# Patient Record
Sex: MALE | Race: OTHER | Employment: FULL TIME
[De-identification: names, ages, dates, MRNs, and addresses within clinical notes are randomized per-mention and may not be internally consistent; named-entity substitution may affect disease eponyms.]

---

## 2017-01-02 ENCOUNTER — TELEPHONE (OUTPATIENT)
Dept: FAMILY MEDICINE CLINIC | Facility: CLINIC | Age: 37
End: 2017-01-02

## 2017-01-02 PROBLEM — F40.10 SOCIAL ANXIETY DISORDER: Status: ACTIVE | Noted: 2017-01-02

## 2017-01-02 PROBLEM — I10 ESSENTIAL HYPERTENSION: Status: ACTIVE | Noted: 2017-01-02

## 2017-03-06 ENCOUNTER — TELEPHONE (OUTPATIENT)
Dept: FAMILY MEDICINE CLINIC | Facility: CLINIC | Age: 37
End: 2017-03-06

## 2017-03-06 DIAGNOSIS — F40.10 SOCIAL ANXIETY DISORDER: Primary | ICD-10-CM

## 2017-03-08 RX ORDER — BUSPIRONE HYDROCHLORIDE 10 MG/1
10 TABLET ORAL 3 TIMES DAILY
Qty: 90 TABLET | Refills: 1 | Status: SHIPPED | OUTPATIENT
Start: 2017-03-08 | End: 2017-08-18 | Stop reason: SDUPTHER

## 2017-03-19 DIAGNOSIS — I10 ESSENTIAL HYPERTENSION: ICD-10-CM

## 2017-03-19 RX ORDER — LISINOPRIL 5 MG/1
TABLET ORAL
Qty: 90 TABLET | Refills: 0 | Status: SHIPPED | OUTPATIENT
Start: 2017-03-19 | End: 2017-04-07 | Stop reason: SDUPTHER

## 2017-03-22 ENCOUNTER — HOSPITAL ENCOUNTER (OUTPATIENT)
Age: 37
Discharge: HOME | End: 2017-03-22
Payer: MEDICAID

## 2017-03-22 ENCOUNTER — OFFICE VISIT (OUTPATIENT)
Dept: FAMILY MEDICINE CLINIC | Age: 37
End: 2017-03-22
Payer: MEDICAID

## 2017-03-22 VITALS
SYSTOLIC BLOOD PRESSURE: 125 MMHG | HEIGHT: 74 IN | TEMPERATURE: 98.2 F | DIASTOLIC BLOOD PRESSURE: 74 MMHG | WEIGHT: 264 LBS | OXYGEN SATURATION: 97 % | HEART RATE: 78 BPM | RESPIRATION RATE: 18 BRPM | BODY MASS INDEX: 33.88 KG/M2

## 2017-03-22 DIAGNOSIS — R94.31 ABNORMAL EKG: ICD-10-CM

## 2017-03-22 DIAGNOSIS — I10 ESSENTIAL HYPERTENSION: Primary | ICD-10-CM

## 2017-03-22 DIAGNOSIS — F33.2 SEVERE EPISODE OF RECURRENT MAJOR DEPRESSIVE DISORDER, WITHOUT PSYCHOTIC FEATURES (HCC): ICD-10-CM

## 2017-03-22 DIAGNOSIS — R53.83 OTHER FATIGUE: ICD-10-CM

## 2017-03-22 DIAGNOSIS — J45.40 MODERATE PERSISTENT ASTHMA WITHOUT COMPLICATION: ICD-10-CM

## 2017-03-22 DIAGNOSIS — I11.9 LVH (LEFT VENTRICULAR HYPERTROPHY) DUE TO HYPERTENSIVE DISEASE, WITHOUT HEART FAILURE: ICD-10-CM

## 2017-03-22 DIAGNOSIS — E78.5 HYPERLIPIDEMIA WITH TARGET LDL LESS THAN 100: ICD-10-CM

## 2017-03-22 DIAGNOSIS — M13.0 SERONEGATIVE POLYARTHRITIS: ICD-10-CM

## 2017-03-22 DIAGNOSIS — Z23 NEED FOR TDAP VACCINATION: ICD-10-CM

## 2017-03-22 DIAGNOSIS — R73.9 HYPERGLYCEMIA: ICD-10-CM

## 2017-03-22 DIAGNOSIS — E66.9 OBESITY (BMI 30-39.9): ICD-10-CM

## 2017-03-22 DIAGNOSIS — Z13.31 POSITIVE DEPRESSION SCREENING: ICD-10-CM

## 2017-03-22 DIAGNOSIS — R61 EXCESSIVE SWEATING: ICD-10-CM

## 2017-03-22 DIAGNOSIS — R53.82 CHRONIC FATIGUE: ICD-10-CM

## 2017-03-22 PROBLEM — K75.2 NONSPECIFIC REACTIVE HEPATITIS: Status: ACTIVE | Noted: 2017-03-22

## 2017-03-22 LAB
ABSOLUTE EOS #: 0.3 K/UL (ref 0–0.4)
ABSOLUTE LYMPH #: 2.3 K/UL (ref 1–4.8)
ABSOLUTE MONO #: 0.7 K/UL (ref 0.1–1.3)
ALBUMIN SERPL-MCNC: 4.3 G/DL (ref 3.5–5.2)
ALBUMIN/GLOBULIN RATIO: ABNORMAL (ref 1–2.5)
ALP BLD-CCNC: 81 U/L (ref 40–129)
ALT SERPL-CCNC: 40 U/L (ref 5–41)
ANION GAP SERPL CALCULATED.3IONS-SCNC: 12 MMOL/L (ref 9–17)
AST SERPL-CCNC: 46 U/L
BASOPHILS # BLD: 1 % (ref 0–2)
BASOPHILS ABSOLUTE: 0.1 K/UL (ref 0–0.2)
BILIRUB SERPL-MCNC: 0.48 MG/DL (ref 0.3–1.2)
BUN BLDV-MCNC: 13 MG/DL (ref 6–20)
BUN/CREAT BLD: ABNORMAL (ref 9–20)
C-REACTIVE PROTEIN: 3.5 MG/L (ref 0–5)
CALCIUM SERPL-MCNC: 9.7 MG/DL (ref 8.6–10.4)
CHLORIDE BLD-SCNC: 103 MMOL/L (ref 98–107)
CHOLESTEROL/HDL RATIO: 7.3
CHOLESTEROL: 290 MG/DL
CO2: 26 MMOL/L (ref 20–31)
CREAT SERPL-MCNC: 0.77 MG/DL (ref 0.7–1.2)
DIFFERENTIAL TYPE: ABNORMAL
EOSINOPHILS RELATIVE PERCENT: 4 % (ref 0–4)
GFR AFRICAN AMERICAN: >60 ML/MIN
GFR NON-AFRICAN AMERICAN: >60 ML/MIN
GFR SERPL CREATININE-BSD FRML MDRD: ABNORMAL ML/MIN/{1.73_M2}
GFR SERPL CREATININE-BSD FRML MDRD: ABNORMAL ML/MIN/{1.73_M2}
GLUCOSE BLD-MCNC: 87 MG/DL (ref 70–99)
HCT VFR BLD CALC: 48.1 % (ref 41–53)
HDLC SERPL-MCNC: 40 MG/DL
HEMOGLOBIN: 16.2 G/DL (ref 13.5–17.5)
LDL CHOLESTEROL: 194 MG/DL (ref 0–130)
LYMPHOCYTES # BLD: 31 % (ref 24–44)
MCH RBC QN AUTO: 30.7 PG (ref 26–34)
MCHC RBC AUTO-ENTMCNC: 33.5 G/DL (ref 31–37)
MCV RBC AUTO: 91.5 FL (ref 80–100)
MONOCYTES # BLD: 9 % (ref 1–7)
PDW BLD-RTO: 14.3 % (ref 11.5–14.9)
PLATELET # BLD: 314 K/UL (ref 150–450)
PLATELET ESTIMATE: ABNORMAL
PMV BLD AUTO: 9.2 FL (ref 6–12)
POTASSIUM SERPL-SCNC: 4.7 MMOL/L (ref 3.7–5.3)
RBC # BLD: 5.26 M/UL (ref 4.5–5.9)
RBC # BLD: ABNORMAL 10*6/UL
SEDIMENTATION RATE, ERYTHROCYTE: 1 MM (ref 0–15)
SEG NEUTROPHILS: 55 % (ref 36–66)
SEGMENTED NEUTROPHILS ABSOLUTE COUNT: 4.1 K/UL (ref 1.3–9.1)
SODIUM BLD-SCNC: 141 MMOL/L (ref 135–144)
TOTAL PROTEIN: 7.6 G/DL (ref 6.4–8.3)
TRIGL SERPL-MCNC: 278 MG/DL
TSH SERPL DL<=0.05 MIU/L-ACNC: 3.66 MIU/L (ref 0.3–5)
VLDLC SERPL CALC-MCNC: ABNORMAL MG/DL (ref 1–30)
WBC # BLD: 7.5 K/UL (ref 3.5–11)
WBC # BLD: ABNORMAL 10*3/UL

## 2017-03-22 PROCEDURE — 84443 ASSAY THYROID STIM HORMONE: CPT

## 2017-03-22 PROCEDURE — 85025 COMPLETE CBC W/AUTO DIFF WBC: CPT

## 2017-03-22 PROCEDURE — 36415 COLL VENOUS BLD VENIPUNCTURE: CPT

## 2017-03-22 PROCEDURE — 96160 PT-FOCUSED HLTH RISK ASSMT: CPT | Performed by: FAMILY MEDICINE

## 2017-03-22 PROCEDURE — 80053 COMPREHEN METABOLIC PANEL: CPT

## 2017-03-22 PROCEDURE — 83036 HEMOGLOBIN GLYCOSYLATED A1C: CPT

## 2017-03-22 PROCEDURE — 99215 OFFICE O/P EST HI 40 MIN: CPT | Performed by: FAMILY MEDICINE

## 2017-03-22 PROCEDURE — 85651 RBC SED RATE NONAUTOMATED: CPT

## 2017-03-22 PROCEDURE — 86140 C-REACTIVE PROTEIN: CPT

## 2017-03-22 PROCEDURE — G8431 POS CLIN DEPRES SCRN F/U DOC: HCPCS | Performed by: FAMILY MEDICINE

## 2017-03-22 PROCEDURE — 80061 LIPID PANEL: CPT

## 2017-03-22 RX ORDER — ATORVASTATIN CALCIUM 20 MG/1
20 TABLET, FILM COATED ORAL DAILY
Qty: 30 TABLET | Refills: 6 | Status: SHIPPED | OUTPATIENT
Start: 2017-03-22 | End: 2017-04-07 | Stop reason: SDUPTHER

## 2017-03-22 RX ORDER — FLUTICASONE PROPIONATE 110 UG/1
2 AEROSOL, METERED RESPIRATORY (INHALATION) 2 TIMES DAILY
Qty: 1 INHALER | Refills: 3 | Status: SHIPPED | OUTPATIENT
Start: 2017-03-22 | End: 2017-04-07 | Stop reason: SDUPTHER

## 2017-03-22 RX ORDER — BLOOD PRESSURE TEST KIT
KIT MISCELLANEOUS
Qty: 1 KIT | Refills: 0 | Status: SHIPPED | OUTPATIENT
Start: 2017-03-22

## 2017-03-22 RX ORDER — BUPROPION HYDROCHLORIDE 150 MG/1
150 TABLET ORAL EVERY MORNING
Qty: 30 TABLET | Refills: 3 | Status: SHIPPED | OUTPATIENT
Start: 2017-03-22 | End: 2017-04-07 | Stop reason: SDUPTHER

## 2017-03-22 RX ORDER — AMLODIPINE BESYLATE 2.5 MG/1
2.5 TABLET ORAL DAILY
Qty: 30 TABLET | Refills: 3 | Status: SHIPPED | OUTPATIENT
Start: 2017-03-22 | End: 2017-04-07 | Stop reason: SDUPTHER

## 2017-03-22 ASSESSMENT — PATIENT HEALTH QUESTIONNAIRE - PHQ9
6. FEELING BAD ABOUT YOURSELF - OR THAT YOU ARE A FAILURE OR HAVE LET YOURSELF OR YOUR FAMILY DOWN: 3
2. FEELING DOWN, DEPRESSED OR HOPELESS: 3
SUM OF ALL RESPONSES TO PHQ QUESTIONS 1-9: 23
10. IF YOU CHECKED OFF ANY PROBLEMS, HOW DIFFICULT HAVE THESE PROBLEMS MADE IT FOR YOU TO DO YOUR WORK, TAKE CARE OF THINGS AT HOME, OR GET ALONG WITH OTHER PEOPLE: 1
3. TROUBLE FALLING OR STAYING ASLEEP: 3
5. POOR APPETITE OR OVEREATING: 3
1. LITTLE INTEREST OR PLEASURE IN DOING THINGS: 3
SUM OF ALL RESPONSES TO PHQ9 QUESTIONS 1 & 2: 6
7. TROUBLE CONCENTRATING ON THINGS, SUCH AS READING THE NEWSPAPER OR WATCHING TELEVISION: 2
8. MOVING OR SPEAKING SO SLOWLY THAT OTHER PEOPLE COULD HAVE NOTICED. OR THE OPPOSITE, BEING SO FIGETY OR RESTLESS THAT YOU HAVE BEEN MOVING AROUND A LOT MORE THAN USUAL: 2
9. THOUGHTS THAT YOU WOULD BE BETTER OFF DEAD, OR OF HURTING YOURSELF: 2
4. FEELING TIRED OR HAVING LITTLE ENERGY: 2

## 2017-03-22 ASSESSMENT — ASTHMA QUESTIONNAIRES
QUESTION_2 LAST FOUR WEEKS HOW OFTEN HAVE YOU HAD SHORTNESS OF BREATH: 3
ACT_TOTALSCORE: 16
QUESTION_3 LAST FOUR WEEKS HOW OFTEN DID YOUR ASTHMA SYMPTOMS (WHEEZING, COUGHING, SHORTNESS OF BREATH, CHEST TIGHTNESS OR PAIN) WAKE YOU UP AT NIGHT OR EARLIER THAN USUAL IN THE MORNING: 5
QUESTION_4 LAST FOUR WEEKS HOW OFTEN HAVE YOU USED YOUR RESCUE INHALER OR NEBULIZER MEDICATION (SUCH AS ALBUTEROL): 2
QUESTION_1 LAST FOUR WEEKS HOW MUCH OF THE TIME DID YOUR ASTHMA KEEP YOU FROM GETTING AS MUCH DONE AT WORK, SCHOOL OR AT HOME: 4
QUESTION_5 LAST FOUR WEEKS HOW WOULD YOU RATE YOUR ASTHMA CONTROL: 2

## 2017-03-22 ASSESSMENT — ENCOUNTER SYMPTOMS
WHEEZING: 0
COUGH: 1
ABDOMINAL DISTENTION: 0
VOMITING: 0
CONSTIPATION: 0
DIARRHEA: 0
CHEST TIGHTNESS: 0
NAUSEA: 0
ABDOMINAL PAIN: 0
SHORTNESS OF BREATH: 1

## 2017-03-23 PROBLEM — Z13.31 POSITIVE DEPRESSION SCREENING: Status: ACTIVE | Noted: 2017-03-23

## 2017-03-23 PROBLEM — R94.31 ABNORMAL EKG: Status: ACTIVE | Noted: 2017-03-23

## 2017-03-23 PROBLEM — E78.5 HYPERLIPIDEMIA WITH TARGET LDL LESS THAN 100: Status: ACTIVE | Noted: 2017-03-23

## 2017-03-23 PROBLEM — I11.9 LVH (LEFT VENTRICULAR HYPERTROPHY) DUE TO HYPERTENSIVE DISEASE: Status: ACTIVE | Noted: 2017-03-23

## 2017-03-23 PROBLEM — F33.1 MODERATE EPISODE OF RECURRENT MAJOR DEPRESSIVE DISORDER (HCC): Status: ACTIVE | Noted: 2017-03-23

## 2017-03-23 PROBLEM — J45.40 MODERATE PERSISTENT ASTHMA WITHOUT COMPLICATION: Status: ACTIVE | Noted: 2017-03-23

## 2017-03-23 PROBLEM — R73.9 HYPERGLYCEMIA: Status: ACTIVE | Noted: 2017-03-23

## 2017-03-23 PROBLEM — R61 EXCESSIVE SWEATING: Status: ACTIVE | Noted: 2017-03-23

## 2017-03-23 PROBLEM — E66.9 OBESITY (BMI 30-39.9): Status: ACTIVE | Noted: 2017-03-23

## 2017-03-23 LAB
ESTIMATED AVERAGE GLUCOSE: 111 MG/DL
HBA1C MFR BLD: 5.5 % (ref 4–6)

## 2017-03-24 DIAGNOSIS — B35.3 TINEA PEDIS OF RIGHT FOOT: Primary | ICD-10-CM

## 2017-03-24 DIAGNOSIS — B35.1 ONYCHOMYCOSIS: ICD-10-CM

## 2017-03-24 DIAGNOSIS — K75.2 NONSPECIFIC REACTIVE HEPATITIS: ICD-10-CM

## 2017-03-24 DIAGNOSIS — Z51.81 MEDICATION MONITORING ENCOUNTER: ICD-10-CM

## 2017-03-24 RX ORDER — TERBINAFINE HYDROCHLORIDE 250 MG/1
250 TABLET ORAL DAILY
Qty: 30 TABLET | Refills: 0 | Status: SHIPPED | OUTPATIENT
Start: 2017-03-24 | End: 2017-04-07 | Stop reason: SDUPTHER

## 2017-04-07 DIAGNOSIS — B35.3 TINEA PEDIS OF RIGHT FOOT: ICD-10-CM

## 2017-04-07 DIAGNOSIS — I10 ESSENTIAL HYPERTENSION: ICD-10-CM

## 2017-04-07 DIAGNOSIS — B35.1 ONYCHOMYCOSIS: ICD-10-CM

## 2017-04-07 DIAGNOSIS — F33.2 SEVERE EPISODE OF RECURRENT MAJOR DEPRESSIVE DISORDER, WITHOUT PSYCHOTIC FEATURES (HCC): ICD-10-CM

## 2017-04-07 DIAGNOSIS — J45.40 MODERATE PERSISTENT ASTHMA WITHOUT COMPLICATION: ICD-10-CM

## 2017-04-07 DIAGNOSIS — E78.5 HYPERLIPIDEMIA WITH TARGET LDL LESS THAN 100: ICD-10-CM

## 2017-04-07 RX ORDER — BUPROPION HYDROCHLORIDE 150 MG/1
150 TABLET ORAL EVERY MORNING
Qty: 30 TABLET | Refills: 3 | Status: SHIPPED | OUTPATIENT
Start: 2017-04-07 | End: 2017-08-18 | Stop reason: SDUPTHER

## 2017-04-07 RX ORDER — LISINOPRIL 5 MG/1
5 TABLET ORAL DAILY
Qty: 30 TABLET | Refills: 3 | Status: SHIPPED | OUTPATIENT
Start: 2017-04-07 | End: 2017-08-28 | Stop reason: SDUPTHER

## 2017-04-07 RX ORDER — ATORVASTATIN CALCIUM 20 MG/1
20 TABLET, FILM COATED ORAL DAILY
Qty: 30 TABLET | Refills: 6 | Status: SHIPPED | OUTPATIENT
Start: 2017-04-07 | End: 2017-11-10 | Stop reason: SDUPTHER

## 2017-04-07 RX ORDER — TERBINAFINE HYDROCHLORIDE 250 MG/1
250 TABLET ORAL DAILY
Qty: 30 TABLET | Refills: 0 | Status: SHIPPED | OUTPATIENT
Start: 2017-04-07 | End: 2017-05-07

## 2017-04-07 RX ORDER — AMLODIPINE BESYLATE 2.5 MG/1
2.5 TABLET ORAL DAILY
Qty: 30 TABLET | Refills: 3 | Status: SHIPPED | OUTPATIENT
Start: 2017-04-07 | End: 2017-08-28 | Stop reason: SDUPTHER

## 2017-04-07 RX ORDER — FLUTICASONE PROPIONATE 110 UG/1
2 AEROSOL, METERED RESPIRATORY (INHALATION) 2 TIMES DAILY
Qty: 1 INHALER | Refills: 3 | Status: SHIPPED | OUTPATIENT
Start: 2017-04-07 | End: 2017-08-28 | Stop reason: SDUPTHER

## 2017-04-13 ENCOUNTER — HOSPITAL ENCOUNTER (OUTPATIENT)
Dept: PULMONOLOGY | Age: 37
Discharge: HOME | End: 2017-04-13
Payer: COMMERCIAL

## 2017-04-13 DIAGNOSIS — M13.0 SERONEGATIVE POLYARTHRITIS: ICD-10-CM

## 2017-04-13 PROCEDURE — 94729 DIFFUSING CAPACITY: CPT

## 2017-04-13 PROCEDURE — 6360000002 HC RX W HCPCS: Performed by: FAMILY MEDICINE

## 2017-04-13 PROCEDURE — 94727 GAS DIL/WSHOT DETER LNG VOL: CPT

## 2017-04-13 PROCEDURE — 94726 PLETHYSMOGRAPHY LUNG VOLUMES: CPT

## 2017-04-13 PROCEDURE — 94728 AIRWY RESIST BY OSCILLOMETRY: CPT

## 2017-04-13 PROCEDURE — 94060 EVALUATION OF WHEEZING: CPT

## 2017-04-13 RX ORDER — ALBUTEROL SULFATE 2.5 MG/3ML
2.5 SOLUTION RESPIRATORY (INHALATION) ONCE
Status: COMPLETED | OUTPATIENT
Start: 2017-04-13 | End: 2017-04-13

## 2017-04-13 RX ORDER — SULFASALAZINE 500 MG/1
1000 TABLET ORAL 2 TIMES DAILY
Qty: 120 TABLET | Refills: 3 | OUTPATIENT
Start: 2017-04-13

## 2017-04-13 RX ADMIN — ALBUTEROL SULFATE 2.5 MG: 2.5 SOLUTION RESPIRATORY (INHALATION) at 10:14

## 2017-05-22 ENCOUNTER — TELEPHONE (OUTPATIENT)
Dept: FAMILY MEDICINE CLINIC | Age: 37
End: 2017-05-22

## 2017-05-22 DIAGNOSIS — M13.0 SERONEGATIVE POLYARTHRITIS: Primary | ICD-10-CM

## 2017-05-23 ENCOUNTER — HOSPITAL ENCOUNTER (OUTPATIENT)
Dept: SLEEP CENTER | Age: 37
Discharge: HOME | End: 2017-05-23
Payer: COMMERCIAL

## 2017-05-23 VITALS
RESPIRATION RATE: 18 BRPM | HEART RATE: 78 BPM | DIASTOLIC BLOOD PRESSURE: 74 MMHG | BODY MASS INDEX: 33.88 KG/M2 | WEIGHT: 264 LBS | SYSTOLIC BLOOD PRESSURE: 125 MMHG | HEIGHT: 74 IN

## 2017-05-23 DIAGNOSIS — G47.33 OSA (OBSTRUCTIVE SLEEP APNEA): Primary | ICD-10-CM

## 2017-05-23 PROCEDURE — 95810 POLYSOM 6/> YRS 4/> PARAM: CPT

## 2017-05-24 ASSESSMENT — SLEEP AND FATIGUE QUESTIONNAIRES
HOW LIKELY ARE YOU TO NOD OFF OR FALL ASLEEP IN A CAR, WHILE STOPPED FOR A FEW MINUTES IN TRAFFIC: 0
HOW LIKELY ARE YOU TO NOD OFF OR FALL ASLEEP WHILE WATCHING TV: 0
ESS TOTAL SCORE: 2
NECK CIRCUMFERENCE (INCHES): 17.32
HOW LIKELY ARE YOU TO NOD OFF OR FALL ASLEEP WHILE SITTING AND READING: 0
HOW LIKELY ARE YOU TO NOD OFF OR FALL ASLEEP WHILE SITTING QUIETLY AFTER LUNCH WITHOUT ALCOHOL: 0
HOW LIKELY ARE YOU TO NOD OFF OR FALL ASLEEP WHILE LYING DOWN TO REST IN THE AFTERNOON WHEN CIRCUMSTANCES PERMIT: 0
HOW LIKELY ARE YOU TO NOD OFF OR FALL ASLEEP WHILE SITTING AND TALKING TO SOMEONE: 0
HOW LIKELY ARE YOU TO NOD OFF OR FALL ASLEEP WHEN YOU ARE A PASSENGER IN A CAR FOR AN HOUR WITHOUT A BREAK: 2
HOW LIKELY ARE YOU TO NOD OFF OR FALL ASLEEP WHILE SITTING INACTIVE IN A PUBLIC PLACE: 0

## 2017-05-26 ENCOUNTER — OFFICE VISIT (OUTPATIENT)
Dept: FAMILY MEDICINE CLINIC | Age: 37
End: 2017-05-26
Payer: COMMERCIAL

## 2017-05-26 ENCOUNTER — HOSPITAL ENCOUNTER (OUTPATIENT)
Age: 37
Discharge: HOME | End: 2017-05-26
Payer: COMMERCIAL

## 2017-05-26 VITALS
HEART RATE: 69 BPM | SYSTOLIC BLOOD PRESSURE: 126 MMHG | DIASTOLIC BLOOD PRESSURE: 70 MMHG | RESPIRATION RATE: 18 BRPM | BODY MASS INDEX: 33.88 KG/M2 | OXYGEN SATURATION: 100 % | TEMPERATURE: 98.5 F | HEIGHT: 74 IN | WEIGHT: 264 LBS

## 2017-05-26 DIAGNOSIS — E66.9 OBESITY (BMI 30-39.9): ICD-10-CM

## 2017-05-26 DIAGNOSIS — E78.5 HYPERLIPIDEMIA WITH TARGET LDL LESS THAN 100: ICD-10-CM

## 2017-05-26 DIAGNOSIS — K75.2 NONSPECIFIC REACTIVE HEPATITIS: ICD-10-CM

## 2017-05-26 DIAGNOSIS — Z23 NEED FOR TDAP VACCINATION: ICD-10-CM

## 2017-05-26 DIAGNOSIS — I10 ESSENTIAL HYPERTENSION: ICD-10-CM

## 2017-05-26 DIAGNOSIS — R10.13 DYSPEPSIA: ICD-10-CM

## 2017-05-26 DIAGNOSIS — J45.40 MODERATE PERSISTENT ASTHMA WITHOUT COMPLICATION: Primary | ICD-10-CM

## 2017-05-26 DIAGNOSIS — F33.1 MODERATE EPISODE OF RECURRENT MAJOR DEPRESSIVE DISORDER (HCC): ICD-10-CM

## 2017-05-26 DIAGNOSIS — F40.10 SOCIAL ANXIETY DISORDER: ICD-10-CM

## 2017-05-26 DIAGNOSIS — E78.1 HYPERTRIGLYCERIDEMIA: ICD-10-CM

## 2017-05-26 LAB
ALBUMIN SERPL-MCNC: 4.5 G/DL (ref 3.5–5.2)
ALBUMIN/GLOBULIN RATIO: ABNORMAL (ref 1–2.5)
ALP BLD-CCNC: 116 U/L (ref 40–129)
ALT SERPL-CCNC: 27 U/L (ref 5–41)
ANION GAP SERPL CALCULATED.3IONS-SCNC: 12 MMOL/L (ref 9–17)
AST SERPL-CCNC: 19 U/L
BILIRUB SERPL-MCNC: 0.27 MG/DL (ref 0.3–1.2)
BUN BLDV-MCNC: 12 MG/DL (ref 6–20)
BUN/CREAT BLD: ABNORMAL (ref 9–20)
CALCIUM SERPL-MCNC: 9.7 MG/DL (ref 8.6–10.4)
CHLORIDE BLD-SCNC: 100 MMOL/L (ref 98–107)
CO2: 27 MMOL/L (ref 20–31)
CREAT SERPL-MCNC: 0.8 MG/DL (ref 0.7–1.2)
GFR AFRICAN AMERICAN: >60 ML/MIN
GFR NON-AFRICAN AMERICAN: >60 ML/MIN
GFR SERPL CREATININE-BSD FRML MDRD: ABNORMAL ML/MIN/{1.73_M2}
GFR SERPL CREATININE-BSD FRML MDRD: ABNORMAL ML/MIN/{1.73_M2}
GLUCOSE BLD-MCNC: 97 MG/DL (ref 70–99)
HAV IGM SER IA-ACNC: NONREACTIVE
HEPATITIS B CORE IGM ANTIBODY: NONREACTIVE
HEPATITIS B SURFACE ANTIGEN: NONREACTIVE
HEPATITIS C ANTIBODY: NONREACTIVE
POTASSIUM SERPL-SCNC: 4.8 MMOL/L (ref 3.7–5.3)
SODIUM BLD-SCNC: 139 MMOL/L (ref 135–144)
TOTAL PROTEIN: 8 G/DL (ref 6.4–8.3)

## 2017-05-26 PROCEDURE — 36415 COLL VENOUS BLD VENIPUNCTURE: CPT

## 2017-05-26 PROCEDURE — 99214 OFFICE O/P EST MOD 30 MIN: CPT | Performed by: FAMILY MEDICINE

## 2017-05-26 PROCEDURE — 80074 ACUTE HEPATITIS PANEL: CPT

## 2017-05-26 PROCEDURE — 80053 COMPREHEN METABOLIC PANEL: CPT

## 2017-05-26 PROCEDURE — 96127 BRIEF EMOTIONAL/BEHAV ASSMT: CPT | Performed by: FAMILY MEDICINE

## 2017-05-26 RX ORDER — OMEPRAZOLE 40 MG/1
40 CAPSULE, DELAYED RELEASE ORAL DAILY
Qty: 30 CAPSULE | Refills: 1 | Status: SHIPPED | OUTPATIENT
Start: 2017-05-26 | End: 2017-06-30 | Stop reason: SDUPTHER

## 2017-05-26 ASSESSMENT — ASTHMA QUESTIONNAIRES
ACT_TOTALSCORE: 25
QUESTION_5 LAST FOUR WEEKS HOW WOULD YOU RATE YOUR ASTHMA CONTROL: 5
QUESTION_3 LAST FOUR WEEKS HOW OFTEN DID YOUR ASTHMA SYMPTOMS (WHEEZING, COUGHING, SHORTNESS OF BREATH, CHEST TIGHTNESS OR PAIN) WAKE YOU UP AT NIGHT OR EARLIER THAN USUAL IN THE MORNING: 5
QUESTION_1 LAST FOUR WEEKS HOW MUCH OF THE TIME DID YOUR ASTHMA KEEP YOU FROM GETTING AS MUCH DONE AT WORK, SCHOOL OR AT HOME: 5
QUESTION_2 LAST FOUR WEEKS HOW OFTEN HAVE YOU HAD SHORTNESS OF BREATH: 5
QUESTION_4 LAST FOUR WEEKS HOW OFTEN HAVE YOU USED YOUR RESCUE INHALER OR NEBULIZER MEDICATION (SUCH AS ALBUTEROL): 5

## 2017-05-26 ASSESSMENT — ENCOUNTER SYMPTOMS
CHEST TIGHTNESS: 0
COUGH: 0
CONSTIPATION: 0
WHEEZING: 0
ABDOMINAL PAIN: 1
SHORTNESS OF BREATH: 0
NAUSEA: 0
APNEA: 1
ABDOMINAL DISTENTION: 1
VOMITING: 0
DIARRHEA: 0

## 2017-05-26 ASSESSMENT — PATIENT HEALTH QUESTIONNAIRE - PHQ9
SUM OF ALL RESPONSES TO PHQ QUESTIONS 1-9: 9
10. IF YOU CHECKED OFF ANY PROBLEMS, HOW DIFFICULT HAVE THESE PROBLEMS MADE IT FOR YOU TO DO YOUR WORK, TAKE CARE OF THINGS AT HOME, OR GET ALONG WITH OTHER PEOPLE: 1
SUM OF ALL RESPONSES TO PHQ9 QUESTIONS 1 & 2: 0
3. TROUBLE FALLING OR STAYING ASLEEP: 3
8. MOVING OR SPEAKING SO SLOWLY THAT OTHER PEOPLE COULD HAVE NOTICED. OR THE OPPOSITE, BEING SO FIGETY OR RESTLESS THAT YOU HAVE BEEN MOVING AROUND A LOT MORE THAN USUAL: 0
1. LITTLE INTEREST OR PLEASURE IN DOING THINGS: 0
9. THOUGHTS THAT YOU WOULD BE BETTER OFF DEAD, OR OF HURTING YOURSELF: 0
2. FEELING DOWN, DEPRESSED OR HOPELESS: 0
7. TROUBLE CONCENTRATING ON THINGS, SUCH AS READING THE NEWSPAPER OR WATCHING TELEVISION: 0
5. POOR APPETITE OR OVEREATING: 2
4. FEELING TIRED OR HAVING LITTLE ENERGY: 3
6. FEELING BAD ABOUT YOURSELF - OR THAT YOU ARE A FAILURE OR HAVE LET YOURSELF OR YOUR FAMILY DOWN: 1

## 2017-05-28 PROBLEM — E78.1 HYPERTRIGLYCERIDEMIA: Status: ACTIVE | Noted: 2017-05-28

## 2017-05-28 PROBLEM — R10.13 DYSPEPSIA: Status: ACTIVE | Noted: 2017-05-28

## 2017-06-10 ENCOUNTER — HOSPITAL ENCOUNTER (OUTPATIENT)
Dept: SLEEP CENTER | Age: 37
Discharge: HOME | End: 2017-06-10
Payer: COMMERCIAL

## 2017-06-10 DIAGNOSIS — G47.33 OSA (OBSTRUCTIVE SLEEP APNEA): Primary | ICD-10-CM

## 2017-06-10 PROCEDURE — 95811 POLYSOM 6/>YRS CPAP 4/> PARM: CPT

## 2017-06-11 VITALS
HEIGHT: 74 IN | SYSTOLIC BLOOD PRESSURE: 125 MMHG | WEIGHT: 264 LBS | HEART RATE: 65 BPM | BODY MASS INDEX: 33.88 KG/M2 | DIASTOLIC BLOOD PRESSURE: 74 MMHG | RESPIRATION RATE: 24 BRPM

## 2017-06-13 DIAGNOSIS — B35.3 TINEA PEDIS OF RIGHT FOOT: ICD-10-CM

## 2017-06-13 DIAGNOSIS — B35.1 ONYCHOMYCOSIS: ICD-10-CM

## 2017-06-13 RX ORDER — TERBINAFINE HYDROCHLORIDE 250 MG/1
250 TABLET ORAL DAILY
Qty: 30 TABLET | Refills: 0 | Status: SHIPPED | OUTPATIENT
Start: 2017-06-13 | End: 2017-07-13

## 2017-06-19 DIAGNOSIS — R06.00 DYSPNEA, UNSPECIFIED TYPE: ICD-10-CM

## 2017-06-19 RX ORDER — ALBUTEROL SULFATE 90 UG/1
2 AEROSOL, METERED RESPIRATORY (INHALATION) EVERY 6 HOURS PRN
Qty: 8 G | Refills: 5 | Status: SHIPPED | OUTPATIENT
Start: 2017-06-19 | End: 2019-02-22

## 2017-06-30 DIAGNOSIS — R10.13 DYSPEPSIA: ICD-10-CM

## 2017-06-30 RX ORDER — OMEPRAZOLE 40 MG/1
CAPSULE, DELAYED RELEASE ORAL
Qty: 30 CAPSULE | Refills: 1 | Status: SHIPPED | OUTPATIENT
Start: 2017-06-30 | End: 2017-08-03 | Stop reason: SDUPTHER

## 2017-08-03 DIAGNOSIS — R10.13 DYSPEPSIA: ICD-10-CM

## 2017-08-03 RX ORDER — OMEPRAZOLE 40 MG/1
CAPSULE, DELAYED RELEASE ORAL
Qty: 30 CAPSULE | Refills: 2 | Status: SHIPPED | OUTPATIENT
Start: 2017-08-03 | End: 2017-10-09 | Stop reason: SDUPTHER

## 2017-08-18 DIAGNOSIS — F33.2 SEVERE EPISODE OF RECURRENT MAJOR DEPRESSIVE DISORDER, WITHOUT PSYCHOTIC FEATURES (HCC): ICD-10-CM

## 2017-08-18 DIAGNOSIS — F40.10 SOCIAL ANXIETY DISORDER: ICD-10-CM

## 2017-08-18 RX ORDER — BUSPIRONE HYDROCHLORIDE 10 MG/1
TABLET ORAL
Qty: 90 TABLET | Refills: 0 | Status: SHIPPED | OUTPATIENT
Start: 2017-08-18 | End: 2017-09-29 | Stop reason: SDUPTHER

## 2017-08-18 RX ORDER — BUPROPION HYDROCHLORIDE 150 MG/1
TABLET ORAL
Qty: 30 TABLET | Refills: 3 | Status: ON HOLD | OUTPATIENT
Start: 2017-08-18 | End: 2019-03-29 | Stop reason: ALTCHOICE

## 2017-08-28 DIAGNOSIS — I10 ESSENTIAL HYPERTENSION: ICD-10-CM

## 2017-08-28 DIAGNOSIS — J45.40 MODERATE PERSISTENT ASTHMA WITHOUT COMPLICATION: ICD-10-CM

## 2017-08-28 RX ORDER — AMLODIPINE BESYLATE 2.5 MG/1
TABLET ORAL
Qty: 30 TABLET | Refills: 3 | Status: SHIPPED | OUTPATIENT
Start: 2017-08-28 | End: 2017-11-10 | Stop reason: SDUPTHER

## 2017-08-28 RX ORDER — DEXAMETHASONE 4 MG/1
TABLET ORAL
Qty: 12 INHALER | Refills: 3 | Status: SHIPPED | OUTPATIENT
Start: 2017-08-28 | End: 2018-11-19 | Stop reason: SDUPTHER

## 2017-08-28 RX ORDER — LISINOPRIL 5 MG/1
TABLET ORAL
Qty: 30 TABLET | Refills: 3 | Status: SHIPPED | OUTPATIENT
Start: 2017-08-28 | End: 2017-11-10 | Stop reason: SDUPTHER

## 2017-09-06 ENCOUNTER — TELEPHONE (OUTPATIENT)
Dept: FAMILY MEDICINE CLINIC | Age: 37
End: 2017-09-06

## 2017-09-29 ENCOUNTER — OFFICE VISIT (OUTPATIENT)
Dept: FAMILY MEDICINE CLINIC | Age: 37
End: 2017-09-29
Payer: COMMERCIAL

## 2017-09-29 VITALS
HEART RATE: 73 BPM | BODY MASS INDEX: 33.57 KG/M2 | WEIGHT: 261.6 LBS | TEMPERATURE: 97.8 F | DIASTOLIC BLOOD PRESSURE: 73 MMHG | OXYGEN SATURATION: 96 % | SYSTOLIC BLOOD PRESSURE: 125 MMHG | HEIGHT: 74 IN

## 2017-09-29 DIAGNOSIS — E78.5 HYPERLIPIDEMIA WITH TARGET LDL LESS THAN 100: ICD-10-CM

## 2017-09-29 DIAGNOSIS — R00.2 HEART PALPITATIONS: ICD-10-CM

## 2017-09-29 DIAGNOSIS — N52.9 ERECTILE DYSFUNCTION, UNSPECIFIED ERECTILE DYSFUNCTION TYPE: ICD-10-CM

## 2017-09-29 DIAGNOSIS — F40.10 SOCIAL ANXIETY DISORDER: ICD-10-CM

## 2017-09-29 DIAGNOSIS — I51.7 LVH (LEFT VENTRICULAR HYPERTROPHY): ICD-10-CM

## 2017-09-29 DIAGNOSIS — R53.82 CHRONIC FATIGUE: ICD-10-CM

## 2017-09-29 DIAGNOSIS — I10 ESSENTIAL HYPERTENSION: Primary | ICD-10-CM

## 2017-09-29 PROCEDURE — 99214 OFFICE O/P EST MOD 30 MIN: CPT | Performed by: FAMILY MEDICINE

## 2017-09-29 RX ORDER — BUSPIRONE HYDROCHLORIDE 10 MG/1
10 TABLET ORAL 3 TIMES DAILY PRN
Qty: 90 TABLET | Refills: 3 | Status: SHIPPED | OUTPATIENT
Start: 2017-09-29 | End: 2017-11-10 | Stop reason: SDUPTHER

## 2017-09-29 ASSESSMENT — ENCOUNTER SYMPTOMS
SHORTNESS OF BREATH: 0
WHEEZING: 0
CHEST TIGHTNESS: 0
ABDOMINAL PAIN: 0
NAUSEA: 0
ABDOMINAL DISTENTION: 0
CONSTIPATION: 0
DIARRHEA: 0
COUGH: 0
VOMITING: 0

## 2017-10-06 ENCOUNTER — HOSPITAL ENCOUNTER (OUTPATIENT)
Age: 37
Discharge: HOME | End: 2017-10-06
Payer: COMMERCIAL

## 2017-10-06 DIAGNOSIS — I10 ESSENTIAL HYPERTENSION: ICD-10-CM

## 2017-10-06 DIAGNOSIS — R53.82 CHRONIC FATIGUE: ICD-10-CM

## 2017-10-06 DIAGNOSIS — E78.5 HYPERLIPIDEMIA WITH TARGET LDL LESS THAN 100: ICD-10-CM

## 2017-10-06 DIAGNOSIS — E66.9 OBESITY (BMI 30-39.9): ICD-10-CM

## 2017-10-06 DIAGNOSIS — N52.9 ERECTILE DYSFUNCTION, UNSPECIFIED ERECTILE DYSFUNCTION TYPE: ICD-10-CM

## 2017-10-06 DIAGNOSIS — E78.1 HYPERTRIGLYCERIDEMIA: ICD-10-CM

## 2017-10-06 LAB
ANION GAP SERPL CALCULATED.3IONS-SCNC: 12 MMOL/L (ref 9–17)
BUN BLDV-MCNC: 12 MG/DL (ref 6–20)
BUN/CREAT BLD: NORMAL (ref 9–20)
CALCIUM SERPL-MCNC: 9.6 MG/DL (ref 8.6–10.4)
CHLORIDE BLD-SCNC: 106 MMOL/L (ref 98–107)
CHOLESTEROL/HDL RATIO: 4.2
CHOLESTEROL: 191 MG/DL
CO2: 25 MMOL/L (ref 20–31)
CREAT SERPL-MCNC: 0.84 MG/DL (ref 0.7–1.2)
GFR AFRICAN AMERICAN: >60 ML/MIN
GFR NON-AFRICAN AMERICAN: >60 ML/MIN
GFR SERPL CREATININE-BSD FRML MDRD: NORMAL ML/MIN/{1.73_M2}
GFR SERPL CREATININE-BSD FRML MDRD: NORMAL ML/MIN/{1.73_M2}
GLUCOSE BLD-MCNC: 92 MG/DL (ref 70–99)
HDLC SERPL-MCNC: 45 MG/DL
LDL CHOLESTEROL: 123 MG/DL (ref 0–130)
POTASSIUM SERPL-SCNC: 4.7 MMOL/L (ref 3.7–5.3)
SODIUM BLD-SCNC: 143 MMOL/L (ref 135–144)
TESTOSTERONE TOTAL: 266 NG/DL (ref 220–1000)
TRIGL SERPL-MCNC: 117 MG/DL
VLDLC SERPL CALC-MCNC: NORMAL MG/DL (ref 1–30)

## 2017-10-06 PROCEDURE — 80061 LIPID PANEL: CPT

## 2017-10-06 PROCEDURE — 36415 COLL VENOUS BLD VENIPUNCTURE: CPT

## 2017-10-06 PROCEDURE — 80048 BASIC METABOLIC PNL TOTAL CA: CPT

## 2017-10-06 PROCEDURE — 84403 ASSAY OF TOTAL TESTOSTERONE: CPT

## 2017-10-07 DIAGNOSIS — R10.13 DYSPEPSIA: ICD-10-CM

## 2017-10-09 RX ORDER — OMEPRAZOLE 40 MG/1
CAPSULE, DELAYED RELEASE ORAL
Qty: 30 CAPSULE | Refills: 3 | Status: SHIPPED | OUTPATIENT
Start: 2017-10-09 | End: 2017-11-10 | Stop reason: SDUPTHER

## 2017-10-13 ENCOUNTER — HOSPITAL ENCOUNTER (OUTPATIENT)
Dept: NON INVASIVE DIAGNOSTICS | Age: 37
Discharge: HOME | End: 2017-10-13
Payer: COMMERCIAL

## 2017-10-13 DIAGNOSIS — I51.7 LVH (LEFT VENTRICULAR HYPERTROPHY): ICD-10-CM

## 2017-10-13 DIAGNOSIS — R00.2 HEART PALPITATIONS: ICD-10-CM

## 2017-10-13 DIAGNOSIS — I10 ESSENTIAL HYPERTENSION: ICD-10-CM

## 2017-10-13 LAB
LV EF: 55 %
LVEF MODALITY: NORMAL

## 2017-10-13 PROCEDURE — 93225 XTRNL ECG REC<48 HRS REC: CPT

## 2017-10-13 PROCEDURE — 93226 XTRNL ECG REC<48 HR SCAN A/R: CPT

## 2017-10-13 PROCEDURE — 93306 TTE W/DOPPLER COMPLETE: CPT

## 2017-10-13 NOTE — PROGRESS NOTES
PLEASE NOTIFY PATIENT.echo 2d is within normal limits except mild left ventricular hypertrophy. left ventricular hypertrophy, means thickening of the muscles of the heart due to  \"heart working harder against the resistance\" which is usually due to high blood pressure. Good control of blood pressure is what you need at this time. Continue BP meds!   BP Readings from Last 3 Encounters:  09/29/17 : 125/73  06/11/17 : 125/74  05/26/17 : 126/70

## 2017-10-17 LAB
ACQUISITION DURATION: NORMAL S
AVERAGE HEART RATE: 81 BPM
HOOKUP DATE: NORMAL
HOOKUP TIME: NORMAL
MAX HEART RATE TIME/DATE: NORMAL
MAX HEART RATE: 152 BPM
MIN HEART RATE TIME/DATE: NORMAL
MIN HEART RATE: 53 BPM
NUMBER OF QRS COMPLEXES: NORMAL
NUMBER OF SUPRAVENTRICULAR BEATS IN RUNS: 0
NUMBER OF SUPRAVENTRICULAR COUPLETS: 1
NUMBER OF SUPRAVENTRICULAR ECTOPICS: 15
NUMBER OF SUPRAVENTRICULAR ISOLATED BEATS: 13
NUMBER OF SUPRAVENTRICULAR RUNS: 0
NUMBER OF VENTRICULAR BEATS IN RUNS: 0
NUMBER OF VENTRICULAR BIGEMINAL CYCLES: 0
NUMBER OF VENTRICULAR COUPLETS: 1
NUMBER OF VENTRICULAR ECTOPICS: 7
NUMBER OF VENTRICULAR ISOLATED BEATS: 5
NUMBER OF VENTRICULAR RUNS: 0

## 2017-10-31 DIAGNOSIS — M13.0 SERONEGATIVE POLYARTHRITIS: Primary | ICD-10-CM

## 2017-10-31 RX ORDER — SULFASALAZINE 500 MG/1
500 TABLET ORAL 2 TIMES DAILY
Qty: 60 TABLET | Refills: 3 | Status: SHIPPED | OUTPATIENT
Start: 2017-10-31 | End: 2018-06-12 | Stop reason: SDUPTHER

## 2017-10-31 NOTE — TELEPHONE ENCOUNTER
Per old records, he was taking sulfasalazine 500 MG, 2 tabs twice a day. HE IS NOT TAKING ANY NOW, I WOULD SUGGEST TO START with 1 TAB BID. Please let the patient know to  prescription from pharmacy. Requested Prescriptions     Signed Prescriptions Disp Refills    sulfaSALAzine (AZULFIDINE) 500 MG tablet 60 tablet 3     Sig: Take 1 tablet by mouth 2 times daily     Authorizing Provider: Jimbo Ruth         Thank you!         FYI    Future Appointments  Date Time Provider Joe Healy   12/29/2017 3:00 PM [facility]       Controlled Substances Monitoring:

## 2017-11-10 DIAGNOSIS — R10.13 DYSPEPSIA: ICD-10-CM

## 2017-11-10 DIAGNOSIS — E78.5 HYPERLIPIDEMIA WITH TARGET LDL LESS THAN 100: ICD-10-CM

## 2017-11-10 DIAGNOSIS — I10 ESSENTIAL HYPERTENSION: ICD-10-CM

## 2017-11-10 DIAGNOSIS — F40.10 SOCIAL ANXIETY DISORDER: ICD-10-CM

## 2017-11-10 RX ORDER — ATORVASTATIN CALCIUM 20 MG/1
20 TABLET, FILM COATED ORAL DAILY
Qty: 90 TABLET | Refills: 0 | Status: SHIPPED | OUTPATIENT
Start: 2017-11-10 | End: 2018-01-13 | Stop reason: SDUPTHER

## 2017-11-10 RX ORDER — OMEPRAZOLE 40 MG/1
40 CAPSULE, DELAYED RELEASE ORAL DAILY
Qty: 90 CAPSULE | Refills: 1 | Status: SHIPPED | OUTPATIENT
Start: 2017-11-10 | End: 2018-03-11 | Stop reason: SDUPTHER

## 2017-11-10 RX ORDER — AMLODIPINE BESYLATE 2.5 MG/1
2.5 TABLET ORAL DAILY
Qty: 90 TABLET | Refills: 0 | Status: SHIPPED | OUTPATIENT
Start: 2017-11-10 | End: 2018-01-13 | Stop reason: SDUPTHER

## 2017-11-10 RX ORDER — LISINOPRIL 5 MG/1
5 TABLET ORAL DAILY
Qty: 90 TABLET | Refills: 0 | Status: SHIPPED | OUTPATIENT
Start: 2017-11-10 | End: 2018-01-13 | Stop reason: SDUPTHER

## 2017-11-10 RX ORDER — BUSPIRONE HYDROCHLORIDE 10 MG/1
10 TABLET ORAL 3 TIMES DAILY PRN
Qty: 180 TABLET | Refills: 0 | Status: SHIPPED | OUTPATIENT
Start: 2017-11-10 | End: 2018-01-02 | Stop reason: SDUPTHER

## 2018-01-02 DIAGNOSIS — F40.10 SOCIAL ANXIETY DISORDER: ICD-10-CM

## 2018-01-02 RX ORDER — BUSPIRONE HYDROCHLORIDE 10 MG/1
TABLET ORAL
Qty: 270 TABLET | Refills: 1 | Status: SHIPPED | OUTPATIENT
Start: 2018-01-02 | End: 2018-11-06 | Stop reason: SDUPTHER

## 2018-01-13 DIAGNOSIS — I10 ESSENTIAL HYPERTENSION: ICD-10-CM

## 2018-01-13 DIAGNOSIS — E78.5 HYPERLIPIDEMIA WITH TARGET LDL LESS THAN 100: ICD-10-CM

## 2018-01-15 RX ORDER — ATORVASTATIN CALCIUM 20 MG/1
TABLET, FILM COATED ORAL
Qty: 90 TABLET | Refills: 3 | Status: SHIPPED | OUTPATIENT
Start: 2018-01-15 | End: 2018-11-06 | Stop reason: SDUPTHER

## 2018-01-15 RX ORDER — AMLODIPINE BESYLATE 2.5 MG/1
TABLET ORAL
Qty: 90 TABLET | Refills: 3 | Status: SHIPPED | OUTPATIENT
Start: 2018-01-15 | End: 2018-11-06 | Stop reason: SDUPTHER

## 2018-01-15 RX ORDER — LISINOPRIL 5 MG/1
TABLET ORAL
Qty: 90 TABLET | Refills: 3 | Status: SHIPPED | OUTPATIENT
Start: 2018-01-15 | End: 2018-11-06 | Stop reason: SDUPTHER

## 2018-01-15 NOTE — TELEPHONE ENCOUNTER
Requested Prescriptions     Pending Prescriptions Disp Refills    lisinopril (PRINIVIL;ZESTRIL) 5 MG tablet [Pharmacy Med Name: LISINOPRIL TABS 5MG] 90 tablet 0     Sig: TAKE 1 TABLET DAILY    atorvastatin (LIPITOR) 20 MG tablet [Pharmacy Med Name: ATORVASTATIN TABS 20MG] 90 tablet 0     Sig: TAKE 1 TABLET DAILY FOR CHOLESTEROL    amLODIPine (NORVASC) 2.5 MG tablet [Pharmacy Med Name: AMLODIPINE BESYLATE TABS 2.5MG] 90 tablet 0     Sig: TAKE 1 TABLET DAILY

## 2018-03-11 DIAGNOSIS — R10.13 DYSPEPSIA: ICD-10-CM

## 2018-03-12 RX ORDER — OMEPRAZOLE 40 MG/1
CAPSULE, DELAYED RELEASE ORAL
Qty: 90 CAPSULE | Refills: 1 | Status: SHIPPED | OUTPATIENT
Start: 2018-03-12 | End: 2018-11-19 | Stop reason: SDUPTHER

## 2018-11-06 ENCOUNTER — APPOINTMENT (OUTPATIENT)
Dept: GENERAL RADIOLOGY | Age: 38
End: 2018-11-06
Payer: COMMERCIAL

## 2018-11-06 ENCOUNTER — HOSPITAL ENCOUNTER (EMERGENCY)
Age: 38
Discharge: HOME | End: 2018-11-06
Attending: EMERGENCY MEDICINE
Payer: COMMERCIAL

## 2018-11-06 VITALS
BODY MASS INDEX: 32.08 KG/M2 | DIASTOLIC BLOOD PRESSURE: 91 MMHG | SYSTOLIC BLOOD PRESSURE: 136 MMHG | HEIGHT: 74 IN | WEIGHT: 250 LBS | TEMPERATURE: 98.2 F | RESPIRATION RATE: 30 BRPM | HEART RATE: 101 BPM | OXYGEN SATURATION: 96 %

## 2018-11-06 DIAGNOSIS — I10 ESSENTIAL HYPERTENSION: ICD-10-CM

## 2018-11-06 DIAGNOSIS — K21.9 GASTROESOPHAGEAL REFLUX DISEASE, ESOPHAGITIS PRESENCE NOT SPECIFIED: ICD-10-CM

## 2018-11-06 DIAGNOSIS — R07.9 CHEST PAIN, UNSPECIFIED TYPE: Primary | ICD-10-CM

## 2018-11-06 DIAGNOSIS — E78.5 HYPERLIPIDEMIA WITH TARGET LDL LESS THAN 100: ICD-10-CM

## 2018-11-06 DIAGNOSIS — F40.10 SOCIAL ANXIETY DISORDER: ICD-10-CM

## 2018-11-06 DIAGNOSIS — F33.2 SEVERE EPISODE OF RECURRENT MAJOR DEPRESSIVE DISORDER, WITHOUT PSYCHOTIC FEATURES (HCC): ICD-10-CM

## 2018-11-06 LAB
ABSOLUTE EOS #: 0.53 K/UL (ref 0–0.44)
ABSOLUTE IMMATURE GRANULOCYTE: <0.03 K/UL (ref 0–0.3)
ABSOLUTE LYMPH #: 2.96 K/UL (ref 1.1–3.7)
ABSOLUTE MONO #: 0.77 K/UL (ref 0.1–1.2)
ANION GAP SERPL CALCULATED.3IONS-SCNC: 12 MMOL/L (ref 9–17)
BASOPHILS # BLD: 1 % (ref 0–2)
BASOPHILS ABSOLUTE: 0.08 K/UL (ref 0–0.2)
BUN BLDV-MCNC: 9 MG/DL (ref 6–20)
BUN/CREAT BLD: NORMAL (ref 9–20)
CALCIUM SERPL-MCNC: 9.5 MG/DL (ref 8.6–10.4)
CHLORIDE BLD-SCNC: 106 MMOL/L (ref 98–107)
CO2: 23 MMOL/L (ref 20–31)
CREAT SERPL-MCNC: 0.84 MG/DL (ref 0.7–1.2)
DIFFERENTIAL TYPE: ABNORMAL
EKG ATRIAL RATE: 99 BPM
EKG P AXIS: 37 DEGREES
EKG P-R INTERVAL: 146 MS
EKG Q-T INTERVAL: 338 MS
EKG QRS DURATION: 90 MS
EKG QTC CALCULATION (BAZETT): 433 MS
EKG R AXIS: 23 DEGREES
EKG T AXIS: 54 DEGREES
EKG VENTRICULAR RATE: 99 BPM
EOSINOPHILS RELATIVE PERCENT: 6 % (ref 1–4)
GFR AFRICAN AMERICAN: >60 ML/MIN
GFR NON-AFRICAN AMERICAN: >60 ML/MIN
GFR SERPL CREATININE-BSD FRML MDRD: NORMAL ML/MIN/{1.73_M2}
GFR SERPL CREATININE-BSD FRML MDRD: NORMAL ML/MIN/{1.73_M2}
GLUCOSE BLD-MCNC: 91 MG/DL (ref 70–99)
HCT VFR BLD CALC: 51.6 % (ref 40.7–50.3)
HEMOGLOBIN: 16.9 G/DL (ref 13–17)
IMMATURE GRANULOCYTES: 0 %
LYMPHOCYTES # BLD: 31 % (ref 24–43)
MCH RBC QN AUTO: 30 PG (ref 25.2–33.5)
MCHC RBC AUTO-ENTMCNC: 32.8 G/DL (ref 28.4–34.8)
MCV RBC AUTO: 91.5 FL (ref 82.6–102.9)
MONOCYTES # BLD: 8 % (ref 3–12)
NRBC AUTOMATED: 0 PER 100 WBC
PDW BLD-RTO: 13.1 % (ref 11.8–14.4)
PLATELET # BLD: 332 K/UL (ref 138–453)
PLATELET ESTIMATE: ABNORMAL
PMV BLD AUTO: 10.7 FL (ref 8.1–13.5)
POC TROPONIN I: 0 NG/ML (ref 0–0.1)
POC TROPONIN I: 0 NG/ML (ref 0–0.1)
POC TROPONIN INTERP: NORMAL
POC TROPONIN INTERP: NORMAL
POTASSIUM SERPL-SCNC: 3.9 MMOL/L (ref 3.7–5.3)
RBC # BLD: 5.64 M/UL (ref 4.21–5.77)
RBC # BLD: ABNORMAL 10*6/UL
SEG NEUTROPHILS: 54 % (ref 36–65)
SEGMENTED NEUTROPHILS ABSOLUTE COUNT: 5.34 K/UL (ref 1.5–8.1)
SODIUM BLD-SCNC: 141 MMOL/L (ref 135–144)
WBC # BLD: 9.7 K/UL (ref 3.5–11.3)
WBC # BLD: ABNORMAL 10*3/UL

## 2018-11-06 PROCEDURE — 2500000003 HC RX 250 WO HCPCS: Performed by: FAMILY MEDICINE

## 2018-11-06 PROCEDURE — 93005 ELECTROCARDIOGRAM TRACING: CPT

## 2018-11-06 PROCEDURE — 71046 X-RAY EXAM CHEST 2 VIEWS: CPT

## 2018-11-06 PROCEDURE — S0028 INJECTION, FAMOTIDINE, 20 MG: HCPCS | Performed by: FAMILY MEDICINE

## 2018-11-06 PROCEDURE — 84484 ASSAY OF TROPONIN QUANT: CPT

## 2018-11-06 PROCEDURE — 99285 EMERGENCY DEPT VISIT HI MDM: CPT

## 2018-11-06 PROCEDURE — 6370000000 HC RX 637 (ALT 250 FOR IP): Performed by: FAMILY MEDICINE

## 2018-11-06 PROCEDURE — 80048 BASIC METABOLIC PNL TOTAL CA: CPT

## 2018-11-06 PROCEDURE — 96374 THER/PROPH/DIAG INJ IV PUSH: CPT

## 2018-11-06 PROCEDURE — 85025 COMPLETE CBC W/AUTO DIFF WBC: CPT

## 2018-11-06 RX ORDER — MAGNESIUM HYDROXIDE/ALUMINUM HYDROXICE/SIMETHICONE 120; 1200; 1200 MG/30ML; MG/30ML; MG/30ML
30 SUSPENSION ORAL ONCE
Status: COMPLETED | OUTPATIENT
Start: 2018-11-06 | End: 2018-11-06

## 2018-11-06 RX ORDER — ATORVASTATIN CALCIUM 20 MG/1
20 TABLET, FILM COATED ORAL DAILY
Qty: 90 TABLET | Refills: 0 | Status: SHIPPED | OUTPATIENT
Start: 2018-11-06 | End: 2019-02-11 | Stop reason: SDUPTHER

## 2018-11-06 RX ORDER — LISINOPRIL 5 MG/1
5 TABLET ORAL DAILY
Qty: 90 TABLET | Refills: 0 | Status: SHIPPED | OUTPATIENT
Start: 2018-11-06 | End: 2019-02-11 | Stop reason: SDUPTHER

## 2018-11-06 RX ORDER — BUSPIRONE HYDROCHLORIDE 10 MG/1
10 TABLET ORAL 3 TIMES DAILY
Qty: 90 TABLET | Refills: 3 | Status: SHIPPED | OUTPATIENT
Start: 2018-11-06 | End: 2019-05-24 | Stop reason: SDUPTHER

## 2018-11-06 RX ORDER — AMLODIPINE BESYLATE 2.5 MG/1
2.5 TABLET ORAL DAILY
Qty: 90 TABLET | Refills: 0 | Status: SHIPPED | OUTPATIENT
Start: 2018-11-06 | End: 2019-02-11 | Stop reason: SDUPTHER

## 2018-11-06 RX ADMIN — FAMOTIDINE 20 MG: 10 INJECTION, SOLUTION INTRAVENOUS at 16:35

## 2018-11-06 RX ADMIN — ALUMINUM HYDROXIDE, MAGNESIUM HYDROXIDE, AND SIMETHICONE 30 ML: 200; 200; 20 SUSPENSION ORAL at 16:35

## 2018-11-06 ASSESSMENT — ENCOUNTER SYMPTOMS
DIARRHEA: 0
ABDOMINAL PAIN: 0
COLOR CHANGE: 0
TROUBLE SWALLOWING: 0
CONSTIPATION: 0
COUGH: 0
SHORTNESS OF BREATH: 1
VOMITING: 0
NAUSEA: 0

## 2018-11-06 ASSESSMENT — HEART SCORE
ECG: 0
ECG: 0

## 2018-11-06 ASSESSMENT — PAIN DESCRIPTION - LOCATION: LOCATION: CHEST

## 2018-11-06 ASSESSMENT — PAIN DESCRIPTION - PAIN TYPE: TYPE: ACUTE PAIN

## 2018-11-06 ASSESSMENT — PAIN SCALES - GENERAL: PAINLEVEL_OUTOF10: 6

## 2018-11-06 ASSESSMENT — PAIN DESCRIPTION - FREQUENCY: FREQUENCY: CONTINUOUS

## 2018-11-06 ASSESSMENT — PAIN DESCRIPTION - ORIENTATION: ORIENTATION: RIGHT;LEFT

## 2018-11-06 NOTE — ED PROVIDER NOTES
(cough) 6/19/17   Amirah Brown MD   Blood Pressure KIT Dx: HTN. Needs automatic blood pressure machine to monitor his blood pressure. 3/22/17   Amirah Brown MD   Respiratory Therapy Supplies (NEBULIZER) MADHAV 1 Units by Does not apply route 2 times daily Dx: Asthma exacerbation J45.901 1/31/17   Skyler Beltran MD   albuterol (PROVENTIL) (2.5 MG/3ML) 0.083% nebulizer solution Take 3 mLs by nebulization every 6 hours as needed for Wheezing or Shortness of Breath. 3/23/12 9/29/17  Maryam Lovell MD       REVIEW OF SYSTEMS    (2-9 systems for level 4, 10 or more for level5)      Review of Systems   Constitutional: Negative for chills and fever. HENT: Negative for trouble swallowing. Eyes: Negative for visual disturbance. Respiratory: Positive for shortness of breath. Negative for cough. Cardiovascular: Positive for chest pain. Negative for palpitations and leg swelling. Gastrointestinal: Negative for abdominal pain, constipation, diarrhea, nausea and vomiting. Genitourinary: Negative for difficulty urinating. Musculoskeletal: Negative for arthralgias and myalgias. Skin: Negative for color change, pallor, rash and wound. Neurological: Positive for light-headedness. Negative for headaches. PHYSICAL EXAM   (up to 7 for level 4, 8 or more for level 5)      INITIAL VITALS:   BP (!) 138/91   Pulse 99   Temp 98.2 °F (36.8 °C) (Oral)   Resp 20   Ht 6' 2\" (1.88 m)   Wt 250 lb (113.4 kg)   SpO2 98%   BMI 32.10 kg/m²     Physical Exam   Constitutional: He is oriented to person, place, and time. He appears well-developed and well-nourished. No distress. HENT:   Head: Normocephalic and atraumatic. Right Ear: External ear normal.   Left Ear: External ear normal.   Eyes: Pupils are equal, round, and reactive to light. Conjunctivae are normal. Right eye exhibits no discharge. Left eye exhibits no discharge. No scleral icterus. Neck: Normal range of motion. Neck supple.  No JVD present. No tracheal deviation present. No thyromegaly present. Cardiovascular: Normal rate, regular rhythm, normal heart sounds and intact distal pulses. Exam reveals no gallop and no friction rub. No murmur heard. Pulmonary/Chest: Effort normal and breath sounds normal. No stridor. He has no wheezes. He has no rales. He exhibits no tenderness. Abdominal: Soft. Bowel sounds are normal. He exhibits no distension and no mass. There is no tenderness. There is no rebound and no guarding. Musculoskeletal: Normal range of motion. He exhibits no edema, tenderness or deformity. Lymphadenopathy:     He has no cervical adenopathy. Neurological: He is alert and oriented to person, place, and time. He exhibits normal muscle tone. Coordination normal.   Skin: Skin is warm and dry. No rash noted. He is not diaphoretic. No erythema. No pallor.        DIFFERENTIAL  DIAGNOSIS     PLAN (LABS / IMAGING / EKG):  Orders Placed This Encounter   Procedures    XR CHEST STANDARD (2 VW)    CBC Auto Differential    Basic Metabolic Panel w/ Reflex to MG    POCT troponin    POCT troponin    POCT troponin    EKG 12 Lead       MEDICATIONS ORDERED:  Orders Placed This Encounter   Medications    aluminum & magnesium hydroxide-simethicone (MAALOX) 200-200-20 MG/5ML suspension 30 mL    famotidine (PEPCID) injection 20 mg       DDX: GERD, stable angina, UA, NSTEMI, STEMI, PNA, pneumothorax    DIAGNOSTIC RESULTS / EMERGENCY DEPARTMENT COURSE / MDM     LABS:  Results for orders placed or performed during the hospital encounter of 11/06/18   CBC Auto Differential   Result Value Ref Range    WBC 9.7 3.5 - 11.3 k/uL    RBC 5.64 4.21 - 5.77 m/uL    Hemoglobin 16.9 13.0 - 17.0 g/dL    Hematocrit 51.6 (H) 40.7 - 50.3 %    MCV 91.5 82.6 - 102.9 fL    MCH 30.0 25.2 - 33.5 pg    MCHC 32.8 28.4 - 34.8 g/dL    RDW 13.1 11.8 - 14.4 %    Platelets 646 174 - 109 k/uL    MPV 10.7 8.1 - 13.5 fL    NRBC Automated 0.0 0.0 per 100 WBC

## 2018-11-06 NOTE — TELEPHONE ENCOUNTER
Please Approve or Refuse.   Send to Pharmacy per Pt's Request: patient is working out of town, not able to make appt at this moment       Next Visit Date:  Visit date not found   Last Visit Date: Visit date not found    Hemoglobin A1C (%)   Date Value   03/22/2017 5.5   07/23/2014 5.3   05/01/2012 5.2             ( goal A1C is < 7)   BP Readings from Last 3 Encounters:   09/29/17 125/73   06/11/17 125/74   05/26/17 126/70          (goal 120/80)  BUN   Date Value Ref Range Status   10/06/2017 12 6 - 20 mg/dL Final     CREATININE   Date Value Ref Range Status   10/06/2017 0.84 0.70 - 1.20 mg/dL Final     Potassium   Date Value Ref Range Status   10/06/2017 4.7 3.7 - 5.3 mmol/L Final

## 2018-11-07 ENCOUNTER — TELEPHONE (OUTPATIENT)
Dept: FAMILY MEDICINE CLINIC | Age: 38
End: 2018-11-07

## 2018-11-19 ENCOUNTER — OFFICE VISIT (OUTPATIENT)
Dept: FAMILY MEDICINE CLINIC | Age: 38
End: 2018-11-19
Payer: COMMERCIAL

## 2018-11-19 VITALS
HEIGHT: 74 IN | WEIGHT: 258.2 LBS | DIASTOLIC BLOOD PRESSURE: 70 MMHG | HEART RATE: 90 BPM | OXYGEN SATURATION: 96 % | BODY MASS INDEX: 33.14 KG/M2 | SYSTOLIC BLOOD PRESSURE: 134 MMHG

## 2018-11-19 DIAGNOSIS — K21.9 GASTROESOPHAGEAL REFLUX DISEASE, ESOPHAGITIS PRESENCE NOT SPECIFIED: ICD-10-CM

## 2018-11-19 DIAGNOSIS — R00.2 HEART PALPITATIONS: ICD-10-CM

## 2018-11-19 DIAGNOSIS — R68.89 DECREASED EXERCISE TOLERANCE: ICD-10-CM

## 2018-11-19 DIAGNOSIS — R53.83 FATIGUE, UNSPECIFIED TYPE: ICD-10-CM

## 2018-11-19 DIAGNOSIS — Z82.49 FAMILY HISTORY OF PREMATURE CAD: ICD-10-CM

## 2018-11-19 DIAGNOSIS — R07.9 CHEST PAIN, UNSPECIFIED TYPE: Primary | ICD-10-CM

## 2018-11-19 DIAGNOSIS — J44.9 CHRONIC OBSTRUCTIVE PULMONARY DISEASE, UNSPECIFIED COPD TYPE (HCC): ICD-10-CM

## 2018-11-19 DIAGNOSIS — I10 ESSENTIAL HYPERTENSION: ICD-10-CM

## 2018-11-19 DIAGNOSIS — J45.30 MILD PERSISTENT ASTHMA WITHOUT COMPLICATION: ICD-10-CM

## 2018-11-19 DIAGNOSIS — Z11.4 ENCOUNTER FOR SCREENING FOR HIV: ICD-10-CM

## 2018-11-19 PROCEDURE — 99215 OFFICE O/P EST HI 40 MIN: CPT | Performed by: NURSE PRACTITIONER

## 2018-11-19 RX ORDER — OMEPRAZOLE 40 MG/1
CAPSULE, DELAYED RELEASE ORAL
Qty: 90 CAPSULE | Refills: 1 | Status: SHIPPED | OUTPATIENT
Start: 2018-11-19 | End: 2019-05-24 | Stop reason: SDUPTHER

## 2018-11-19 RX ORDER — FLUTICASONE PROPIONATE 110 UG/1
AEROSOL, METERED RESPIRATORY (INHALATION)
Qty: 12 INHALER | Refills: 3 | Status: SHIPPED | OUTPATIENT
Start: 2018-11-19 | End: 2018-12-22

## 2018-11-19 ASSESSMENT — PATIENT HEALTH QUESTIONNAIRE - PHQ9
SUM OF ALL RESPONSES TO PHQ QUESTIONS 1-9: 0
SUM OF ALL RESPONSES TO PHQ QUESTIONS 1-9: 0
SUM OF ALL RESPONSES TO PHQ9 QUESTIONS 1 & 2: 0
2. FEELING DOWN, DEPRESSED OR HOPELESS: 0
1. LITTLE INTEREST OR PLEASURE IN DOING THINGS: 0

## 2018-11-19 ASSESSMENT — ENCOUNTER SYMPTOMS
BLOOD IN STOOL: 0
ABDOMINAL PAIN: 0
SHORTNESS OF BREATH: 1
WHEEZING: 1
COUGH: 0
EYE DISCHARGE: 0

## 2018-11-19 NOTE — PROGRESS NOTES
[address] [facility] [address]  [address] [facility] [address]  [address] [address]  Dept: [phone]  Dept Fax: [phone]    Raina Woodard is a 43 y.o. male who presents today for his medical conditions/complaintsas noted below. Raina Woodard is c/o of ED Follow-up; Hypertension; and Palpitations (pt is noticing that this happens more offen)        HPI:     Patient presents with:  ED Follow-up  Hypertension 134/70 today   Palpitations: pt is noticing that this happens more offen        In ER: 43 Y. Gilma Sample with PMHx of HTN, HLD and NOEMÍ presented with substernal CP since yesterday. Pt was stable, initial EKG shows NSR with no any ST-T changes. Obtained  CXR two sets of trops - neg,  Was  Medicated  with maalox and pepcid as a trial given his symptoms of GERD. Did not keep taking these meds   omeprazole   Had pressure/fullness  in chest head hands. Went back on meds. Had c'weight on chest \" yesterday  -  did not take bp at that time   Duration: 2-3 hr.   Aggrav: I dont know   Allev: nothing   Works outside, weather not a factor.     + decr exerc tolerance     Has not made appt with cardiologist since er visit. Asthma, out of flovent x 1 y   Sob x 3 mos.  Using nebulizer more freq   Using rescue inhaler In a while., about sept ,  was using qd x 3 mos                       Past Medical History:   Diagnosis Date    Asthma     COPD (chronic obstructive pulmonary disease) ([address] 11/19/2018    Depression     Erectile dysfunction 9/29/2017    Essential hypertension 1/2/2017    GERD (gastroesophageal reflux disease) 11/19/2018    Hyperlipidemia with target LDL less than 100 3/23/2017    Left lower lobe pneumonia ([address] 11/9/2012    Nonspecific reactive hepatitis 3/22/2017    Obesity (BMI 30-39.9) 3/23/2017    Osteoarthritis     Seronegative polyarthritis 10/10/2016    followed w/ rheumatology in [address] [place] [address] anxiety disorder      Past Surgical History:   Procedure Laterality

## 2018-12-21 ENCOUNTER — OFFICE VISIT (OUTPATIENT)
Dept: FAMILY MEDICINE CLINIC | Age: 38
End: 2018-12-21
Payer: COMMERCIAL

## 2018-12-21 ENCOUNTER — HOSPITAL ENCOUNTER (OUTPATIENT)
Dept: GENERAL RADIOLOGY | Age: 38
Discharge: HOME | End: 2018-12-23
Payer: COMMERCIAL

## 2018-12-21 ENCOUNTER — HOSPITAL ENCOUNTER (OUTPATIENT)
Age: 38
Discharge: HOME | End: 2018-12-23
Payer: COMMERCIAL

## 2018-12-21 VITALS
HEART RATE: 75 BPM | SYSTOLIC BLOOD PRESSURE: 124 MMHG | HEIGHT: 74 IN | BODY MASS INDEX: 33.11 KG/M2 | WEIGHT: 258 LBS | DIASTOLIC BLOOD PRESSURE: 68 MMHG | OXYGEN SATURATION: 96 %

## 2018-12-21 DIAGNOSIS — R06.00 DYSPNEA, UNSPECIFIED TYPE: Primary | ICD-10-CM

## 2018-12-21 DIAGNOSIS — J45.40 MODERATE PERSISTENT ASTHMA WITHOUT COMPLICATION: ICD-10-CM

## 2018-12-21 DIAGNOSIS — J20.9 ACUTE BRONCHITIS DUE TO INFECTION: ICD-10-CM

## 2018-12-21 DIAGNOSIS — J01.01 ACUTE RECURRENT MAXILLARY SINUSITIS: ICD-10-CM

## 2018-12-21 DIAGNOSIS — R06.00 DYSPNEA, UNSPECIFIED TYPE: ICD-10-CM

## 2018-12-21 DIAGNOSIS — E78.5 HYPERLIPIDEMIA WITH TARGET LDL LESS THAN 100: ICD-10-CM

## 2018-12-21 DIAGNOSIS — R93.89 ABNORMAL CHEST X-RAY: ICD-10-CM

## 2018-12-21 DIAGNOSIS — I10 ESSENTIAL HYPERTENSION: ICD-10-CM

## 2018-12-21 PROCEDURE — 71046 X-RAY EXAM CHEST 2 VIEWS: CPT

## 2018-12-21 PROCEDURE — 99214 OFFICE O/P EST MOD 30 MIN: CPT | Performed by: FAMILY MEDICINE

## 2018-12-21 RX ORDER — ALBUTEROL SULFATE 90 UG/1
2 AEROSOL, METERED RESPIRATORY (INHALATION) EVERY 6 HOURS PRN
Qty: 18 G | Refills: 3 | Status: SHIPPED | OUTPATIENT
Start: 2018-12-21 | End: 2020-01-02 | Stop reason: SDUPTHER

## 2018-12-21 RX ORDER — ALBUTEROL SULFATE 90 UG/1
2 AEROSOL, METERED RESPIRATORY (INHALATION) EVERY 6 HOURS PRN
Qty: 8 G | Refills: 5 | Status: CANCELLED | OUTPATIENT
Start: 2018-12-21

## 2018-12-21 RX ORDER — CEFUROXIME AXETIL 500 MG/1
500 TABLET ORAL EVERY 12 HOURS
Qty: 20 TABLET | Refills: 0 | Status: SHIPPED | OUTPATIENT
Start: 2018-12-21 | End: 2018-12-31

## 2018-12-21 ASSESSMENT — ENCOUNTER SYMPTOMS
TROUBLE SWALLOWING: 0
VOMITING: 0
DIARRHEA: 0
ABDOMINAL DISTENTION: 0
WHEEZING: 0
SHORTNESS OF BREATH: 1
CHEST TIGHTNESS: 1
SORE THROAT: 0
NAUSEA: 0
RHINORRHEA: 1
ABDOMINAL PAIN: 0
SINUS PAIN: 0
CONSTIPATION: 0
SINUS PRESSURE: 1

## 2018-12-21 ASSESSMENT — ASTHMA QUESTIONNAIRES
QUESTION_5 LAST FOUR WEEKS HOW WOULD YOU RATE YOUR ASTHMA CONTROL: 3
ACT_TOTALSCORE: 18
QUESTION_3 LAST FOUR WEEKS HOW OFTEN DID YOUR ASTHMA SYMPTOMS (WHEEZING, COUGHING, SHORTNESS OF BREATH, CHEST TIGHTNESS OR PAIN) WAKE YOU UP AT NIGHT OR EARLIER THAN USUAL IN THE MORNING: 4
QUESTION_2 LAST FOUR WEEKS HOW OFTEN HAVE YOU HAD SHORTNESS OF BREATH: 4
QUESTION_4 LAST FOUR WEEKS HOW OFTEN HAVE YOU USED YOUR RESCUE INHALER OR NEBULIZER MEDICATION (SUCH AS ALBUTEROL): 4
QUESTION_1 LAST FOUR WEEKS HOW MUCH OF THE TIME DID YOUR ASTHMA KEEP YOU FROM GETTING AS MUCH DONE AT WORK, SCHOOL OR AT HOME: 3

## 2018-12-21 NOTE — PROGRESS NOTES
a concern about  obstructive lung disease then a methacholine challenge should be ordered. \"       ACT 18, worsening  asthma    ASTHMA CONTROL TEST 12/21/2018 5/26/2017 3/22/2017   In the past 4 weeks, how much of the time did your asthma keep you from getting as much done at work, school or at home? 3 5 4   During the past 4 weeks, how often have you had shortness of breath? 4 5 3   During the past 4 weeks, how often did your asthma symptoms (wheezing, coughing, shortness of breath, chest tightness or pain) wake you up at night or earlier than usual in the morning? 4 5 5   During the past 4 weeks, how often have you used your rescue inhaler or nebulizer medication (such as albuterol)? 4 5 2   How would you rate your asthma control during the past 4 weeks? 3 5 2   Asthma Control Test Total Score 18 25 16       Pa complains of Sinus congestion, with greenish rinorrhea, for about 2 months, not getting better. Reports sinus congestion, postnasal drip, greenish rhinorrhea, sinus pressure over the both maxillary sinuses, for 2 months, not getting better.         Hypertension: Patient here for follow-up of elevated blood pressure. he   is not exercising , but staying active,and is adherent to low salt diet. Blood pressure is well controlled at home. Cardiac symptoms dyspnea and fatigue. Patient denies chest pain, chest pressure/discomfort, claudication, exertional chest pressure/discomfort, irregular heart beat, lower extremity edema, near-syncope, orthopnea, palpitations, paroxysmal nocturnal dyspnea, syncope and tachypnea. Cardiovascular risk factors: dyslipidemia, hypertension and obesity (BMI >= 30 kg/m2). Use of agents associated with hypertension: none. History of target organ damage: left ventricular hypertrophy. EKG done on 11/7/18 was normal  Echo 2-D on 10/13/17 showed mild LVH, EF >55%      BP controlled.  Douglaswatson Santos reports compliance with BP medications, and tolerates them well, denies side Respiratory: Positive for cough, chest tightness and shortness of breath. Negative for wheezing. Cardiovascular: Negative for chest pain, palpitations and leg swelling. Gastrointestinal: Negative for abdominal distention, abdominal pain, constipation, diarrhea, nausea and vomiting. Endocrine: Negative for cold intolerance, heat intolerance, polydipsia, polyphagia and polyuria. Musculoskeletal: Negative for arthralgias and back pain. Allergic/Immunologic: Positive for environmental allergies. Psychiatric/Behavioral: Negative for dysphoric mood and sleep disturbance. The patient is not nervous/anxious.            -vital signs stable and within normal limits except Obesity per BMI. /68   Pulse 75   Ht 6' 2\" (1.88 m)   Wt 258 lb (117 kg)   SpO2 96%   BMI 33.13 kg/m²            Physical Exam   Constitutional: He is oriented to person, place, and time. He appears well-developed and well-nourished. No distress. HENT:   Head: Normocephalic and atraumatic. Right Ear: External ear normal.   Left Ear: External ear normal.   Nose: Mucosal edema and rhinorrhea present. Right sinus exhibits maxillary sinus tenderness. Right sinus exhibits no frontal sinus tenderness. Left sinus exhibits maxillary sinus tenderness. Left sinus exhibits no frontal sinus tenderness. Mouth/Throat: Posterior oropharyngeal erythema present. No oropharyngeal exudate. Eyes: Conjunctivae and EOM are normal. Right eye exhibits no discharge. Left eye exhibits no discharge. No scleral icterus. Neck: Normal range of motion. Neck supple. No thyromegaly present. Cardiovascular: Normal rate, regular rhythm, normal heart sounds and intact distal pulses. No murmur heard. Pulmonary/Chest: Effort normal and breath sounds normal. No respiratory distress. He has no wheezes. He has no rales. He exhibits no tenderness. Abdominal: Soft. Bowel sounds are normal. He exhibits no distension. There is no tenderness.    Obese abdomen. Musculoskeletal: Normal range of motion. He exhibits no edema or tenderness. Neurological: He is alert and oriented to person, place, and time. No cranial nerve deficit. He exhibits normal muscle tone. Coordination normal.   Skin: Skin is warm and dry. Capillary refill takes less than 2 seconds. No rash noted. He is not diaphoretic. Psychiatric: He has a normal mood and affect. His behavior is normal. Judgment and thought content normal.   Nursing note and vitals reviewed. Discussed testing with the patient and all questions fully answered. Mild polycythemia, he denies dehydration.   hyperlipidemia   Otherwise labs within normal limits    EKG normal   Admission on 11/06/2018, Discharged on 11/06/2018   Component Date Value Ref Range Status    Ventricular Rate 11/06/2018 99  BPM Final    Atrial Rate 11/06/2018 99  BPM Final    P-R Interval 11/06/2018 146  ms Final    QRS Duration 11/06/2018 90  ms Final    Q-T Interval 11/06/2018 338  ms Final    QTc Calculation (Bazett) 11/06/2018 433  ms Final    P Axis 11/06/2018 37  degrees Final    R Axis 11/06/2018 23  degrees Final    T Axis 11/06/2018 54  degrees Final    WBC 11/06/2018 9.7  3.5 - 11.3 k/uL Final    RBC 11/06/2018 5.64  4.21 - 5.77 m/uL Final    Hemoglobin 11/06/2018 16.9  13.0 - 17.0 g/dL Final    Hematocrit 11/06/2018 51.6* 40.7 - 50.3 % Final    MCV 11/06/2018 91.5  82.6 - 102.9 fL Final    MCH 11/06/2018 30.0  25.2 - 33.5 pg Final    MCHC 11/06/2018 32.8  28.4 - 34.8 g/dL Final    RDW 11/06/2018 13.1  11.8 - 14.4 % Final    Platelets 47/92/3052 332  138 - 453 k/uL Final    MPV 11/06/2018 10.7  8.1 - 13.5 fL Final    NRBC Automated 11/06/2018 0.0  0.0 per 100 WBC Final    Differential Type 11/06/2018 NOT REPORTED   Final    Seg Neutrophils 11/06/2018 54  36 - 65 % Final    Lymphocytes 11/06/2018 31  24 - 43 % Final    Monocytes 11/06/2018 8  3 - 12 % Final    Eosinophils % 11/06/2018 6* 1 - 4 % Final   

## 2018-12-21 NOTE — PROGRESS NOTES
Visit Information    Have you changed or started any medications since your last visit including any over-the-counter medicines, vitamins, or herbal medicines? no   Are you having any side effects from any of your medications? -  no  Have you stopped taking any of your medications? Is so, why? -  no    Have you seen any other physician or provider since your last visit? No  Have you had any other diagnostic tests since your last visit? No  Have you been seen in the emergency room and/or had an admission to a hospital since we last saw you? No  Have you had your routine dental cleaning in the past 6 months? no    Have you activated your [employer] account? If not, what are your barriers?  Yes     Patient Care Team:  Debbie Kelsey MD as PCP - General (Family Medicine)  Lisa Pino MD as Surgeon (Cardiology)    Medical History Review  Past Medical, Family, and Social History reviewed and does contribute to the patient presenting condition    Health Maintenance   Topic Date Due    DTaP/Tdap/Td vaccine (1 - Tdap) 05/19/2019 (Originally 10/23/1998)    Flu vaccine (1) 05/19/2019 (Originally 9/1/2018)    Pneumococcal med risk (1 of 1 - PPSV23) 05/19/2019 (Originally 10/23/1998)    HIV screen  05/19/2019 (Originally 10/23/1994)    Potassium monitoring  11/06/2019    Creatinine monitoring  11/06/2019

## 2018-12-22 PROBLEM — J01.01 ACUTE RECURRENT MAXILLARY SINUSITIS: Status: ACTIVE | Noted: 2018-12-22

## 2018-12-22 PROBLEM — R93.89 ABNORMAL CHEST X-RAY: Status: ACTIVE | Noted: 2018-12-22

## 2018-12-22 PROBLEM — R06.00 DYSPNEA: Status: ACTIVE | Noted: 2018-12-22

## 2018-12-22 PROBLEM — J20.9 ACUTE BRONCHITIS DUE TO INFECTION: Status: ACTIVE | Noted: 2018-12-22

## 2018-12-22 ASSESSMENT — ENCOUNTER SYMPTOMS
COUGH: 1
BACK PAIN: 0

## 2019-02-11 DIAGNOSIS — E78.5 HYPERLIPIDEMIA WITH TARGET LDL LESS THAN 100: ICD-10-CM

## 2019-02-11 DIAGNOSIS — I10 ESSENTIAL HYPERTENSION: ICD-10-CM

## 2019-02-11 RX ORDER — AMLODIPINE BESYLATE 2.5 MG/1
TABLET ORAL
Qty: 90 TABLET | Refills: 0 | Status: SHIPPED | OUTPATIENT
Start: 2019-02-11 | End: 2019-05-24 | Stop reason: SDUPTHER

## 2019-02-11 RX ORDER — LISINOPRIL 5 MG/1
TABLET ORAL
Qty: 90 TABLET | Refills: 0 | Status: SHIPPED | OUTPATIENT
Start: 2019-02-11 | End: 2019-05-24 | Stop reason: SDUPTHER

## 2019-02-11 RX ORDER — ATORVASTATIN CALCIUM 20 MG/1
TABLET, FILM COATED ORAL
Qty: 90 TABLET | Refills: 0 | Status: SHIPPED | OUTPATIENT
Start: 2019-02-11 | End: 2019-05-24 | Stop reason: SDUPTHER

## 2019-02-12 ENCOUNTER — OFFICE VISIT (OUTPATIENT)
Dept: FAMILY MEDICINE CLINIC | Age: 39
End: 2019-02-12
Payer: COMMERCIAL

## 2019-02-12 VITALS
HEIGHT: 74 IN | BODY MASS INDEX: 33.88 KG/M2 | DIASTOLIC BLOOD PRESSURE: 82 MMHG | SYSTOLIC BLOOD PRESSURE: 124 MMHG | WEIGHT: 264 LBS | TEMPERATURE: 99 F | HEART RATE: 88 BPM

## 2019-02-12 DIAGNOSIS — I10 ESSENTIAL HYPERTENSION: ICD-10-CM

## 2019-02-12 DIAGNOSIS — R22.31 LUMP OF AXILLA, RIGHT: Primary | ICD-10-CM

## 2019-02-12 PROCEDURE — 99213 OFFICE O/P EST LOW 20 MIN: CPT | Performed by: FAMILY MEDICINE

## 2019-02-12 RX ORDER — DOXYCYCLINE 100 MG/1
100 CAPSULE ORAL 2 TIMES DAILY
COMMUNITY
Start: 2019-02-09 | End: 2019-02-16

## 2019-02-12 ASSESSMENT — ENCOUNTER SYMPTOMS
SORE THROAT: 1
SHORTNESS OF BREATH: 0
ABDOMINAL PAIN: 0
NAUSEA: 0
COUGH: 0

## 2019-02-13 ENCOUNTER — TELEPHONE (OUTPATIENT)
Dept: FAMILY MEDICINE CLINIC | Age: 39
End: 2019-02-13

## 2019-02-22 ENCOUNTER — OFFICE VISIT (OUTPATIENT)
Dept: FAMILY MEDICINE CLINIC | Age: 39
End: 2019-02-22
Payer: COMMERCIAL

## 2019-02-22 VITALS
HEART RATE: 78 BPM | DIASTOLIC BLOOD PRESSURE: 72 MMHG | WEIGHT: 262.2 LBS | BODY MASS INDEX: 33.65 KG/M2 | HEIGHT: 74 IN | OXYGEN SATURATION: 98 % | TEMPERATURE: 98.2 F | SYSTOLIC BLOOD PRESSURE: 119 MMHG

## 2019-02-22 DIAGNOSIS — I10 ESSENTIAL HYPERTENSION: ICD-10-CM

## 2019-02-22 DIAGNOSIS — J45.40 MODERATE PERSISTENT ASTHMA WITHOUT COMPLICATION: ICD-10-CM

## 2019-02-22 DIAGNOSIS — R22.31 AXILLARY LUMP, RIGHT: Primary | ICD-10-CM

## 2019-02-22 DIAGNOSIS — E78.5 HYPERLIPIDEMIA WITH TARGET LDL LESS THAN 100: ICD-10-CM

## 2019-02-22 PROCEDURE — 99214 OFFICE O/P EST MOD 30 MIN: CPT | Performed by: FAMILY MEDICINE

## 2019-02-22 ASSESSMENT — ENCOUNTER SYMPTOMS
SHORTNESS OF BREATH: 0
CONSTIPATION: 0
CHEST TIGHTNESS: 0
DIARRHEA: 0
NAUSEA: 0
ABDOMINAL PAIN: 0
COUGH: 0
VOMITING: 0
ABDOMINAL DISTENTION: 0
WHEEZING: 0

## 2019-03-03 PROBLEM — R10.13 DYSPEPSIA: Status: RESOLVED | Noted: 2017-05-28 | Resolved: 2019-03-03

## 2019-03-03 PROBLEM — Z13.31 POSITIVE DEPRESSION SCREENING: Status: RESOLVED | Noted: 2017-03-23 | Resolved: 2019-03-03

## 2019-03-03 PROBLEM — J44.9 COPD (CHRONIC OBSTRUCTIVE PULMONARY DISEASE) (HCC): Status: RESOLVED | Noted: 2018-11-19 | Resolved: 2019-03-03

## 2019-03-03 PROBLEM — R22.31 AXILLARY LUMP, RIGHT: Status: ACTIVE | Noted: 2019-03-03

## 2019-03-03 PROBLEM — I51.7 LVH (LEFT VENTRICULAR HYPERTROPHY): Status: RESOLVED | Noted: 2017-09-29 | Resolved: 2019-03-03

## 2019-03-03 PROBLEM — R06.00 DYSPNEA: Status: RESOLVED | Noted: 2018-12-22 | Resolved: 2019-03-03

## 2019-03-03 PROBLEM — R93.89 ABNORMAL CHEST X-RAY: Status: RESOLVED | Noted: 2018-12-22 | Resolved: 2019-03-03

## 2019-03-03 PROBLEM — J20.9 ACUTE BRONCHITIS DUE TO INFECTION: Status: RESOLVED | Noted: 2018-12-22 | Resolved: 2019-03-03

## 2019-03-03 PROBLEM — R94.31 ABNORMAL EKG: Status: RESOLVED | Noted: 2017-03-23 | Resolved: 2019-03-03

## 2019-03-03 PROBLEM — J01.01 ACUTE RECURRENT MAXILLARY SINUSITIS: Status: RESOLVED | Noted: 2018-12-22 | Resolved: 2019-03-03

## 2019-03-12 ENCOUNTER — HOSPITAL ENCOUNTER (OUTPATIENT)
Age: 39
Discharge: HOME | End: 2019-03-12
Payer: COMMERCIAL

## 2019-03-12 DIAGNOSIS — I10 ESSENTIAL HYPERTENSION: ICD-10-CM

## 2019-03-12 DIAGNOSIS — E78.5 HYPERLIPIDEMIA WITH TARGET LDL LESS THAN 100: ICD-10-CM

## 2019-03-12 DIAGNOSIS — Z11.4 ENCOUNTER FOR SCREENING FOR HIV: ICD-10-CM

## 2019-03-12 LAB
ALBUMIN SERPL-MCNC: 4.3 G/DL (ref 3.5–5.2)
ALBUMIN/GLOBULIN RATIO: ABNORMAL (ref 1–2.5)
ALP BLD-CCNC: 95 U/L (ref 40–129)
ALT SERPL-CCNC: 30 U/L (ref 5–41)
ANION GAP SERPL CALCULATED.3IONS-SCNC: 13 MMOL/L (ref 9–17)
AST SERPL-CCNC: 29 U/L
BILIRUB SERPL-MCNC: 0.6 MG/DL (ref 0.3–1.2)
BUN BLDV-MCNC: 22 MG/DL (ref 6–20)
BUN/CREAT BLD: ABNORMAL (ref 9–20)
CALCIUM SERPL-MCNC: 9.5 MG/DL (ref 8.6–10.4)
CHLORIDE BLD-SCNC: 104 MMOL/L (ref 98–107)
CHOLESTEROL/HDL RATIO: 4.8
CHOLESTEROL: 211 MG/DL
CO2: 23 MMOL/L (ref 20–31)
CREAT SERPL-MCNC: 0.83 MG/DL (ref 0.7–1.2)
GFR AFRICAN AMERICAN: >60 ML/MIN
GFR NON-AFRICAN AMERICAN: >60 ML/MIN
GFR SERPL CREATININE-BSD FRML MDRD: ABNORMAL ML/MIN/{1.73_M2}
GFR SERPL CREATININE-BSD FRML MDRD: ABNORMAL ML/MIN/{1.73_M2}
GLUCOSE BLD-MCNC: 95 MG/DL (ref 70–99)
HCT VFR BLD CALC: 50.3 % (ref 41–53)
HDLC SERPL-MCNC: 44 MG/DL
HEMOGLOBIN: 16.6 G/DL (ref 13.5–17.5)
HIV AG/AB: NONREACTIVE
LDL CHOLESTEROL: 125 MG/DL (ref 0–130)
MCH RBC QN AUTO: 30.1 PG (ref 26–34)
MCHC RBC AUTO-ENTMCNC: 32.9 G/DL (ref 31–37)
MCV RBC AUTO: 91.3 FL (ref 80–100)
NRBC AUTOMATED: NORMAL PER 100 WBC
PDW BLD-RTO: 14.1 % (ref 11.5–14.9)
PLATELET # BLD: 284 K/UL (ref 150–450)
PMV BLD AUTO: 9.3 FL (ref 6–12)
POTASSIUM SERPL-SCNC: 4.8 MMOL/L (ref 3.7–5.3)
RBC # BLD: 5.51 M/UL (ref 4.5–5.9)
SODIUM BLD-SCNC: 140 MMOL/L (ref 135–144)
TOTAL PROTEIN: 7.8 G/DL (ref 6.4–8.3)
TRIGL SERPL-MCNC: 211 MG/DL
TSH SERPL DL<=0.05 MIU/L-ACNC: 3.87 MIU/L (ref 0.3–5)
VLDLC SERPL CALC-MCNC: ABNORMAL MG/DL (ref 1–30)
WBC # BLD: 7.3 K/UL (ref 3.5–11)

## 2019-03-12 PROCEDURE — 36415 COLL VENOUS BLD VENIPUNCTURE: CPT

## 2019-03-12 PROCEDURE — 84443 ASSAY THYROID STIM HORMONE: CPT

## 2019-03-12 PROCEDURE — 80061 LIPID PANEL: CPT

## 2019-03-12 PROCEDURE — 85027 COMPLETE CBC AUTOMATED: CPT

## 2019-03-12 PROCEDURE — 87389 HIV-1 AG W/HIV-1&-2 AB AG IA: CPT

## 2019-03-12 PROCEDURE — 80053 COMPREHEN METABOLIC PANEL: CPT

## 2019-03-29 ENCOUNTER — ANESTHESIA EVENT (OUTPATIENT)
Dept: OPERATING ROOM | Age: 39
End: 2019-03-29
Payer: COMMERCIAL

## 2019-03-29 ENCOUNTER — ANESTHESIA (OUTPATIENT)
Dept: OPERATING ROOM | Age: 39
End: 2019-03-29
Payer: COMMERCIAL

## 2019-03-29 ENCOUNTER — HOSPITAL ENCOUNTER (OUTPATIENT)
Age: 39
Setting detail: OUTPATIENT SURGERY
Discharge: HOME | End: 2019-03-29
Attending: SURGERY | Admitting: SURGERY
Payer: COMMERCIAL

## 2019-03-29 VITALS
SYSTOLIC BLOOD PRESSURE: 117 MMHG | DIASTOLIC BLOOD PRESSURE: 67 MMHG | TEMPERATURE: 97.8 F | HEIGHT: 74 IN | BODY MASS INDEX: 33.37 KG/M2 | WEIGHT: 260 LBS | HEART RATE: 58 BPM | OXYGEN SATURATION: 95 % | RESPIRATION RATE: 16 BRPM

## 2019-03-29 VITALS — OXYGEN SATURATION: 91 % | SYSTOLIC BLOOD PRESSURE: 95 MMHG | DIASTOLIC BLOOD PRESSURE: 55 MMHG

## 2019-03-29 DIAGNOSIS — R22.31 AXILLARY LUMP, RIGHT: Primary | ICD-10-CM

## 2019-03-29 PROCEDURE — 6360000002 HC RX W HCPCS: Performed by: SURGERY

## 2019-03-29 PROCEDURE — 3600000012 HC SURGERY LEVEL 2 ADDTL 15MIN: Performed by: SURGERY

## 2019-03-29 PROCEDURE — 2709999900 HC NON-CHARGEABLE SUPPLY: Performed by: SURGERY

## 2019-03-29 PROCEDURE — 7100000000 HC PACU RECOVERY - FIRST 15 MIN: Performed by: SURGERY

## 2019-03-29 PROCEDURE — 2500000003 HC RX 250 WO HCPCS: Performed by: NURSE ANESTHETIST, CERTIFIED REGISTERED

## 2019-03-29 PROCEDURE — 3600000002 HC SURGERY LEVEL 2 BASE: Performed by: SURGERY

## 2019-03-29 PROCEDURE — 3700000001 HC ADD 15 MINUTES (ANESTHESIA): Performed by: SURGERY

## 2019-03-29 PROCEDURE — 7100000001 HC PACU RECOVERY - ADDTL 15 MIN: Performed by: SURGERY

## 2019-03-29 PROCEDURE — 2580000003 HC RX 258: Performed by: ANESTHESIOLOGY

## 2019-03-29 PROCEDURE — 7100000011 HC PHASE II RECOVERY - ADDTL 15 MIN: Performed by: SURGERY

## 2019-03-29 PROCEDURE — 2500000003 HC RX 250 WO HCPCS: Performed by: SURGERY

## 2019-03-29 PROCEDURE — 7100000010 HC PHASE II RECOVERY - FIRST 15 MIN: Performed by: SURGERY

## 2019-03-29 PROCEDURE — 7100000031 HC ASPR PHASE II RECOVERY - ADDTL 15 MIN: Performed by: SURGERY

## 2019-03-29 PROCEDURE — 88305 TISSUE EXAM BY PATHOLOGIST: CPT

## 2019-03-29 PROCEDURE — 6360000002 HC RX W HCPCS: Performed by: NURSE ANESTHETIST, CERTIFIED REGISTERED

## 2019-03-29 PROCEDURE — 7100000030 HC ASPR PHASE II RECOVERY - FIRST 15 MIN: Performed by: SURGERY

## 2019-03-29 PROCEDURE — 3700000000 HC ANESTHESIA ATTENDED CARE: Performed by: SURGERY

## 2019-03-29 RX ORDER — LABETALOL HYDROCHLORIDE 5 MG/ML
5 INJECTION, SOLUTION INTRAVENOUS EVERY 10 MIN PRN
Status: DISCONTINUED | OUTPATIENT
Start: 2019-03-29 | End: 2019-03-29 | Stop reason: HOSPADM

## 2019-03-29 RX ORDER — BUPIVACAINE HYDROCHLORIDE 5 MG/ML
INJECTION, SOLUTION EPIDURAL; INTRACAUDAL PRN
Status: DISCONTINUED | OUTPATIENT
Start: 2019-03-29 | End: 2019-03-29 | Stop reason: ALTCHOICE

## 2019-03-29 RX ORDER — IBUPROFEN 600 MG/1
600 TABLET ORAL EVERY 6 HOURS PRN
Qty: 30 TABLET | Refills: 0 | Status: SHIPPED | OUTPATIENT
Start: 2019-03-29 | End: 2019-08-15 | Stop reason: ALTCHOICE

## 2019-03-29 RX ORDER — PROPOFOL 10 MG/ML
INJECTION, EMULSION INTRAVENOUS PRN
Status: DISCONTINUED | OUTPATIENT
Start: 2019-03-29 | End: 2019-03-29 | Stop reason: SDUPTHER

## 2019-03-29 RX ORDER — LIDOCAINE HYDROCHLORIDE 10 MG/ML
INJECTION, SOLUTION EPIDURAL; INFILTRATION; INTRACAUDAL; PERINEURAL PRN
Status: DISCONTINUED | OUTPATIENT
Start: 2019-03-29 | End: 2019-03-29 | Stop reason: SDUPTHER

## 2019-03-29 RX ORDER — FENTANYL CITRATE 50 UG/ML
INJECTION, SOLUTION INTRAMUSCULAR; INTRAVENOUS PRN
Status: DISCONTINUED | OUTPATIENT
Start: 2019-03-29 | End: 2019-03-29 | Stop reason: SDUPTHER

## 2019-03-29 RX ORDER — DIPHENHYDRAMINE HYDROCHLORIDE 50 MG/ML
12.5 INJECTION INTRAMUSCULAR; INTRAVENOUS
Status: DISCONTINUED | OUTPATIENT
Start: 2019-03-29 | End: 2019-03-29 | Stop reason: HOSPADM

## 2019-03-29 RX ORDER — HYDROCODONE BITARTRATE AND ACETAMINOPHEN 5; 325 MG/1; MG/1
1 TABLET ORAL EVERY 4 HOURS PRN
Qty: 18 TABLET | Refills: 0 | Status: SHIPPED | OUTPATIENT
Start: 2019-03-29 | End: 2019-03-29

## 2019-03-29 RX ORDER — PROMETHAZINE HYDROCHLORIDE 25 MG/ML
6.25 INJECTION, SOLUTION INTRAMUSCULAR; INTRAVENOUS
Status: DISCONTINUED | OUTPATIENT
Start: 2019-03-29 | End: 2019-03-29 | Stop reason: CLARIF

## 2019-03-29 RX ORDER — OXYCODONE HYDROCHLORIDE AND ACETAMINOPHEN 5; 325 MG/1; MG/1
1 TABLET ORAL PRN
Status: DISCONTINUED | OUTPATIENT
Start: 2019-03-29 | End: 2019-03-29 | Stop reason: HOSPADM

## 2019-03-29 RX ORDER — OXYCODONE HYDROCHLORIDE AND ACETAMINOPHEN 5; 325 MG/1; MG/1
2 TABLET ORAL PRN
Status: DISCONTINUED | OUTPATIENT
Start: 2019-03-29 | End: 2019-03-29 | Stop reason: HOSPADM

## 2019-03-29 RX ORDER — PROPOFOL 10 MG/ML
INJECTION, EMULSION INTRAVENOUS CONTINUOUS PRN
Status: DISCONTINUED | OUTPATIENT
Start: 2019-03-29 | End: 2019-03-29 | Stop reason: SDUPTHER

## 2019-03-29 RX ORDER — MEPERIDINE HYDROCHLORIDE 50 MG/ML
12.5 INJECTION INTRAMUSCULAR; INTRAVENOUS; SUBCUTANEOUS EVERY 5 MIN PRN
Status: DISCONTINUED | OUTPATIENT
Start: 2019-03-29 | End: 2019-03-29 | Stop reason: HOSPADM

## 2019-03-29 RX ORDER — SODIUM CHLORIDE, SODIUM LACTATE, POTASSIUM CHLORIDE, CALCIUM CHLORIDE 600; 310; 30; 20 MG/100ML; MG/100ML; MG/100ML; MG/100ML
INJECTION, SOLUTION INTRAVENOUS CONTINUOUS
Status: DISCONTINUED | OUTPATIENT
Start: 2019-03-29 | End: 2019-03-29 | Stop reason: HOSPADM

## 2019-03-29 RX ADMIN — FENTANYL CITRATE 25 MCG: 50 INJECTION, SOLUTION INTRAMUSCULAR; INTRAVENOUS at 09:53

## 2019-03-29 RX ADMIN — FENTANYL CITRATE 25 MCG: 50 INJECTION, SOLUTION INTRAMUSCULAR; INTRAVENOUS at 10:07

## 2019-03-29 RX ADMIN — FENTANYL CITRATE 50 MCG: 50 INJECTION, SOLUTION INTRAMUSCULAR; INTRAVENOUS at 09:47

## 2019-03-29 RX ADMIN — PROPOFOL 40 MG: 10 INJECTION, EMULSION INTRAVENOUS at 09:50

## 2019-03-29 RX ADMIN — PROPOFOL 40 MG: 10 INJECTION, EMULSION INTRAVENOUS at 09:48

## 2019-03-29 RX ADMIN — Medication 2 G: at 09:48

## 2019-03-29 RX ADMIN — PROPOFOL 75 MCG/KG/MIN: 10 INJECTION, EMULSION INTRAVENOUS at 09:48

## 2019-03-29 RX ADMIN — SODIUM CHLORIDE, POTASSIUM CHLORIDE, SODIUM LACTATE AND CALCIUM CHLORIDE: 600; 310; 30; 20 INJECTION, SOLUTION INTRAVENOUS at 08:52

## 2019-03-29 RX ADMIN — LIDOCAINE HYDROCHLORIDE 20 MG: 10 INJECTION, SOLUTION EPIDURAL; INFILTRATION; INTRACAUDAL; PERINEURAL at 09:48

## 2019-03-29 ASSESSMENT — PULMONARY FUNCTION TESTS
PIF_VALUE: 1
PIF_VALUE: 0
PIF_VALUE: 1
PIF_VALUE: 0
PIF_VALUE: 1

## 2019-03-29 ASSESSMENT — PAIN SCALES - GENERAL: PAINLEVEL_OUTOF10: 2

## 2019-03-29 ASSESSMENT — PAIN DESCRIPTION - DESCRIPTORS: DESCRIPTORS: ACHING

## 2019-03-29 ASSESSMENT — PAIN DESCRIPTION - PAIN TYPE: TYPE: SURGICAL PAIN

## 2019-03-29 ASSESSMENT — PAIN - FUNCTIONAL ASSESSMENT: PAIN_FUNCTIONAL_ASSESSMENT: 0-10

## 2019-03-29 ASSESSMENT — PAIN DESCRIPTION - ORIENTATION: ORIENTATION: RIGHT

## 2019-03-29 NOTE — H&P
HISTORY and [facility] [address] [address]       NAME:  Jennifer Sinclair  MRN: [id number]   YOB: 1980   Date: 3/29/2019   Age: 43 y.o. Gender: male       COMPLAINT AND PRESENT HISTORY:               Jennifer Sinclair is 43 y.o. , male, here for right axillary lesion biopsy excision- sebaceous cyst. Pt reports he has had the cyst since December 2018. He reports it swelled, increased in size and became very tender to touch. Denies any drainage or open wound. He states he went to PCP and was put on antibiotics, 7 day course of doxycycline, which decreased the size of the cyst. It continues to bother him. He reports at the time it was infected, his right arm had increased redness. Denies any other somatic complaints.      PAST MEDICAL HISTORY     Past Medical History:   Diagnosis Date    Asthma     COPD (chronic obstructive pulmonary disease) ([facility] 11/19/2018    Depression     Erectile dysfunction 9/29/2017    Essential hypertension 1/2/2017    GERD (gastroesophageal reflux disease) 11/19/2018    Hyperlipidemia with target LDL less than 100 3/23/2017    Left lower lobe pneumonia ([facility] 11/9/2012    Mitral valve prolapse     Nonspecific reactive hepatitis 3/22/2017    Obesity (BMI 30-39.9) 3/23/2017    Osteoarthritis     Seronegative polyarthritis 10/10/2016    followed w/ rheumatology in [address] [place] [address] anxiety disorder      SURGICAL HISTORY       Past Surgical History:   Procedure Laterality Date    COLONOSCOPY      ENDOSCOPY, COLON, DIAGNOSTIC      HAND SURGERY Left     KNEE ARTHROSCOPY      right knee    NOSE SURGERY      TONSILLECTOMY      WISDOM TOOTH EXTRACTION       SOCIAL HISTORY       Social History     Socioeconomic History    Marital status:      Spouse name: None    Number of children: None    Years of education: None    Highest education level: None   Occupational History    None   Social Needs    Financial resource strain: None    Food insecurity: mometasone-formoterol (DULERA) 100-5 MCG/ACT inhaler Inhale 2 puffs into the lungs 2 times daily Stop Flovent 13 g 3    Blood Pressure KIT Dx: HTN. Needs automatic blood pressure machine to monitor his blood pressure. 1 kit 0    Respiratory Therapy Supplies (NEBULIZER) MADHAV 1 Units by Does not apply route 2 times daily Dx: Asthma exacerbation J45.901 1 Device 0       Negative except for what is mentioned in the HPI. GENERAL PHYSICAL EXAM     Vitals: /66   Pulse 69   Temp 97.7 °F (36.5 °C) (Oral)   Resp 16   Ht 6' 2\" (1.88 m)   Wt 260 lb (117.9 kg)   SpO2 95%   BMI 33.38 kg/m²  Body mass index is 33.38 kg/m². GENERAL APPEARANCE:   Fermin Conklin is 43 y.o., male, mildly obese, nourished, conscious, alert. Does not appear to be distress or pain at this time. SKIN:  Warm, dry, no cyanosis or jaundice. HEAD:  Normocephalic, atraumatic, no swelling or tenderness. EYES:  Pupils equal, reactive to light. EARS:  No discharge, no marked hearing loss. NOSE:  No rhinorrhea, epistaxis or septal deformity. THROAT:  Not congested. No ulceration bleeding or discharge. NECK:  No stiffness, trachea central.                  CHEST:  Symmetrical and equal on expansion. HEART:  RRR S1 > S2. No audible murmurs or gallops. LUNGS:  Equal on expansion, normal breath sounds anterior lateral.           ABDOMEN:  Obese. Soft on palpation. No localized tenderness. LYMPHATICS:  Not examined. LOCOMOTOR, BACK AND SPINE: no reported tenderness. EXTREMITIES:  Right axilla with palpable pea sized mobile nodule, firm, tender to touch. No visible surrounding erythema. No pedal edema. Moves all extremities with no difficulty. NEUROLOGIC:  The patient is conscious, alert, oriented, No apparent focal sensory or motor deficits.

## 2019-04-01 LAB — SURGICAL PATHOLOGY REPORT: NORMAL

## 2019-05-24 ENCOUNTER — OFFICE VISIT (OUTPATIENT)
Dept: FAMILY MEDICINE CLINIC | Age: 39
End: 2019-05-24
Payer: COMMERCIAL

## 2019-05-24 VITALS
HEIGHT: 74 IN | BODY MASS INDEX: 34.68 KG/M2 | WEIGHT: 270.2 LBS | HEART RATE: 97 BPM | DIASTOLIC BLOOD PRESSURE: 77 MMHG | OXYGEN SATURATION: 96 % | SYSTOLIC BLOOD PRESSURE: 124 MMHG

## 2019-05-24 DIAGNOSIS — F40.10 SOCIAL ANXIETY DISORDER: ICD-10-CM

## 2019-05-24 DIAGNOSIS — R07.89 ATYPICAL CHEST PAIN: ICD-10-CM

## 2019-05-24 DIAGNOSIS — Z00.00 ANNUAL PHYSICAL EXAM: Primary | ICD-10-CM

## 2019-05-24 DIAGNOSIS — Z28.21 REFUSED PNEUMOCOCCAL VACCINATION: ICD-10-CM

## 2019-05-24 DIAGNOSIS — K21.9 GASTROESOPHAGEAL REFLUX DISEASE, ESOPHAGITIS PRESENCE NOT SPECIFIED: ICD-10-CM

## 2019-05-24 DIAGNOSIS — I10 ESSENTIAL HYPERTENSION: ICD-10-CM

## 2019-05-24 DIAGNOSIS — E78.5 HYPERLIPIDEMIA WITH TARGET LDL LESS THAN 100: ICD-10-CM

## 2019-05-24 DIAGNOSIS — Z82.49 FAMILY HISTORY OF PREMATURE CAD: ICD-10-CM

## 2019-05-24 DIAGNOSIS — Z28.21 REFUSES TETANUS, DIPHTHERIA, AND ACELLULAR PERTUSSIS (TDAP) VACCINATION: ICD-10-CM

## 2019-05-24 PROBLEM — R22.31 AXILLARY LUMP, RIGHT: Status: RESOLVED | Noted: 2019-03-03 | Resolved: 2019-05-24

## 2019-05-24 PROBLEM — R61 EXCESSIVE SWEATING: Status: RESOLVED | Noted: 2017-03-23 | Resolved: 2019-05-24

## 2019-05-24 PROBLEM — R00.2 HEART PALPITATIONS: Status: RESOLVED | Noted: 2017-09-29 | Resolved: 2019-05-24

## 2019-05-24 PROCEDURE — 99395 PREV VISIT EST AGE 18-39: CPT | Performed by: FAMILY MEDICINE

## 2019-05-24 RX ORDER — BUSPIRONE HYDROCHLORIDE 10 MG/1
10 TABLET ORAL 3 TIMES DAILY
Qty: 90 TABLET | Refills: 3 | Status: SHIPPED | OUTPATIENT
Start: 2019-05-24 | End: 2019-10-20 | Stop reason: SDUPTHER

## 2019-05-24 RX ORDER — ATORVASTATIN CALCIUM 20 MG/1
TABLET, FILM COATED ORAL
Qty: 90 TABLET | Refills: 0 | Status: SHIPPED | OUTPATIENT
Start: 2019-05-24 | End: 2019-08-11 | Stop reason: SDUPTHER

## 2019-05-24 RX ORDER — AMLODIPINE BESYLATE 2.5 MG/1
TABLET ORAL
Qty: 90 TABLET | Refills: 0 | Status: SHIPPED | OUTPATIENT
Start: 2019-05-24 | End: 2019-08-11 | Stop reason: SDUPTHER

## 2019-05-24 RX ORDER — OMEPRAZOLE 40 MG/1
CAPSULE, DELAYED RELEASE ORAL
Qty: 90 CAPSULE | Refills: 1 | Status: SHIPPED | OUTPATIENT
Start: 2019-05-24 | End: 2019-08-15 | Stop reason: SDUPTHER

## 2019-05-24 RX ORDER — LISINOPRIL 5 MG/1
TABLET ORAL
Qty: 90 TABLET | Refills: 0 | Status: SHIPPED | OUTPATIENT
Start: 2019-05-24 | End: 2019-06-05 | Stop reason: SDUPTHER

## 2019-05-24 ASSESSMENT — ENCOUNTER SYMPTOMS
CHEST TIGHTNESS: 0
VOMITING: 0
RHINORRHEA: 0
WHEEZING: 0
NAUSEA: 0
TROUBLE SWALLOWING: 0
DIARRHEA: 0
BACK PAIN: 0
COUGH: 0
SHORTNESS OF BREATH: 0
ABDOMINAL DISTENTION: 0
ABDOMINAL PAIN: 0
CONSTIPATION: 0

## 2019-05-24 ASSESSMENT — PATIENT HEALTH QUESTIONNAIRE - PHQ9
SUM OF ALL RESPONSES TO PHQ QUESTIONS 1-9: 0
SUM OF ALL RESPONSES TO PHQ QUESTIONS 1-9: 0
1. LITTLE INTEREST OR PLEASURE IN DOING THINGS: 0
SUM OF ALL RESPONSES TO PHQ9 QUESTIONS 1 & 2: 0
2. FEELING DOWN, DEPRESSED OR HOPELESS: 0

## 2019-05-24 ASSESSMENT — ASTHMA QUESTIONNAIRES
QUESTION_4 LAST FOUR WEEKS HOW OFTEN HAVE YOU USED YOUR RESCUE INHALER OR NEBULIZER MEDICATION (SUCH AS ALBUTEROL): 5
QUESTION_2 LAST FOUR WEEKS HOW OFTEN HAVE YOU HAD SHORTNESS OF BREATH: 5
QUESTION_1 LAST FOUR WEEKS HOW MUCH OF THE TIME DID YOUR ASTHMA KEEP YOU FROM GETTING AS MUCH DONE AT WORK, SCHOOL OR AT HOME: 5
QUESTION_3 LAST FOUR WEEKS HOW OFTEN DID YOUR ASTHMA SYMPTOMS (WHEEZING, COUGHING, SHORTNESS OF BREATH, CHEST TIGHTNESS OR PAIN) WAKE YOU UP AT NIGHT OR EARLIER THAN USUAL IN THE MORNING: 5
ACT_TOTALSCORE: 25
QUESTION_5 LAST FOUR WEEKS HOW WOULD YOU RATE YOUR ASTHMA CONTROL: 5

## 2019-05-24 NOTE — PATIENT INSTRUCTIONS
Patient Education        High Cholesterol: Care Instructions  Your Care Instructions    Cholesterol is a type of fat in your blood. It is needed for many body functions, such as making new cells. Cholesterol is made by your body. It also comes from food you eat. High cholesterol means that you have too much of the fat in your blood. This raises your risk of a heart attack and stroke. LDL and HDL are part of your total cholesterol. LDL is the \"bad\" cholesterol. High LDL can raise your risk for heart disease, heart attack, and stroke. HDL is the \"good\" cholesterol. It helps clear bad cholesterol from the body. High HDL is linked with a lower risk of heart disease, heart attack, and stroke. Your cholesterol levels help your doctor find out your risk for having a heart attack or stroke. You and your doctor can talk about whether you need to lower your risk and what treatment is best for you. A heart-healthy lifestyle along with medicines can help lower your cholesterol and your risk. The way you choose to lower your risk will depend on how high your risk is for heart attack and stroke. It will also depend on how you feel about taking medicines. Follow-up care is a key part of your treatment and safety. Be sure to make and go to all appointments, and call your doctor if you are having problems. It's also a good idea to know your test results and keep a list of the medicines you take. How can you care for yourself at home? · Eat a variety of foods every day. Good choices include fruits, vegetables, whole grains (like oatmeal), dried beans and peas, nuts and seeds, soy products (like tofu), and fat-free or low-fat dairy products. · Replace butter, margarine, and hydrogenated or partially hydrogenated oils with olive and canola oils. (Canola oil margarine without trans fat is fine.)  · Replace red meat with fish, poultry, and soy protein (like tofu).   · Limit processed and packaged foods like chips, crackers, and cookies. · Bake, broil, or steam foods. Don't leon them. · Be physically active. Get at least 30 minutes of exercise on most days of the week. Walking is a good choice. You also may want to do other activities, such as running, swimming, cycling, or playing tennis or team sports. · Stay at a healthy weight or lose weight by making the changes in eating and physical activity listed above. Losing just a small amount of weight, even 5 to 10 pounds, can reduce your risk for having a heart attack or stroke. · Do not smoke. When should you call for help? Watch closely for changes in your health, and be sure to contact your doctor if:    · You need help making lifestyle changes.     · You have questions about your medicine. Where can you learn more? Go to https://chpepiceweb.[email or web address] org and sign in to your [employer] account. Enter D885 in the [employer] box to learn more about \"High Cholesterol: Care Instructions. \"     If you do not have an account, please click on the \"Sign Up Now\" link. Current as of: July 22, 2018  Content Version: 12.0  © 4150-5282 [employer]. Care instructions adapted under license by [facility] ([facility]). If you have questions about a medical condition or this instruction, always ask your healthcare professional. [address] any warranty or liability for your use of this information. Patient Education        Well Visit, Ages 25 to 48: Care Instructions  Your Care Instructions    Physical exams can help you stay healthy. Your doctor has checked your overall health and may have suggested ways to take good care of yourself. He or she also may have recommended tests. At home, you can help prevent illness with healthy eating, regular exercise, and other steps. Follow-up care is a key part of your treatment and safety. Be sure to make and go to all appointments, and call your doctor if you are having problems.  It's also a good idea to know your test results and keep a list of the medicines you take. How can you care for yourself at home? · Reach and stay at a healthy weight. This will lower your risk for many problems, such as obesity, diabetes, heart disease, and high blood pressure. · Get at least 30 minutes of physical activity on most days of the week. Walking is a good choice. You also may want to do other activities, such as running, swimming, cycling, or playing tennis or team sports. Discuss any changes in your exercise program with your doctor. · Do not smoke or allow others to smoke around you. If you need help quitting, talk to your doctor about stop-smoking programs and medicines. These can increase your chances of quitting for good. · Talk to your doctor about whether you have any risk factors for sexually transmitted infections (STIs). Having one sex partner (who does not have STIs and does not have sex with anyone else) is a good way to avoid these infections. · Use birth control if you do not want to have children at this time. Talk with your doctor about the choices available and what might be best for you. · Protect your skin from too much sun. When you're outdoors from 10 a.m. to 4 p.m., stay in the shade or cover up with clothing and a hat with a wide brim. Wear sunglasses that block UV rays. Even when it's cloudy, put broad-spectrum sunscreen (SPF 30 or higher) on any exposed skin. · See a dentist one or two times a year for checkups and to have your teeth cleaned. · Wear a seat belt in the car. Follow your doctor's advice about when to have certain tests. These tests can spot problems early. For everyone  · Cholesterol. Have the fat (cholesterol) in your blood tested after age 21. Your doctor will tell you how often to have this done based on your age, family history, or other things that can increase your risk for heart disease. · Blood pressure. Have your blood pressure checked during a routine doctor visit.  Your got prostate cancer when he was younger than 72. When should you call for help? Watch closely for changes in your health, and be sure to contact your doctor if you have any problems or symptoms that concern you. Where can you learn more? Go to https://chdavid.[employer]. org and sign in to your [facility] account. Enter P072 in the [employer] box to learn more about \"Well Visit, Ages 25 to 48: Care Instructions. \"     If you do not have an account, please click on the \"Sign Up Now\" link. Current as of: December 13, 2018  Content Version: 12.0  © 9141-2631 [employer]. Care instructions adapted under license by [facility] ([facility]). If you have questions about a medical condition or this instruction, always ask your healthcare professional. [address] any warranty or liability for your use of this information.

## 2019-05-24 NOTE — PROGRESS NOTES
Chief Complaint   Patient presents with    Annual Exam    Other     testing not done     Hypertension    Hyperlipidemia    Asthma        Here for annual exam. Also, Annual Exam; Other (testing not done ); Hypertension; Hyperlipidemia; and Asthma      Current concerns:still getting chest pains but less than before    Reports chest pains, stabbing pains, located on the left chest, precordial. He says the pains are not reproducible with direct pressure, and they come and go, for a few days at a time  Last time  He had severe chest pain was 3 mo ago, was located on the left side, went to the jaw and left shoulder, lasted 10 minutes , and he thinks it was due to stress . He is left-handed. He denies shoulder pains, but sometimes he gets pain in his biceps area  Was in ED on 11/6/18 due to the chest pain   EKG on 11/6/18, report reviewed through [employer],  was normal.  He never completed the stress test      Sexually active: yes  Wears seatbelts: yes    Regular exercise: yes   Has physical work      Ever been transfused or tattooed?: yes, one tattoo    Last eye exam: not up to date     Abnormal visual screening. I advised Jim Yadav to make appointment with ophthalmologist. The patient verbalizes understanding and agrees with the plan. Visual Acuity Screening    Right eye Left eye Both eyes   Without correction: 20/10 20/10 20/10   With correction:          Hearing:normal       Last dental exam and preventative dental cleaning:not  up to date   I advised Jim Yadav to make appointment with dentist. The patient verbalizes understanding and agrees with the plan. Nutritional assessment: Obesity per BMI.   unintentional weight gain of 8 pounds in 3 months. He admits of eating a lot of Fast foods and fried foods. He doesn't drink pop. He drinks occasionally alcohol but not every week     Body mass index is 34.69 kg/m².   Wt Readings from Last 3 Encounters:   05/24/19 270 lb 3.2 oz (122.6 kg)   03/29/19 260 lb (117.9 kg)   02/22/19 262 lb 3.2 oz (118.9 kg)       Healthful diet and Physical activity counseling to prevent CVD- low carb, low fat diet, increase fruits and vegetables, and exercise 4-5 times a day 30-40 minutes a day discussed      Tobacco use screening: He doesn't smoke      Alcohol use: Occasionally, rarely      There is no immunization history on file for this patient. 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 --Absolutely declines pneumonia shot at this time      Colonoscopy recommended at 47  The patient has NO family history of colon cancer    The patient has NO family history of cancer. BP controlled. Jelena Wing reports compliance with BP medications, and tolerates them well, denies side effects. BP Readings from Last 3 Encounters:   05/24/19 124/77   03/29/19 117/67   03/29/19 (!) 95/55     Pulse controlled. Pulse Readings from Last 3 Encounters:   05/24/19 97   03/29/19 58   02/22/19 78           Normal A1c     Lab Results   Component Value Date    LABA1C 5.5 03/22/2017       Hyperlipidemia  worsening  He reports compliance with Lipitor.   He is agreeable to keep low carb, low-fat diet and to stop eating fast food and instead eat more baked food    Lab Results   Component Value Date    CHOL 211 (H) 03/12/2019    CHOL 191 10/06/2017    CHOL 290 (H) 03/22/2017     Lab Results   Component Value Date    TRIG 211 (H) 03/12/2019    TRIG 117 10/06/2017    TRIG 278 (H) 03/22/2017     Lab Results   Component Value Date    HDL 44 03/12/2019    HDL 45 10/06/2017    HDL 40 (L) 03/22/2017     Lab Results   Component Value Date    LDLCHOLESTEROL 125 03/12/2019    LDLCHOLESTEROL 123 10/06/2017    LDLCHOLESTEROL 194 (H) 03/22/2017     Lab Results   Component Value Date  Obesity (BMI 30-39.9) 03/23/2017     Priority: High    Hyperlipidemia with target LDL less than 100 03/23/2017     Priority: High    Moderate episode of recurrent major depressive disorder ([address] 03/23/2017     Priority: High    Asthma, Moderate persistent asthma without complication 61/35/3006     Priority: High    Essential hypertension 01/02/2017     Priority: High    Social anxiety disorder 01/02/2017     Priority: High    Seronegative polyarthritis 10/10/2016     Priority: High    Fatigue 10/10/2016     Priority: High    Atypical chest pain      Priority: High    Hyperglycemia 03/23/2017     Priority: Medium    LVH (left ventricular hypertrophy) due to hypertensive disease 03/23/2017     Priority: Medium    Nonspecific reactive hepatitis 03/22/2017     Priority: Medium    Refused pneumococcal vaccination 05/24/2019    Refuses tetanus, diphtheria, and acellular pertussis (Tdap) vaccination 05/24/2019    Erectile dysfunction 09/29/2017         Past Medical History:   Diagnosis Date    Asthma     [facility] 11/19/2018    Depression     Erectile dysfunction 9/29/2017    Essential hypertension 1/2/2017    GERD (gastroesophageal reflux disease) 11/19/2018    Heart palpitations 9/29/2017    Hyperlipidemia with target LDL less than 100 3/23/2017    Left lower lobe pneumonia ([address] 11/9/2012    Mitral valve prolapse     Nonspecific reactive hepatitis 3/22/2017    Obesity (BMI 30-39.9) 3/23/2017    Osteoarthritis     Seronegative polyarthritis 10/10/2016    followed w/ rheumatology in [address] [place] [address] anxiety disorder      Past Surgical History:   Procedure Laterality Date    COLONOSCOPY      CYST REMOVAL Right     Armpit     ENDOSCOPY, COLON, DIAGNOSTIC      HAND SURGERY Left     KNEE ARTHROSCOPY      right knee    NOSE SURGERY      TONSILLECTOMY      WISDOM TOOTH EXTRACTION       Family History   Problem Relation Age of Onset    Diabetes social,and family history as well as his current medications and allergies were reviewed as documented in today's encounter. Review of Systems   Constitutional: Positive for unexpected weight change. Negative for activity change, appetite change, chills, diaphoresis, fatigue and fever. HENT: Negative for congestion, dental problem, hearing loss, postnasal drip, rhinorrhea and trouble swallowing. Eyes: Positive for visual disturbance. Respiratory: Negative for cough, chest tightness, shortness of breath and wheezing. Cardiovascular: Negative for chest pain, palpitations and leg swelling. Gastrointestinal: Negative for abdominal distention, abdominal pain, constipation, diarrhea, nausea and vomiting. Endocrine: Positive for polyphagia. Negative for cold intolerance, heat intolerance, polydipsia and polyuria. Genitourinary: Negative for difficulty urinating, frequency and urgency. Musculoskeletal: Negative for arthralgias, back pain and myalgias. Skin: Negative for rash. Allergic/Immunologic: Negative for environmental allergies. Neurological: Negative for weakness and numbness. Hematological: Does not bruise/bleed easily. Psychiatric/Behavioral: Negative for dysphoric mood and sleep disturbance. The patient is nervous/anxious.          -vital signs stable and within normal limits except Obesity per BMI. /77   Pulse 97   Ht 6' 2\" (1.88 m)   Wt 270 lb 3.2 oz (122.6 kg)   SpO2 96%   BMI 34.69 kg/m²      Physical Exam   Constitutional: He is oriented to person, place, and time. He appears well-developed and well-nourished. No distress. HENT:   Head: Normocephalic and atraumatic. Right Ear: Hearing, tympanic membrane, external ear and ear canal normal.   Left Ear: Hearing, tympanic membrane, external ear and ear canal normal.   Nose: Nose normal. Right sinus exhibits no maxillary sinus tenderness and no frontal sinus tenderness.  Left sinus exhibits no maxillary sinus tenderness and no frontal sinus tenderness. Mouth/Throat: Oropharynx is clear and moist. No posterior oropharyngeal erythema. Hearing screening by finger rub within normal limits bilateral.     Eyes: Conjunctivae and EOM are normal. Right eye exhibits no discharge. Left eye exhibits no discharge. No scleral icterus. Neck: Normal range of motion. Neck supple. No thyromegaly present. Cardiovascular: Normal rate, regular rhythm, normal heart sounds and intact distal pulses. No murmur heard. Pulmonary/Chest: Effort normal and breath sounds normal. No respiratory distress. He has no wheezes. He has no rales. He exhibits no tenderness. Abdominal: Soft. Bowel sounds are normal. He exhibits no distension. There is no tenderness. Obese abdomen. Musculoskeletal: Normal range of motion. He exhibits no edema or tenderness. Right shoulder: He exhibits no tenderness. Left shoulder: He exhibits no tenderness. No shoulder joint tenderness  No reproducible  chest pain with direct pressure. Neurological: He is alert and oriented to person, place, and time. No cranial nerve deficit. He exhibits normal muscle tone. Coordination normal.   Skin: Skin is warm and dry. Capillary refill takes less than 2 seconds. No rash noted. He is not diaphoretic. Psychiatric: He has a normal mood and affect. His behavior is normal. Judgment and thought content normal.   Nursing note and vitals reviewed. I personally reviewed testing with patient.   hyperlipidemia worsening  Says he will stop eating fried foods    Otherwise labs within normal limits      Lab Results   Component Value Date    WBC 7.3 03/12/2019    HGB 16.6 03/12/2019    HCT 50.3 03/12/2019    MCV 91.3 03/12/2019     03/12/2019       Lab Results   Component Value Date     03/12/2019    K 4.8 03/12/2019     03/12/2019    CO2 23 03/12/2019    BUN 22 03/12/2019    CREATININE 0.83 03/12/2019    GLUCOSE 95 03/12/2019    GLUCOSE dosage   - atorvastatin (LIPITOR) 20 MG tablet; take 1 tablet by mouth once daily  Dispense: 90 tablet; Refill: 0  - (Gxt) Stress Test Exercise W Out Myoview; Future    7. Family history of premature CAD  - (Gxt) Stress Test Exercise W Out Myoview; Future    8. Refused pneumococcal vaccination  9. Refuses tetanus, diphtheria, and acellular pertussis (Tdap) vaccination  I have recommended that this patient have a immunization for heat and pneumonia vaccine, but he declines at this time. I have discussed the risks and benefits of this procedure with him. The patient verbalizes understanding. Orders Placed This Encounter   Procedures    (Gxt) Stress Test Exercise W Out Myoview     Standing Status:   Future     Standing Expiration Date:   5/24/2020     Order Specific Question:   Reason for Exam?     Answer: Atypical chest pain     Order Specific Question:   Reason for Exam?     Answer:   Family history     Order Specific Question:   Reason for Exam?     Answer:   Hypertension     Order Specific Question:   Reason for Exam?     Answer:   Hyperlipidemia       Medications Discontinued During This Encounter   Medication Reason    omeprazole (PRILOSEC) 40 MG delayed release capsule REORDER    lisinopril (PRINIVIL;ZESTRIL) 5 MG tablet REORDER    busPIRone (BUSPAR) 10 MG tablet REORDER    atorvastatin (LIPITOR) 20 MG tablet REORDER    amLODIPine (NORVASC) 2.5 MG tablet REORDER         Pa received counseling on the following healthy behaviors: nutrition, exercise, medication adherence and weight loss . Reviewed prior labs and health maintenance  Continue current medications, diet and exercise. Discussed use, benefit, and side effects of prescribed medications. Barriers to medication compliance addressed. Patient given educational materials - see patient instructions  Was a self-tracking handout given in paper form or via [employer]?  Yes    Requested Prescriptions     Signed Prescriptions Disp Refills    omeprazole (PRILOSEC) 40 MG delayed release capsule 90 capsule 1     Sig: TAKE 1 CAPSULE DAILY    lisinopril (PRINIVIL;ZESTRIL) 5 MG tablet 90 tablet 0     Sig: take 1 tablet by mouth once daily    busPIRone (BUSPAR) 10 MG tablet 90 tablet 3     Sig: Take 1 tablet by mouth 3 times daily    atorvastatin (LIPITOR) 20 MG tablet 90 tablet 0     Sig: take 1 tablet by mouth once daily    amLODIPine (NORVASC) 2.5 MG tablet 90 tablet 0     Sig: take 1 tablet by mouth once daily       All patient questions answered. Patient voiced understanding. Quality Measures    Body mass index is 34.69 kg/m². Elevated. Weight control planned discussed conventional weight loss and Healthy diet and regular exercise. BP: 124/77 Blood pressure is normal. Treatment plan consists of Weight Reduction, DASH Eating Plan, Dietary Sodium Restriction, Increased Physical Activity, Patient In-home Blood Pressure Monitoring and No treatment change needed. Lab Results   Component Value Date    LDLCHOLESTEROL 125 03/12/2019    (goal LDL reduction with dx if diabetes is 50% LDL reduction)        Negative depression screening. PHQ Scores 5/24/2019 11/19/2018 5/26/2017 3/22/2017   PHQ2 Score 0 0 0 6   PHQ9 Score 0 0 9 23     Interpretation of Total Score Depression Severity: 1-4 = Minimal depression, 5-9 = Mild depression, 10-14 = Moderate depression, 15-19 = Moderately severe depression, 20-27 = Severe depression      Thepatient's past medical, surgical, social, and family history as well as his   current medications and allergies were reviewed as documented in today's encounter. Medications, labs, diagnostic studies,consultations and follow-up as documented in this encounter. Return in about 6 months (around 11/24/2019) for HTN, ASTHMA, **Please give asthma form, LABS F/U. Patient was seen with total face to face time of  30 minutes. More than 50% of this visit was counseling and education. No future appointments.     This note was completed by using the assistance of a speech-recognition program. However, inadvertent computerized transcription errors may be present. Although every effort wasmade to ensure accuracy, no guarantees can be provided that every mistake has been identified and corrected by editing.   Electronically signed by Ivy Huitron MD on 5/24/2019 at 5:30 PM

## 2019-05-24 NOTE — PROGRESS NOTES
Visit Information    Have you changed or started any medications since your last visit including any over-the-counter medicines, vitamins, or herbal medicines? no   Are you having any side effects from any of your medications? -  no  Have you stopped taking any of your medications? Is so, why? -  no    Have you seen any other physician or provider since your last visit? Yes - Records Obtained  Have you had any other diagnostic tests since your last visit? No  Have you been seen in the emergency room and/or had an admission to a hospital since we last saw you? No  Have you had your routine dental cleaning in the past 6 months? no    Have you activated your [employer] account? If not, what are your barriers?  Yes     Patient Care Team:  Raquel Mulligan MD as PCP - General (Family Medicine)  Carleen Jones MD as Surgeon (Cardiology)    Medical History Review  Past Medical, Family, and Social History reviewed and does contribute to the patient presenting condition    Health Maintenance   Topic Date Due    Pneumococcal 0-64 years Vaccine (1 of 1 - PPSV23) 05/15/2020 (Originally 10/23/1986)    DTaP/Tdap/Td vaccine (1 - Tdap) 05/24/2020 (Originally 10/23/1998)    Flu vaccine (Season Ended) 09/01/2019    Potassium monitoring  03/12/2020    Creatinine monitoring  03/12/2020    HIV screen  Completed

## 2019-06-05 DIAGNOSIS — I10 ESSENTIAL HYPERTENSION: ICD-10-CM

## 2019-06-05 RX ORDER — LISINOPRIL 5 MG/1
TABLET ORAL
Qty: 90 TABLET | Refills: 3 | Status: SHIPPED | OUTPATIENT
Start: 2019-06-05 | End: 2019-08-15 | Stop reason: ALTCHOICE

## 2019-06-05 NOTE — TELEPHONE ENCOUNTER
Please Approve or Refuse.   Send to Pharmacy per Pt's Request:      Next Visit Date:  Visit date not found   Last Visit Date: 5/24/2019    Hemoglobin A1C (%)   Date Value   03/22/2017 5.5   07/23/2014 5.3   05/01/2012 5.2             ( goal A1C is < 7)   BP Readings from Last 3 Encounters:   05/24/19 124/77   03/29/19 117/67   03/29/19 (!) 95/55          (goal 120/80)  BUN   Date Value Ref Range Status   03/12/2019 22 (H) 6 - 20 mg/dL Final     CREATININE   Date Value Ref Range Status   03/12/2019 0.83 0.70 - 1.20 mg/dL Final     Potassium   Date Value Ref Range Status   03/12/2019 4.8 3.7 - 5.3 mmol/L Final     Comment:     SPECIMEN MODERATELY HEMOLYZED, RESULTS MAY BE ADVERSELY AFFECTED

## 2019-08-11 DIAGNOSIS — E78.5 HYPERLIPIDEMIA WITH TARGET LDL LESS THAN 100: ICD-10-CM

## 2019-08-11 DIAGNOSIS — I10 ESSENTIAL HYPERTENSION: ICD-10-CM

## 2019-08-12 RX ORDER — ATORVASTATIN CALCIUM 20 MG/1
TABLET, FILM COATED ORAL
Qty: 90 TABLET | Refills: 3 | Status: SHIPPED | OUTPATIENT
Start: 2019-08-12 | End: 2020-01-02 | Stop reason: SDUPTHER

## 2019-08-12 RX ORDER — AMLODIPINE BESYLATE 2.5 MG/1
TABLET ORAL
Qty: 90 TABLET | Refills: 3 | Status: SHIPPED | OUTPATIENT
Start: 2019-08-12 | End: 2019-08-15 | Stop reason: SDUPTHER

## 2019-08-15 ENCOUNTER — OFFICE VISIT (OUTPATIENT)
Dept: FAMILY MEDICINE CLINIC | Age: 39
End: 2019-08-15
Payer: COMMERCIAL

## 2019-08-15 VITALS
SYSTOLIC BLOOD PRESSURE: 116 MMHG | HEIGHT: 74 IN | BODY MASS INDEX: 32.83 KG/M2 | OXYGEN SATURATION: 96 % | HEART RATE: 85 BPM | DIASTOLIC BLOOD PRESSURE: 78 MMHG | WEIGHT: 255.8 LBS

## 2019-08-15 DIAGNOSIS — K12.2 UVULITIS: ICD-10-CM

## 2019-08-15 DIAGNOSIS — I10 ESSENTIAL HYPERTENSION: ICD-10-CM

## 2019-08-15 DIAGNOSIS — Z83.49 FAMILY HISTORY OF THYROID NODULE: ICD-10-CM

## 2019-08-15 DIAGNOSIS — K21.9 GASTROESOPHAGEAL REFLUX DISEASE, ESOPHAGITIS PRESENCE NOT SPECIFIED: ICD-10-CM

## 2019-08-15 DIAGNOSIS — R13.14 PHARYNGOESOPHAGEAL DYSPHAGIA: Primary | ICD-10-CM

## 2019-08-15 PROBLEM — F33.42 RECURRENT MAJOR DEPRESSIVE DISORDER, IN FULL REMISSION (HCC): Status: ACTIVE | Noted: 2017-03-23

## 2019-08-15 PROCEDURE — 1036F TOBACCO NON-USER: CPT | Performed by: FAMILY MEDICINE

## 2019-08-15 PROCEDURE — G8417 CALC BMI ABV UP PARAM F/U: HCPCS | Performed by: FAMILY MEDICINE

## 2019-08-15 PROCEDURE — 99214 OFFICE O/P EST MOD 30 MIN: CPT | Performed by: FAMILY MEDICINE

## 2019-08-15 PROCEDURE — G8427 DOCREV CUR MEDS BY ELIG CLIN: HCPCS | Performed by: FAMILY MEDICINE

## 2019-08-15 RX ORDER — OMEPRAZOLE 40 MG/1
CAPSULE, DELAYED RELEASE ORAL
Qty: 90 CAPSULE | Refills: 1 | Status: SHIPPED | OUTPATIENT
Start: 2019-08-15 | End: 2020-01-02 | Stop reason: SDUPTHER

## 2019-08-15 RX ORDER — AMLODIPINE BESYLATE 10 MG/1
10 TABLET ORAL EVERY MORNING
Qty: 90 TABLET | Refills: 3 | Status: SHIPPED | OUTPATIENT
Start: 2019-08-15 | End: 2020-01-02 | Stop reason: SDUPTHER

## 2019-08-15 RX ORDER — LISINOPRIL 5 MG/1
TABLET ORAL
Qty: 90 TABLET | Refills: 3 | Status: CANCELLED | OUTPATIENT
Start: 2019-08-15

## 2019-08-15 RX ORDER — METHYLPREDNISOLONE 4 MG/1
TABLET ORAL
Qty: 1 KIT | Refills: 0 | Status: ON HOLD | OUTPATIENT
Start: 2019-08-15 | End: 2019-10-09 | Stop reason: ALTCHOICE

## 2019-08-15 ASSESSMENT — ENCOUNTER SYMPTOMS
NAUSEA: 0
COUGH: 0
VOMITING: 0
RHINORRHEA: 0
TROUBLE SWALLOWING: 1
CHEST TIGHTNESS: 0
ABDOMINAL DISTENTION: 0
WHEEZING: 0
CONSTIPATION: 0
SHORTNESS OF BREATH: 0
ABDOMINAL PAIN: 0
DIARRHEA: 0
VOICE CHANGE: 1

## 2019-08-15 NOTE — PROGRESS NOTES
mg and lisinopril 5 mg daily. Use of agents associated with hypertension: none. History of target organ damage: none. BP Readings from Last 3 Encounters:   08/15/19 116/78   05/24/19 124/77   03/29/19 117/67      Pulse controlled. Pulse Readings from Last 3 Encounters:   08/15/19 85   05/24/19 97   03/29/19 58     Patient has lost intentionally 15 pounds since the last visit with me  Wt Readings from Last 3 Encounters:   08/15/19 255 lb 12.8 oz (116 kg)   05/24/19 270 lb 3.2 oz (122.6 kg)   03/29/19 260 lb (117.9 kg)               Allergies   Allergen Reactions    Other Anaphylaxis     Any type of seafood    Shrimp (Diagnostic) Anaphylaxis    Ace Inhibitors      UVULITIS ON 8/15/19         Current Outpatient Medications on File Prior to Visit   Medication Sig Dispense Refill    amLODIPine (NORVASC) 2.5 MG tablet take 1 tablet by mouth once daily 90 tablet 3    atorvastatin (LIPITOR) 20 MG tablet take 1 tablet by mouth once daily 90 tablet 3    lisinopril (PRINIVIL;ZESTRIL) 5 MG tablet take 1 tablet by mouth once daily 90 tablet 3    omeprazole (PRILOSEC) 40 MG delayed release capsule TAKE 1 CAPSULE DAILY 90 capsule 1    busPIRone (BUSPAR) 10 MG tablet Take 1 tablet by mouth 3 times daily 90 tablet 3    ibuprofen (ADVIL;MOTRIN) 600 MG tablet Take 1 tablet by mouth every 6 hours as needed for Pain 30 tablet 0    gentamicin irrigation 15 mLs by Nasal route 2 times daily 15ml each nostril bid.  BUDESONIDE NA by Nasal route 1 tube ea nostril bid      albuterol sulfate HFA (VENTOLIN HFA) 108 (90 Base) MCG/ACT inhaler Inhale 2 puffs into the lungs every 6 hours as needed for Wheezing or Shortness of Breath 18 g 3    mometasone-formoterol (DULERA) 100-5 MCG/ACT inhaler Inhale 2 puffs into the lungs 2 times daily Stop Flovent 13 g 3    Blood Pressure KIT Dx: HTN. Needs automatic blood pressure machine to monitor his blood pressure.  1 kit 0    Respiratory Therapy Supplies (NEBULIZER) MADHAV 1 Units by Does not apply route 2 times daily Dx: Asthma exacerbation J45.901 1 Device 0     No current facility-administered medications on file prior to visit. Social History     Tobacco Use    Smoking status: Never Smoker    Smokeless tobacco: Never Used   Substance Use Topics    Alcohol use: Yes     Comment: occasional  few x year    Drug use: No       Counseling given: Yes                    The patient's past medical, surgical, social, and family history as well as his current medications and allergies were reviewed as documented in today's encounter. Rest of complaints with corresponding details per ROS. Review of Systems   Constitutional: Negative for activity change, appetite change, chills, diaphoresis, fatigue, fever and unexpected weight change. HENT: Positive for trouble swallowing and voice change. Negative for congestion, postnasal drip and rhinorrhea. Respiratory: Negative for cough, chest tightness, shortness of breath and wheezing. Cardiovascular: Negative for chest pain, palpitations and leg swelling. Gastrointestinal: Negative for abdominal distention, abdominal pain, constipation, diarrhea, nausea and vomiting. Heartburn better   Allergic/Immunologic: Positive for environmental allergies and food allergies. Neurological: Negative for dizziness and light-headedness.         -vital signs stable and within normal limits except Morbid obesity per BMI. /78   Pulse 85   Ht 6' 2\" (1.88 m)   Wt 255 lb 12.8 oz (116 kg)   SpO2 96%   BMI 32.84 kg/m²        Physical Exam   Constitutional: He is oriented to person, place, and time. He appears well-developed and well-nourished. No distress. HENT:   Head: Normocephalic and atraumatic. Right Ear: External ear normal.   Left Ear: External ear normal.   Nose: Nose normal. No mucosal edema or rhinorrhea. Right sinus exhibits no maxillary sinus tenderness and no frontal sinus tenderness.  Left sinus exhibits no mouth, with food. Keep low carb, low salt diet while taking it     Dispense:  1 kit     Refill:  0       Medications Discontinued During This Encounter   Medication Reason    lisinopril (PRINIVIL;ZESTRIL) 5 MG tablet Alternate therapy    ibuprofen (ADVIL;MOTRIN) 600 MG tablet Therapy completed    gentamicin irrigation Therapy completed    omeprazole (PRILOSEC) 40 MG delayed release capsule REORDER    amLODIPine (NORVASC) 2.5 MG tablet REORDER       Pa received counseling on the following healthy behaviors: nutrition, exercise, medication adherence and weight loss    Reviewed prior labs and health maintenance. Continue current medications, diet and exercise. Discussed use, benefit, and side effects of prescribed medications. Barriers to medication compliance addressed. Patient given educational materials - see patient instructions. All patient questions answered. Patient voiced understanding. Thepatient's past medical, surgical, social, and family history as well as his   current medications and allergies were reviewed as documented in today's encounter. Medications, labs, diagnostic studies,consultations and follow-up as documented in this encounter. Return in about 3 months (around 11/15/2019) for LABS F/U, HTN, F/U imaging, ASTHMA, **Please give asthma form. Patient was seen with total face to face time of  25 minutes. More than 50% of this visit was counseling and education. No future appointments. This note was completed by using the assistance of a speech-recognition program. However, inadvertent computerized transcription errors may be present. Although every effort was made to ensure accuracy, no guarantees can be provided that every mistake has been identified and corrected by editing.     Electronically signed by Tushar Nina MD on 8/15/2019 at 7:48 PM

## 2019-08-21 ENCOUNTER — HOSPITAL ENCOUNTER (OUTPATIENT)
Dept: ULTRASOUND IMAGING | Age: 39
Discharge: HOME | End: 2019-08-23
Payer: COMMERCIAL

## 2019-08-21 DIAGNOSIS — Z83.49 FAMILY HISTORY OF THYROID NODULE: ICD-10-CM

## 2019-08-21 DIAGNOSIS — R13.14 PHARYNGOESOPHAGEAL DYSPHAGIA: ICD-10-CM

## 2019-08-21 PROCEDURE — 76536 US EXAM OF HEAD AND NECK: CPT

## 2019-09-05 ENCOUNTER — TELEPHONE (OUTPATIENT)
Dept: GASTROENTEROLOGY | Age: 39
End: 2019-09-05

## 2019-09-23 ENCOUNTER — OFFICE VISIT (OUTPATIENT)
Dept: GASTROENTEROLOGY | Age: 39
End: 2019-09-23
Payer: COMMERCIAL

## 2019-09-23 VITALS
BODY MASS INDEX: 33.29 KG/M2 | WEIGHT: 259.3 LBS | HEART RATE: 83 BPM | SYSTOLIC BLOOD PRESSURE: 119 MMHG | DIASTOLIC BLOOD PRESSURE: 75 MMHG

## 2019-09-23 DIAGNOSIS — K21.9 GASTROESOPHAGEAL REFLUX DISEASE, ESOPHAGITIS PRESENCE NOT SPECIFIED: Primary | ICD-10-CM

## 2019-09-23 DIAGNOSIS — R13.14 PHARYNGOESOPHAGEAL DYSPHAGIA: ICD-10-CM

## 2019-09-23 DIAGNOSIS — K12.2 UVULITIS: ICD-10-CM

## 2019-09-23 PROCEDURE — G8427 DOCREV CUR MEDS BY ELIG CLIN: HCPCS | Performed by: INTERNAL MEDICINE

## 2019-09-23 PROCEDURE — 99244 OFF/OP CNSLTJ NEW/EST MOD 40: CPT | Performed by: INTERNAL MEDICINE

## 2019-09-23 PROCEDURE — G8417 CALC BMI ABV UP PARAM F/U: HCPCS | Performed by: INTERNAL MEDICINE

## 2019-09-23 ASSESSMENT — ENCOUNTER SYMPTOMS
VOMITING: 0
ABDOMINAL PAIN: 0
CHOKING: 0
VOICE CHANGE: 0
RESPIRATORY NEGATIVE: 1
BACK PAIN: 0
TROUBLE SWALLOWING: 1
COUGH: 0
DIARRHEA: 0
ANAL BLEEDING: 0
RECTAL PAIN: 0
EYES NEGATIVE: 1
CONSTIPATION: 0
BLOOD IN STOOL: 0
ABDOMINAL DISTENTION: 0
GASTROINTESTINAL NEGATIVE: 1
WHEEZING: 0
NAUSEA: 0
SORE THROAT: 0
SINUS PRESSURE: 1

## 2019-09-23 NOTE — PROGRESS NOTES
Subjective:      Patient ID: Linda Schulz is a 43 y.o. male. Dr. Anil Zamorano MD has requested that I see Linda Schulz for a consult for   1. Gastroesophageal reflux disease, esophagitis presence not specified    2. Pharyngoesophageal dysphagia    3. Nonspecific reactive hepatitis    4. Uvulitis     . Past Medical, Family, and Social History reviewed and does contribute to the patient presenting condition. patient\"s PMH/PSH,SH,PSYCH hx, MEDs, ALLERGIES, and ROS was all reviewed and updated ion the appropriate sections      HPI  Patient seen along with his wife. Patient states that he is having difficulty swallowing. He feels food getting stuck in the cervical esophagus. No symptom of complete obstruction of esophagus. He also states that he has issues with the liquids. His symptoms not suggestive of oropharyngeal dysphagia. He does have chronic GERD symptoms as well. Patient has been taking PPI therapy with relief of heartburns. He states that his swallowing issues are intermittent. Some days he swallows GERD and suddenly has issues with swallowing. Patient has the symptom the last 6 months and recently getting worse. No odynophagia. No epigastric pain denies abdominal pain, bloating, nausea vomiting. Bowel movements satisfactory. No weight loss. Has good appetite. Symptoms suggestive of extra esophageal manifestation of GERD. Review of Systems   Constitutional: Negative. Negative for appetite change, fatigue and unexpected weight change. HENT: Positive for sinus pressure and trouble swallowing. Negative for dental problem, postnasal drip, sore throat and voice change. Eyes: Negative. Negative for visual disturbance. Respiratory: Negative. Negative for cough, choking and wheezing. Cardiovascular: Positive for palpitations. Negative for chest pain and leg swelling. Gastrointestinal: Negative.   Negative for abdominal distention, abdominal pain, anal disease, esophagitis presence not specified     2. Pharyngoesophageal dysphagia  EGD   3. Uvulitis             Plan: At present patient looks stable. Given patient's symptoms, concerns he may need EGD to evaluate cervical esophageal pathology. Advised to follow antireflux measures. On further discussion it appears that this patient has significant stress. I am wondering whether this issues causing some of his symptoms intermittently. Discussed with the patient regarding this as well.

## 2019-10-02 ENCOUNTER — TELEPHONE (OUTPATIENT)
Dept: GASTROENTEROLOGY | Age: 39
End: 2019-10-02

## 2019-10-09 ENCOUNTER — ANESTHESIA EVENT (OUTPATIENT)
Dept: ENDOSCOPY | Age: 39
End: 2019-10-09
Payer: COMMERCIAL

## 2019-10-09 ENCOUNTER — ANESTHESIA (OUTPATIENT)
Dept: ENDOSCOPY | Age: 39
End: 2019-10-09
Payer: COMMERCIAL

## 2019-10-09 ENCOUNTER — HOSPITAL ENCOUNTER (OUTPATIENT)
Age: 39
Setting detail: OUTPATIENT SURGERY
Discharge: HOME | End: 2019-10-09
Attending: INTERNAL MEDICINE | Admitting: INTERNAL MEDICINE
Payer: COMMERCIAL

## 2019-10-09 VITALS
BODY MASS INDEX: 32.08 KG/M2 | WEIGHT: 250 LBS | HEART RATE: 87 BPM | OXYGEN SATURATION: 98 % | HEIGHT: 74 IN | RESPIRATION RATE: 16 BRPM | TEMPERATURE: 98.8 F | DIASTOLIC BLOOD PRESSURE: 55 MMHG | SYSTOLIC BLOOD PRESSURE: 104 MMHG

## 2019-10-09 VITALS — OXYGEN SATURATION: 92 % | SYSTOLIC BLOOD PRESSURE: 133 MMHG | DIASTOLIC BLOOD PRESSURE: 78 MMHG

## 2019-10-09 PROCEDURE — 2580000003 HC RX 258: Performed by: ANESTHESIOLOGY

## 2019-10-09 PROCEDURE — 43239 EGD BIOPSY SINGLE/MULTIPLE: CPT | Performed by: INTERNAL MEDICINE

## 2019-10-09 PROCEDURE — 43251 EGD REMOVE LESION SNARE: CPT | Performed by: INTERNAL MEDICINE

## 2019-10-09 PROCEDURE — 2709999900 HC NON-CHARGEABLE SUPPLY: Performed by: INTERNAL MEDICINE

## 2019-10-09 PROCEDURE — 3700000000 HC ANESTHESIA ATTENDED CARE: Performed by: INTERNAL MEDICINE

## 2019-10-09 PROCEDURE — 3700000001 HC ADD 15 MINUTES (ANESTHESIA): Performed by: INTERNAL MEDICINE

## 2019-10-09 PROCEDURE — 7100000001 HC PACU RECOVERY - ADDTL 15 MIN: Performed by: INTERNAL MEDICINE

## 2019-10-09 PROCEDURE — 6360000002 HC RX W HCPCS: Performed by: NURSE ANESTHETIST, CERTIFIED REGISTERED

## 2019-10-09 PROCEDURE — 88305 TISSUE EXAM BY PATHOLOGIST: CPT

## 2019-10-09 PROCEDURE — 3609013500 HC EGD REMOVAL TUMOR POLYP/OTHER LESION SNARE TECH: Performed by: INTERNAL MEDICINE

## 2019-10-09 PROCEDURE — 7100000000 HC PACU RECOVERY - FIRST 15 MIN: Performed by: INTERNAL MEDICINE

## 2019-10-09 RX ORDER — PROPOFOL 10 MG/ML
INJECTION, EMULSION INTRAVENOUS PRN
Status: DISCONTINUED | OUTPATIENT
Start: 2019-10-09 | End: 2019-10-09 | Stop reason: SDUPTHER

## 2019-10-09 RX ORDER — SODIUM CHLORIDE, SODIUM LACTATE, POTASSIUM CHLORIDE, CALCIUM CHLORIDE 600; 310; 30; 20 MG/100ML; MG/100ML; MG/100ML; MG/100ML
INJECTION, SOLUTION INTRAVENOUS CONTINUOUS
Status: DISCONTINUED | OUTPATIENT
Start: 2019-10-09 | End: 2019-10-09 | Stop reason: HOSPADM

## 2019-10-09 RX ADMIN — PROPOFOL 100 MG: 10 INJECTION, EMULSION INTRAVENOUS at 10:03

## 2019-10-09 RX ADMIN — PROPOFOL 100 MG: 10 INJECTION, EMULSION INTRAVENOUS at 10:06

## 2019-10-09 RX ADMIN — SODIUM CHLORIDE, POTASSIUM CHLORIDE, SODIUM LACTATE AND CALCIUM CHLORIDE: 600; 310; 30; 20 INJECTION, SOLUTION INTRAVENOUS at 08:36

## 2019-10-09 RX ADMIN — PROPOFOL 100 MG: 10 INJECTION, EMULSION INTRAVENOUS at 09:59

## 2019-10-09 RX ADMIN — PROPOFOL 30 MG: 10 INJECTION, EMULSION INTRAVENOUS at 09:54

## 2019-10-09 ASSESSMENT — PULMONARY FUNCTION TESTS
PIF_VALUE: 0
PIF_VALUE: 1
PIF_VALUE: 0
PIF_VALUE: 1
PIF_VALUE: 0
PIF_VALUE: 1
PIF_VALUE: 0
PIF_VALUE: 0
PIF_VALUE: 1
PIF_VALUE: 0
PIF_VALUE: 0
PIF_VALUE: 1
PIF_VALUE: 0

## 2019-10-09 ASSESSMENT — PAIN - FUNCTIONAL ASSESSMENT: PAIN_FUNCTIONAL_ASSESSMENT: 0-10

## 2019-10-09 ASSESSMENT — PAIN SCALES - GENERAL: PAINLEVEL_OUTOF10: 0

## 2019-10-10 LAB — SURGICAL PATHOLOGY REPORT: NORMAL

## 2019-10-16 PROBLEM — G47.33 OSA (OBSTRUCTIVE SLEEP APNEA): Status: ACTIVE | Noted: 2019-10-16

## 2019-10-20 DIAGNOSIS — F40.10 SOCIAL ANXIETY DISORDER: ICD-10-CM

## 2019-10-21 RX ORDER — BUSPIRONE HYDROCHLORIDE 10 MG/1
TABLET ORAL
Qty: 90 TABLET | Refills: 3 | Status: SHIPPED | OUTPATIENT
Start: 2019-10-21 | End: 2020-01-02 | Stop reason: SDUPTHER

## 2020-01-02 ENCOUNTER — TELEPHONE (OUTPATIENT)
Dept: FAMILY MEDICINE CLINIC | Age: 40
End: 2020-01-02

## 2020-01-02 ENCOUNTER — OFFICE VISIT (OUTPATIENT)
Dept: FAMILY MEDICINE CLINIC | Age: 40
End: 2020-01-02
Payer: COMMERCIAL

## 2020-01-02 VITALS
SYSTOLIC BLOOD PRESSURE: 139 MMHG | BODY MASS INDEX: 32.98 KG/M2 | HEART RATE: 108 BPM | HEIGHT: 74 IN | DIASTOLIC BLOOD PRESSURE: 84 MMHG | OXYGEN SATURATION: 94 % | WEIGHT: 257 LBS

## 2020-01-02 PROBLEM — R13.14 PHARYNGOESOPHAGEAL DYSPHAGIA: Status: RESOLVED | Noted: 2019-08-15 | Resolved: 2020-01-02

## 2020-01-02 PROBLEM — K20.0 EOSINOPHILIC ESOPHAGITIS: Status: ACTIVE | Noted: 2020-01-02

## 2020-01-02 PROBLEM — K12.2 UVULITIS: Status: RESOLVED | Noted: 2019-08-15 | Resolved: 2020-01-02

## 2020-01-02 LAB
INFLUENZA A ANTIBODY: NEGATIVE
INFLUENZA B ANTIBODY: NEGATIVE

## 2020-01-02 PROCEDURE — 99214 OFFICE O/P EST MOD 30 MIN: CPT | Performed by: FAMILY MEDICINE

## 2020-01-02 PROCEDURE — G8417 CALC BMI ABV UP PARAM F/U: HCPCS | Performed by: FAMILY MEDICINE

## 2020-01-02 PROCEDURE — 94640 AIRWAY INHALATION TREATMENT: CPT | Performed by: FAMILY MEDICINE

## 2020-01-02 PROCEDURE — G8484 FLU IMMUNIZE NO ADMIN: HCPCS | Performed by: FAMILY MEDICINE

## 2020-01-02 PROCEDURE — 1036F TOBACCO NON-USER: CPT | Performed by: FAMILY MEDICINE

## 2020-01-02 PROCEDURE — 87804 INFLUENZA ASSAY W/OPTIC: CPT | Performed by: FAMILY MEDICINE

## 2020-01-02 PROCEDURE — G8427 DOCREV CUR MEDS BY ELIG CLIN: HCPCS | Performed by: FAMILY MEDICINE

## 2020-01-02 RX ORDER — OMEPRAZOLE 40 MG/1
CAPSULE, DELAYED RELEASE ORAL
Qty: 90 CAPSULE | Refills: 3 | Status: SHIPPED | OUTPATIENT
Start: 2020-01-02 | End: 2020-10-27 | Stop reason: SDUPTHER

## 2020-01-02 RX ORDER — AMLODIPINE BESYLATE 2.5 MG/1
TABLET ORAL
COMMUNITY
Start: 2019-11-18 | End: 2020-01-02 | Stop reason: DRUGHIGH

## 2020-01-02 RX ORDER — BLOOD PRESSURE TEST KIT
KIT MISCELLANEOUS
Qty: 1 KIT | Refills: 0 | Status: CANCELLED | OUTPATIENT
Start: 2020-01-02

## 2020-01-02 RX ORDER — AMLODIPINE BESYLATE 2.5 MG/1
TABLET ORAL
Qty: 30 TABLET | Status: CANCELLED | OUTPATIENT
Start: 2020-01-02

## 2020-01-02 RX ORDER — AMLODIPINE BESYLATE 10 MG/1
10 TABLET ORAL EVERY EVENING
Qty: 90 TABLET | Refills: 3 | Status: SHIPPED | OUTPATIENT
Start: 2020-01-02 | End: 2020-05-08 | Stop reason: SDUPTHER

## 2020-01-02 RX ORDER — PREDNISONE 10 MG/1
50 TABLET ORAL DAILY
Qty: 35 TABLET | Refills: 0 | Status: SHIPPED | OUTPATIENT
Start: 2020-01-02 | End: 2020-01-09

## 2020-01-02 RX ORDER — ALBUTEROL SULFATE 2.5 MG/3ML
2.5 SOLUTION RESPIRATORY (INHALATION) EVERY 6 HOURS PRN
Qty: 120 VIAL | Refills: 0 | Status: SHIPPED | OUTPATIENT
Start: 2020-01-02 | End: 2022-08-09 | Stop reason: SDUPTHER

## 2020-01-02 RX ORDER — ALBUTEROL SULFATE 2.5 MG/3ML
2.5 SOLUTION RESPIRATORY (INHALATION) ONCE
Status: COMPLETED | OUTPATIENT
Start: 2020-01-02 | End: 2020-01-02

## 2020-01-02 RX ORDER — SOFT LENS DISINFECTANT
1 SOLUTION, NON-ORAL MISCELLANEOUS 2 TIMES DAILY
Qty: 1 DEVICE | Refills: 0 | Status: CANCELLED | OUTPATIENT
Start: 2020-01-02

## 2020-01-02 RX ORDER — ATORVASTATIN CALCIUM 20 MG/1
20 TABLET, FILM COATED ORAL EVERY EVENING
Qty: 90 TABLET | Refills: 3 | Status: SHIPPED | OUTPATIENT
Start: 2020-01-02 | End: 2020-05-08 | Stop reason: SDUPTHER

## 2020-01-02 RX ORDER — BUSPIRONE HYDROCHLORIDE 10 MG/1
10 TABLET ORAL 3 TIMES DAILY PRN
Qty: 90 TABLET | Refills: 3 | Status: SHIPPED | OUTPATIENT
Start: 2020-01-02 | End: 2020-05-08 | Stop reason: SDUPTHER

## 2020-01-02 RX ORDER — ALBUTEROL SULFATE 90 UG/1
2 AEROSOL, METERED RESPIRATORY (INHALATION) EVERY 6 HOURS PRN
Qty: 18 G | Refills: 3 | Status: SHIPPED | OUTPATIENT
Start: 2020-01-02 | End: 2020-05-08 | Stop reason: SDUPTHER

## 2020-01-02 RX ORDER — METHYLPREDNISOLONE SODIUM SUCCINATE 125 MG/2ML
125 INJECTION, POWDER, LYOPHILIZED, FOR SOLUTION INTRAMUSCULAR; INTRAVENOUS ONCE
Status: COMPLETED | OUTPATIENT
Start: 2020-01-02 | End: 2020-01-02

## 2020-01-02 RX ORDER — AZITHROMYCIN 250 MG/1
TABLET, FILM COATED ORAL
Qty: 6 TABLET | Refills: 0 | Status: SHIPPED | OUTPATIENT
Start: 2020-01-02 | End: 2020-01-07

## 2020-01-02 RX ADMIN — METHYLPREDNISOLONE SODIUM SUCCINATE 125 MG: 125 INJECTION, POWDER, LYOPHILIZED, FOR SOLUTION INTRAMUSCULAR; INTRAVENOUS at 15:48

## 2020-01-02 RX ADMIN — ALBUTEROL SULFATE 2.5 MG: 2.5 SOLUTION RESPIRATORY (INHALATION) at 15:46

## 2020-01-02 ASSESSMENT — PATIENT HEALTH QUESTIONNAIRE - PHQ9
1. LITTLE INTEREST OR PLEASURE IN DOING THINGS: 0
SUM OF ALL RESPONSES TO PHQ9 QUESTIONS 1 & 2: 0
SUM OF ALL RESPONSES TO PHQ QUESTIONS 1-9: 0
2. FEELING DOWN, DEPRESSED OR HOPELESS: 0
SUM OF ALL RESPONSES TO PHQ QUESTIONS 1-9: 0

## 2020-01-02 ASSESSMENT — ENCOUNTER SYMPTOMS
VOMITING: 0
SORE THROAT: 1
SINUS PAIN: 1
WHEEZING: 1
COUGH: 1
NAUSEA: 0
TROUBLE SWALLOWING: 0
ABDOMINAL DISTENTION: 0
SINUS PRESSURE: 1
CHEST TIGHTNESS: 0
SHORTNESS OF BREATH: 1
DIARRHEA: 0
CONSTIPATION: 0
ABDOMINAL PAIN: 0

## 2020-01-02 NOTE — PROGRESS NOTES
Chief Complaint   Patient presents with    Cough     fever sore throat ear pain back of head hurts phlegm green chest pain    Other     pt refuses vaccines    Nausea    Hypertension    Hyperlipidemia         Patient presents today for an acute visit secondary to Cough (fever sore throat ear pain back of head hurts phlegm green chest pain); Other (pt refuses vaccines); Nausea; Hypertension; and Hyperlipidemia   . HPI      Pa Cordova complains of cough, congestion and shortness of breath. Onset of symptoms was a few days ago, more than 3 days ago. Symptoms have been gradually worsening since that time. The cough is paroxysmal and productive of clear  and green sputum and is aggravated by cold air, infection and reclining position. Associated symptoms include: postnasal drip, shortness of breath, sputum production and wheezing, congestion, sore throat, post nasal drip, headache, bilateral sinus pain, fever and chills. He does report feeling more tired, fatigued, fever and chills  Patient denies ear pain or trouble swallowing  He  reports neck pain and upper back pain from so much coughing. Patient says he has been taking decongestants from over-the-counter, but does not help. Patient does have asthma. Patient reports he never received Dullera. He is using only albuterol as needed. He does have nebulizer machine at home but did not start using it  He denies chest pain    Patient does have a history of environmental allergens. Patient has not traveled recently. Patient does not have a history of smoking. Patient has not had a previous chest x-ray. Patient has not had a PPD done. Reports sick contact with his daughter    Negative influenza screen  Results for POC orders placed in visit on 01/02/20   POCT Influenza A/B   Result Value Ref Range    Influenza A Ab negative     Influenza B Ab negative        Hypertension: Patient here for follow-up of elevated blood pressure.   he   is not Range    Influenza A Ab negative     Influenza B Ab negative        3. Essential hypertension  Inadequately controlled  Advised to stop decongestants due to borderline high BP and mild tachycardia   - amLODIPine (NORVASC) 10 MG tablet; Take 1 tablet by mouth every evening Stop Lisinopril. Dose increased  8/15/2019  Dispense: 90 tablet; Refill: 3  - CBC; Future  - Comprehensive Metabolic Panel; Future  - TSH without Reflex; Future  Discussed low salt diet and BP and pulse monitoring daily, BP log given  Continue current treatment. 4. Flu-like symptoms  worsening   - POCT Influenza A/B-negative     5. Hyperlipidemia with target LDL less than 100  Inadequately controlled    - atorvastatin (LIPITOR) 20 MG tablet; Take 1 tablet by mouth every evening  Dispense: 90 tablet; Refill: 3  - Lipid Panel; [facility] labs    6. Gastroesophageal reflux disease, esophagitis presence not specified  Improved with medication  - omeprazole (PRILOSEC) 40 MG delayed release capsule; TAKE 1 CAPSULE DAILY  Dispense: 90 capsule; Refill: 3    7. Social anxiety disorder  Improved with medications  - busPIRone (BUSPAR) 10 MG tablet; Take 1 tablet by mouth 3 times daily as needed (anxiety)  Dispense: 90 tablet; Refill: 3    8.  Eosinophilic esophagitis  New per pathology report    Patient never followed up with GI to discuss his report  - Willem Lopze MD, Allergy & Immunology, [place]  Continue Prilosec 40 mg daily  He needs full evaluation for food allergies, will refer him to allergy specialist      Data Unavailable    Orders Placed This Encounter   Procedures    CBC     Standing Status:   Future     Standing Expiration Date:   1/2/2021    Comprehensive Metabolic Panel     Standing Status:   Future     Standing Expiration Date:   1/2/2021    Lipid Panel     Standing Status:   Future     Standing Expiration Date:   1/2/2021     Order Specific Question:   Is Patient Fasting?/# of Hours     Answer:   8-10 Hours, water ok to drink  TSH without Reflex     Standing Status:   Future     Standing Expiration Date:   2021   Ronel Duckworth MD, Allergy & Immunology, [place]     Referral Priority:   Routine     Referral Type:   Eval and Treat     Referral Reason:   Specialty Services Required     Referred to Provider:   Taurus Laboy MD     Requested Specialty:   Allergy & Immunology     Number of Visits Requested:   1    POCT Influenza A/B    OR PRESSURIZED/NONPRESSURIZED INHALATION TREATMENT       Orders Placed This Encounter   Medications    omeprazole (PRILOSEC) 40 MG delayed release capsule     Sig: TAKE 1 CAPSULE DAILY     Dispense:  90 capsule     Refill:  3    albuterol sulfate HFA (VENTOLIN HFA) 108 (90 Base) MCG/ACT inhaler     Sig: Inhale 2 puffs into the lungs every 6 hours as needed for Wheezing or Shortness of Breath     Dispense:  18 g     Refill:  3    amLODIPine (NORVASC) 10 MG tablet     Sig: Take 1 tablet by mouth every evening Stop Lisinopril.  Dose increased  8/15/2019     Dispense:  90 tablet     Refill:  3    atorvastatin (LIPITOR) 20 MG tablet     Sig: Take 1 tablet by mouth every evening     Dispense:  90 tablet     Refill:  3    busPIRone (BUSPAR) 10 MG tablet     Sig: Take 1 tablet by mouth 3 times daily as needed (anxiety)     Dispense:  90 tablet     Refill:  3    mometasone-formoterol (DULERA) 100-5 MCG/ACT inhaler     Sig: Inhale 2 puffs into the lungs 2 times daily Stop Flovent     Dispense:  13 g     Refill:  3    budesonide (RINOCORT AQUA) 32 MCG/ACT nasal spray     Si sprays by Each Nostril route daily 1 tube ea nostril bid     Dispense:  1 Bottle     Refill:  3    albuterol (PROVENTIL) (2.5 MG/3ML) 0.083% nebulizer solution     Sig: Take 3 mLs by nebulization every 6 hours as needed for Wheezing or Shortness of Breath     Dispense:  120 vial     Refill:  0    azithromycin (ZITHROMAX) 250 MG tablet     Si mg orally on day one followed by 250 mg daily on days two through five Dispense:  6 tablet     Refill:  0    predniSONE (DELTASONE) 10 MG tablet     Sig: Take 5 tablets by mouth daily for 7 days Start tomorrow     Dispense:  35 tablet     Refill:  0    methylPREDNISolone sodium (SOLU-MEDROL) injection 125 mg    albuterol (PROVENTIL) nebulizer solution 2.5 mg       Medications Discontinued During This Encounter   Medication Reason    amLODIPine (NORVASC) 2.5 MG tablet DOSE ADJUSTMENT    omeprazole (PRILOSEC) 40 MG delayed release capsule REORDER    albuterol sulfate HFA (VENTOLIN HFA) 108 (90 Base) MCG/ACT inhaler REORDER    amLODIPine (NORVASC) 10 MG tablet REORDER    atorvastatin (LIPITOR) 20 MG tablet REORDER    busPIRone (BUSPAR) 10 MG tablet REORDER    mometasone-formoterol (DULERA) 100-5 MCG/ACT inhaler REORDER    BUDESONIDE NA REORDER       Pa received counseling on the following healthy behaviors: nutrition, exercise, medication adherence and weight loss    Reviewed prior labs and health maintenance  Continue current medications, diet and exercise. Discussed use, benefit, and side effects of prescribed medications. Barriers to medication compliance addressed. Patient given educational materials - see patient instructions  Was a self-tracking handout given in paper form or via Clutch[employer]? Yes    Requested Prescriptions     Signed Prescriptions Disp Refills    omeprazole (PRILOSEC) 40 MG delayed release capsule 90 capsule 3     Sig: TAKE 1 CAPSULE DAILY    albuterol sulfate HFA (VENTOLIN HFA) 108 (90 Base) MCG/ACT inhaler 18 g 3     Sig: Inhale 2 puffs into the lungs every 6 hours as needed for Wheezing or Shortness of Breath    amLODIPine (NORVASC) 10 MG tablet 90 tablet 3     Sig: Take 1 tablet by mouth every evening Stop Lisinopril.  Dose increased  8/15/2019    atorvastatin (LIPITOR) 20 MG tablet 90 tablet 3     Sig: Take 1 tablet by mouth every evening    busPIRone (BUSPAR) 10 MG tablet 90 tablet 3     Sig: Take 1 tablet by mouth 3 times daily as needed education. Future Appointments   Date Time Provider Joe Monet   4/2/2020  3:30 PM MD flower Oliver     This note was completed by using the assistance of a speech-recognition program. However, inadvertent computerized transcription errors may be present. Although every effort was made to ensure accuracy, no guarantees can be provided that every mistake has been identified and corrected by editing.     Electronically signed by Zara Swenson MD on 1/2/2020 at 6:25 PM

## 2020-01-02 NOTE — PATIENT INSTRUCTIONS
Patient Education        Bronchitis: Care Instructions  Your Care Instructions    Bronchitis is inflammation of the bronchial tubes, which carry air to the lungs. The tubes swell and produce mucus, or phlegm. The mucus and inflamed bronchial tubes make you cough. You may have trouble breathing. Most cases of bronchitis are caused by viruses like those that cause colds. Antibiotics usually do not help and they may be harmful. Bronchitis usually develops rapidly and lasts about 2 to 3 weeks in otherwise healthy people. Follow-up care is a key part of your treatment and safety. Be sure to make and go to all appointments, and call your doctor if you are having problems. It's also a good idea to know your test results and keep a list of the medicines you take. How can you care for yourself at home? · Take all medicines exactly as prescribed. Call your doctor if you think you are having a problem with your medicine. · Get some extra rest.  · Take an over-the-counter pain medicine, such as acetaminophen (Tylenol), ibuprofen (Advil, Motrin), or naproxen (Aleve) to reduce fever and relieve body aches. Read and follow all instructions on the label. · Do not take two or more pain medicines at the same time unless the doctor told you to. Many pain medicines have acetaminophen, which is Tylenol. Too much acetaminophen (Tylenol) can be harmful. · Take an over-the-counter cough medicine that contains dextromethorphan to help quiet a dry, hacking cough so that you can sleep. Avoid cough medicines that have more than one active ingredient. Read and follow all instructions on the label. · Breathe moist air from a humidifier, hot shower, or sink filled with hot water. The heat and moisture will thin mucus so you can cough it out. · Do not smoke. Smoking can make bronchitis worse. If you need help quitting, talk to your doctor about stop-smoking programs and medicines.  These can increase your chances of quitting for good.  When should you call for help? Call 911 anytime you think you may need emergency care. For example, call if:    · You have severe trouble breathing.    Call your doctor now or seek immediate medical care if:    · You have new or worse trouble breathing.     · You cough up dark brown or bloody mucus (sputum).     · You have a new or higher fever.     · You have a new rash.    Watch closely for changes in your health, and be sure to contact your doctor if:    · You cough more deeply or more often, especially if you notice more mucus or a change in the color of your mucus.     · You are not getting better as expected. Where can you learn more? Go to https://GummiipeVirobayeb.Goshi. org and sign in to your [employer] account. Enter H333 in the [employer] box to learn more about \"Bronchitis: Care Instructions. \"     If you do not have an account, please click on the \"Sign Up Now\" link. Current as of: September 5, 2018  Content Version: 12.1  © 1938-3349 Healthwise, Incorporated. Care instructions adapted under license by [facility] ([facility]). If you have questions about a medical condition or this instruction, always ask your healthcare professional. [address] any warranty or liability for your use of this information. Patient Education        Asthma: Your Action Plan  Sample Action Plan    Controller medicine action plan  Fill in the blank spaces and boxes that apply for all sections. · Name of your controller medicine:  ? ____________________________________________  · How much of this medicine do you take? ? ____________________________________________  · How often do you take this medicine? ? ____________________________________________  · Other instructions?   ? ____________________________________________  Quick-relief medicine action plan  · Name of your quick-relief medicine:  ? ____________________________________________  · How much of this medicine do you zone peak flow (if you use a peak flow meter)  · Less than _______ (less than 50% of your personal best)  Red zone actions (Check the boxes and fill in the blank spaces that apply.)  [ ] Take _____ puff(s) of quick-relief medicine called ____________________________. Repeat ______ times. [ ] Begin or increase treatment with corticosteroid pills. Take ________ mg now. [ ] Call your doctor at this number: _________________. If you can't contact your doctor, go to the emergency department. Call 911 or ___________________. [ ] Other numbers you might call are: ___________________________________. When should you call for help? Call 911 anytime you think you may need emergency care. For example, call if:  · You have severe trouble breathing. Call your doctor now or seek immediate medical care if:  · You are in the red zone of your asthma action plan. · You've used your quick-relief medicine but are still having trouble breathing. · You cough up blood. · You have new or worse trouble breathing. · You cough up dark brown or bloody mucus (sputum). Watch closely for changes in your health, and be sure to contact your doctor if:  · You need to use quick-relief medicine more than 2 days each week (unless it's just for exercise). · Your coughing and wheezing get worse. Follow-up care is a key part of your treatment and safety. Be sure to make and go to all appointments, and call your doctor if you are having problems. It's also a good idea to know your test results and keep a list of the medicines you take. Where can you learn more? Go to https://[email or web address] org and sign in to your [employer] account. Enter 81 [id number] in the [employer] box to learn more about \"Asthma: Your Action Plan. \"     If you do not have an account, please click on the \"Sign Up Now\" link. Current as of: September 5, 2018  Content Version: 12.1  © 3331-7208 Healthwise, Incorporated.  Care instructions adapted under license by [facility] ([facility]). If you have questions about a medical condition or this instruction, always ask your healthcare professional. [place] [address] any warranty or liability for your use of this information.

## 2020-01-02 NOTE — RESULT ENCOUNTER NOTE
Addressed during office visit today, negative influenza bilaterally , within normal limits   Continue current treatment discussed during visit

## 2020-01-02 NOTE — TELEPHONE ENCOUNTER
Please advise patient I have made a referral for him to allergy specialist, the pathology report of his EGD showed eosinophilic esophagitis which could be seen in food allergies. I do know he has some food allergies anyway  I did make the referral, please give him the contact information for Dr. Villanueva    He never followed up with a GI specialist, and he never returned the phone calls to our office either, per the notes under the surgical pathology report. I also reprinted his stress test, needs to do it in 1 month, when asthma is better.     I also asked him to let us know if he ever received [facility] which is the blue inhaler I prescribed to him today, he might need prior authorization for it

## 2020-01-02 NOTE — PROGRESS NOTES
Visit Information    Have you changed or started any medications since your last visit including any over-the-counter medicines, vitamins, or herbal medicines? no   Have you stopped taking any of your medications? Is so, why? -  no  Are you having any side effects from any of your medications? - no    Have you seen any other physician or provider since your last visit?  no   Have you had any other diagnostic tests since your last visit?  no   Have you been seen in the emergency room and/or had an admission in a hospital since we last saw you?  no   Have you had your routine dental cleaning in the past 6 months?  no     Do you have an active MyChart account? If no, what is the barrier?   Yes    Patient Care Team:  Gaviota Fowler MD as PCP - General (Family Medicine)  Gaviota Fowler MD as PCP - [facility]  Moni Lewis MD as Surgeon (Cardiology)  Xin Sharma MD as Consulting Physician (Gastroenterology)    Medical History Review  Past Medical, Family, and Social History reviewed and does contribute to the patient presenting condition    Health Maintenance   Topic Date Due    Flu vaccine (1) 09/01/2019    Pneumococcal 0-64 years Vaccine (1 of 1 - PPSV23) 05/15/2020 (Originally 10/23/1985)    DTaP/Tdap/Td vaccine (1 - Tdap) 05/24/2020 (Originally 10/23/1990)    Lipid screen  03/12/2020    Potassium monitoring  03/12/2020    Creatinine monitoring  03/12/2020    HIV screen  Completed

## 2020-04-29 ENCOUNTER — TELEPHONE (OUTPATIENT)
Dept: FAMILY MEDICINE CLINIC | Age: 40
End: 2020-04-29

## 2020-05-08 ENCOUNTER — HOSPITAL ENCOUNTER (OUTPATIENT)
Age: 40
Setting detail: SPECIMEN
Discharge: HOME | End: 2020-05-08
Payer: COMMERCIAL

## 2020-05-08 ENCOUNTER — TELEMEDICINE (OUTPATIENT)
Dept: FAMILY MEDICINE CLINIC | Age: 40
End: 2020-05-08
Payer: COMMERCIAL

## 2020-05-08 VITALS
SYSTOLIC BLOOD PRESSURE: 132 MMHG | WEIGHT: 260 LBS | DIASTOLIC BLOOD PRESSURE: 68 MMHG | BODY MASS INDEX: 33.37 KG/M2 | HEIGHT: 74 IN

## 2020-05-08 PROBLEM — J30.9 ALLERGIC RHINITIS DUE TO ALLERGEN: Status: ACTIVE | Noted: 2020-05-08

## 2020-05-08 PROBLEM — F33.42 RECURRENT MAJOR DEPRESSIVE DISORDER, IN FULL REMISSION (HCC): Status: RESOLVED | Noted: 2017-03-23 | Resolved: 2020-05-08

## 2020-05-08 PROBLEM — F33.0 MILD EPISODE OF RECURRENT MAJOR DEPRESSIVE DISORDER (HCC): Status: ACTIVE | Noted: 2020-05-08

## 2020-05-08 LAB
ALBUMIN SERPL-MCNC: 4.2 G/DL (ref 3.5–5.2)
ALBUMIN/GLOBULIN RATIO: 1.4 (ref 1–2.5)
ALP BLD-CCNC: 94 U/L (ref 40–129)
ALT SERPL-CCNC: 59 U/L (ref 5–41)
ANION GAP SERPL CALCULATED.3IONS-SCNC: 15 MMOL/L (ref 9–17)
AST SERPL-CCNC: 48 U/L
BILIRUB SERPL-MCNC: 0.36 MG/DL (ref 0.3–1.2)
BUN BLDV-MCNC: 13 MG/DL (ref 6–20)
BUN/CREAT BLD: ABNORMAL (ref 9–20)
CALCIUM SERPL-MCNC: 9.2 MG/DL (ref 8.6–10.4)
CHLORIDE BLD-SCNC: 105 MMOL/L (ref 98–107)
CHOLESTEROL/HDL RATIO: 4.2
CHOLESTEROL: 211 MG/DL
CO2: 20 MMOL/L (ref 20–31)
CREAT SERPL-MCNC: 0.71 MG/DL (ref 0.7–1.2)
GFR AFRICAN AMERICAN: >60 ML/MIN
GFR NON-AFRICAN AMERICAN: >60 ML/MIN
GFR SERPL CREATININE-BSD FRML MDRD: ABNORMAL ML/MIN/{1.73_M2}
GFR SERPL CREATININE-BSD FRML MDRD: ABNORMAL ML/MIN/{1.73_M2}
GLUCOSE BLD-MCNC: 64 MG/DL (ref 70–99)
HCT VFR BLD CALC: 49.3 % (ref 40.7–50.3)
HDLC SERPL-MCNC: 50 MG/DL
HEMOGLOBIN: 16.2 G/DL (ref 13–17)
LDL CHOLESTEROL: 133 MG/DL (ref 0–130)
MCH RBC QN AUTO: 30.6 PG (ref 25.2–33.5)
MCHC RBC AUTO-ENTMCNC: 32.9 G/DL (ref 28.4–34.8)
MCV RBC AUTO: 93.2 FL (ref 82.6–102.9)
NRBC AUTOMATED: 0 PER 100 WBC
PDW BLD-RTO: 13.2 % (ref 11.8–14.4)
PLATELET # BLD: 356 K/UL (ref 138–453)
PMV BLD AUTO: 11.3 FL (ref 8.1–13.5)
POTASSIUM SERPL-SCNC: 4.1 MMOL/L (ref 3.7–5.3)
RBC # BLD: 5.29 M/UL (ref 4.21–5.77)
SODIUM BLD-SCNC: 140 MMOL/L (ref 135–144)
TOTAL PROTEIN: 7.2 G/DL (ref 6.4–8.3)
TRIGL SERPL-MCNC: 138 MG/DL
TSH SERPL DL<=0.05 MIU/L-ACNC: 3.28 MIU/L (ref 0.3–5)
VLDLC SERPL CALC-MCNC: ABNORMAL MG/DL (ref 1–30)
WBC # BLD: 7.3 K/UL (ref 3.5–11.3)

## 2020-05-08 PROCEDURE — 99214 OFFICE O/P EST MOD 30 MIN: CPT | Performed by: FAMILY MEDICINE

## 2020-05-08 PROCEDURE — G8427 DOCREV CUR MEDS BY ELIG CLIN: HCPCS | Performed by: FAMILY MEDICINE

## 2020-05-08 RX ORDER — ALBUTEROL SULFATE 90 UG/1
2 AEROSOL, METERED RESPIRATORY (INHALATION) EVERY 6 HOURS PRN
Qty: 18 G | Refills: 3 | Status: ON HOLD | OUTPATIENT
Start: 2020-05-08 | End: 2021-10-08 | Stop reason: HOSPADM

## 2020-05-08 RX ORDER — AMLODIPINE BESYLATE 10 MG/1
10 TABLET ORAL EVERY EVENING
Qty: 90 TABLET | Refills: 3 | Status: SHIPPED | OUTPATIENT
Start: 2020-05-08 | End: 2020-10-12

## 2020-05-08 RX ORDER — BUSPIRONE HYDROCHLORIDE 10 MG/1
10 TABLET ORAL 3 TIMES DAILY PRN
Qty: 90 TABLET | Refills: 3 | Status: SHIPPED | OUTPATIENT
Start: 2020-05-08 | End: 2021-01-08 | Stop reason: SDUPTHER

## 2020-05-08 RX ORDER — ATORVASTATIN CALCIUM 20 MG/1
20 TABLET, FILM COATED ORAL EVERY EVENING
Qty: 90 TABLET | Refills: 3 | Status: SHIPPED | OUTPATIENT
Start: 2020-05-08 | End: 2020-05-12 | Stop reason: ALTCHOICE

## 2020-05-08 RX ORDER — BUPROPION HYDROCHLORIDE 150 MG/1
150 TABLET ORAL EVERY MORNING
Qty: 30 TABLET | Refills: 3 | Status: SHIPPED | OUTPATIENT
Start: 2020-05-08 | End: 2020-09-25 | Stop reason: SDUPTHER

## 2020-05-08 RX ORDER — FLUTICASONE PROPIONATE 50 MCG
2 SPRAY, SUSPENSION (ML) NASAL DAILY
Qty: 1 BOTTLE | Refills: 3 | Status: SHIPPED | OUTPATIENT
Start: 2020-05-08 | End: 2020-10-27 | Stop reason: SDUPTHER

## 2020-05-08 RX ORDER — LORATADINE 10 MG/1
10 TABLET ORAL DAILY
Qty: 90 TABLET | Refills: 3 | Status: SHIPPED | OUTPATIENT
Start: 2020-05-08 | End: 2020-09-21 | Stop reason: SDUPTHER

## 2020-05-08 ASSESSMENT — ENCOUNTER SYMPTOMS
SINUS PAIN: 0
RHINORRHEA: 1
NAUSEA: 0
SINUS PRESSURE: 0
SHORTNESS OF BREATH: 0
COUGH: 0
CHEST TIGHTNESS: 0
DIARRHEA: 0
CONSTIPATION: 0
WHEEZING: 0
ABDOMINAL DISTENTION: 0
VOMITING: 0
ABDOMINAL PAIN: 0

## 2020-05-08 ASSESSMENT — PATIENT HEALTH QUESTIONNAIRE - PHQ9
SUM OF ALL RESPONSES TO PHQ QUESTIONS 1-9: 2
1. LITTLE INTEREST OR PLEASURE IN DOING THINGS: 0
2. FEELING DOWN, DEPRESSED OR HOPELESS: 2
SUM OF ALL RESPONSES TO PHQ QUESTIONS 1-9: 2
SUM OF ALL RESPONSES TO PHQ9 QUESTIONS 1 & 2: 2

## 2020-05-08 NOTE — PROGRESS NOTES
Asthma exacerbation J45.901 Yes Sukumar Kim MD       Social History     Tobacco Use    Smoking status: Never Smoker    Smokeless tobacco: Never Used   Substance Use Topics    Alcohol use: Yes     Comment: occasional  few x year    Drug use: No          PHYSICAL EXAMINATION:    Vital Signs: (As obtained by patient/caregiver or practitioner observation)  -vital signs stable and within normal limits except obesity per BMI      /68   Ht 6' 2\" (1.88 m)   Wt 260 lb (117.9 kg)   BMI 33.38 kg/m²     Intensity of pain is: 0 out of 10      Constitutional: [x] Appears well-developed and well-nourished [x] No apparent distress      [x] Abnormal-obese    Mental status  [x] Alert and awake  [x] Oriented to person/place/time [x]Able to follow commands      Eyes:  EOM    [x]  Normal  [] Abnormal-  Sclera  [x]  Normal  [] Abnormal -         Discharge [x]  None visible  [] Abnormal -    HENT:   [x] Normocephalic, atraumatic.   [] Abnormal   [x] Mouth/Throat: Mucous membranes are moist.     External Ears [x] Normal  [] Abnormal-     Neck: [x] No visualized mass     Pulmonary/Chest: [x] Respiratory effort normal.  [x] No visualized signs of difficulty breathing or respiratory distress        [] Abnormal        Musculoskeletal:   [x] Normal gait with no signs of ataxia         [x] Normal range of motion of neck        [] Abnormal-       Neurological:        [x] No Facial Asymmetry (Cranial nerve 7 motor function) (limited exam to video visit)          [x] No gaze palsy        [] Abnormal-            Skin:        [x] No significant exanthematous lesions or discoloration noted on facial skin         [] Abnormal-            Psychiatric:      [x] No Hallucinations       []Mood is normal      [x]Behavior is normal      [x]Judgment is normal      [x]Thought content is normal       [x] Abnormal-mildly anxious    Other pertinent observable physical exam findings-none    Due to this being a TeleHealth encounter, evaluation Metabolic Panel; Future  - TSH without Reflex; Future  Discussed low salt diet and BP and pulse monitoring daily    2. Obesity (BMI 30-39. 9)  Worsening  We will restart Wellbutrin  - Cathy De La Garza CNP, Weight Management, [place]    Patient was asked about his current diet and exercise habits, and personalized advice was provided regarding recommended lifestyle changes. Patient's comorbid health conditions associated with elevated BMI were discussed, including COPD/asthma, dyslipidemia, GERD, hypertension, mood disorder and obstructive sleep apnea, as well as the likely benefits of weight loss. Based upon patient's motivation to change his behavior, the following plan was agreed upon to work toward a weight loss goal of 1-2 pounds per week: low carbohydrate diet, wear a pedometer and get at least 10,000 steps per day and medication prescribed: Wellbutrin. Referred to weight management   Educational materials for  weight loss were provided. Patient will follow-up in 3 month(s) with PCP and earlier with nutritionist.  Provider spent 10 minutes counseling patient. 3. Hyperlipidemia with target LDL less than 100  Likely improving  - atorvastatin (LIPITOR) 20 MG tablet; Take 1 tablet by mouth every evening  Dispense: 90 tablet; Refill: 3  - Lipid Panel; Future  Continue current treatment and attempt to lose weight  4. Asthma, Moderate persistent asthma without complication  Stable  Continue current treatment  - mometasone-formoterol (DULERA) 100-5 MCG/ACT inhaler; Inhale 2 puffs into the lungs 2 times daily Stop Flovent  Dispense: 13 g; Refill: 3  - albuterol sulfate HFA (VENTOLIN HFA) 108 (90 Base) MCG/ACT inhaler; Inhale 2 puffs into the lungs every 6 hours as needed for Wheezing or Shortness of Breath  Dispense: 18 g; Refill: 3    5.  Seasonal allergic rhinitis due to other allergic trigger  Worsening  - fluticasone (FLONASE) 50 MCG/ACT nasal spray; 2 sprays by Nasal route daily  Dispense: 1 Bottle; Refill: 3  - loratadine (CLARITIN) 10 MG tablet; Take 1 tablet by mouth daily  Dispense: 90 tablet; Refill: 3    6. Mild episode of recurrent major depressive disorder (HCC)  Worsening  - star  buPROPion (WELLBUTRIN XL) 150 MG extended release tablet; Take 1 tablet by mouth every morning  Dispense: 30 tablet; Refill: 3  -Continue busPIRone (BUSPAR) 10 MG tablet; Take 1 tablet by mouth 3 times daily as needed (anxiety)  Dispense: 90 tablet; Refill: 3    7. Social anxiety disorder  Improved with medication  - buPROPion (WELLBUTRIN XL) 150 MG extended release tablet; Take 1 tablet by mouth every morning  Dispense: 30 tablet; Refill: 3  - busPIRone (BUSPAR) 10 MG tablet; Take 1 tablet by mouth 3 times daily as needed (anxiety)  Dispense: 90 tablet; Refill: 3      Pa received counseling on the following healthy behaviors: nutrition, exercise, medication adherence and weight loss  Reviewed prior labs and health maintenance. Continue current medications, diet and exercise. Discussed use, benefit, and side effects of prescribed medications. Barriers to medication compliance addressed. Patient given educational materials - see patient instructions. All patient questions answered. Patient voiced understanding. Return in about 3 months (around 8/8/2020) for ASTHMA, HTN,HLP, LABS F/U, WT management, depression-DO PHQ-9 in [facility]. Data Unavailable      No future appointments. Total time spent during this visit 25 minutes including face-to-face, counseling, charting review, and non-face-to-face time. Roverto García is a 35 y.o. male being evaluated by a Virtual Visit (video visit) encounter to address concerns as mentioned above. Due to this being a TeleHealth encounter (During ICWQE-02 public health emergency), evaluation of the following organ systems was limited: Vitals/Constitutional/EENT/Resp/CV/GI//MS/Neuro/Skin/Heme-Lymph-Imm.     Pursuant to the emergency declaration under the [employer] and

## 2020-05-08 NOTE — PATIENT INSTRUCTIONS
wheezing get worse. Follow-up care is a key part of your treatment and safety. Be sure to make and go to all appointments, and call your doctor if you are having problems. It's also a good idea to know your test results and keep a list of the medicines you take. Where can you learn more? Go to [email or web address] and sign in to your [employer] account. Enter [phone] in the charity: water box to learn more about \"Asthma: Your Action Plan. \"     If you do not have an account, please click on the \"Sign Up Now\" link. Current as of: June 9, 2019Content Version: 12.4  © 3815-0224 Healthwise, Incorporated. Patient Education        Learning About Low-Carbohydrate Diets for Weight Loss  What is a low-carbohydrate diet? Low-carb diets avoid foods that are high in carbohydrate. These high-carb foods include pasta, bread, rice, cereal, fruits, and starchy vegetables. Instead, these diets usually have you eat foods that are high in fat and protein. Many people lose weight quickly on a low-carb diet. But the early weight loss is water. People on this diet often gain the weight back after they start eating carbs again. Not all diet plans are safe or work well. A lot of the evidence shows that low-carb diets aren't healthy. That's because these diets often don't include healthy foods like fruits and vegetables. Losing weight safely means balancing protein, fat, and carbs with every meal and snack. And low-carb diets don't always provide the vitamins, minerals, and fiber you need. If you have a serious medical condition, talk to your doctor before you try any diet. These conditions include kidney disease, heart disease, type 2 diabetes, high cholesterol, and high blood pressure. If you are pregnant, it may not be safe for your baby if you are on a low-carb diet. How can you lose weight safely? You might have heard that a diet plan helped another person lose weight.  But that doesn't mean that it will

## 2020-05-12 RX ORDER — PRAVASTATIN SODIUM 20 MG
20 TABLET ORAL EVERY EVENING
Qty: 90 TABLET | Refills: 3 | Status: SHIPPED | OUTPATIENT
Start: 2020-05-12 | End: 2021-06-07

## 2020-08-12 ENCOUNTER — OFFICE VISIT (OUTPATIENT)
Dept: PRIMARY CARE CLINIC | Age: 40
End: 2020-08-12
Payer: COMMERCIAL

## 2020-08-12 ENCOUNTER — HOSPITAL ENCOUNTER (OUTPATIENT)
Age: 40
Setting detail: SPECIMEN
Discharge: HOME | End: 2020-08-12
Payer: COMMERCIAL

## 2020-08-12 VITALS
DIASTOLIC BLOOD PRESSURE: 83 MMHG | BODY MASS INDEX: 34.01 KG/M2 | OXYGEN SATURATION: 98 % | SYSTOLIC BLOOD PRESSURE: 129 MMHG | RESPIRATION RATE: 18 BRPM | HEIGHT: 74 IN | WEIGHT: 265 LBS | TEMPERATURE: 99.3 F | HEART RATE: 78 BPM

## 2020-08-12 PROCEDURE — G8427 DOCREV CUR MEDS BY ELIG CLIN: HCPCS | Performed by: NURSE PRACTITIONER

## 2020-08-12 PROCEDURE — 1036F TOBACCO NON-USER: CPT | Performed by: NURSE PRACTITIONER

## 2020-08-12 PROCEDURE — G8417 CALC BMI ABV UP PARAM F/U: HCPCS | Performed by: NURSE PRACTITIONER

## 2020-08-12 PROCEDURE — 99214 OFFICE O/P EST MOD 30 MIN: CPT | Performed by: NURSE PRACTITIONER

## 2020-08-12 ASSESSMENT — ENCOUNTER SYMPTOMS
ABDOMINAL PAIN: 0
VOMITING: 0
COUGH: 1
TROUBLE SWALLOWING: 0
CHEST TIGHTNESS: 0
DIARRHEA: 0
EYE REDNESS: 1
NAUSEA: 0
ABDOMINAL DISTENTION: 0
EYE PAIN: 0
SORE THROAT: 0
RHINORRHEA: 0
SHORTNESS OF BREATH: 0
EYE DISCHARGE: 0

## 2020-08-12 ASSESSMENT — PATIENT HEALTH QUESTIONNAIRE - PHQ9
2. FEELING DOWN, DEPRESSED OR HOPELESS: 0
SUM OF ALL RESPONSES TO PHQ QUESTIONS 1-9: 0
1. LITTLE INTEREST OR PLEASURE IN DOING THINGS: 0
SUM OF ALL RESPONSES TO PHQ QUESTIONS 1-9: 0
SUM OF ALL RESPONSES TO PHQ9 QUESTIONS 1 & 2: 0

## 2020-08-12 NOTE — PATIENT INSTRUCTIONS
Learning About Coronavirus [phone])  Coronavirus [phone]): Overview  What is coronavirus (COVID-19)? The coronavirus disease (COVID-19) is caused by a virus. It is an illness that was first found in [place], [place], in December 2019. It has since spread worldwide. The virus can cause fever, cough, and trouble breathing. In severe cases, it can cause pneumonia and make it hard to breathe without help. It can cause death. Coronaviruses are a large group of viruses. They cause the common cold. They also cause more serious illnesses like [place] East respiratory syndrome (MERS) and severe acute respiratory syndrome (SARS). COVID-19 is caused by a novel coronavirus. That means it's a new type that has not been seen in people before. This virus spreads person-to-person through droplets from coughing and sneezing. It can also spread when you are close to someone who is infected. And it can spread when you touch something that has the virus on it, such as a doorknob or a tabletop. What can you do to protect yourself from coronavirus (COVID-19)? The best way to protect yourself from getting sick is to:  · Avoid areas where there is an outbreak. · Avoid contact with people who may be infected. · Wash your hands often with soap or alcohol-based hand sanitizers. · Avoid crowds and try to stay at least 6 feet away from other people. · Wash your hands often, especially after you cough or sneeze. Use soap and water, and scrub for at least 20 seconds. If soap and water aren't available, use an alcohol-based hand . · Avoid touching your mouth, nose, and eyes. What can you do to avoid spreading the virus to others? To help avoid spreading the virus to others:  · Cover your mouth with a tissue when you cough or sneeze. Then throw the tissue in the trash. · Use a disinfectant to clean things that you touch often. · Wear a cloth face cover if you have to go to public areas.   · Stay home if you are sick or have been exposed to the virus. Don't go to school, work, or public areas. And don't use public transportation. · If you are sick:  ? Leave your home only if you need to get medical care. But call the doctor's office first so they know you're coming. And wear a face cover. ? Wear the face cover whenever you're around other people. It can help stop the spread of the virus when you cough or sneeze. ? Clean and disinfect your home every day. Use household  and disinfectant wipes or sprays. Take special care to clean things that you grab with your hands. These include doorknobs, remote controls, phones, and handles on your refrigerator and microwave. And don't forget countertops, tabletops, bathrooms, and computer keyboards. When to call for help  [id number] anytime you think you may need emergency care. For example, call if:  · You have severe trouble breathing. (You can't talk at all.)  · You have constant chest pain or pressure. · You are severely dizzy or lightheaded. · You are confused or can't think clearly. · Your face and lips have a blue color. · You pass out (lose consciousness) or are very hard to wake up. Call your doctor now if you develop symptoms such as:  · Shortness of breath. · Fever. · Cough. If you need to get care, call ahead to the doctor's office for instructions before you go. Make sure you wear a face cover to prevent exposing other people to the virus. Where can you get the latest information? The following health organizations are tracking and studying this virus. Their websites contain the most up-to-date information. Talon Dai also learn what to do if you think you may have been exposed to the virus. · U.S. Centers for Disease Control and Prevention (CDC): The CDC provides updated news about the disease and travel advice. The website also tells you how to prevent the spread of infection.  www.cdc.gov  · World Health Organization [facility]): WHO offers information about the virus with fever. Don't use cold water or ice. · Use petroleum jelly on sore skin. This can help if the skin around your nose and lips becomes sore from rubbing a lot with tissues. Tips for isolation  · Wear a cloth face cover when you are around other people. It can help stop the spread of the virus when you cough or sneeze. · Limit contact with people in your home. If possible, stay in a separate bedroom and use a separate bathroom. · Avoid contact with pets and other animals. · Cover your mouth and nose with a tissue when you cough or sneeze. Then throw it in the trash right away. · Wash your hands often, especially after you cough or sneeze. Use soap and water, and scrub for at least 20 seconds. If soap and water aren't available, use an alcohol-based hand . · Don't share personal household items. These include bedding, towels, cups and glasses, and eating utensils. · Clean and disinfect your home every day. Use household  and disinfectant wipes or sprays. Take special care to clean things that you grab with your hands. These include doorknobs, remote controls, phones, and handles on your refrigerator and microwave. And don't forget countertops, tabletops, bathrooms, and computer keyboards. When should you call for help? [id number] anytime you think you may need emergency care. For example, call if you have life-threatening symptoms, such as:  · You have severe trouble breathing. (You can't talk at all.)  · You have constant chest pain or pressure. · You are severely dizzy or lightheaded. · You are confused or can't think clearly. · Your face and lips have a blue color. · You pass out (lose consciousness) or are very hard to wake up. Call your doctor now or seek immediate medical care if:  · You have moderate trouble breathing. (You can't speak a full sentence.)  · You are coughing up blood (more than about 1 teaspoon). · You have signs of low blood pressure.  These include feeling lightheaded; being too weak to stand; and having cold, pale, clammy skin. Watch closely for changes in your health, and be sure to contact your doctor if:  · Your symptoms get worse. · You are not getting better as expected. Call before you go to the doctor's office. Follow their instructions. And wear a cloth face cover. Current as of: April 24, 2020               Content Version: 12.4  © 2006-2020 Healthwise, Incorporated. Care instructions adapted under license by your healthcare professional. If you have questions about a medical condition or this instruction, always ask your healthcare professional. [address] any warranty or liability for your use of this information.

## 2020-08-12 NOTE — PROGRESS NOTES
[facility]  [facility]  [address] [place] [address]  [address] [address]  Dept: [phone]  Dept Fax: [phone]    Tri Hancock is a 35 y.o. male who presents today for his medical conditions/complaints of   Chief Complaint   Patient presents with    Fever    Other     loss of smell/taste          HPI:     /83 (Site: Left Upper Arm, Position: Sitting)   Pulse 78   Temp 99.3 °F (37.4 °C) (Temporal)   Resp 18   Ht 6' 2\" (1.88 m)   Wt 265 lb (120.2 kg)   SpO2 98%   BMI 34.02 kg/m²       HPI  Pt presented to the urgent care today with c/o fever and chills (temp at home 101.0). This is a new problem. The current episode started 6 days ago. The problem has been unchanged since onset. Associated symptoms include: loss of taste and smell, headache, cough- dry, fatigue. Pertinent negatives include: No body aches, SOB,chest pain, nausea, vomiting, diarrhea. Pt has tried Tylenol with some improvement. History of asthma- controlled with inhalers. Employed at [employer] . Traveled to [place]- returned 2 days ago. No known exposure to COVID. 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      Past Medical History:   Diagnosis Date    Asthma     COPD (chronic obstructive pulmonary disease) ([facility] [address] 11/19/2018    Depression     Erectile dysfunction 9/29/2017    Essential hypertension 1/2/2017    GERD (gastroesophageal reflux disease) 11/19/2018    Heart palpitations 9/29/2017    Hyperlipidemia with target LDL less than 100 3/23/2017    Left lower lobe pneumonia ([facility] [address] 11/9/2012    Mitral valve prolapse     Nonspecific reactive hepatitis 3/22/2017    Obesity (BMI 30-39.9) 3/23/2017    NOEMÍ (obstructive sleep apnea) 10/16/2019    Osteoarthritis     Seronegative polyarthritis 10/10/2016    followed w/ rheumatology in [address] [place] [address] anxiety disorder     Uvulitis 8/15/2019        Past Surgical History:   Procedure Laterality Date    COLONOSCOPY      CYST REMOVAL Right     Armpit     ENDOSCOPY, COLON, DIAGNOSTIC      HAND SURGERY Left     KNEE ARTHROSCOPY      right knee    NOSE SURGERY      TONSILLECTOMY      UPPER GASTROINTESTINAL ENDOSCOPY N/A 10/9/2019    EGD POLYP SNARE, HOT. ESOPHAGEAL DILATATION WITH MALONIES 50F performed by Saida Reeder MD at [facility]         Family History   Problem Relation Age of Onset    Diabetes Father     High Cholesterol Father     Other Mother         autoimmune hepatitis     High Blood Pressure Brother     Heart Disease Brother     Heart Attack Brother 40        cabg age 40    Leah Tomas Rheum Arthritis Other 7    Colon Cancer Neg Hx     Cancer Neg Hx        Social History     Tobacco Use    Smoking status: Never Smoker    Smokeless tobacco: Never Used   Substance Use Topics    Alcohol use: Yes     Comment: occasional  few x year        Prior to Visit Medications    Medication Sig Taking? Authorizing Provider   pravastatin (PRAVACHOL) 20 MG tablet Take 1 tablet by mouth every evening Stop atorvastatin Yes Loren Matthew MD   buPROPion (WELLBUTRIN XL) 150 MG extended release tablet Take 1 tablet by mouth every morning Yes Loren Matthew MD   mometasone-formoterol (DULERA) 100-5 MCG/ACT inhaler Inhale 2 puffs into the lungs 2 times daily Stop Flovent Yes Loren Matthew MD   fluticasone (FLONASE) 50 MCG/ACT nasal spray 2 sprays by Nasal route daily Yes Loren Matthew MD   loratadine (CLARITIN) 10 MG tablet Take 1 tablet by mouth daily Yes Loren Matthew MD   albuterol sulfate HFA (VENTOLIN HFA) 108 (90 Base) MCG/ACT inhaler Inhale 2 puffs into the lungs every 6 hours as needed for Wheezing or Shortness of Breath Yes Loren Matthew MD   amLODIPine (NORVASC) 10 MG tablet Take 1 tablet by mouth every evening Stop Lisinopril.  Dose increased  8/15/2019 Yes Loren Matthew MD   busPIRone (BUSPAR) 10 MG tablet Take 1 tablet by mouth 3 times daily as needed (anxiety) Yes Vivian Brown MD   omeprazole (PRILOSEC) 40 MG delayed release capsule TAKE 1 CAPSULE DAILY Yes Vivian Brown MD   albuterol (PROVENTIL) (2.5 MG/3ML) 0.083% nebulizer solution Take 3 mLs by nebulization every 6 hours as needed for Wheezing or Shortness of Breath Yes Vivain Brown MD   Blood Pressure KIT Dx: HTN. Needs automatic blood pressure machine to monitor his blood pressure. Yes Vivian Brown MD   Respiratory Therapy Supplies (NEBULIZER) MADHAV 1 Units by Does not apply route 2 times daily Dx: Asthma exacerbation J45.901 Yes Azar Kilpatrick MD       Allergies   Allergen Reactions    Other Anaphylaxis     Any type of seafood    Shellfish Allergy Anaphylaxis    Shrimp (Diagnostic) Anaphylaxis    Ace Inhibitors      UVULITIS ON 8/15/19         Subjective:      Review of Systems   Constitutional: Positive for chills, fatigue and fever. HENT: Negative for congestion, ear pain, rhinorrhea, sore throat and trouble swallowing. Loss of taste and smell. Eyes: Positive for redness. Negative for pain, discharge and visual disturbance. Respiratory: Positive for cough. Negative for chest tightness and shortness of breath. Cardiovascular: Negative for chest pain, palpitations and leg swelling. Gastrointestinal: Negative for abdominal distention, abdominal pain, diarrhea, nausea and vomiting. Genitourinary: Negative for decreased urine volume and difficulty urinating. Musculoskeletal: Negative for arthralgias, gait problem, myalgias and neck pain. Skin: Negative for pallor and rash. Neurological: Positive for headaches. Negative for weakness and light-headedness. Psychiatric/Behavioral: Negative for sleep disturbance. Objective:     Physical Exam  Vitals signs and nursing note reviewed. Constitutional:       General: He is not in acute distress. Appearance: Normal appearance. HENT:      Head: Normocephalic and atraumatic. Right Ear: Tympanic membrane and ear canal normal.      Left Ear: Tympanic membrane and ear canal normal.      Nose: Nose normal. No rhinorrhea. Mouth/Throat:      Lips: Pink. Mouth: Mucous membranes are moist.      Pharynx: Oropharynx is clear. Uvula midline. No oropharyngeal exudate or posterior oropharyngeal erythema. Eyes:      General: Lids are normal.      Extraocular Movements: Extraocular movements intact. Conjunctiva/sclera:      Right eye: Right conjunctiva is injected. No exudate. Left eye: Left conjunctiva is injected. No exudate. Pupils: Pupils are equal, round, and reactive to light. Neck:      Musculoskeletal: Normal range of motion and neck supple. Cardiovascular:      Rate and Rhythm: Normal rate and regular rhythm. Pulses: Normal pulses. Pulmonary:      Effort: Pulmonary effort is normal. No tachypnea. Breath sounds: Normal breath sounds. No wheezing, rhonchi or rales. Abdominal:      General: Bowel sounds are normal. There is no distension. Palpations: Abdomen is soft. Tenderness: There is no abdominal tenderness. Musculoskeletal: Normal range of motion. Right lower leg: No edema. Left lower leg: No edema. Lymphadenopathy:      Cervical: No cervical adenopathy. Skin:     General: Skin is warm and dry. Capillary Refill: Capillary refill takes less than 2 seconds. Findings: No rash. Neurological:      Mental Status: He is alert and oriented to person, place, and time. Coordination: Coordination normal.      Gait: Gait normal.   Psychiatric:         Mood and Affect: Mood normal.         Thought Content: Thought content normal.           MEDICAL DECISION MAKING Assessment/Plan:     Christina Rouse was seen today for fever and other. Diagnoses and all orders for this visit:    Suspected COVID-19 virus infection  -     COVID-19 Ambulatory; Future    Fever and chills  -     COVID-19 Ambulatory;  Future    Loss of taste  - The patient indicates understanding of these issues and agrees with the plan. Educational materials provided on AVS.  Follow up if symptoms do not improve/worsen. Call with any questions or concerns. Discussed symptoms that will warrant urgent ED evaluation/treatment. Preventing the Spread of Coronavirus Disease 2019 in Homes and Residential Communities: For the most recent information go to: [email or web address]    Patient given educational materials - see patientinstructions. Discussed use, benefit, and side effects of prescribed medications. All patient questions answered. Pt verbalized understanding. Instructed to continue current medications, diet and exercise. Patient agreedwith treatment plan. Follow up as directed.      Electronically signed by LAMAR Laurent CNP on 8/12/2020 at 10:34 AM

## 2020-08-16 LAB — SARS-COV-2, NAA: NOT DETECTED

## 2020-09-01 NOTE — PROGRESS NOTES
P [facility]  [facility]  [address] [facility]  [address] [address]  Dept: [phone]     2020   Chief Complaint   Patient presents with    Arm Pain     left forearm x 2yrs gripping hurts pain radiates up to his shoulder      HPI  Daniel Manzano (:  1980) is a 35 y.o. male is an established patient [facility]. Patient has a history of left elbow tendinitis and biceps tendinitis. Patient was seen and evaluated for this condition in the past.  He works as a . Patient admits to repetitive work using the left arm. Patient never had any evaluation done such as an x-ray or an EMG done. Patient reports some severe pain and numbness coming from the shoulder to the elbow to the wrist area. Patient used some elbow splints and took some anti-inflammatories. However, pain continues. In fact, he thinks it has gotten worse. No data recorded   Counseling given: Not Answered    Patient Active Problem List   Diagnosis    Seronegative polyarthritis    Fatigue    Essential hypertension    Social anxiety disorder    Nonspecific reactive hepatitis    Hyperglycemia    Obesity (BMI 30-39. 9)    LVH (left ventricular hypertrophy) due to hypertensive disease    Hyperlipidemia with target LDL less than 100    Asthma, Moderate persistent asthma without complication    Hypertriglyceridemia    Erectile dysfunction    Family history of premature CAD    GERD (gastroesophageal reflux disease)    Refused pneumococcal vaccination    Refuses tetanus, diphtheria, and acellular pertussis (Tdap) vaccination    Family history of thyroid nodule    Gastric polyp    NOEMÍ (obstructive sleep apnea)    Eosinophilic esophagitis    Mild episode of recurrent major depressive disorder (HCC)    Allergic rhinitis due to allergen      Past Medical History:   Diagnosis Date    Asthma     COPD (chronic obstructive pulmonary disease) ([facility] [address] 2018    Depression MCG/ACT nasal spray 2 sprays by Nasal route daily 1 Bottle 3    loratadine (CLARITIN) 10 MG tablet Take 1 tablet by mouth daily 90 tablet 3    albuterol sulfate HFA (VENTOLIN HFA) 108 (90 Base) MCG/ACT inhaler Inhale 2 puffs into the lungs every 6 hours as needed for Wheezing or Shortness of Breath 18 g 3    amLODIPine (NORVASC) 10 MG tablet Take 1 tablet by mouth every evening Stop Lisinopril. Dose increased  8/15/2019 90 tablet 3    busPIRone (BUSPAR) 10 MG tablet Take 1 tablet by mouth 3 times daily as needed (anxiety) 90 tablet 3    omeprazole (PRILOSEC) 40 MG delayed release capsule TAKE 1 CAPSULE DAILY 90 capsule 3    albuterol (PROVENTIL) (2.5 MG/3ML) 0.083% nebulizer solution Take 3 mLs by nebulization every 6 hours as needed for Wheezing or Shortness of Breath 120 vial 0    Blood Pressure KIT Dx: HTN. Needs automatic blood pressure machine to monitor his blood pressure. 1 kit 0    Respiratory Therapy Supplies (NEBULIZER) MADHAV 1 Units by Does not apply route 2 times daily Dx: Asthma exacerbation J45.901 1 Device 0     No current facility-administered medications for this visit. Review of Systems   Constitutional: Negative for chills and fever. HENT: Negative. Respiratory: Negative. Negative for cough and shortness of breath. Cardiovascular: Negative. Negative for chest pain and palpitations. Gastrointestinal: Negative for abdominal pain. Genitourinary: Negative for dysuria. Musculoskeletal: Positive for arthralgias and joint swelling. Negative for myalgias. Left arm, elbow pain   Skin: Negative for rash. Neurological: Positive for numbness. Negative for light-headedness and headaches. Psychiatric/Behavioral: Negative for sleep disturbance. The patient is nervous/anxious. Prior to Visit Medications    Medication Sig Taking? Authorizing Provider   methylPREDNISolone (MEDROL DOSEPACK) 4 MG tablet Take by mouth.  Yes Cheli Booker, APRN - CNP   naproxen (NAPROSYN) 500 MG tablet Take 1 tablet by mouth 2 times daily (with meals) Yes Cheli Booker APRN - CNP   pravastatin (PRAVACHOL) 20 MG tablet Take 1 tablet by mouth every evening Stop atorvastatin Yes Ross Lieberman MD   buPROPion (WELLBUTRIN XL) 150 MG extended release tablet Take 1 tablet by mouth every morning Yes Ross Lieberman MD   mometasone-formoterol (DULERA) 100-5 MCG/ACT inhaler Inhale 2 puffs into the lungs 2 times daily Stop Flovent Yes Ross Lieberman MD   fluticasone (FLONASE) 50 MCG/ACT nasal spray 2 sprays by Nasal route daily Yes Ross Lieberman MD   loratadine (CLARITIN) 10 MG tablet Take 1 tablet by mouth daily Yes Ross Lieberman MD   albuterol sulfate HFA (VENTOLIN HFA) 108 (90 Base) MCG/ACT inhaler Inhale 2 puffs into the lungs every 6 hours as needed for Wheezing or Shortness of Breath Yes Vandana Novak MD   amLODIPine (NORVASC) 10 MG tablet Take 1 tablet by mouth every evening Stop Lisinopril. Dose increased  8/15/2019 Yes Ross Lieberman MD   busPIRone (BUSPAR) 10 MG tablet Take 1 tablet by mouth 3 times daily as needed (anxiety) Yes Ross Lieberman MD   omeprazole (PRILOSEC) 40 MG delayed release capsule TAKE 1 CAPSULE DAILY Yes Ross Lieberman MD   albuterol (PROVENTIL) (2.5 MG/3ML) 0.083% nebulizer solution Take 3 mLs by nebulization every 6 hours as needed for Wheezing or Shortness of Breath Yes Ross Lieberman MD   Blood Pressure KIT Dx: HTN. Needs automatic blood pressure machine to monitor his blood pressure.  Yes Ross Lieberman MD   Respiratory Therapy Supplies (NEBULIZER) MADHAV 1 Units by Does not apply route 2 times daily Dx: Asthma exacerbation J45.901 Yes Carmen Rivas MD      Social History     Tobacco Use    Smoking status: Never Smoker    Smokeless tobacco: Never Used   Substance Use Topics    Alcohol use: Yes     Comment: occasional  few x year      Vitals:    09/02/20 1339   BP: 110/80   Pulse: 83   SpO2: 98%   Weight: 260 lb (117.9 kg)   Height: 6' 2\" (1.88 m)     Estimated body mass index is 33.38 kg/m² as calculated from the following:    Height as of this encounter: 6' 2\" (1.88 m). Weight as of this encounter: 260 lb (117.9 kg). Physical Exam  Vitals signs and nursing note reviewed. Constitutional:       Appearance: He is well-developed. HENT:      Head: Normocephalic. Right Ear: Tympanic membrane and external ear normal.      Left Ear: Tympanic membrane and external ear normal.      Nose: Nose normal.      Mouth/Throat:      Mouth: Mucous membranes are moist.   Eyes:      Pupils: Pupils are equal, round, and reactive to light. Neck:      Musculoskeletal: Normal range of motion and neck supple. Thyroid: No thyromegaly. Vascular: No JVD. Cardiovascular:      Rate and Rhythm: Normal rate and regular rhythm. Pulses: Normal pulses. Heart sounds: Normal heart sounds. No murmur. Pulmonary:      Effort: Pulmonary effort is normal. No respiratory distress. Breath sounds: Normal breath sounds. Abdominal:      General: Bowel sounds are normal. There is no distension. Palpations: Abdomen is soft. Tenderness: There is no abdominal tenderness. Musculoskeletal: Normal range of motion. Left elbow: He exhibits swelling. He exhibits normal range of motion. Tenderness found. Left wrist: He exhibits tenderness. He exhibits normal range of motion and no deformity. Right forearm: He exhibits tenderness. He exhibits no edema. Lymphadenopathy:      Cervical: No cervical adenopathy. Skin:     General: Skin is warm and dry. Capillary Refill: Capillary refill takes less than 2 seconds. Neurological:      Mental Status: He is alert and oriented to person, place, and time. Psychiatric:         Mood and Affect: Mood is anxious. Behavior: Behavior normal.       Most recent labs reviewed with patient, and all questions answered.   Lab Results   Component Value Date WBC 7.3 05/08/2020    HGB 16.2 05/08/2020    HCT 49.3 05/08/2020    MCV 93.2 05/08/2020     05/08/2020     Lab Results   Component Value Date     05/08/2020    K 4.1 05/08/2020     05/08/2020    CO2 20 05/08/2020    BUN 13 05/08/2020    CREATININE 0.71 05/08/2020    GLUCOSE 64 05/08/2020    GLUCOSE 96 04/30/2012    CALCIUM 9.2 05/08/2020      Lab Results   Component Value Date    ALT 59 (H) 05/08/2020    AST 48 (H) 05/08/2020    ALKPHOS 94 05/08/2020    BILITOT 0.36 05/08/2020     Lab Results   Component Value Date    TSH 3.28 05/08/2020     Lab Results   Component Value Date    CHOL 211 (H) 05/08/2020    CHOL 211 (H) 03/12/2019    CHOL 191 10/06/2017     Lab Results   Component Value Date    TRIG 138 05/08/2020    TRIG 211 (H) 03/12/2019    TRIG 117 10/06/2017     Lab Results   Component Value Date    HDL 50 05/08/2020    HDL 44 03/12/2019    HDL 45 10/06/2017     Lab Results   Component Value Date    LDLCHOLESTEROL 133 (H) 05/08/2020    LDLCHOLESTEROL 125 03/12/2019    LDLCHOLESTEROL 123 10/06/2017     Lab Results   Component Value Date    CHOLHDLRATIO 4.2 05/08/2020    CHOLHDLRATIO 4.8 03/12/2019    CHOLHDLRATIO 4.2 10/06/2017     Lab Results   Component Value Date    LABA1C 5.5 03/22/2017      Lab Results   Component Value Date    SJRREJWW61 570 07/23/2014     No results found for: FOLATE  Lab Results   Component Value Date    VITD25 43.5 08/26/2015     ASSESSMENT/PLAN:  1. Left elbow tendinitis  Failure to Improve  Evaluate  Start medrol pack  Then naproxen  Get PT done  - EMG; Future  - [facility]  - methylPREDNISolone (MEDROL DOSEPACK) 4 MG tablet; Take by mouth. Dispense: 1 kit; Refill: 0  - naproxen (NAPROSYN) 500 MG tablet; Take 1 tablet by mouth 2 times daily (with meals)  Dispense: 180 tablet; Refill: 1    2.  Biceps tendinitis of right upper extremity  Failure to Improve  Evaluate  Start medrol pack  Then naproxen  Get PT done  - EMG; Future  - [facility] - [facility]  - methylPREDNISolone (MEDROL DOSEPACK) 4 MG tablet; Take by mouth. Dispense: 1 kit; Refill: 0  - naproxen (NAPROSYN) 500 MG tablet; Take 1 tablet by mouth 2 times daily (with meals)  Dispense: 180 tablet; Refill: 1     Controlled Substance Monitoring:  Acute and Chronic Pain Monitoring:   RX Monitoring 11/4/2016   Attestation The Prescription Monitoring Report for this patient was reviewed today. Periodic Controlled Substance Monitoring No signs of potential drug abuse or diversion identified. Orders Placed This Encounter   Procedures    [facility]     Referral Priority:   Routine     Referral Type:   Eval and Treat     Referral Reason:   Specialty Services Required     Requested Specialty:   Physical Therapy     Number of Visits Requested:   1    EMG     Standing Status:   Future     Standing Expiration Date:   11/1/2020     Order Specific Question:   Which body part? Answer:   left upper arm     Orders Placed This Encounter   Medications    methylPREDNISolone (MEDROL DOSEPACK) 4 MG tablet     Sig: Take by mouth. Dispense:  1 kit     Refill:  0    naproxen (NAPROSYN) 500 MG tablet     Sig: Take 1 tablet by mouth 2 times daily (with meals)     Dispense:  180 tablet     Refill:  1      There are no discontinued medications. Health Maintenance Due   Topic Date Due    Pneumococcal 0-64 years Vaccine (1 of 1 - PPSV23) 10/23/1985    DTaP/Tdap/Td vaccine (1 - Tdap) 10/23/1998    Flu vaccine (1) 09/01/2020      Return in about 3 months (around 12/2/2020) for Chronic conditions, Appt w/ Dr. Rere Babb. [facility] received counseling on the following healthy behaviors: nutrition, exercise and medication adherence  Reviewed prior labs and health maintenance  Continue current medications, diet and exercise. Discussed use, benefit, and side effects of prescribed medications. Barriers to medication compliance addressed.    Patient given educational materials - see patient instructions  Was a self-tracking handout given in paper form or via [employer]? Yes    Requested Prescriptions     Signed Prescriptions Disp Refills    methylPREDNISolone (MEDROL DOSEPACK) 4 MG tablet 1 kit 0     Sig: Take by mouth.  naproxen (NAPROSYN) 500 MG tablet 180 tablet 1     Sig: Take 1 tablet by mouth 2 times daily (with meals)       All patient questions answered. Patient voiced understanding. Quality Measures    Body mass index is 33.38 kg/m². Elevated. Weight control planned discussed Healthy diet and regular exercise. BP: 110/80 Blood pressure is normal. Treatment plan consists of No treatment change needed. Lab Results   Component Value Date    VTVCKPZNEWMAVP 191 (H) 05/08/2020    (goal LDL reduction with dx if diabetes is 50% LDL reduction)      PHQ Scores 8/12/2020 5/8/2020 1/2/2020 5/24/2019 11/19/2018 5/26/2017 3/22/2017   PHQ2 Score 0 2 0 0 0 0 6   PHQ9 Score 0 2 0 0 0 9 23     Interpretation of Total Score Depression Severity: 1-4 = Minimal depression, 5-9 = Mild depression, 10-14 = Moderate depression, 15-19 = Moderately severe depression, 20-27 = Severe depression   This note was completed by using the assistance of a speech-recognition program. However, inadvertent computerized transcription errors may be present. Although every effort was made to ensure accuracy, no guarantees can be provided that every mistake has been identified and corrected by editing   An electronic signature was used to authenticate this note.   --LAMAR Gallagher - CNP on 9/2/2020 at 8:36 PM

## 2020-09-02 ENCOUNTER — OFFICE VISIT (OUTPATIENT)
Dept: FAMILY MEDICINE CLINIC | Age: 40
End: 2020-09-02
Payer: COMMERCIAL

## 2020-09-02 VITALS
SYSTOLIC BLOOD PRESSURE: 110 MMHG | OXYGEN SATURATION: 98 % | BODY MASS INDEX: 33.37 KG/M2 | WEIGHT: 260 LBS | HEIGHT: 74 IN | HEART RATE: 83 BPM | DIASTOLIC BLOOD PRESSURE: 80 MMHG

## 2020-09-02 PROCEDURE — G8427 DOCREV CUR MEDS BY ELIG CLIN: HCPCS | Performed by: FAMILY MEDICINE

## 2020-09-02 PROCEDURE — G8417 CALC BMI ABV UP PARAM F/U: HCPCS | Performed by: FAMILY MEDICINE

## 2020-09-02 PROCEDURE — 1036F TOBACCO NON-USER: CPT | Performed by: FAMILY MEDICINE

## 2020-09-02 PROCEDURE — 99213 OFFICE O/P EST LOW 20 MIN: CPT | Performed by: FAMILY MEDICINE

## 2020-09-02 RX ORDER — NAPROXEN 500 MG/1
500 TABLET ORAL 2 TIMES DAILY WITH MEALS
Qty: 180 TABLET | Refills: 1 | Status: SHIPPED | OUTPATIENT
Start: 2020-09-02 | End: 2021-06-23 | Stop reason: ALTCHOICE

## 2020-09-02 RX ORDER — METHYLPREDNISOLONE 4 MG/1
TABLET ORAL
Qty: 1 KIT | Refills: 0 | Status: SHIPPED | OUTPATIENT
Start: 2020-09-02 | End: 2020-09-21 | Stop reason: ALTCHOICE

## 2020-09-02 ASSESSMENT — ENCOUNTER SYMPTOMS
ABDOMINAL PAIN: 0
COUGH: 0
RESPIRATORY NEGATIVE: 1
SHORTNESS OF BREATH: 0

## 2020-09-02 NOTE — PATIENT INSTRUCTIONS
Patient Education        Biceps Tendinitis: Exercises  Introduction  Here are some examples of exercises for you to try. The exercises may be suggested for a condition or for rehabilitation. Start each exercise slowly. Ease off the exercises if you start to have pain. You will be told when to start these exercises and which ones will work best for you. How to do the exercises  Biceps stretch   1. Stand and hold your affected arm out to the side, with your hand at about hip level. 2. Gently bend your wrist back so that your fingers point down toward the floor. 3. You may also do this next to a wall and rest your fingers on the wall. 4. For more of a stretch, bend your head to the opposite side of your affected arm. 5. Hold for 15 to 30 seconds. 6. Repeat 2 to 4 times. Resisted supination   For this and the following exercises, you will need elastic exercise material, such as surgical tubing or Thera-Band. 1. Sit leaning forward with your legs slightly spread. Then place your forearm on your thigh with your hand and affected wrist in front of your knee. 2. Grasp one end of an exercise band with your palm down, and step on the other end. 3. Keeping your wrist straight, roll your palm outward and away from your thigh for a count of 2, then slowly move your wrist back to the starting position to a count of 5.  4. Repeat 8 to 12 times. Resisted elbow flexion   1. Using your affected arm, hold one end of an elastic band in your hand. 2. Place the other end of the band under your foot on the same side of your body as your affected arm. 3. Slowly bend your elbow and bring your hand toward your shoulder. Your palm and the underside of your wrist should be facing up as you pull the band toward your shoulder. Count to 2 as you pull up. 4. Relax and slowly return to your starting position. Count to 5 as you return to the start. 5. Repeat 8 to 12 times. Resisted elbow flexion at shoulder level   1.  Tie the ends of an exercise band together to form a loop. Attach one end of the loop to a secure object or shut a door on it to hold it in place. (Or you can have someone hold one end of the loop to provide resistance.) The band should be at shoulder level. 2. Stand facing where you have tied or fastened the band. 3. Start with your affected arm held out straight, holding the band in your hand. 4. Slowly bend your elbow to 90 degrees, bringing your hand toward your forehead. Count to 2 as you pull the band toward your head. 5. Relax and slowly return to your starting position. Count to 5 as you return to the start. 6. Repeat 8 to 12 times. Follow-up care is a key part of your treatment and safety. Be sure to make and go to all appointments, and call your doctor if you are having problems. It's also a good idea to know your test results and keep a list of the medicines you take. Where can you learn more? Go to https://[employer]. org and sign in to your [employer] account. Enter M120 in the Cloud Security box to learn more about \"Biceps Tendinitis: Exercises. \"     If you do not have an account, please click on the \"Sign Up Now\" link. Current as of: March 2, 2020               Content Version: 12.5  © 8243-4420 Healthwise, Incorporated. Care instructions adapted under license by [facility] ([facility]). If you have questions about a medical condition or this instruction, always ask your healthcare professional. [place] [address] any warranty or liability for your use of this information. Patient Education        Tennis Elbow: Exercises  Introduction  Here are some examples of exercises for you to try. The exercises may be suggested for a condition or for rehabilitation. Start each exercise slowly. Ease off the exercises if you start to have pain. You will be told when to start these exercises and which ones will work best for you.   How to do the exercises  Wrist flexor stretch 1. Extend your arm in front of you with your palm up. 2. Bend your wrist, pointing your hand toward the floor. 3. With your other hand, gently bend your wrist farther until you feel a mild to moderate stretch in your forearm. 4. Hold for at least 15 to 30 seconds. Repeat 2 to 4 times. Wrist extensor stretch   1. Repeat steps 1 to 4 of the stretch above but begin with your extended hand palm down. Ball or sock squeeze   1. Hold a tennis ball (or a rolled-up sock) in your hand. 2. Make a fist around the ball (or sock) and squeeze. 3. Hold for about 6 seconds, and then relax for up to 10 seconds. 4. Repeat 8 to 12 times. 5. Switch the ball (or sock) to your other hand and do 8 to 12 times. Wrist deviation   1. Sit so that your arm is supported but your hand hangs off the edge of a flat surface, such as a table. 2. Hold your hand out like you are shaking hands with someone. 3. Move your hand up and down. 4. Repeat this motion 8 to 12 times. 5. Switch arms. 6. Try to do this exercise twice with each hand. Wrist curls   1. Place your forearm on a table with your hand hanging over the edge of the table, palm up. 2. Place a 1- to 2-pound weight in your hand. This may be a dumbbell, a can of food, or a filled water bottle. 3. Slowly raise and lower the weight while keeping your forearm on the table and your palm facing up. 4. Repeat this motion 8 to 12 times. 5. Switch arms, and do steps 1 through 4.  6. Repeat with your hand facing down toward the floor. Switch arms. Biceps curls   1. Sit leaning forward with your legs slightly spread and your left hand on your left thigh. 2. Place your right elbow on your right thigh, and hold the weight with your forearm horizontal.  3. Slowly curl the weight up and toward your chest.  4. Repeat this motion 8 to 12 times. 5. Switch arms, and do steps 1 through 4. Follow-up care is a key part of your treatment and safety.  Be sure to make and go to all appointments, and call your doctor if you are having problems. It's also a good idea to know your test results and keep a list of the medicines you take. Where can you learn more? Go to https://[employer]. org and sign in to your [employer] account. Enter O104 in the [employer] box to learn more about \"Tennis Elbow: Exercises. \"     If you do not have an account, please click on the \"Sign Up Now\" link. Current as of: March 2, 2020               Content Version: 12.5  © 0454-4195 Healthwise, Incorporated. Care instructions adapted under license by [facility] ([facility]). If you have questions about a medical condition or this instruction, always ask your healthcare professional. [address] any warranty or liability for your use of this information.

## 2020-09-10 ENCOUNTER — HOSPITAL ENCOUNTER (OUTPATIENT)
Dept: PHYSICAL THERAPY | Age: 40
Setting detail: THERAPIES SERIES
Discharge: HOME | End: 2020-09-10
Payer: COMMERCIAL

## 2020-09-10 PROCEDURE — 97110 THERAPEUTIC EXERCISES: CPT

## 2020-09-10 PROCEDURE — 97162 PT EVAL MOD COMPLEX 30 MIN: CPT

## 2020-09-10 ASSESSMENT — PAIN DESCRIPTION - DESCRIPTORS: DESCRIPTORS: SHARP;SHOOTING;STABBING;BURNING

## 2020-09-10 ASSESSMENT — PAIN DESCRIPTION - LOCATION: LOCATION: ELBOW;ARM;SHOULDER

## 2020-09-10 ASSESSMENT — PAIN DESCRIPTION - FREQUENCY: FREQUENCY: CONTINUOUS

## 2020-09-10 ASSESSMENT — PAIN DESCRIPTION - PROGRESSION: CLINICAL_PROGRESSION: GRADUALLY WORSENING

## 2020-09-10 ASSESSMENT — PAIN SCALES - GENERAL: PAINLEVEL_OUTOF10: 7

## 2020-09-10 ASSESSMENT — PAIN DESCRIPTION - ORIENTATION: ORIENTATION: RIGHT;ANTERIOR;OUTER

## 2020-09-10 ASSESSMENT — PAIN DESCRIPTION - PAIN TYPE: TYPE: CHRONIC PAIN

## 2020-09-10 NOTE — PROGRESS NOTES
power lines- climbing poles, hand digging, pulling up wire    Objective  Palpation: No tenderness with palpation  Observation: Forward head posture with protracted shoulders, (R) shoulder lower than (L) shoulder, slight head tilt to the (R) in neutral sitting, unequal scapular retraction. Range of Motion  AROM RUE (degrees)  RUE AROM : WNL  PROM LUE (degrees)  LUE PROM: Exceptions  L Shoulder Flex  0-170: WNL  L Shoulder ABduction 0-140: WNL with pain at end range  L Shoulder Int Rotation  0-70: WNL  L Shoulder Ext Rotation  0-90: 50  L Shoulder Horiz ABduction 0-40: WNL  L Shoulder Horiz ADduction 0-120: WNL  L Elbow Flex/Ext 0-145: WNL with pain at end range of elbow flexion  L Forearm Pron  0-90: WNL  L Wrist Flex 0-80: WNL  L Wrist Ext 0-70: WNL  L Wrist Radial Deviation 0-20: WNL  L Wrist Ulnar Deviation 0-45: WNL  AROM LUE (degrees)  LUE AROM : Exceptions  L Shoulder Flexion 0-180: WNL  L Shoulder ABduction 0-180: 155  L Shoulder Int Rotation  0-70: WNL  L Shoulder Ext Rotation  0-90: 50  L Shoulder Horiz ABduction 0-40: WNL  L Shoulder Horiz ADduction 0-120: WNL  L Elbow Flexion 0-145:  WNL with pain at end range  L Elbow Extension 145-0: WNL  L Forearm Pron 0-90: WNL  L Forearm Supination  0-90: 30  L Wrist Flexion 0-80: 75  L Wrist Extension 0-70: 55  L Wrist Radial Deviation 0-20: WNL  L Wrist Ulnar Deviation 0-45: WNL    Strength  Strength RUE  Strength RUE: WFL  Strength LUE  Strength LUE: Exception  L Shoulder Flexion: 5/5  L Shoulder ABduction: 3-/5  L Shoulder Internal Rotation: 5/5  L Shoulder External Rotation: 2+/5(pain with resistance)  L Elbow Flexion: 4+/5(pain with resistance)  L Elbow Extension: 5/5(pain in lateral forearm with resistance)  L Forearm Pron: 5/5  L Forearm Sup: 5/5(Pain in the lateral forearm with resistance)  L Wrist Flexion: 4+/5(Pain in lateral forearm and bicep belly with resistance)  L Wrist Extension: 5/5    Additional Measures  Flexibility: Tightness noted in (L) wrist extensors  Special Tests: Cozen's Test (-),  Mill's Test (-), Golfer's Elbow Test (-)    Sensation  Overall Sensation Status: Impaired(C6-T1)  Light Touch: Partial deficits in the LUE    Hand Assessment  Hand Dominance: Left  Left Hand Strength -  (lbs)  Handle Setting 3: 70 lbs with pain  Right Hand Strength -  (lbs)  Handle Setting 3: 60 lbs    Assessment  Conditions Requiring Skilled Therapeutic Intervention  Body structures, Functions, Activity limitations: Decreased functional mobility ; Decreased ADL status; Decreased ROM; Decreased strength; Increased pain  Assessment: Patient had moderate irritation of the (L) elbow/ biceps muscle with increased use of the (L) upper extremity. Mild irritability of the (L) elbow when performing  strength. Treatment Diagnosis: (L) elbow pain with mobility deficits  Prognosis: Good  Decision Making: Medium Complexity  History: Work demands limit patient from resting (L) elbow, Asthma, COPD, Hyperlipidemia, HTN, Depression, Social Anxiety Disorder  Exam: NPRS-7/10, Decreased ROM within the (L) upper extremity, Decreased strength within the (L) upper extremity  Clinical Presentation: Clinically Evolving  Treatment Initiated : Cold pack to the (L) elbow and biceps muscle x 15 min (seated position), Educated patient on resting the (L) arm as much as possible and icing the (L) elbow/ biceps at home, Home Exercise Program- Wrist extensor stretch 30 sec x 5 sets (1-2 times daily)    OutComes Score  QuickDASH Total Score: 23 (09/10/20 0945)       QuickDASH Disability/Symptom Score : 27.27 % (09/10/20 0945)    Goals  Short term goals  Time Frame for Short term goals: 9 visits  Short term goal 1: Patient will report pain as 5-6/10 with ADLs/work related tasks.   Short term goal 2: Patient will improve ROM within all involved planes in order to assist with work related tasks  Long term goals  Time Frame for Long term goals : 18 visits  Long term goal 1: Patient will report pain as certify a need for   Medically necessary rehabilitation services.   [] Physician Signature    Date:     Electronically signed by: Shantel MILLER    [facility]) @ [facility]  [address] [place] [address] [address] [place], [address] [address] [facility]  Phone [phone]  Fax [phone]    Therapy Time   Individual Concurrent Group Co-treatment   Time In 0945         Time Out 1100         Minutes 75           Treatment Charges: Minutes Units   []  Ultrasound     []  Electrical-Stim     []  Iontophoresis     []  Traction     []  Massage       [x]  Eval 50 1   []  Gait     [x]  Ther Exercise 10  1    []  Manual Therapy       []  Ther Activities       []  Aquatics     []  Vasopneumatic Device     []  Neuro Re-Ed       [x]  Other- cold pack  15  0    Total Treatment Time: 75 2

## 2020-09-16 ENCOUNTER — HOSPITAL ENCOUNTER (OUTPATIENT)
Dept: PHYSICAL THERAPY | Age: 40
Setting detail: THERAPIES SERIES
Discharge: HOME | End: 2020-09-16
Payer: COMMERCIAL

## 2020-09-16 PROCEDURE — 97033 APP MDLTY 1+IONTPHRSIS EA 15: CPT

## 2020-09-16 ASSESSMENT — PAIN SCALES - GENERAL: PAINLEVEL_OUTOF10: 3

## 2020-09-16 ASSESSMENT — PAIN DESCRIPTION - DESCRIPTORS: DESCRIPTORS: SHARP;SHOOTING;STABBING;BURNING

## 2020-09-16 ASSESSMENT — PAIN DESCRIPTION - ORIENTATION: ORIENTATION: LEFT;OUTER

## 2020-09-16 ASSESSMENT — PAIN DESCRIPTION - PAIN TYPE: TYPE: CHRONIC PAIN

## 2020-09-16 ASSESSMENT — PAIN DESCRIPTION - PROGRESSION: CLINICAL_PROGRESSION: GRADUALLY IMPROVING

## 2020-09-16 ASSESSMENT — PAIN DESCRIPTION - FREQUENCY: FREQUENCY: CONTINUOUS

## 2020-09-16 ASSESSMENT — PAIN DESCRIPTION - LOCATION: LOCATION: ELBOW

## 2020-09-16 NOTE — PROGRESS NOTES
Physical Therapy  Daily Treatment Note  Date: 2020  Patient Name: Roxana Forte  MRN: [id number]     :   1980    General  Chart Reviewed: Yes  Response To Previous Treatment: Not applicable  Referring Practitioner: JUDI Vaughn  PT Visit Information  Onset Date: 20  PT Insurance Information: [address] [facility]  Total # of Visits Approved: 18  Total # of Visits to Date: 2  No Show: 0  Canceled Appointment: 0  General Comment  Comments: Patient presents to therapy with (L) elbow and biceps tendonitis. 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 Patient saw PCP on 2020 due to progressively getting worse. PCP prescribed Naproxen and referred patient to physical therapy. Patient to also get a nerve conduction test in the future. Subjective  Pain Screening  Patient Currently in Pain: Yes  Pain Assessment  Pain Assessment: 0-10  Pain Level: 3  Patient's Stated Pain Goal: 2  Pain Type: Chronic pain  Pain Location: Elbow  Pain Orientation: Left; Outer  Pain Radiating Towards: the (L) bicep muscle in the (L) shoulder  Pain Descriptors: Travon Rumpf; Shooting; Stabbing;Burning  Pain Frequency: Continuous  Clinical Progression: Gradually improving  Non-Pharmaceutical Pain Intervention(s): Repositioned;Rest;Cold applied  Response to Pain Intervention: Patient Satisfied  Multiple Pain Sites: No     Treatment Activities  Modalities  Iontophoresis: 80 mA x 30 minutes (L) distal elbow region (seated position)    Activity Tolerance  Patient tolerated treatment well     Assessment  Conditions Requiring Skilled Therapeutic Intervention  Body structures, Functions, Activity limitations: Decreased functional mobility ; Decreased ADL status; Decreased ROM; Decreased strength; Increased pain  Assessment: Patient reported less pain within the elbow region within the last 24 hours. Still highly irritable with touch/provided ionto for pain control.   Treatment Diagnosis: (L) elbow pain with mobility deficits  Prognosis: Good     Plan  Times per week: 2  Plan weeks: 18 visits  Current Treatment Recommendations: Strengthening, Home Exercise Program, ROM, Modalities    Therapy Time   Individual Concurrent Group Co-treatment   Time In 0930         Time Out 4510         Minutes 45           Treatment Charges: Minutes Units   []  Ultrasound     []  Electrical-Stim     [x]  Iontophoresis 30 1   []  Traction     []  Massage       []  Eval     []  Gait     []  Ther Exercise       []  Manual Therapy       []  Ther Activities       []  Aquatics     []  Vasopneumatic Device     []  Neuro Re-Ed       []  Other       Total Treatment Time: 27 1      Kulwinder Askew, PT DPT

## 2020-09-21 ENCOUNTER — TELEMEDICINE (OUTPATIENT)
Dept: FAMILY MEDICINE CLINIC | Age: 40
End: 2020-09-21
Payer: COMMERCIAL

## 2020-09-21 VITALS — BODY MASS INDEX: 33.37 KG/M2 | WEIGHT: 260 LBS | HEIGHT: 74 IN | TEMPERATURE: 99.5 F

## 2020-09-21 PROCEDURE — 99213 OFFICE O/P EST LOW 20 MIN: CPT | Performed by: FAMILY MEDICINE

## 2020-09-21 PROCEDURE — G8427 DOCREV CUR MEDS BY ELIG CLIN: HCPCS | Performed by: FAMILY MEDICINE

## 2020-09-21 RX ORDER — AMOXICILLIN AND CLAVULANATE POTASSIUM 875; 125 MG/1; MG/1
1 TABLET, FILM COATED ORAL 2 TIMES DAILY
Qty: 14 TABLET | Refills: 0 | Status: SHIPPED | OUTPATIENT
Start: 2020-09-21 | End: 2020-09-28

## 2020-09-21 RX ORDER — LORATADINE 10 MG/1
10 TABLET ORAL DAILY
Qty: 90 TABLET | Refills: 3 | Status: SHIPPED | OUTPATIENT
Start: 2020-09-21 | End: 2021-01-08 | Stop reason: SDUPTHER

## 2020-09-21 ASSESSMENT — ENCOUNTER SYMPTOMS
CONSTIPATION: 0
DIARRHEA: 0
NAUSEA: 0
WHEEZING: 1
TROUBLE SWALLOWING: 1
COUGH: 0
VOMITING: 0
ABDOMINAL PAIN: 0
SINUS PRESSURE: 0
EYE PAIN: 0
CHEST TIGHTNESS: 0
SORE THROAT: 1

## 2020-09-21 NOTE — PROGRESS NOTES
[address] [address]  Phone: [phone], Fax: [phone]    TELEHEALTH EVALUATION -- Audio/Visual (During - public health emergency)    Patient ID verified by me prior to start of this visit    Chen Woodard (:  1980) has requested an audio/video evaluation for the following concern(s):  Chief Complaint   Patient presents with    Pharyngitis     ~ 5 days, ear pain       HPI:  Chen Woodard is an established patient of Dr. Wlimer Lou. Patient has a history of asthma and seasonal allergies. Patient woke up with ear pain and sore throat 5 days ago. Is also had some low grade fever, 99.5 today. Patient denies any patient contact with similar symptoms or any patient with a confirmed COVID-19 virus infection. Patient does not have any lymphadenopathy with him assessing himself. But he feels generally ill. He denies any body aches, headaches, anosmia, ageusia, fatigue, or severe shortness of breath. Patient reports some dysphagia and sore throat. He reports symptoms are getting worse. He started to have some mild shortness of breath. He does have a known asthma and he is on albuterol and Dulera. Patient is adherence with this therapy usually helps with his asthma symptoms. Patient is also on Flonase and Claritin for his seasonal allergic rhinitis. This therapy is also working for him effectively. Height: 6' 2.02\" (1.88 m), Weight: 260 lb (117.9 kg)    Review of Systems   Constitutional: Positive for fever. Negative for chills and fatigue. HENT: Positive for ear pain, sore throat and trouble swallowing. Negative for congestion, postnasal drip and sinus pressure. Eyes: Negative for pain. Respiratory: Positive for wheezing (occasional). Negative for cough and chest tightness. Cardiovascular: Negative for chest pain and palpitations.    Gastrointestinal: Negative for abdominal pain, constipation, diarrhea, nausea and NOEMÍ (obstructive sleep apnea) 10/16/2019    Osteoarthritis     Seronegative polyarthritis 10/10/2016    followed w/ rheumatology in [address] [place] [address] anxiety disorder     Uvulitis 8/15/2019     Past Surgical History:   Procedure Laterality Date    COLONOSCOPY      CYST REMOVAL Right     Armpit     ENDOSCOPY, COLON, DIAGNOSTIC      HAND SURGERY Left     KNEE ARTHROSCOPY      right knee    NOSE SURGERY      TONSILLECTOMY      UPPER GASTROINTESTINAL ENDOSCOPY N/A 10/9/2019    EGD POLYP SNARE, HOT.  ESOPHAGEAL DILATATION WITH MALONIES 49F performed by Gina Garner MD at [facility]       Family History   Problem Relation Age of Onset    Diabetes Father     High Cholesterol Father     Other Mother         autoimmune hepatitis     High Blood Pressure Brother     Heart Disease Brother     Heart Attack Brother 40        cabg age 40    Alex Gilding Rheum Arthritis Other 7    Colon Cancer Neg Hx     Cancer Neg Hx      Current Outpatient Medications   Medication Sig Dispense Refill    amoxicillin-clavulanate (AUGMENTIN) 875-125 MG per tablet Take 1 tablet by mouth 2 times daily for 7 days 14 tablet 0    loratadine (CLARITIN) 10 MG tablet Take 1 tablet by mouth daily 90 tablet 3    naproxen (NAPROSYN) 500 MG tablet Take 1 tablet by mouth 2 times daily (with meals) 180 tablet 1    pravastatin (PRAVACHOL) 20 MG tablet Take 1 tablet by mouth every evening Stop atorvastatin 90 tablet 3    buPROPion (WELLBUTRIN XL) 150 MG extended release tablet Take 1 tablet by mouth every morning 30 tablet 3    mometasone-formoterol (DULERA) 100-5 MCG/ACT inhaler Inhale 2 puffs into the lungs 2 times daily Stop Flovent 13 g 3    fluticasone (FLONASE) 50 MCG/ACT nasal spray 2 sprays by Nasal route daily 1 Bottle 3    albuterol sulfate HFA (VENTOLIN HFA) 108 (90 Base) MCG/ACT inhaler Inhale 2 puffs into the lungs every 6 hours as needed for Wheezing or Shortness of Breath 18 g 3    amLODIPine HFA) 108 (90 Base) MCG/ACT inhaler Inhale 2 puffs into the lungs every 6 hours as needed for Wheezing or Shortness of Breath Yes Henrry Jeong MD   amLODIPine (NORVASC) 10 MG tablet Take 1 tablet by mouth every evening Stop Lisinopril. Dose increased  8/15/2019 Yes Henrry Jeong MD   busPIRone (BUSPAR) 10 MG tablet Take 1 tablet by mouth 3 times daily as needed (anxiety) Yes Henrry Jeong MD   omeprazole (PRILOSEC) 40 MG delayed release capsule TAKE 1 CAPSULE DAILY Yes Henrry Jeong MD   albuterol (PROVENTIL) (2.5 MG/3ML) 0.083% nebulizer solution Take 3 mLs by nebulization every 6 hours as needed for Wheezing or Shortness of Breath Yes Henrry Jeong MD   Blood Pressure KIT Dx: HTN. Needs automatic blood pressure machine to monitor his blood pressure. Yes Henrry Jeong MD   Respiratory Therapy Supplies (NEBULIZER) MADHAV 1 Units by Does not apply route 2 times daily Dx: Asthma exacerbation J45.901 Yes Laura Douglas MD       Social History     Tobacco Use    Smoking status: Never Smoker    Smokeless tobacco: Never Used   Substance Use Topics    Alcohol use: Yes     Comment: occasional  few x year    Drug use: No        PHYSICAL EXAMINATION:  Vital Signs: (As obtained by patient/caregiver or practitioner observation)    Constitutional: [x] Appears well-developed and well-nourished [x] No apparent distress      [] Abnormal-   Mental status  [x] Alert and awake  [x] Oriented to person/place/time [x]Able to follow commands      Eyes:  EOM    [x]  Normal  [] Abnormal-  Sclera  [x]  Normal  [] Abnormal -         Discharge [x]  None visible  [] Abnormal -    HENT:   [x] Normocephalic, atraumatic.   [] Abnormal   [x] Mouth/Throat: Mucous membranes are moist.     External Ears [x] Normal  [] Abnormal-     Neck: [x] No visualized mass     Pulmonary/Chest: [x] Respiratory effort normal.  [x] No visualized signs of difficulty breathing or respiratory distress        [] Abnormal Musculoskeletal:   [x] Normal gait with no signs of ataxia         [x] Normal range of motion of neck        [] Abnormal-     Neurological:        [x] No Facial Asymmetry (Cranial nerve 7 motor function) (limited exam to video visit)          [x] No gaze palsy        [] Abnormal-     Skin:        [x] No significant exanthematous lesions or discoloration noted on facial skin         [] Abnormal-     Psychiatric:       [x] Normal Affect [x] No Hallucinations        [x] Abnormal- Anxious, with pressured speech    Other pertinent observable physical exam findings- no lymph adenopathy.   Lab Results   Component Value Date    WBC 7.3 05/08/2020    HGB 16.2 05/08/2020    HCT 49.3 05/08/2020    MCV 93.2 05/08/2020     05/08/2020     Lab Results   Component Value Date     05/08/2020    K 4.1 05/08/2020     05/08/2020    CO2 20 05/08/2020    BUN 13 05/08/2020    CREATININE 0.71 05/08/2020    GLUCOSE 64 05/08/2020    GLUCOSE 96 04/30/2012    CALCIUM 9.2 05/08/2020      Lab Results   Component Value Date    ALT 59 (H) 05/08/2020    AST 48 (H) 05/08/2020    ALKPHOS 94 05/08/2020    BILITOT 0.36 05/08/2020     Lab Results   Component Value Date    TSH 3.28 05/08/2020     Lab Results   Component Value Date    CHOL 211 (H) 05/08/2020    CHOL 211 (H) 03/12/2019    CHOL 191 10/06/2017     Lab Results   Component Value Date    TRIG 138 05/08/2020    TRIG 211 (H) 03/12/2019    TRIG 117 10/06/2017     Lab Results   Component Value Date    HDL 50 05/08/2020    HDL 44 03/12/2019    HDL 45 10/06/2017     Lab Results   Component Value Date    LDLCHOLESTEROL 133 (H) 05/08/2020    LDLCHOLESTEROL 125 03/12/2019    LDLCHOLESTEROL 123 10/06/2017     Lab Results   Component Value Date    VLDL NOT REPORTED 05/08/2020    VLDL NOT REPORTED (H) 03/12/2019    VLDL NOT REPORTED 10/06/2017     Lab Results   Component Value Date    CHOLHDLRATIO 4.2 05/08/2020    CHOLHDLRATIO 4.8 03/12/2019    CHOLHDLRATIO 4.2 10/06/2017     Lab Results   Component Value Date    LABA1C 5.5 03/22/2017     Lab Results   Component Value Date    CUUCFYRI54 570 07/23/2014     No results found for: FOLATE  Lab Results   Component Value Date    IRON 60 07/23/2014    TIBC 338 07/23/2014    FERRITIN 113 07/23/2014     Lab Results   Component Value Date    VITD25 43.5 08/26/2015      Due to this being a TeleHealth encounter, evaluation of the following organ systems is limited: Vitals/Constitutional/EENT/Resp/CV/GI//MS/Neuro/Skin/Heme-Lymph-Imm. ASSESSMENT/PLAN:  1. Strep throat  Worsening  DISCUSSED AND ADVISED TO:  Rest.  Advised to increase fluid intake. Eat more fruits and vegetables. Take medications as discussed. Report for worsening symptoms. - amoxicillin-clavulanate (AUGMENTIN) 875-125 MG per tablet; Take 1 tablet by mouth 2 times daily for 7 days  Dispense: 14 tablet; Refill: 0    2. Asthma, Moderate persistent asthma without complication  Stable  Continue using inhalers. To avoid bronchitis  DISCUSSED and ADVISED TO:  Use prescribed inhalers or medications regularly. Avoid known irritants and exposure to known triggers. Advised to report the need to use your albuterol inhaler more than usual  Stay away from smoking or second hand smoke. Go to the nearest ER for increasing SOB. 3. Seasonal allergic rhinitis due to other allergic trigger  Stable  Continue with the same therapy Claritin and Flonase  ADVISED TO:  Avoid known allergens/irritants. Stop smoking or avoid second hand smoke. Stay hydrated. Report for worsening symptoms    - loratadine (CLARITIN) 10 MG tablet; Take 1 tablet by mouth daily  Dispense: 90 tablet; Refill: 3    Controlled Substance Monitoring:  Acute and Chronic Pain Monitoring:   RX Monitoring 11/4/2016   Attestation The Prescription Monitoring Report for this patient was reviewed today. Periodic Controlled Substance Monitoring No signs of potential drug abuse or diversion identified.      No orders of the defined types were placed in this encounter. Orders Placed This Encounter   Medications    amoxicillin-clavulanate (AUGMENTIN) 875-125 MG per tablet     Sig: Take 1 tablet by mouth 2 times daily for 7 days     Dispense:  14 tablet     Refill:  0    loratadine (CLARITIN) 10 MG tablet     Sig: Take 1 tablet by mouth daily     Dispense:  90 tablet     Refill:  3      Medications Discontinued During This Encounter   Medication Reason    methylPREDNISolone (MEDROL DOSEPACK) 4 MG tablet Therapy completed    loratadine (CLARITIN) 10 MG tablet REORDER      Pa received counseling on the following healthy behaviors: nutrition, exercise and medication adherence  Reviewed prior labs and health maintenance. Continue current medications, diet and exercise. Discussed use, benefit, and side effects of prescribed medications. Barriers to medication compliance addressed. Patient given educational materials - see patient instructions. All patient questions answered. Patient voiced understanding. No follow-ups on file. Roxana Forte is a 35 y.o. male patient  being evaluated by a Virtual Visit (video visit) encounter to address concerns as mentioned above. Due to this being a TeleHealth encounter (During [place] [address] public health emergency), evaluation of the following organ systems was limited:Vitals/Constitutional/EENT/Resp/CV/GI//MS/Neuro/Skin/Heme-Lymph-Imm. Services were provided through a video synchronous discussion virtually to substitute for in-person clinic visit. This is a telehealth visit that was performed with the originating site at Patient Location: home and provider Location of [place], [place]. Verbal consent to participate in video visit was obtained.  Patient ID verified by me prior to start of this visit  I discussed with the patient the nature of our telehealth visits via interactive/real-time audio/video that:  - I would evaluate the patient and recommend diagnostics and treatments based on my

## 2020-09-21 NOTE — PATIENT INSTRUCTIONS
Patient Education        Strep Throat: Care Instructions  Your Care Instructions     Strep throat is a bacterial infection that causes sudden, severe sore throat and fever. Strep throat, which is caused by bacteria called streptococcus, is treated with antibiotics. Sometimes a strep test is necessary to tell if the sore throat is caused by strep bacteria. Treatment can help ease symptoms and may prevent future problems. Follow-up care is a key part of your treatment and safety. Be sure to make and go to all appointments, and call your doctor if you are having problems. It's also a good idea to know your test results and keep a list of the medicines you take. How can you care for yourself at home? · Take your antibiotics as directed. Do not stop taking them just because you feel better. You need to take the full course of antibiotics. · Strep throat can spread to others until 24 hours after you begin taking antibiotics. During this time, you should avoid contact with other people at work or home, especially infants and children. Do not sneeze or cough on others, and wash your hands often. Keep your drinking glass and eating utensils separate from those of others, and wash these items well in hot, soapy water. · Gargle with warm salt water at least once each hour to help reduce swelling and make your throat feel better. Use 1 teaspoon of salt mixed in 8 fluid ounces of warm water. · Take an over-the-counter pain medication, such as acetaminophen (Tylenol), ibuprofen (Advil, Motrin), or naproxen (Aleve). Read and follow all instructions on the label. · Try an over-the-counter anesthetic throat spray or throat lozenges, which may help relieve throat pain. · Drink plenty of fluids. Fluids may help soothe an irritated throat. Hot fluids, such as tea or soup, may help your throat feel better. · Eat soft solids and drink plenty of clear liquids.  Flavored ice pops, ice cream, scrambled eggs, sherbet, and gelatin dessert (such as Jell-O) may also soothe the throat. · Get lots of rest.  · Do not smoke, and avoid secondhand smoke. If you need help quitting, talk to your doctor about stop-smoking programs and medicines. These can increase your chances of quitting for good. · Use a vaporizer or humidifier to add moisture to the air in your bedroom. Follow the directions for cleaning the machine. When should you call for help? Call your doctor now or seek immediate medical care if:  · You have a new or higher fever. · You have a fever with a stiff neck or severe headache. · You have new or worse trouble swallowing. · Your sore throat gets much worse on one side. · Your pain becomes much worse on one side of your throat. Watch closely for changes in your health, and be sure to contact your doctor if:  · You are not getting better after 2 days (48 hours). · You do not get better as expected. Where can you learn more? Go to https://[employer]. org and sign in to your "[employer] account. Enter K625 in the SmartMove box to learn more about \"Strep Throat: Care Instructions. \"     If you do not have an account, please click on the \"Sign Up Now\" link. Current as of: July 29, 2019               Content Version: 12.5  © 0318-7657 Healthwise, Incorporated. Care instructions adapted under license by [facility] ([facility]). If you have questions about a medical condition or this instruction, always ask your healthcare professional. [place] [address] any warranty or liability for your use of this information.

## 2020-09-25 RX ORDER — BUPROPION HYDROCHLORIDE 150 MG/1
150 TABLET ORAL EVERY MORNING
Qty: 30 TABLET | Refills: 3 | Status: SHIPPED | OUTPATIENT
Start: 2020-09-25 | End: 2020-10-27 | Stop reason: SDUPTHER

## 2020-09-25 NOTE — TELEPHONE ENCOUNTER
Please Approve or Refuse.   Send to Pharmacy per Pt's Request:    Next Visit Date:  1/8/2021   Last Visit Date: 9/21/2020    Hemoglobin A1C (%)   Date Value   03/22/2017 5.5   07/23/2014 5.3   05/01/2012 5.2             ( goal A1C is < 7)   BP Readings from Last 3 Encounters:   09/02/20 110/80   08/12/20 129/83   05/08/20 132/68          (goal 120/80)  BUN   Date Value Ref Range Status   05/08/2020 13 6 - 20 mg/dL Final     CREATININE   Date Value Ref Range Status   05/08/2020 0.71 0.70 - 1.20 mg/dL Final     Potassium   Date Value Ref Range Status   05/08/2020 4.1 3.7 - 5.3 mmol/L Final

## 2020-10-12 ENCOUNTER — PROCEDURE VISIT (OUTPATIENT)
Dept: PHYSICAL MEDICINE AND REHAB | Age: 40
End: 2020-10-12
Payer: COMMERCIAL

## 2020-10-12 PROCEDURE — 95886 MUSC TEST DONE W/N TEST COMP: CPT | Performed by: PSYCHIATRY & NEUROLOGY

## 2020-10-12 PROCEDURE — 95909 NRV CNDJ TST 5-6 STUDIES: CPT | Performed by: PSYCHIATRY & NEUROLOGY

## 2020-10-12 RX ORDER — AMLODIPINE BESYLATE 10 MG/1
TABLET ORAL
Qty: 90 TABLET | Refills: 3 | Status: SHIPPED | OUTPATIENT
Start: 2020-10-12 | End: 2021-01-08 | Stop reason: SDUPTHER

## 2020-10-20 NOTE — PROGRESS NOTES
[x] [facility] ([facility]) @ [facility]  [address] [place] [address] [address]  Phone [phone]  Fax [phone]    Physical Therapy Discharge Note    Date: 10/20/2020      Patient: Serene Richards  : 1980  MRN: [id number]    Physician: LAMAR Yanez-CNP    Diagnosis: Left elbow tendonitis (M77.8), Biceps tendonitis of the left upper extremity  Onset Date: 2020    Rehab Diagnosis: (L) elbow pain with mobility deficits   Total visits attended: 2  Cancels/No shows:1/0  Date of initial visit: 09/10/2020                [] Patient recovered from conditions. Treatment goals were met. [] Patient received maximum benefit. No further therapy indicated at this time. [] Patient demonstrated improvement from condition with  ** Of  ** Short term goals met. []Patient demonstrated improvement from condition with **   Of **  Long term goals met. [] Patient to continue exercise/home instructions independently. [x] Therapy interrupted due to the patient not wishing to schedule future appointments. [] Patient has 2 or more no shows/cancels, is discontinued per our policy. [] Patient has completed prescribed number of treatment sessions. [] Other:      Pain level at evaluation was   7    /10 and at discharge was  unknown     /10    It Is My Understanding That The:  [] Patient returned to work. [] Patient demonstrated improved level of function. [] Patient returned to previous functional level. [] Patient's current functional status is unknown due to no-shows  [] Other:     Recommendations/Comments:  Pt was contacted at the number provided regarding future appointments. A message was left to contact us. However, he did not return our call.      Treatment Included:     [] Therapeutic Exercise   81649  [x] Iontophoresis: 4 mg/mL Dexamethasone Sodium Phosphate  mA min  56060   [] Therapeutic Activity  68602 [] Vasopneumatic cold with compression  [id number]    [] Gait Training   [id number] [] Ultrasound   [id number]   [] Neuromuscular Re-education  [id number] [] Electrical Stimulation Unattended  65390   [] Manual Therapy  93026 [] Electrical Stimulation Attended  [id number]   [] Instruction in HEP  [] Lumbar/Cervical Traction  [id number]   [] Aquatic Therapy   [id number] [] Cold/hot pack    [] Massage   62370      [] Dry Needling, 1 or 2 muscles  63074   [] Biofeedback, first 15 minutes   83487  [] Biofeedback, additional 15 minutes   02896 [] Dry Needling, 3 or more muscles  37174                If you have any questions or concerns regarding this patient's care, please contact us.    Thank you for your referral.      Electronically signed by: STEPHANIE AlyT

## 2020-10-26 ASSESSMENT — ENCOUNTER SYMPTOMS
SHORTNESS OF BREATH: 0
COUGH: 0
RESPIRATORY NEGATIVE: 1
ABDOMINAL PAIN: 0

## 2020-10-27 ENCOUNTER — OFFICE VISIT (OUTPATIENT)
Dept: FAMILY MEDICINE CLINIC | Age: 40
End: 2020-10-27
Payer: COMMERCIAL

## 2020-10-27 VITALS
DIASTOLIC BLOOD PRESSURE: 86 MMHG | HEIGHT: 74 IN | SYSTOLIC BLOOD PRESSURE: 132 MMHG | OXYGEN SATURATION: 97 % | TEMPERATURE: 99.1 F | HEART RATE: 106 BPM | WEIGHT: 258 LBS | BODY MASS INDEX: 33.11 KG/M2

## 2020-10-27 PROBLEM — M75.22 BICEPS TENDINITIS OF LEFT UPPER EXTREMITY: Status: ACTIVE | Noted: 2020-10-27

## 2020-10-27 PROBLEM — Z28.21 INFLUENZA VACCINATION DECLINED: Status: ACTIVE | Noted: 2020-10-27

## 2020-10-27 PROBLEM — M77.8 TENDINITIS OF ELBOW: Status: ACTIVE | Noted: 2020-10-27

## 2020-10-27 PROCEDURE — G8484 FLU IMMUNIZE NO ADMIN: HCPCS | Performed by: FAMILY MEDICINE

## 2020-10-27 PROCEDURE — 99214 OFFICE O/P EST MOD 30 MIN: CPT | Performed by: FAMILY MEDICINE

## 2020-10-27 PROCEDURE — G8427 DOCREV CUR MEDS BY ELIG CLIN: HCPCS | Performed by: FAMILY MEDICINE

## 2020-10-27 PROCEDURE — G8417 CALC BMI ABV UP PARAM F/U: HCPCS | Performed by: FAMILY MEDICINE

## 2020-10-27 PROCEDURE — 1036F TOBACCO NON-USER: CPT | Performed by: FAMILY MEDICINE

## 2020-10-27 RX ORDER — FLUTICASONE PROPIONATE 50 MCG
2 SPRAY, SUSPENSION (ML) NASAL DAILY
Qty: 1 BOTTLE | Refills: 3 | Status: SHIPPED | OUTPATIENT
Start: 2020-10-27 | End: 2022-07-08

## 2020-10-27 RX ORDER — OMEPRAZOLE 40 MG/1
CAPSULE, DELAYED RELEASE ORAL
Qty: 90 CAPSULE | Refills: 3 | Status: SHIPPED | OUTPATIENT
Start: 2020-10-27 | End: 2021-01-08 | Stop reason: SDUPTHER

## 2020-10-27 RX ORDER — GABAPENTIN 100 MG/1
100 CAPSULE ORAL NIGHTLY
Qty: 90 CAPSULE | Refills: 1 | Status: SHIPPED | OUTPATIENT
Start: 2020-10-27 | End: 2021-01-08

## 2020-10-27 RX ORDER — PREDNISONE 10 MG/1
TABLET ORAL
Qty: 30 TABLET | Refills: 0 | Status: SHIPPED | OUTPATIENT
Start: 2020-10-27 | End: 2021-01-08 | Stop reason: ALTCHOICE

## 2020-10-27 RX ORDER — BUPROPION HYDROCHLORIDE 150 MG/1
150 TABLET ORAL EVERY MORNING
Qty: 30 TABLET | Refills: 3 | Status: SHIPPED | OUTPATIENT
Start: 2020-10-27 | End: 2021-01-08 | Stop reason: SDUPTHER

## 2020-10-27 NOTE — PROGRESS NOTES
Visit Information    Have you changed or started any medications since your last visit including any over-the-counter medicines, vitamins, or herbal medicines? no   Are you having any side effects from any of your medications? -  no  Have you stopped taking any of your medications? Is so, why? -  no    Have you seen any other physician or provider since your last visit? No  Have you had any other diagnostic tests since your last visit? Yes - Records Obtained  Have you been seen in the emergency room and/or had an admission to a hospital since we last saw you? No  Have you had your routine dental cleaning in the past 6 months? no    Have you activated your [employer] account? If not, what are your barriers?  Yes     Patient Care Team:  Charlotte Hanley MD as PCP - General (Family Medicine)  Charlotte Hanley MD as PCP - [address] [place] [address]  [facility] Provider  Ena Childress MD as Surgeon (Cardiology)  Leah Burnett MD as Consulting Physician (Gastroenterology)    Medical History Review  Past Medical, Family, and Social History reviewed and does contribute to the patient presenting condition    Health Maintenance   Topic Date Due    Pneumococcal 0-64 years Vaccine (1 of 1 - PPSV23) 10/23/1985    DTaP/Tdap/Td vaccine (1 - Tdap) 10/23/1998    Flu vaccine (1) 09/01/2020    Lipid screen  05/08/2021    Potassium monitoring  05/08/2021    Creatinine monitoring  05/08/2021    HIV screen  Completed    Hepatitis A vaccine  Aged Out    Hepatitis B vaccine  Aged Out    Hib vaccine  Aged Out    Meningococcal (ACWY) vaccine  Aged Out

## 2020-10-27 NOTE — PATIENT INSTRUCTIONS
Patient Education        Biceps Tendinitis: Exercises  Introduction  Here are some examples of exercises for you to try. The exercises may be suggested for a condition or for rehabilitation. Start each exercise slowly. Ease off the exercises if you start to have pain. You will be told when to start these exercises and which ones will work best for you. How to do the exercises  Biceps stretch   1. Stand and hold your affected arm out to the side, with your hand at about hip level. 2. Gently bend your wrist back so that your fingers point down toward the floor. 3. You may also do this next to a wall and rest your fingers on the wall. 4. For more of a stretch, bend your head to the opposite side of your affected arm. 5. Hold for 15 to 30 seconds. 6. Repeat 2 to 4 times. Resisted supination   For this and the following exercises, you will need elastic exercise material, such as surgical tubing or Thera-Band. 1. Sit leaning forward with your legs slightly spread. Then place your forearm on your thigh with your hand and affected wrist in front of your knee. 2. Grasp one end of an exercise band with your palm down, and step on the other end. 3. Keeping your wrist straight, roll your palm outward and away from your thigh for a count of 2, then slowly move your wrist back to the starting position to a count of 5.  4. Repeat 8 to 12 times. Resisted elbow flexion   1. Using your affected arm, hold one end of an elastic band in your hand. 2. Place the other end of the band under your foot on the same side of your body as your affected arm. 3. Slowly bend your elbow and bring your hand toward your shoulder. Your palm and the underside of your wrist should be facing up as you pull the band toward your shoulder. Count to 2 as you pull up. 4. Relax and slowly return to your starting position. Count to 5 as you return to the start. 5. Repeat 8 to 12 times. Resisted elbow flexion at shoulder level   1.  Tie the ends of an exercise band together to form a loop. Attach one end of the loop to a secure object or shut a door on it to hold it in place. (Or you can have someone hold one end of the loop to provide resistance.) The band should be at shoulder level. 2. Stand facing where you have tied or fastened the band. 3. Start with your affected arm held out straight, holding the band in your hand. 4. Slowly bend your elbow to 90 degrees, bringing your hand toward your forehead. Count to 2 as you pull the band toward your head. 5. Relax and slowly return to your starting position. Count to 5 as you return to the start. 6. Repeat 8 to 12 times. Follow-up care is a key part of your treatment and safety. Be sure to make and go to all appointments, and call your doctor if you are having problems. It's also a good idea to know your test results and keep a list of the medicines you take. Where can you learn more? Go to https://[employer]. org and sign in to your [employer] account. Enter U963 in the [employer] box to learn more about \"Biceps Tendinitis: Exercises. \"     If you do not have an account, please click on the \"Sign Up Now\" link. Current as of: March 2, 2020               Content Version: 12.6  © 6189-0037 [employer]. Care instructions adapted under license by [facility] ([facility]). If you have questions about a medical condition or this instruction, always ask your healthcare professional. [place] [address] any warranty or liability for your use of this information.

## 2020-10-27 NOTE — PROGRESS NOTES
[facility] PHYSICIANS  [facility] PHYSICIANS [address] [address]  Dept: [phone]     10/27/2020   Chief Complaint   Patient presents with    Arm Pain     left      HPI  Cassie Pain (:  1980) is a 38 y.o. male is an established patient Shivani Pineda. Patient has a history of left elbow tendinitis, biceps tendinitis, social anxiety disorder GERD eosinophilic esophagitis, seasonal allergic rhinitis,    Patient was seen and evaluated for this condition in the past.  He works as a . Patient admits to repetitive work using the left arm. Patient reports some severe pain and numbness coming from the shoulder to the elbow to the wrist area. Patient used some elbow splints and Naproxen. EMG came in without any report of neuropathy or radiculopathy. Also had some courses of physical therapy. Patient states that it did not help at all. Patient continues to do his type of work and continues to have the same issues. He is about ready to go to [place] to do another job there for a few weeks and he was requesting to get a steroid injection. Since we are not able to do this in the office I will send oral steroid for him to take and start him on gabapentin he can take at night. Since we have done most of the conservative treatments that we can for his chronic tendinitis of the elbow and the biceps. He also declined to go back to physical therapy. We are going to refer him to orthopedic specialist once he gets back from [place]. Patient also has a known history of major depressive disorder and anxiety disorder patient is currently on Wellbutrin, BuSpar, with fair success. He denies suicidality or homicidality. He does admit to some mild anxiety relating to the pandemic and being away from the family while he gets sent to another state to work on jobs    Patient also has a known history of GERD and eosinophilic esophagitis.   Patient is currently on omeprazole, patient does well with this medication. He is aware of some of the food triggers which he tries to avoid. He denies any nausea, vomiting, diarrhea. He does have some indigestion and heartburn without the medication. Amelie Khan is due for Influenza vaccine. Denies side effects from prior flu shots. Denies allergy to eggs. Denies history of Guillain-Barré syndrome. No data recorded   Counseling given: Yes    Patient Active Problem List   Diagnosis    Seronegative polyarthritis    Fatigue    Essential hypertension    Social anxiety disorder    Nonspecific reactive hepatitis    Hyperglycemia    Obesity (BMI 30-39. 9)    LVH (left ventricular hypertrophy) due to hypertensive disease    Hyperlipidemia with target LDL less than 100    Asthma, Moderate persistent asthma without complication    Hypertriglyceridemia    Erectile dysfunction    Family history of premature CAD    GERD (gastroesophageal reflux disease)    Refused pneumococcal vaccination    Refuses tetanus, diphtheria, and acellular pertussis (Tdap) vaccination    Family history of thyroid nodule    Gastric polyp    NOEMÍ (obstructive sleep apnea)    Eosinophilic esophagitis    Mild episode of recurrent major depressive disorder (HCC)    Allergic rhinitis due to allergen    Influenza vaccination declined    Tendinitis of elbow    Biceps tendinitis of left upper extremity      Past Medical History:   Diagnosis Date    Asthma     COPD (chronic obstructive pulmonary disease) ([facility] [address] 11/19/2018    Depression     Erectile dysfunction 9/29/2017    Essential hypertension 1/2/2017    GERD (gastroesophageal reflux disease) 11/19/2018    Heart palpitations 9/29/2017    Hyperlipidemia with target LDL less than 100 3/23/2017    Left lower lobe pneumonia 11/9/2012    Mitral valve prolapse     Nonspecific reactive hepatitis 3/22/2017    Obesity (BMI 30-39.9) 3/23/2017    NOEMÍ (obstructive sleep apnea) 10/16/2019  Osteoarthritis     Seronegative polyarthritis 10/10/2016    followed w/ rheumatology in [address] [place] [address] anxiety disorder     Uvulitis 8/15/2019      Past Surgical History:   Procedure Laterality Date    COLONOSCOPY      CYST REMOVAL Right     Armpit     ENDOSCOPY, COLON, DIAGNOSTIC      HAND SURGERY Left     KNEE ARTHROSCOPY      right knee    NOSE SURGERY      TONSILLECTOMY      UPPER GASTROINTESTINAL ENDOSCOPY N/A 10/9/2019    EGD POLYP SNARE, HOT. ESOPHAGEAL DILATATION WITH MALONIES 49F performed by Rahul Shoemaker MD at [facility]       Family History   Problem Relation Age of Onset    Diabetes Father     High Cholesterol Father     Other Mother         autoimmune hepatitis     High Blood Pressure Brother     Heart Disease Brother     Heart Attack Brother 40        cabg age 40    Merna Stabs Rheum Arthritis Other 7    Colon Cancer Neg Hx     Cancer Neg Hx      Current Outpatient Medications   Medication Sig Dispense Refill    buPROPion (WELLBUTRIN XL) 150 MG extended release tablet Take 1 tablet by mouth every morning 30 tablet 3    fluticasone (FLONASE) 50 MCG/ACT nasal spray 2 sprays by Nasal route daily 1 Bottle 3    omeprazole (PRILOSEC) 40 MG delayed release capsule TAKE 1 CAPSULE DAILY 90 capsule 3    predniSONE (DELTASONE) 10 MG tablet Take 4 tabs X 3 days, then 3 tabs X 3 days, then 2 tabs X 3 days, then 1 tab X 3 days 30 tablet 0    gabapentin (NEURONTIN) 100 MG capsule Take 1 capsule by mouth nightly for 180 days.  Intended supply: 90 days 90 capsule 1    amLODIPine (NORVASC) 10 MG tablet take 1 tablet by mouth every morning **STOP LISINOPRIL** 90 tablet 3    loratadine (CLARITIN) 10 MG tablet Take 1 tablet by mouth daily 90 tablet 3    naproxen (NAPROSYN) 500 MG tablet Take 1 tablet by mouth 2 times daily (with meals) 180 tablet 1    pravastatin (PRAVACHOL) 20 MG tablet Take 1 tablet by mouth every evening Stop atorvastatin 90 tablet 3    MG tablet Take 4 tabs X 3 days, then 3 tabs X 3 days, then 2 tabs X 3 days, then 1 tab X 3 days Yes LAMAR Mcadams CNP   gabapentin (NEURONTIN) 100 MG capsule Take 1 capsule by mouth nightly for 180 days. Intended supply: 90 days Yes LAMAR Mcadams CNP   amLODIPine (NORVASC) 10 MG tablet take 1 tablet by mouth every morning **STOP LISINOPRIL** Yes Leonie Rosas MD   loratadine (CLARITIN) 10 MG tablet Take 1 tablet by mouth daily Yes LAMAR Mcadams CNP   naproxen (NAPROSYN) 500 MG tablet Take 1 tablet by mouth 2 times daily (with meals) Yes LAMAR Mcadams CNP   pravastatin (PRAVACHOL) 20 MG tablet Take 1 tablet by mouth every evening Stop atorvastatin Yes Leonie Rosas MD   mometasone-formoterol (DULERA) 100-5 MCG/ACT inhaler Inhale 2 puffs into the lungs 2 times daily Stop Flovent Yes Leonie Rosas MD   albuterol sulfate HFA (VENTOLIN HFA) 108 (90 Base) MCG/ACT inhaler Inhale 2 puffs into the lungs every 6 hours as needed for Wheezing or Shortness of Breath Yes Leonie Rosas MD   busPIRone (BUSPAR) 10 MG tablet Take 1 tablet by mouth 3 times daily as needed (anxiety) Yes Vandana Novak MD   albuterol (PROVENTIL) (2.5 MG/3ML) 0.083% nebulizer solution Take 3 mLs by nebulization every 6 hours as needed for Wheezing or Shortness of Breath Yes Leonie Rosas MD   Blood Pressure KIT Dx: HTN. Needs automatic blood pressure machine to monitor his blood pressure.  Yes Leonie Rosas MD   Respiratory Therapy Supplies (NEBULIZER) MADHAV 1 Units by Does not apply route 2 times daily Dx: Asthma exacerbation J45.901 Yes Vicky Aguayo MD      Social History     Tobacco Use    Smoking status: Never Smoker    Smokeless tobacco: Never Used   Substance Use Topics    Alcohol use: Yes     Comment: occasional  few x year      Vitals:    10/27/20 1355   BP: 132/86   Pulse: 106   Temp: 99.1 °F (37.3 °C)   TempSrc: Temporal   SpO2: 97%   Weight: 258 lb (117 kg)   Height: 6' 2\" (1.88 m)     Estimated body mass index is 33.13 kg/m² as calculated from the following:    Height as of this encounter: 6' 2\" (1.88 m). Weight as of this encounter: 258 lb (117 kg). Physical Exam  Vitals signs and nursing note reviewed. Constitutional:       Appearance: He is well-developed. He is obese. HENT:      Head: Normocephalic. Right Ear: Tympanic membrane and external ear normal.      Left Ear: Tympanic membrane and external ear normal.      Nose: Nose normal.      Mouth/Throat:      Mouth: Mucous membranes are moist.   Eyes:      Pupils: Pupils are equal, round, and reactive to light. Neck:      Musculoskeletal: Normal range of motion and neck supple. Thyroid: No thyromegaly. Vascular: No JVD. Cardiovascular:      Rate and Rhythm: Normal rate and regular rhythm. Pulses: Normal pulses. Heart sounds: Normal heart sounds. No murmur. Pulmonary:      Effort: Pulmonary effort is normal. No respiratory distress. Breath sounds: Normal breath sounds. Abdominal:      General: Bowel sounds are normal. There is no distension. Palpations: Abdomen is soft. Tenderness: There is no abdominal tenderness. Musculoskeletal: Normal range of motion. Left elbow: He exhibits swelling. He exhibits normal range of motion. Tenderness found. Left wrist: He exhibits tenderness. He exhibits normal range of motion and no deformity. Right forearm: He exhibits tenderness. He exhibits no edema. Comments: Tenderness to biceps, triceps, lateral epicondyle. Lymphadenopathy:      Cervical: No cervical adenopathy. Skin:     General: Skin is warm and dry. Capillary Refill: Capillary refill takes less than 2 seconds. Neurological:      Mental Status: He is alert and oriented to person, place, and time. Psychiatric:         Mood and Affect: Mood is anxious.          Behavior: Behavior normal.       Most recent labs reviewed with patient, and all questions answered. Lab Results   Component Value Date    WBC 7.3 05/08/2020    HGB 16.2 05/08/2020    HCT 49.3 05/08/2020    MCV 93.2 05/08/2020     05/08/2020     Lab Results   Component Value Date     05/08/2020    K 4.1 05/08/2020     05/08/2020    CO2 20 05/08/2020    BUN 13 05/08/2020    CREATININE 0.71 05/08/2020    GLUCOSE 64 05/08/2020    GLUCOSE 96 04/30/2012    CALCIUM 9.2 05/08/2020      Lab Results   Component Value Date    ALT 59 (H) 05/08/2020    AST 48 (H) 05/08/2020    ALKPHOS 94 05/08/2020    BILITOT 0.36 05/08/2020     Lab Results   Component Value Date    TSH 3.28 05/08/2020     Lab Results   Component Value Date    CHOL 211 (H) 05/08/2020    CHOL 211 (H) 03/12/2019    CHOL 191 10/06/2017     Lab Results   Component Value Date    TRIG 138 05/08/2020    TRIG 211 (H) 03/12/2019    TRIG 117 10/06/2017     Lab Results   Component Value Date    HDL 50 05/08/2020    HDL 44 03/12/2019    HDL 45 10/06/2017     Lab Results   Component Value Date    LDLCHOLESTEROL 133 (H) 05/08/2020    LDLCHOLESTEROL 125 03/12/2019    LDLCHOLESTEROL 123 10/06/2017     Lab Results   Component Value Date    CHOLHDLRATIO 4.2 05/08/2020    CHOLHDLRATIO 4.8 03/12/2019    CHOLHDLRATIO 4.2 10/06/2017     Lab Results   Component Value Date    LABA1C 5.5 03/22/2017      Lab Results   Component Value Date    [id number] 570 07/23/2014     No results found for: FOLATE  Lab Results   Component Value Date    VITD25 43.5 08/26/2015     ASSESSMENT/PLAN:  1. Biceps tendinitis of left upper extremity  Worsening  Start the prednisone and gabapentin. Once prednisone is done start taking the naproxen  Use the splint provided from the previous visit  Call the office once back from [place] to be sent to the orthopedic specialist  Advised to report for worsening symptoms    - predniSONE (DELTASONE) 10 MG tablet;  Take 4 tabs X 3 days, then 3 tabs X 3 days, then 2 tabs X 3 days, then 1 tab X 3 days  Dispense: 30 tablet; Refill: 0  - gabapentin (NEURONTIN) 100 MG capsule; Take 1 capsule by mouth nightly for 180 days. Intended supply: 90 days  Dispense: 90 capsule; Refill: 1    2. Tendinitis of elbow  Worsening  Start the prednisone and gabapentin. Once prednisone is done start taking the naproxen  Use the splint provided from the previous visit  Call the office once back from [place] to be sent to the orthopedic specialist  Advised to report for worsening symptoms    - predniSONE (DELTASONE) 10 MG tablet; Take 4 tabs X 3 days, then 3 tabs X 3 days, then 2 tabs X 3 days, then 1 tab X 3 days  Dispense: 30 tablet; Refill: 0  - gabapentin (NEURONTIN) 100 MG capsule; Take 1 capsule by mouth nightly for 180 days. Intended supply: 90 days  Dispense: 90 capsule; Refill: 1    3. Mild episode of recurrent major depressive disorder (HCC)  Stable  Continue current therapy. DISCUSSED and ADVISED TO:  Not stopping medication suddenly. See the specialist as discussed. Report for feelings of SI, HI, and hallucinations. Go to the ER for increasing urge to hurt yourself. - buPROPion (WELLBUTRIN XL) 150 MG extended release tablet; Take 1 tablet by mouth every morning  Dispense: 30 tablet; Refill: 3    4. Social anxiety disorder  Stable  Continue current therapy. Discussed how to recognize anxiety. Advised to relieve tension with exercise or a massage. Advised to get enough rest.  Advised to avoid alcohol, caffeine, nicotine, and illegal drugs. Which can increase anxiety level and cause sleep problems. - buPROPion (WELLBUTRIN XL) 150 MG extended release tablet; Take 1 tablet by mouth every morning  Dispense: 30 tablet; Refill: 3    5. Gastroesophageal reflux disease, unspecified whether esophagitis present  Stable  Continue therapy  DISCUSSED AND ADVISED TO:  Avoid food triggers.   Stop eating large meals close to bedtime  Don't eat meals too close to bedtime  Avoid ASA, NSAID's, caffeine, peppermints, alcohol and tobacco.  Report for worsening symptoms. - omeprazole (PRILOSEC) 40 MG delayed release capsule; TAKE 1 CAPSULE DAILY  Dispense: 90 capsule; Refill: 3    6. Eosinophilic esophagitis  Stable  Continue therapy  DISCUSSED AND ADVISED TO:  Avoid food triggers. Stop eating large meals close to bedtime  Don't eat meals too close to bedtime  Avoid ASA, NSAID's, caffeine, peppermints, alcohol and tobacco.  Report for worsening symptoms. - omeprazole (PRILOSEC) 40 MG delayed release capsule; TAKE 1 CAPSULE DAILY  Dispense: 90 capsule; Refill: 3    7. Influenza vaccination declined  Declined, despite lengthy discussion of the importance of getting vaccinated. Controlled Substance Monitoring:  Acute and Chronic Pain Monitoring:   RX Monitoring 2016   Attestation The Prescription Monitoring Report for this patient was reviewed today. Periodic Controlled Substance Monitoring No signs of potential drug abuse or diversion identified. No orders of the defined types were placed in this encounter. Orders Placed This Encounter   Medications    buPROPion (WELLBUTRIN XL) 150 MG extended release tablet     Sig: Take 1 tablet by mouth every morning     Dispense:  30 tablet     Refill:  3    fluticasone (FLONASE) 50 MCG/ACT nasal spray     Si sprays by Nasal route daily     Dispense:  1 Bottle     Refill:  3    omeprazole (PRILOSEC) 40 MG delayed release capsule     Sig: TAKE 1 CAPSULE DAILY     Dispense:  90 capsule     Refill:  3    predniSONE (DELTASONE) 10 MG tablet     Sig: Take 4 tabs X 3 days, then 3 tabs X 3 days, then 2 tabs X 3 days, then 1 tab X 3 days     Dispense:  30 tablet     Refill:  0    gabapentin (NEURONTIN) 100 MG capsule     Sig: Take 1 capsule by mouth nightly for 180 days.  Intended supply: 90 days     Dispense:  90 capsule     Refill:  1      Medications Discontinued During This Encounter   Medication Reason    buPROPion (WELLBUTRIN XL) 150 MG extended release tablet REORDER    fluticasone (FLONASE) 50 MCG/ACT nasal spray REORDER    omeprazole (PRILOSEC) 40 MG delayed release capsule REORDER      Health Maintenance Due   Topic Date Due    Pneumococcal 0-64 years Vaccine (1 of 1 - PPSV23) 10/23/1985    DTaP/Tdap/Td vaccine (1 - Tdap) 10/23/1998      Return in about 2 months (around 2020) for Keep Prev Appt, Appt w/ Dr. Garnell Bence. Sin Chun received counseling on the following healthy behaviors: nutrition, exercise and medication adherence  Reviewed prior labs and health maintenance  Continue current medications, diet and exercise. Discussed use, benefit, and side effects of prescribed medications. Barriers to medication compliance addressed. Patient given educational materials - see patient instructions  Was a self-tracking handout given in paper form or via [employer]? Yes    Requested Prescriptions     Signed Prescriptions Disp Refills    buPROPion (WELLBUTRIN XL) 150 MG extended release tablet 30 tablet 3     Sig: Take 1 tablet by mouth every morning    fluticasone (FLONASE) 50 MCG/ACT nasal spray 1 Bottle 3     Si sprays by Nasal route daily    omeprazole (PRILOSEC) 40 MG delayed release capsule 90 capsule 3     Sig: TAKE 1 CAPSULE DAILY    predniSONE (DELTASONE) 10 MG tablet 30 tablet 0     Sig: Take 4 tabs X 3 days, then 3 tabs X 3 days, then 2 tabs X 3 days, then 1 tab X 3 days    gabapentin (NEURONTIN) 100 MG capsule 90 capsule 1     Sig: Take 1 capsule by mouth nightly for 180 days. Intended supply: 90 days       All patient questions answered. Patient voiced understanding. Quality Measures    Body mass index is 33.13 kg/m². Elevated. Weight control planned discussed Healthy diet and regular exercise. BP: 132/86 Blood pressure is normal. Treatment plan consists of No treatment change needed.     Lab Results   Component Value Date    [id number] 859 (H) 2020    (goal LDL reduction with dx if diabetes is 50% LDL reduction)      PHQ Scores 2020 5/8/2020 1/2/2020 5/24/2019 11/19/2018 5/26/2017 3/22/2017   PHQ2 Score 0 2 0 0 0 0 6   PHQ9 Score 0 2 0 0 0 9 23     Interpretation of Total Score Depression Severity: 1-4 = Minimal depression, 5-9 = Mild depression, 10-14 = Moderate depression, 15-19 = Moderately severe depression, 20-27 = Severe depression   This note was completed by using the assistance of a speech-recognition program. However, inadvertent computerized transcription errors may be present. Although every effort was made to ensure accuracy, no guarantees can be provided that every mistake has been identified and corrected by editing   An electronic signature was used to authenticate this note.   --LAMAR Gee - CNP on 10/27/2020 at 2:37 PM

## 2020-12-01 ENCOUNTER — OFFICE VISIT (OUTPATIENT)
Dept: PRIMARY CARE CLINIC | Age: 40
End: 2020-12-01
Payer: COMMERCIAL

## 2020-12-01 VITALS
RESPIRATION RATE: 16 BRPM | HEART RATE: 84 BPM | DIASTOLIC BLOOD PRESSURE: 82 MMHG | SYSTOLIC BLOOD PRESSURE: 124 MMHG | WEIGHT: 246 LBS | OXYGEN SATURATION: 97 % | HEIGHT: 74 IN | TEMPERATURE: 98.8 F | BODY MASS INDEX: 31.57 KG/M2

## 2020-12-01 PROCEDURE — G8427 DOCREV CUR MEDS BY ELIG CLIN: HCPCS | Performed by: NURSE PRACTITIONER

## 2020-12-01 PROCEDURE — G8484 FLU IMMUNIZE NO ADMIN: HCPCS | Performed by: NURSE PRACTITIONER

## 2020-12-01 PROCEDURE — 99214 OFFICE O/P EST MOD 30 MIN: CPT | Performed by: NURSE PRACTITIONER

## 2020-12-01 PROCEDURE — G8417 CALC BMI ABV UP PARAM F/U: HCPCS | Performed by: NURSE PRACTITIONER

## 2020-12-01 PROCEDURE — 1036F TOBACCO NON-USER: CPT | Performed by: NURSE PRACTITIONER

## 2020-12-01 ASSESSMENT — ENCOUNTER SYMPTOMS
EYE DISCHARGE: 0
VOMITING: 0
ALLERGIC/IMMUNOLOGIC NEGATIVE: 1
EYE ITCHING: 0
SORE THROAT: 0
CHEST TIGHTNESS: 0
COUGH: 0
SHORTNESS OF BREATH: 1
NAUSEA: 1
DIARRHEA: 0
ABDOMINAL PAIN: 0
EYES NEGATIVE: 1

## 2020-12-01 NOTE — PATIENT INSTRUCTIONS

## 2020-12-01 NOTE — PROGRESS NOTES
[facility]  [facility]  [address] [place]  [address] [address]  Dept: [phone]  Dept Fax: [phone]    Zuleyma Cha is a 38 y.o. male who presents today forhis medical conditions/complaints as noted below. Zuleyma Cha is c/o of   Chief Complaint   Patient presents with    Nausea    Shortness of Breath    Other     loss of smell     HPI:     Nausea & Vomiting   This is a new problem. The current episode started in the past 7 days (x's 2 days ). The problem occurs constantly. Associated symptoms include nausea. Pertinent negatives include no abdominal pain, chest pain, chills, congestion, coughing, fatigue, fever, headaches, neck pain, rash, sore throat, vomiting or weakness. Associated symptoms comments: SOB, loss of smell . Symptoms developed Sunday. Taking Tylenol and Ibuprofen PRN. Past Medical History:   Diagnosis Date    Asthma     COPD (chronic obstructive pulmonary disease) ([facility] [address] 11/19/2018    Depression     Erectile dysfunction 9/29/2017    Essential hypertension 1/2/2017    GERD (gastroesophageal reflux disease) 11/19/2018    Heart palpitations 9/29/2017    Hyperlipidemia with target LDL less than 100 3/23/2017    Left lower lobe pneumonia 11/9/2012    Mitral valve prolapse     Nonspecific reactive hepatitis 3/22/2017    Obesity (BMI 30-39.9) 3/23/2017    NOEMÍ (obstructive sleep apnea) 10/16/2019    Osteoarthritis     Seronegative polyarthritis 10/10/2016    followed w/ rheumatology in [address] [place] [address] anxiety disorder     Uvulitis 8/15/2019      Past Surgical History:   Procedure Laterality Date    COLONOSCOPY      CYST REMOVAL Right     Armpit     ENDOSCOPY, COLON, DIAGNOSTIC      HAND SURGERY Left     KNEE ARTHROSCOPY      right knee    NOSE SURGERY      TONSILLECTOMY      UPPER GASTROINTESTINAL ENDOSCOPY N/A 10/9/2019    EGD POLYP SNARE, HOT.  ESOPHAGEAL DILATATION WITH MALONIES 49F performed by Donal Peng Ronn Nava MD at [facility]         Family History   Problem Relation Age of Onset    Diabetes Father     High Cholesterol Father     Other Mother         autoimmune hepatitis     High Blood Pressure Brother     Heart Disease Brother     Heart Attack Brother 40        cabg age 40    Adriana Curl Rheum Arthritis Other 7    Colon Cancer Neg Hx     Cancer Neg Hx        Social History     Tobacco Use    Smoking status: Never Smoker    Smokeless tobacco: Never Used   Substance Use Topics    Alcohol use: Yes     Comment: occasional  few x year      Current Outpatient Medications   Medication Sig Dispense Refill    buPROPion (WELLBUTRIN XL) 150 MG extended release tablet Take 1 tablet by mouth every morning 30 tablet 3    fluticasone (FLONASE) 50 MCG/ACT nasal spray 2 sprays by Nasal route daily 1 Bottle 3    omeprazole (PRILOSEC) 40 MG delayed release capsule TAKE 1 CAPSULE DAILY 90 capsule 3    amLODIPine (NORVASC) 10 MG tablet take 1 tablet by mouth every morning **STOP LISINOPRIL** 90 tablet 3    loratadine (CLARITIN) 10 MG tablet Take 1 tablet by mouth daily 90 tablet 3    pravastatin (PRAVACHOL) 20 MG tablet Take 1 tablet by mouth every evening Stop atorvastatin 90 tablet 3    mometasone-formoterol (DULERA) 100-5 MCG/ACT inhaler Inhale 2 puffs into the lungs 2 times daily Stop Flovent 13 g 3    albuterol sulfate HFA (VENTOLIN HFA) 108 (90 Base) MCG/ACT inhaler Inhale 2 puffs into the lungs every 6 hours as needed for Wheezing or Shortness of Breath 18 g 3    busPIRone (BUSPAR) 10 MG tablet Take 1 tablet by mouth 3 times daily as needed (anxiety) 90 tablet 3    albuterol (PROVENTIL) (2.5 MG/3ML) 0.083% nebulizer solution Take 3 mLs by nebulization every 6 hours as needed for Wheezing or Shortness of Breath 120 vial 0    Blood Pressure KIT Dx: HTN. Needs automatic blood pressure machine to monitor his blood pressure.  1 kit 0    Respiratory Therapy Supplies (NEBULIZER) MADHAV 1 Units by Does not apply route 2 times daily Dx: Asthma exacerbation J45.901 1 Device 0    predniSONE (DELTASONE) 10 MG tablet Take 4 tabs X 3 days, then 3 tabs X 3 days, then 2 tabs X 3 days, then 1 tab X 3 days (Patient not taking: Reported on 12/1/2020) 30 tablet 0    gabapentin (NEURONTIN) 100 MG capsule Take 1 capsule by mouth nightly for 180 days. Intended supply: 90 days (Patient not taking: Reported on 12/1/2020) 90 capsule 1    naproxen (NAPROSYN) 500 MG tablet Take 1 tablet by mouth 2 times daily (with meals) (Patient not taking: Reported on 12/1/2020) 180 tablet 1     No current facility-administered medications for this visit. Allergies   Allergen Reactions    Other Anaphylaxis     Any type of seafood    Shellfish Allergy Anaphylaxis    Shrimp (Diagnostic) Anaphylaxis    Ace Inhibitors      UVULITIS ON 8/15/19           Subjective:      Review of Systems   Constitutional: Negative for appetite change, chills, fatigue and fever. HENT: Negative for congestion, postnasal drip and sore throat. Loss of smell    Eyes: Negative. Negative for discharge and itching. Respiratory: Positive for shortness of breath. Negative for cough and chest tightness. Cardiovascular: Negative for chest pain, palpitations and leg swelling. Gastrointestinal: Positive for nausea. Negative for abdominal pain, diarrhea and vomiting. Endocrine: Negative. Genitourinary: Negative for dysuria, frequency and urgency. Musculoskeletal: Negative for neck pain and neck stiffness. Skin: Negative for rash. Allergic/Immunologic: Negative. Neurological: Negative for dizziness, weakness, light-headedness and headaches. Hematological: Negative for adenopathy. Psychiatric/Behavioral: Negative for confusion. Objective:      Physical Exam  Vitals signs reviewed. Constitutional:       Appearance: He is well-developed. HENT:      Head: Normocephalic.       Right Ear: External ear normal.      Left Ear: External ear normal.      Nose: Nose normal.   Eyes:      Conjunctiva/sclera: Conjunctivae normal.      Pupils: Pupils are equal, round, and reactive to light. Neck:      Musculoskeletal: Normal range of motion and neck supple. Cardiovascular:      Rate and Rhythm: Normal rate and regular rhythm. Heart sounds: Normal heart sounds, S1 normal and S2 normal. No murmur. No friction rub. No gallop. Pulmonary:      Effort: Pulmonary effort is normal. No respiratory distress. Breath sounds: Normal breath sounds. No stridor, decreased air movement or transmitted upper airway sounds. No decreased breath sounds, wheezing, rhonchi or rales. Abdominal:      General: Bowel sounds are normal.      Palpations: Abdomen is soft. Musculoskeletal: Normal range of motion. Lymphadenopathy:      Cervical: No cervical adenopathy. Skin:     General: Skin is warm and dry. Findings: No rash. Neurological:      Mental Status: He is alert and oriented to person, place, and time. Cranial Nerves: No cranial nerve deficit. Coordination: Coordination normal.      Deep Tendon Reflexes: Reflexes are normal and symmetric. Psychiatric:         Thought Content: Thought content normal.       /82 (Site: Right Upper Arm, Position: Sitting)   Pulse 84   Temp 98.8 °F (37.1 °C) (Temporal)   Resp 16   Ht 6' 2\" (1.88 m)   Wt 246 lb (111.6 kg)   SpO2 97%   BMI 31.58 kg/m²     Assessment:       Diagnosis Orders   1. Suspected COVID-19 virus infection  COVID-19 Ambulatory   2. Nausea  COVID-19 Ambulatory   3. SOB (shortness of breath)  COVID-19 Ambulatory   4.  Loss of smell  COVID-19 Ambulatory           Plan:     1.) Covid swab obtained and sent to lab- will call with results   2.) Quarantine while waiting for Covid results   3.) Symptom management encouraged   4.) Follow-up with PCP PRN     Advance Care Planning  People with COVID-19 may have no symptoms, mild symptoms, such as fever, cough, and shortness of breath or they may have more severe illness, developing severe and fatal pneumonia. As a result, Advance Care Planning with attention to naming a health care decision maker (someone you trust to make healthcare decisions for you if you could not speak for yourself) and sharing other health care preferences is important BEFORE a possible health crisis. Please contact your Primary Care Provider to discuss Advance Care Planning. Preventing the Spread of Coronavirus Disease 2019 in Homes and Residential Communities  For the most recent information go to [email or web address]    Prevention steps for People with confirmed or suspected COVID-19 (including persons under investigation) who do not need to be hospitalized  and   People with confirmed COVID-19 who were hospitalized and determined to be medically stable to go home    Your healthcare provider and public health staff will evaluate whether you can be cared for at home. If it is determined that you do not need to be hospitalized and can be isolated at home, you will be monitored by staff from your local or state health department. You should follow the prevention steps below until a healthcare provider or local or state health department says you can return to your normal activities. Stay home except to get medical care  People who are mildly ill with COVID-19 are able to isolate at home during their illness. You should restrict activities outside your home, except for getting medical care. Do not go to work, school, or public areas. Avoid using public transportation, ride-sharing, or taxis. Separate yourself from other people and animals in your home  People: As much as possible, you should stay in a specific room and away from other people in your home. Also, you should use a separate bathroom, if available. Animals:  You should restrict contact with pets and other animals while you are sick with at least 60% alcohol, covering all surfaces of your hands and rubbing them together until they feel dry. Soap and water are the best option if hands are visibly dirty. Avoid touching your eyes, nose, and mouth with unwashed hands. Avoid sharing personal household items  You should not share dishes, drinking glasses, cups, eating utensils, towels, or bedding with other people or pets in your home. After using these items, they should be washed thoroughly with soap and water. Clean all high-touch surfaces everyday  High touch surfaces include counters, tabletops, doorknobs, bathroom fixtures, toilets, phones, keyboards, tablets, and bedside tables. Also, clean any surfaces that may have blood, stool, or body fluids on them. Use a household cleaning spray or wipe, according to the label instructions. Labels contain instructions for safe and effective use of the cleaning product including precautions you should take when applying the product, such as wearing gloves and making sure you have good ventilation during use of the product. Monitor your symptoms  Seek prompt medical attention if your illness is worsening (e.g., difficulty breathing). Before seeking care, call your healthcare provider and tell them that you have, or are being evaluated for, COVID-19. Put on a facemask before you enter the facility. These steps will help the healthcare providers office to keep other people in the office or waiting room from getting infected or exposed. Ask your healthcare provider to call the local or state health department. Persons who are placed under active monitoring or facilitated self-monitoring should follow instructions provided by their local health department or occupational health professionals, as appropriate. When working with your local health department check their available hours.   If you have a medical emergency and need to call 911, notify the dispatch personnel that you have, or are being evaluated for COVID-19. If possible, put on a facemask before emergency medical services arrive. Discontinuing home isolation  Patients with confirmed COVID-19 should remain under home isolation precautions until the risk of secondary transmission to others is thought to be low. The decision to discontinue home isolation precautions should be made on a case-by-case basis, in consultation with healthcare providers and state and local health departments. Problem List     None           Patient given educationalmaterials - see patient instructions. Discussed use, benefit, and side effectsof prescribed medications. All patient questions answered. Pt verbalized understanding. Instructed to continue current medications, diet and exercise. Patient agreedwith treatment plan. Follow up as directed.      Electronically signed by LAMAR Alas CNP on 12/1/2020 at 11:36 AM

## 2020-12-02 ENCOUNTER — HOSPITAL ENCOUNTER (OUTPATIENT)
Age: 40
Setting detail: SPECIMEN
Discharge: HOME | End: 2020-12-02
Payer: COMMERCIAL

## 2020-12-06 LAB — SARS-COV-2, NAA: NOT DETECTED

## 2021-01-08 ENCOUNTER — OFFICE VISIT (OUTPATIENT)
Dept: FAMILY MEDICINE CLINIC | Age: 41
End: 2021-01-08
Payer: COMMERCIAL

## 2021-01-08 VITALS
HEIGHT: 74 IN | DIASTOLIC BLOOD PRESSURE: 84 MMHG | WEIGHT: 265.8 LBS | HEART RATE: 82 BPM | OXYGEN SATURATION: 96 % | SYSTOLIC BLOOD PRESSURE: 136 MMHG | BODY MASS INDEX: 34.11 KG/M2 | TEMPERATURE: 98.3 F

## 2021-01-08 DIAGNOSIS — K21.00 GASTROESOPHAGEAL REFLUX DISEASE WITH ESOPHAGITIS WITHOUT HEMORRHAGE: ICD-10-CM

## 2021-01-08 DIAGNOSIS — F33.42 RECURRENT MAJOR DEPRESSIVE DISORDER, IN FULL REMISSION (HCC): ICD-10-CM

## 2021-01-08 DIAGNOSIS — E78.5 HYPERLIPIDEMIA WITH TARGET LDL LESS THAN 100: ICD-10-CM

## 2021-01-08 DIAGNOSIS — J30.89 SEASONAL ALLERGIC RHINITIS DUE TO OTHER ALLERGIC TRIGGER: ICD-10-CM

## 2021-01-08 DIAGNOSIS — M77.8 LEFT ELBOW TENDINITIS: Primary | ICD-10-CM

## 2021-01-08 DIAGNOSIS — F40.10 SOCIAL ANXIETY DISORDER: ICD-10-CM

## 2021-01-08 DIAGNOSIS — M75.22 BICEPS TENDINITIS OF LEFT UPPER EXTREMITY: ICD-10-CM

## 2021-01-08 DIAGNOSIS — J45.40 MODERATE PERSISTENT ASTHMA WITHOUT COMPLICATION: ICD-10-CM

## 2021-01-08 DIAGNOSIS — I10 ESSENTIAL HYPERTENSION: ICD-10-CM

## 2021-01-08 PROCEDURE — G8484 FLU IMMUNIZE NO ADMIN: HCPCS | Performed by: FAMILY MEDICINE

## 2021-01-08 PROCEDURE — G8417 CALC BMI ABV UP PARAM F/U: HCPCS | Performed by: FAMILY MEDICINE

## 2021-01-08 PROCEDURE — G8427 DOCREV CUR MEDS BY ELIG CLIN: HCPCS | Performed by: FAMILY MEDICINE

## 2021-01-08 PROCEDURE — 1036F TOBACCO NON-USER: CPT | Performed by: FAMILY MEDICINE

## 2021-01-08 PROCEDURE — 99214 OFFICE O/P EST MOD 30 MIN: CPT | Performed by: FAMILY MEDICINE

## 2021-01-08 RX ORDER — BUSPIRONE HYDROCHLORIDE 10 MG/1
10 TABLET ORAL 3 TIMES DAILY PRN
Qty: 270 TABLET | Refills: 3 | Status: SHIPPED | OUTPATIENT
Start: 2021-01-08

## 2021-01-08 RX ORDER — BUPROPION HYDROCHLORIDE 150 MG/1
150 TABLET ORAL EVERY MORNING
Qty: 90 TABLET | Refills: 3 | Status: SHIPPED | OUTPATIENT
Start: 2021-01-08 | End: 2022-02-07

## 2021-01-08 RX ORDER — CELECOXIB 100 MG/1
100 CAPSULE ORAL DAILY PRN
Qty: 30 CAPSULE | Refills: 3 | Status: SHIPPED | OUTPATIENT
Start: 2021-01-08 | End: 2022-04-08

## 2021-01-08 RX ORDER — LORATADINE 10 MG/1
10 TABLET ORAL DAILY
Qty: 90 TABLET | Refills: 3 | Status: SHIPPED | OUTPATIENT
Start: 2021-01-08

## 2021-01-08 RX ORDER — OMEPRAZOLE 40 MG/1
CAPSULE, DELAYED RELEASE ORAL
Qty: 90 CAPSULE | Refills: 3 | Status: SHIPPED | OUTPATIENT
Start: 2021-01-08 | End: 2022-01-24

## 2021-01-08 RX ORDER — AMLODIPINE BESYLATE 10 MG/1
TABLET ORAL
Qty: 90 TABLET | Refills: 3 | Status: SHIPPED | OUTPATIENT
Start: 2021-01-08 | End: 2022-05-17

## 2021-01-08 RX ORDER — CYCLOBENZAPRINE HCL 5 MG
5 TABLET ORAL 3 TIMES DAILY PRN
Qty: 90 TABLET | Refills: 0 | Status: ON HOLD | OUTPATIENT
Start: 2021-01-08 | End: 2021-10-03 | Stop reason: ALTCHOICE

## 2021-01-08 RX ORDER — NAPROXEN 500 MG/1
500 TABLET ORAL 2 TIMES DAILY WITH MEALS
Qty: 180 TABLET | Refills: 1 | Status: CANCELLED | OUTPATIENT
Start: 2021-01-08

## 2021-01-08 ASSESSMENT — ENCOUNTER SYMPTOMS
ABDOMINAL DISTENTION: 0
VOMITING: 0
RHINORRHEA: 0
SINUS PRESSURE: 0
COUGH: 0
ABDOMINAL PAIN: 0
SHORTNESS OF BREATH: 0
SINUS PAIN: 0
CONSTIPATION: 0
NAUSEA: 0
DIARRHEA: 0
CHEST TIGHTNESS: 0
WHEEZING: 0

## 2021-01-08 ASSESSMENT — PATIENT HEALTH QUESTIONNAIRE - PHQ9
1. LITTLE INTEREST OR PLEASURE IN DOING THINGS: 0
SUM OF ALL RESPONSES TO PHQ QUESTIONS 1-9: 0

## 2021-01-08 NOTE — PROGRESS NOTES
Visit Information    Have you changed or started any medications since your last visit including any over-the-counter medicines, vitamins, or herbal medicines? no   Have you stopped taking any of your medications? Is so, why? -  no  Are you having any side effects from any of your medications? - no    Have you seen any other physician or provider since your last visit?  no   Have you had any other diagnostic tests since your last visit?  no   Have you been seen in the emergency room and/or had an admission in a hospital since we last saw you?  yes - URGENT CARE 12/01/2020   Have you had your routine dental cleaning in the past 6 months?  no     Do you have an active MyChart account? If no, what is the barrier?   Yes    Patient Care Team:  Micah Henry MD as PCP - General (Family Medicine)  Micah Henry MD as PCP - [facility]  Leldon Mortimer, MD as Surgeon (Cardiology)  Alfie oJ MD as Consulting Physician (Gastroenterology)    Medical History Review  Past Medical, Family, and Social History reviewed and does contribute to the patient presenting condition    Health Maintenance   Topic Date Due    Pneumococcal 0-64 years Vaccine (1 of 1 - PPSV23) 10/23/1985    DTaP/Tdap/Td vaccine (1 - Tdap) 10/23/1998    Flu vaccine (1) 10/27/2021 (Originally 9/1/2020)    Lipid screen  05/08/2021    Potassium monitoring  05/08/2021    Creatinine monitoring  05/08/2021    Hepatitis C screen  Completed    HIV screen  Completed    Hepatitis A vaccine  Aged Out    Hepatitis B vaccine  Aged Out    Hib vaccine  Aged Out    Meningococcal (ACWY) vaccine  Aged Out

## 2021-01-08 NOTE — PROGRESS NOTES
treatment. -     buPROPion (WELLBUTRIN XL) 150 MG extended release tablet; Take 1 tablet by mouth every morning, Disp-90 tablet, R-3Normal  -     busPIRone (BUSPAR) 10 MG tablet; Take 1 tablet by mouth 3 times daily as needed (anxiety), Disp-270 tablet, R-3Normal  6. Moderate persistent asthma without complication  Stable  Continue current treatment. 7. Social anxiety disorder  Improved with meds  Continue current treatment. -     buPROPion (WELLBUTRIN XL) 150 MG extended release tablet; Take 1 tablet by mouth every morning, Disp-90 tablet, R-3Normal  -     busPIRone (BUSPAR) 10 MG tablet; Take 1 tablet by mouth 3 times daily as needed (anxiety), Disp-270 tablet, R-3Normal  8. Seasonal allergic rhinitis due to other allergic trigger  Improved with medications  Continue current treatment  -     loratadine (CLARITIN) 10 MG tablet; Take 1 tablet by mouth daily, Disp-90 tablet, R-3Normal  9. Gastroesophageal reflux disease with esophagitis without hemorrhage  Improved with medications   Continue current treatment. -     omeprazole (PRILOSEC) 40 MG delayed release capsule; TAKE 1 CAPSULE DAILY, Disp-90 capsule, R-3Normal      Pa received counseling on the following healthy behaviors: nutrition, exercise, medication adherence and weight loss. Reviewed prior labs and health maintenance  Discussed use, benefit, and side effects of prescribed medications. Barriers to medication compliance addressed. Patient given educational materials - see patient instructions  Was a self-tracking handout given in paper form or via [employer]? Yes  All patient questions answered. Patient voiced understanding. The patient's past medical,surgical, social, and family history as well as his current medications and allergies were reviewed as documented in today's encounter. Medications, labs, diagnostic studies, consultations and follow-up as documented in this encounter.     Return in about 5 months (around 6/8/2021) for Face-2F-30mins PHYSICAL, VISION screen, PHQ9. .    Data Unavailable      SUBJECTIVE/OBJECTIVE:    Pa complains of Left elbow pain located on the medial side,  radiates to the arm and forearm, for several months, getting worse. Also has Left shoulder pain in anterior aspect. Sometimes wakes him up. Onset several  Months ago, not getting better  He is Left handed   He is doing Lifting, carrying, does repetitive motions and his job is as  . Intensity of pain is 7-8/10. He has already tried Steroid po, naproxen, elbow brace. Ran out of Naproxen 2 weeks ago, and pain is worsening  Gabapentin didn't help either. Had one time seizure related to med interaction in 2012 or earlier. Never had another seizure. He is not on anti-seizure meds. I resolved the problem    Hypertension: Patient here for follow-up. He is exercising and is adherent to low salt diet. Blood pressure is well controlled at home. Cardiac symptoms fatigue. Patient denies chest pain, chest pressure/discomfort, claudication, dyspnea, exertional chest pressure/discomfort, irregular heart beat, lower extremity edema, near-syncope, orthopnea, palpitations, paroxysmal nocturnal dyspnea, syncope and tachypnea. Cardiovascular risk factors: dyslipidemia, family history of premature cardiovascular disease, hypertension, male gender and obesity (BMI >= 30 kg/m2). Use of agents associated with hypertension: NSAIDS. History of target organ damage: none. He never completed the stress test    BP controlled. Sherrill Mancini reports compliance with BP medications, and tolerates them well, denies side effects.     BP Readings from Last 3 Encounters:   01/08/21 136/84   12/01/20 124/82   10/27/20 132/86          Pulse is Normal.    Pulse Readings from Last 3 Encounters:   01/08/21 82   12/01/20 84   10/27/20 106       Weight has been increasing  He says per his scale, his weight has been stable     Wt Readings from Last 3 Encounters:   01/08/21 265 lb 12.8 oz (120.6 kg)   12/01/20 246 lb (111.6 kg)   10/27/20 258 lb (117 kg)     01/02/20 257 lb (116.6 kg)       Hyperlipidemia:  No new myalgias or GI upset on pravastatin (Pravachol). Medication compliance: compliant all of the time. Patient is  following a low fat, low cholesterol diet. Using [employer] diet. LDL is high  Lab Results   Component Value Date    LDLCHOLESTEROL 133 (H) 05/08/2020     Lab Results   Component Value Date    TRIG 138 05/08/2020    TRIG 211 (H) 03/12/2019    TRIG 117 10/06/2017       Depression and anxiety  Taking Wellbutrin and Buspar, , Tolerates medications well, denies side effects. Anxiety improved    PHQ-2 Over the past 2 weeks, how often have you been bothered by any of the following problems? Little interest or pleasure in doing things: Not at all  Feeling down, depressed, or hopeless: Not at all  PHQ-2 Score: 0  PHQ-9 Over the past 2 weeks, how often have you been bothered by any of the following problems? PHQ-9 Total Score: 0  Denies suicidal ideation, plan or intent. PHQ Scores 1/8/2021 8/12/2020 5/8/2020 1/2/2020 5/24/2019 11/19/2018 5/26/2017   PHQ2 Score 0 0 2 0 0 0 0   PHQ9 Score 0 0 2 0 0 0 9         Asthma:  Current treatment includes beta agonist inhalers, nebulized beta agonists, combination beta agonists/steroid inhalers, which has been effective. Using preventive medication(s) consistently: yes. Residual symptoms: none. Patient denies chest pain/tightness, dyspnea on exertion, cough, wheezing. He requires his rescue inhaler 1 time(s) per month. Doesn't smoke    Seasonal allergies, taking Claritin, denies sinus pressure, purulent nasal discharge    Patient has GERD and eosinophilic esophagitis  Takes omeprazole helps  He denies nausea, vomiting, diarrhea, constipation        Prior to Visit Medications    Medication Sig Taking?  Authorizing Provider   buPROPion (WELLBUTRIN XL) 150 MG extended release tablet Take 1 tablet by mouth every morning Yes LAMAR Mcadams CNP   fluticasone (FLONASE) 50 MCG/ACT nasal spray 2 sprays by Nasal route daily Yes LAMAR Mcadams CNP   omeprazole (PRILOSEC) 40 MG delayed release capsule TAKE 1 CAPSULE DAILY Yes LAMAR Mcadams CNP   predniSONE (DELTASONE) 10 MG tablet Take 4 tabs X 3 days, then 3 tabs X 3 days, then 2 tabs X 3 days, then 1 tab X 3 days Yes LAMAR Mcadams CNP   gabapentin (NEURONTIN) 100 MG capsule Take 1 capsule by mouth nightly for 180 days. Intended supply: 90 days Yes LAMAR Mcadams CNP   amLODIPine (NORVASC) 10 MG tablet take 1 tablet by mouth every morning **STOP LISINOPRIL** Yes Tamara Reyna MD   loratadine (CLARITIN) 10 MG tablet Take 1 tablet by mouth daily Yes LAMAR Mcadams CNP   naproxen (NAPROSYN) 500 MG tablet Take 1 tablet by mouth 2 times daily (with meals) Yes LAMAR Mcadams CNP   pravastatin (PRAVACHOL) 20 MG tablet Take 1 tablet by mouth every evening Stop atorvastatin Yes Tamara Reyna MD   mometasone-formoterol (DULERA) 100-5 MCG/ACT inhaler Inhale 2 puffs into the lungs 2 times daily Stop Flovent Yes Tamara Reyna MD   albuterol sulfate HFA (VENTOLIN HFA) 108 (90 Base) MCG/ACT inhaler Inhale 2 puffs into the lungs every 6 hours as needed for Wheezing or Shortness of Breath Yes Tamara Reyna MD   busPIRone (BUSPAR) 10 MG tablet Take 1 tablet by mouth 3 times daily as needed (anxiety) Yes Vandana Novak MD   albuterol (PROVENTIL) (2.5 MG/3ML) 0.083% nebulizer solution Take 3 mLs by nebulization every 6 hours as needed for Wheezing or Shortness of Breath Yes Tamara Reyna MD   Blood Pressure KIT Dx: HTN. Needs automatic blood pressure machine to monitor his blood pressure.  Yes Tamara Reyna MD   Respiratory Therapy Supplies (NEBULIZER) MADHAV 1 Units by Does not apply route 2 times daily Dx: Asthma exacerbation J45.901 Yes Chris Lyle MD       Social History Tobacco Use    Smoking status: Never Smoker    Smokeless tobacco: Never Used   Substance Use Topics    Alcohol use: Yes     Comment: occasional  few x year    Drug use: No         Review of Systems   Constitutional: Positive for fatigue. Negative for activity change, appetite change, chills, diaphoresis, fever and unexpected weight change. HENT: Negative for congestion, nosebleeds, postnasal drip, rhinorrhea, sinus pressure and sinus pain. Respiratory: Negative for cough, chest tightness, shortness of breath and wheezing. Cardiovascular: Negative for chest pain, palpitations and leg swelling. Gastrointestinal: Negative for abdominal distention, abdominal pain, constipation, diarrhea, nausea and vomiting. Endocrine: Negative for cold intolerance, heat intolerance, polydipsia, polyphagia and polyuria. Musculoskeletal: Positive for arthralgias. Allergic/Immunologic: Positive for environmental allergies. Hematological: Does not bruise/bleed easily. Psychiatric/Behavioral: Negative for dysphoric mood, self-injury, sleep disturbance and suicidal ideas. The patient is nervous/anxious.          -vital signs stable and within normal limits except Obesity per BMI. /84   Pulse 82   Temp 98.3 °F (36.8 °C)   Ht 6' 2\" (1.88 m)   Wt 265 lb 12.8 oz (120.6 kg)   SpO2 96%   BMI 34.13 kg/m²        Physical Exam  Vitals signs and nursing note reviewed. Constitutional:       General: He is not in acute distress. Appearance: Normal appearance. He is well-developed. He is obese. He is not diaphoretic. HENT:      Head: Normocephalic and atraumatic. Right Ear: External ear normal.      Left Ear: External ear normal.      Mouth/Throat:      Comments: I did not examine the mouth due to coronavirus pandemic and wearing masks    Eyes:      General: Lids are normal. No scleral icterus. Right eye: No discharge. Left eye: No discharge.       Conjunctiva/sclera: Conjunctivae normal.   Neck:      Musculoskeletal: Normal range of motion and neck supple. Thyroid: No thyromegaly. Cardiovascular:      Rate and Rhythm: Normal rate and regular rhythm. Heart sounds: Normal heart sounds. No murmur. Pulmonary:      Effort: Pulmonary effort is normal. No respiratory distress. Breath sounds: Normal breath sounds. No wheezing or rales. Chest:      Chest wall: No tenderness. Abdominal:      General: Bowel sounds are normal. There is no distension. Palpations: Abdomen is soft. There is no hepatomegaly or splenomegaly. Tenderness: There is no abdominal tenderness. Comments: Obese abdomen. Musculoskeletal:      Left shoulder: He exhibits decreased range of motion, tenderness, pain and decreased strength. Left elbow: He exhibits decreased range of motion. Tenderness found. Medial epicondyle tenderness noted. Right lower leg: No edema. Left lower leg: No edema. Comments: There is tenderness anterior bicipital, deltoid, lateral forearm and medial epicondyle     Lymphadenopathy:      Cervical: No cervical adenopathy. Skin:     General: Skin is warm and dry. Capillary Refill: Capillary refill takes less than 2 seconds. Findings: No rash. Neurological:      Mental Status: He is alert and oriented to person, place, and time. Deep Tendon Reflexes: Reflexes are normal and symmetric. Psychiatric:         Mood and Affect: Mood is anxious. Behavior: Behavior normal.         Thought Content:  Thought content normal.         Judgment: Judgment normal.             Orders Placed This Encounter   Medications    amLODIPine (NORVASC) 10 MG tablet     Sig: take 1 tablet by mouth every morning     Dispense:  90 tablet     Refill:  3    buPROPion (WELLBUTRIN XL) 150 MG extended release tablet     Sig: Take 1 tablet by mouth every morning     Dispense:  90 tablet     Refill:  3    busPIRone (BUSPAR) 10 MG tablet     Sig: Take 1 tablet by mouth 3 times daily as needed (anxiety)     Dispense:  270 tablet     Refill:  3    loratadine (CLARITIN) 10 MG tablet     Sig: Take 1 tablet by mouth daily     Dispense:  90 tablet     Refill:  3    omeprazole (PRILOSEC) 40 MG delayed release capsule     Sig: TAKE 1 CAPSULE DAILY     Dispense:  90 capsule     Refill:  3    celecoxib (CELEBREX) 100 MG capsule     Sig: Take 1 capsule by mouth daily as needed for Pain     Dispense:  30 capsule     Refill:  3    cyclobenzaprine (FLEXERIL) 5 MG tablet     Sig: Take 1 tablet by mouth 3 times daily as needed for Muscle spasms     Dispense:  90 tablet     Refill:  0       Orders Placed This Encounter   Procedures    XR ELBOW LEFT (MIN 3 VIEWS)     Standing Status:   Future     Standing Expiration Date:   1/8/2022    XR SHOULDER LEFT (MIN 2 VIEWS)     Standing Status:   Future     Standing Expiration Date:   1/8/2022    CBC     Standing Status:   Future     Standing Expiration Date:   1/8/2022    Comprehensive Metabolic Panel     Standing Status:   Future     Standing Expiration Date:   1/8/2022    Lipid Panel     Standing Status:   Future     Standing Expiration Date:   1/8/2022     Order Specific Question:   Is Patient Fasting?/# of Hours     Answer:   8-10 Hours, water ok to drink    TSH without Reflex     Standing Status:   Future     Standing Expiration Date:   1/8/2022    Urinalysis Reflex to Culture     Standing Status:   Future     Standing Expiration Date:   1/8/2022     Order Specific Question:   SPECIFY(EX-CATH,MIDSTREAM,CYSTO,ETC)?      Answer:   midstream    Uric Acid     Standing Status:   Future     Standing Expiration Date:   1/8/2022    C-Reactive Protein     Standing Status:   Future     Standing Expiration Date:   1/9/2022    Sedimentation Rate     Standing Status:   Future     Standing Expiration Date:   1/8/2022    Ambulatory referral to Orthopedic Surgery     Referral Priority:   Routine     Referral Type:   Eval and Treat     Referral Reason:   Specialty Services Required     Referred to Provider:   Avril Jasso MD     Requested Specialty:   Orthopedic Surgery     Number of Visits Requested:   1       Medications Discontinued During This Encounter   Medication Reason    predniSONE (DELTASONE) 10 MG tablet Therapy completed    gabapentin (NEURONTIN) 100 MG capsule Patient Choice    busPIRone (BUSPAR) 10 MG tablet REORDER    loratadine (CLARITIN) 10 MG tablet REORDER    amLODIPine (NORVASC) 10 MG tablet REORDER    buPROPion (WELLBUTRIN XL) 150 MG extended release tablet REORDER    omeprazole (PRILOSEC) 40 MG delayed release capsule REORDER         On this date 01/08/21 I have spent 35 minutes reviewing previous notes, test results and face to face with the patient discussing the diagnosis and importance of compliance with the treatment plan as well as documenting on the day of the visit. Future Appointments   Date Time Provider Joe Healy   6/4/2021  4:00 PM Ladonna Ramirez MD [id number]        This note was completed by using the assistance of a speech-recognition program. However, inadvertent computerized transcription errors may be present. Although every effort was made to ensure accuracy, no guarantees can be provided that every mistake has been identified and corrected by editing. An electronic signature was used to authenticate this note.   Electronically signed by Ladonna Ramirez MD on 1/8/2021 at 10:36 PM

## 2021-01-08 NOTE — PATIENT INSTRUCTIONS
Patient Education        Learning About Low-Carbohydrate Diets  What is a low-carbohydrate diet? A low-carbohydrate (or \"low-carb\") diet limits foods and drinks that have carbohydrates. This includes grains, fruits, milk and yogurt, and starchy vegetables like potatoes, beans, and corn. It also avoids foods and drinks that have added sugar. Instead, low-carb diets include foods that are high in protein and fat. Why might you follow a low-carb diet? Low-carb diets may be used for a variety of reasons, such as for weight loss. People who have diabetes may use a low-carb diet to help manage their blood sugar levels. What should you do before you start the diet? Talk to your doctor before you try any diet. This is even more important if you have health problems like kidney disease, heart disease, or diabetes. Your doctor may suggest that you meet with a registered dietitian. A dietitian can help you make an eating plan that works for you. What foods do you eat on a low-carb diet? On a low-carb diet, you choose foods that are high in protein and fat. Examples of these are:  · Meat, poultry, and fish. · Eggs. · Nuts, such as walnuts, pecans, almonds, and peanuts. · Peanut butter and other nut butters. · Tofu. · Avocado. · Clotilda Braver. · Non-starchy vegetables like broccoli, cauliflower, green beans, mushrooms, peppers, lettuce, and spinach. · Unsweetened non-dairy milks like almond milk and coconut milk. · Cheese, cottage cheese, and cream cheese. Current as of: August 22, 2019               Content Version: 12.6  © 7581-0872 [employer] Care instructions adapted under license by [facility] ([facility]). If you have questions about a medical condition or this instruction, always ask your healthcare professional. [address]  any warranty or liability for your use of this information.          Patient Education        High Cholesterol: Care Instructions  Your Care Instructions Cholesterol is a type of fat in your blood. It is needed for many body functions, such as making new cells. Cholesterol is made by your body. It also comes from food you eat. High cholesterol means that you have too much of the fat in your blood. This raises your risk of a heart attack and stroke. LDL and HDL are part of your total cholesterol. LDL is the \"bad\" cholesterol. High LDL can raise your risk for heart disease, heart attack, and stroke. HDL is the \"good\" cholesterol. It helps clear bad cholesterol from the body. High HDL is linked with a lower risk of heart disease, heart attack, and stroke. Your cholesterol levels help your doctor find out your risk for having a heart attack or stroke. You and your doctor can talk about whether you need to lower your risk and what treatment is best for you. A heart-healthy lifestyle along with medicines can help lower your cholesterol and your risk. The way you choose to lower your risk will depend on how high your risk is for heart attack and stroke. It will also depend on how you feel about taking medicines. Follow-up care is a key part of your treatment and safety. Be sure to make and go to all appointments, and call your doctor if you are having problems. It's also a good idea to know your test results and keep a list of the medicines you take. How can you care for yourself at home? · Eat a variety of foods every day. Good choices include fruits, vegetables, whole grains (like oatmeal), dried beans and peas, nuts and seeds, soy products (like tofu), and fat-free or low-fat dairy products. · Replace butter, margarine, and hydrogenated or partially hydrogenated oils with olive and canola oils. (Canola oil margarine without trans fat is fine.)  · Replace red meat with fish, poultry, and soy protein (like tofu). · Limit processed and packaged foods like chips, crackers, and cookies. · Bake, broil, or steam foods. Don't leon them. · Be physically active.  Get at least 30 minutes of exercise on most days of the week. Walking is a good choice. You also may want to do other activities, such as running, swimming, cycling, or playing tennis or team sports. · Stay at a healthy weight or lose weight by making the changes in eating and physical activity listed above. Losing just a small amount of weight, even 5 to 10 pounds, can reduce your risk for having a heart attack or stroke. · Do not smoke. When should you call for help? Watch closely for changes in your health, and be sure to contact your doctor if:    · You need help making lifestyle changes.     · You have questions about your medicine. Where can you learn more? Go to https://BlueBox Group.InSphero. org and sign in to your [employer] account. Enter F979 in the BlogBus box to learn more about \"High Cholesterol: Care Instructions. \"     If you do not have an account, please click on the \"Sign Up Now\" link. Current as of: December 16, 2019               Content Version: 12.6  © 5524-1599 [employer]. Care instructions adapted under license by [facility] ([facility]). If you have questions about a medical condition or this instruction, always ask your healthcare professional. [place] [address] any warranty or liability for your use of this information. Patient Education        Golfer's Elbow: Exercises  Introduction  Here are some examples of exercises for you to try. The exercises may be suggested for a condition or for rehabilitation. Start each exercise slowly. Ease off the exercises if you start to have pain. You will be told when to start these exercises and which ones will work best for you. How to do the exercises  Wrist extensor stretch   1. Extend your affected arm in front of you and make a fist with your palm facing down. 2. Bend your wrist so that your fist points toward the floor.   3. With your other hand, gently bend your wrist farther until you feel a mild to moderate stretch in your forearm. 4. Hold for at least 15 to 30 seconds. 5. Repeat 2 to 4 times. 6. Repeat steps 1 through 5 with your fingers pointing toward the floor. Forearm extensor stretch   1. Place your affected elbow down at your side, bent at about 90 degrees. Then make a fist with your palm facing down. 2. Keeping your wrist bent, slowly straighten your elbow so your arm is down at your side. Then twist your fist out so your palm is facing out to the side and you feel a stretch. 3. Hold for at least 15 to 30 seconds. 4. Repeat 2 to 4 times. Wrist flexor stretch   1. Extend your affected arm in front of you with your palm facing away from your body. 2. Bend back your wrist, pointing your hand up toward the ceiling. 3. With your other hand, gently bend your wrist farther until you feel a mild to moderate stretch in your forearm. 4. Hold for at least 15 to 30 seconds. 5. Repeat 2 to 4 times. 6. Repeat steps 1 through 5, but this time extend your affected arm in front of you with your palm facing up. Then bend back your wrist, pointing your hand toward the floor. Wrist curls   1. Place your forearm on a table with your hand hanging over the edge of the table, palm up. 2. Place a 1- to 2-pound weight in your hand. This may be a dumbbell, a can of food, or a filled water bottle. 3. Slowly raise and lower the weight while keeping your forearm on the table and your palm facing up. 4. Repeat this motion 8 to 12 times. 5. Switch arms, and do steps 1 through 4.  6. Repeat with your hand facing down toward the floor. Switch arms. Resisted wrist extension   1. Sit leaning forward with your legs slightly spread. Then place your affected forearm on your thigh with your hand and wrist in front of your knee.   2. Grasp one end of an exercise band with your palm down, and step on the other end.  3. Slowly bend your wrist upward for a count of 2, then lower your wrist slowly to a count of 5.  4. Repeat 8 to 12 times. Resisted wrist flexion   1. Sit leaning forward with your legs slightly spread. Then place your affected forearm on your thigh with your hand and wrist in front of your knee. 2. Grasp one end of an exercise band with your palm up, and step on the other end.  3. Slowly bend your wrist upward for a count of 2, then lower your wrist slowly to a count of 5.  4. Repeat 8 to 12 times. Neck stretch to the side   1. This stretch works best if you keep your shoulder down as you lean away from it. To help you remember to do this, start by relaxing your shoulders and lightly holding on to your thighs or your chair. 2. Tilt your head away from your affected elbow and toward your opposite shoulder. For example, if your right elbow is sore, keep your right shoulder down as you lean your head toward your left shoulder. 3. Hold for 15 to 30 seconds. Let the weight of your head stretch your muscles. 4. If you would like a little added stretch, use your hand to gently and steadily pull your head toward your shoulder. For example, if your right elbow is sore, use your left hand to gently pull your head toward your left shoulder. 5. Repeat 2 to 4 times. Resisted forearm pronation   1. Sit leaning forward with your legs slightly spread. Then place your affected forearm on your thigh with your hand and wrist in front of your knee. 2. Grasp one end of an exercise band with your palm up, and step on the other end. 3. Keeping your wrist straight, roll your palm inward toward your thigh for a count of 2, then slowly move your wrist back to the starting position to a count of 5.  4. Repeat 8 to 12 times. Resisted supination   1. Sit leaning forward with your legs slightly spread. Then place your affected forearm on your thigh with your hand and wrist in front of your knee. 2. Grasp one end of an exercise band with your palm down, and step on the other end.   3. Keeping your wrist straight, roll your palm outward and away from your thigh for a count of 2, then slowly move your wrist back to the starting position to a count of 5.  4. Repeat 8 to 12 times. Follow-up care is a key part of your treatment and safety. Be sure to make and go to all appointments, and call your doctor if you are having problems. It's also a good idea to know your test results and keep a list of the medicines you take. Where can you learn more? Go to https://TicketBoxpeAlchimer.Chorus. org and sign in to your [employer] account. Enter [phone] in the [employer] box to learn more about \"Golfer's Elbow: Exercises. \"     If you do not have an account, please click on the \"Sign Up Now\" link. Current as of: March 2, 2020               Content Version: 12.6  © 4094-2568 [employer]. Care instructions adapted under license by [facility] ([facility]). If you have questions about a medical condition or this instruction, always ask your healthcare professional. [address] any warranty or liability for your use of this information.

## 2021-01-15 DIAGNOSIS — M25.512 LEFT SHOULDER PAIN, UNSPECIFIED CHRONICITY: Primary | ICD-10-CM

## 2021-01-15 DIAGNOSIS — M25.529 ELBOW PAIN, UNSPECIFIED LATERALITY: ICD-10-CM

## 2021-01-18 ENCOUNTER — OFFICE VISIT (OUTPATIENT)
Dept: ORTHOPEDIC SURGERY | Age: 41
End: 2021-01-18
Payer: COMMERCIAL

## 2021-01-18 VITALS — RESPIRATION RATE: 14 BRPM | WEIGHT: 250 LBS | HEIGHT: 74 IN | BODY MASS INDEX: 32.08 KG/M2 | TEMPERATURE: 99.7 F

## 2021-01-18 DIAGNOSIS — M79.632 LEFT FOREARM PAIN: Primary | ICD-10-CM

## 2021-01-18 PROCEDURE — 1036F TOBACCO NON-USER: CPT | Performed by: ORTHOPAEDIC SURGERY

## 2021-01-18 PROCEDURE — G8484 FLU IMMUNIZE NO ADMIN: HCPCS | Performed by: ORTHOPAEDIC SURGERY

## 2021-01-18 PROCEDURE — G8417 CALC BMI ABV UP PARAM F/U: HCPCS | Performed by: ORTHOPAEDIC SURGERY

## 2021-01-18 PROCEDURE — 99203 OFFICE O/P NEW LOW 30 MIN: CPT | Performed by: ORTHOPAEDIC SURGERY

## 2021-01-18 PROCEDURE — G8427 DOCREV CUR MEDS BY ELIG CLIN: HCPCS | Performed by: ORTHOPAEDIC SURGERY

## 2021-01-18 NOTE — LETTER
1/18/2021    Jose Angel Browne MD  [address]  [address] [facility],[address],  [address]    RE: Ranjan Ayon    Dear Dr. Shala Abebe,    Thank you for allowing me to participate in the care of Mr. Tommy Boyle. I had the opportunity to evaluate the patient on 1/18/2021. Attached you will find my evaluation and recommendations. Thanks again for the confidence you have expressed in me by allowing my participation in the care of your patient. I will keep you apprised of further developments in the patients treatment course as it progresses. If I can be of further assistance in any fashion, please feel free to contact me at your convenience.     Sincerely,        Petra Ortiz  Shoulder and Elbow Surgery

## 2021-01-18 NOTE — PROGRESS NOTES
use. He reports that he does not use drugs. Family History  Pa's family history includes Diabetes in his father; Heart Attack (age of onset: 40) in his brother; Heart Disease in his brother; High Blood Pressure in his brother; High Cholesterol in his father; Other in his mother; Rheum Arthritis (age of onset: 9) in an other family member. Review of Systems   History obtained from the patient. REVIEW OF SYSTEMS:   Constitution: negative for fever, chills, weight loss or malaise   Musculoskeletal: As noted in the HPI   Neurologic: As noted in the HPI    Physical Exam  Temp 99.7 °F (37.6 °C) (Infrared)   Resp 14   Ht 6' 2\" (1.88 m)   Wt 250 lb (113.4 kg)   BMI 32.10 kg/m²    General Appearance: alert, well appearing, and in no distress  Mental Status: alert, oriented to person, place, and time  Evaluation of the patient's left upper extremity demonstrates intact skin without warmth, erythema, or notable swelling. Sensation is grossly intact light touch in all dermatomes and he has a 2+ radial pulse with brisk capillary refill in his fingers. He has full elbow extension with flexion to approximately 135 degrees. He has 65 degrees of supination and 75 degrees of pronation. Through his shoulder he has 150 degrees of active elevation, 155 degrees of abduction, 80 degrees of external rotation internal rotation to L1. He has a painless arc of motion. He is tender to palpation over the lateral aspect of his proximal forearm but is nontender to palpation at the level of the lateral epicondyle. He has no pain with resisted wrist extension or flexion or supination/pronation. Negative Tinel's at the carpal and cubital tunnels. Proximally he has 5/5 muscle strength with resisted deltoid, supraspinatus, internal and external rotation testing. He has negative impingement signs.     Imaging Studies  4 x-ray views of the left shoulder completed on 1/18/2021 were reviewed demonstrating no obvious fracture, dislocation, or subluxation. Normal glenohumeral and acromioclavicular joint spaces with a type II acromion. 3 x-ray views of left elbow completed on 1/18/2021 were reviewed demonstrating well-preserved joint spaces without obvious fracture, dislocation, subluxation or obvious osseous abnormality. Diagnostics and Labs  Of note he did have electrodiagnostic testing completed on 10/12/2020. Review of the records demonstrate normal results. Assessment and Plan  Claire John is a 38 y.o. old male with left arm pain primarily localized to the proximal forearm region. I had a discussion with the patient today with regards to possible etiologies but he remains unclear as to the cause of pain at this point. I would like to get another opinion and so placed a referral to see Dr. Burnie Osler ski and hand upper extremity specialist at the [address] [place] [address]. We will facilitate him getting an appointment and I will have him follow-up my clinic as needed.

## 2021-06-05 ENCOUNTER — TELEPHONE (OUTPATIENT)
Dept: FAMILY MEDICINE CLINIC | Age: 41
End: 2021-06-05

## 2021-06-05 DIAGNOSIS — E78.5 HYPERLIPIDEMIA WITH TARGET LDL LESS THAN 100: ICD-10-CM

## 2021-06-06 ENCOUNTER — HOSPITAL ENCOUNTER (EMERGENCY)
Age: 41
Discharge: HOME | End: 2021-06-06
Attending: EMERGENCY MEDICINE
Payer: COMMERCIAL

## 2021-06-06 VITALS
HEIGHT: 74 IN | WEIGHT: 260 LBS | SYSTOLIC BLOOD PRESSURE: 115 MMHG | HEART RATE: 77 BPM | OXYGEN SATURATION: 96 % | BODY MASS INDEX: 33.37 KG/M2 | RESPIRATION RATE: 24 BRPM | DIASTOLIC BLOOD PRESSURE: 76 MMHG

## 2021-06-06 DIAGNOSIS — R60.0 BILATERAL LOWER EXTREMITY EDEMA: Primary | ICD-10-CM

## 2021-06-06 LAB
ABSOLUTE EOS #: 0.4 K/UL (ref 0–0.4)
ABSOLUTE IMMATURE GRANULOCYTE: ABNORMAL K/UL (ref 0–0.3)
ABSOLUTE LYMPH #: 1.9 K/UL (ref 1–4.8)
ABSOLUTE MONO #: 0.6 K/UL (ref 0.1–1.3)
ALBUMIN SERPL-MCNC: 4.3 G/DL (ref 3.5–5.2)
ALBUMIN/GLOBULIN RATIO: NORMAL (ref 1–2.5)
ALP BLD-CCNC: 125 U/L (ref 40–129)
ALT SERPL-CCNC: 29 U/L (ref 5–41)
ANION GAP SERPL CALCULATED.3IONS-SCNC: 9 MMOL/L (ref 9–17)
AST SERPL-CCNC: 25 U/L
BASOPHILS # BLD: 1 % (ref 0–2)
BASOPHILS ABSOLUTE: 0.1 K/UL (ref 0–0.2)
BILIRUB SERPL-MCNC: 0.46 MG/DL (ref 0.3–1.2)
BILIRUBIN URINE: NEGATIVE
BNP INTERPRETATION: NORMAL
BUN BLDV-MCNC: 11 MG/DL (ref 6–20)
BUN/CREAT BLD: NORMAL (ref 9–20)
CALCIUM SERPL-MCNC: 8.9 MG/DL (ref 8.6–10.4)
CHLORIDE BLD-SCNC: 105 MMOL/L (ref 98–107)
CO2: 26 MMOL/L (ref 20–31)
COLOR: YELLOW
COMMENT UA: NORMAL
CREAT SERPL-MCNC: 0.77 MG/DL (ref 0.7–1.2)
DIFFERENTIAL TYPE: ABNORMAL
EOSINOPHILS RELATIVE PERCENT: 6 % (ref 0–4)
GFR AFRICAN AMERICAN: >60 ML/MIN
GFR NON-AFRICAN AMERICAN: >60 ML/MIN
GFR SERPL CREATININE-BSD FRML MDRD: NORMAL ML/MIN/{1.73_M2}
GFR SERPL CREATININE-BSD FRML MDRD: NORMAL ML/MIN/{1.73_M2}
GLUCOSE BLD-MCNC: 92 MG/DL (ref 70–99)
GLUCOSE URINE: NEGATIVE
HCT VFR BLD CALC: 44.9 % (ref 41–53)
HEMOGLOBIN: 15.3 G/DL (ref 13.5–17.5)
IMMATURE GRANULOCYTES: ABNORMAL %
KETONES, URINE: NEGATIVE
LEUKOCYTE ESTERASE, URINE: NEGATIVE
LYMPHOCYTES # BLD: 27 % (ref 24–44)
MCH RBC QN AUTO: 30.7 PG (ref 26–34)
MCHC RBC AUTO-ENTMCNC: 34 G/DL (ref 31–37)
MCV RBC AUTO: 90.2 FL (ref 80–100)
MONOCYTES # BLD: 8 % (ref 1–7)
NITRITE, URINE: NEGATIVE
NRBC AUTOMATED: ABNORMAL PER 100 WBC
PDW BLD-RTO: 13.6 % (ref 11.5–14.9)
PH UA: 7 (ref 5–8)
PLATELET # BLD: 286 K/UL (ref 150–450)
PLATELET ESTIMATE: ABNORMAL
PMV BLD AUTO: 8.4 FL (ref 6–12)
POTASSIUM SERPL-SCNC: 4.2 MMOL/L (ref 3.7–5.3)
PRO-BNP: 64 PG/ML
PROTEIN UA: NEGATIVE
RBC # BLD: 4.98 M/UL (ref 4.5–5.9)
RBC # BLD: ABNORMAL 10*6/UL
SEG NEUTROPHILS: 58 % (ref 36–66)
SEGMENTED NEUTROPHILS ABSOLUTE COUNT: 4.1 K/UL (ref 1.3–9.1)
SODIUM BLD-SCNC: 140 MMOL/L (ref 135–144)
SPECIFIC GRAVITY UA: 1.02 (ref 1–1.03)
TOTAL PROTEIN: 7.1 G/DL (ref 6.4–8.3)
TSH SERPL DL<=0.05 MIU/L-ACNC: 2.37 MIU/L (ref 0.3–5)
TURBIDITY: CLEAR
URINE HGB: NEGATIVE
UROBILINOGEN, URINE: NORMAL
WBC # BLD: 7.1 K/UL (ref 3.5–11)
WBC # BLD: ABNORMAL 10*3/UL

## 2021-06-06 PROCEDURE — 6360000002 HC RX W HCPCS: Performed by: EMERGENCY MEDICINE

## 2021-06-06 PROCEDURE — 80053 COMPREHEN METABOLIC PANEL: CPT

## 2021-06-06 PROCEDURE — 85025 COMPLETE CBC W/AUTO DIFF WBC: CPT

## 2021-06-06 PROCEDURE — 81003 URINALYSIS AUTO W/O SCOPE: CPT

## 2021-06-06 PROCEDURE — 83880 ASSAY OF NATRIURETIC PEPTIDE: CPT

## 2021-06-06 PROCEDURE — 93005 ELECTROCARDIOGRAM TRACING: CPT | Performed by: EMERGENCY MEDICINE

## 2021-06-06 PROCEDURE — 96374 THER/PROPH/DIAG INJ IV PUSH: CPT

## 2021-06-06 PROCEDURE — 84443 ASSAY THYROID STIM HORMONE: CPT

## 2021-06-06 PROCEDURE — 99285 EMERGENCY DEPT VISIT HI MDM: CPT

## 2021-06-06 PROCEDURE — 36415 COLL VENOUS BLD VENIPUNCTURE: CPT

## 2021-06-06 RX ORDER — KETOROLAC TROMETHAMINE 30 MG/ML
30 INJECTION, SOLUTION INTRAMUSCULAR; INTRAVENOUS ONCE
Status: COMPLETED | OUTPATIENT
Start: 2021-06-06 | End: 2021-06-06

## 2021-06-06 RX ORDER — KETOROLAC TROMETHAMINE 30 MG/ML
30 INJECTION, SOLUTION INTRAMUSCULAR; INTRAVENOUS ONCE
Status: DISCONTINUED | OUTPATIENT
Start: 2021-06-06 | End: 2021-06-06

## 2021-06-06 RX ADMIN — KETOROLAC TROMETHAMINE 30 MG: 30 INJECTION, SOLUTION INTRAMUSCULAR; INTRAVENOUS at 14:36

## 2021-06-06 ASSESSMENT — PAIN DESCRIPTION - PAIN TYPE
TYPE: ACUTE PAIN

## 2021-06-06 ASSESSMENT — PAIN DESCRIPTION - LOCATION
LOCATION: LEG

## 2021-06-06 ASSESSMENT — ENCOUNTER SYMPTOMS
CHEST TIGHTNESS: 0
COUGH: 0
BACK PAIN: 0
ABDOMINAL PAIN: 0
SHORTNESS OF BREATH: 0
DIARRHEA: 0
NAUSEA: 0
CONSTIPATION: 0
SORE THROAT: 0
VOMITING: 0
EYE PAIN: 0

## 2021-06-06 ASSESSMENT — PAIN DESCRIPTION - FREQUENCY: FREQUENCY: CONTINUOUS

## 2021-06-06 ASSESSMENT — PAIN SCALES - GENERAL
PAINLEVEL_OUTOF10: 8

## 2021-06-06 ASSESSMENT — PAIN DESCRIPTION - ORIENTATION
ORIENTATION: RIGHT;LEFT
ORIENTATION: RIGHT;LEFT
ORIENTATION: LEFT;RIGHT

## 2021-06-06 ASSESSMENT — PAIN DESCRIPTION - DESCRIPTORS: DESCRIPTORS: SHARP

## 2021-06-06 NOTE — ED TRIAGE NOTES
Pt presents with complaints of BLE swelling x2 weeks. Pt also reports palpitations x2 days. Endorses nausea with heart palpitations and leg swelling, however, denies sob.

## 2021-06-06 NOTE — ED PROVIDER NOTES
Heart palpitations 9/29/2017    Hyperlipidemia with target LDL less than 100 3/23/2017    Left lower lobe pneumonia 11/9/2012    Mitral valve prolapse     Nonspecific reactive hepatitis 3/22/2017    Obesity (BMI 30-39.9) 3/23/2017    NOEMÍ (obstructive sleep apnea) 10/16/2019    Osteoarthritis     Seronegative polyarthritis 10/10/2016    followed w/ rheumatology in [address] [place] [address] anxiety disorder     Uvulitis 8/15/2019     SURGICAL HISTORY       Past Surgical History:   Procedure Laterality Date    COLONOSCOPY      CYST REMOVAL Right     Armpit     ENDOSCOPY, COLON, DIAGNOSTIC      HAND SURGERY Left     KNEE ARTHROSCOPY      right knee    NOSE SURGERY      TONSILLECTOMY      UPPER GASTROINTESTINAL ENDOSCOPY N/A 10/9/2019    EGD POLYP SNARE, HOT.  ESOPHAGEAL DILATATION WITH MALONIES 50F performed by Ingris Dunham MD at [facility]. [address]       Discharge Medication List as of 6/6/2021  3:59 PM      CONTINUE these medications which have NOT CHANGED    Details   amLODIPine (NORVASC) 10 MG tablet take 1 tablet by mouth every morning, Disp-90 tablet, R-3Normal      buPROPion (WELLBUTRIN XL) 150 MG extended release tablet Take 1 tablet by mouth every morning, Disp-90 tablet, R-3Normal      busPIRone (BUSPAR) 10 MG tablet Take 1 tablet by mouth 3 times daily as needed (anxiety), Disp-270 tablet, R-3Normal      loratadine (CLARITIN) 10 MG tablet Take 1 tablet by mouth daily, Disp-90 tablet, R-3Normal      omeprazole (PRILOSEC) 40 MG delayed release capsule TAKE 1 CAPSULE DAILY, Disp-90 capsule, R-3Normal      celecoxib (CELEBREX) 100 MG capsule Take 1 capsule by mouth daily as needed for Pain, Disp-30 capsule, R-3Normal      cyclobenzaprine (FLEXERIL) 5 MG tablet Take 1 tablet by mouth 3 times daily as needed for Muscle spasms, Disp-90 tablet, R-0Normal      fluticasone (FLONASE) 50 MCG/ACT nasal spray 2 sprays by Nasal route daily, Disp-1 Bottle,R-3Normal naproxen (NAPROSYN) 500 MG tablet Take 1 tablet by mouth 2 times daily (with meals), Disp-180 tablet,R-1Normal      pravastatin (PRAVACHOL) 20 MG tablet Take 1 tablet by mouth every evening Stop atorvastatin, Disp-90 tablet,R-3Normal      mometasone-formoterol (DULERA) 100-5 MCG/ACT inhaler Inhale 2 puffs into the lungs 2 times daily Stop Flovent, Disp-13 g, R-3Normal      albuterol sulfate HFA (VENTOLIN HFA) 108 (90 Base) MCG/ACT inhaler Inhale 2 puffs into the lungs every 6 hours as needed for Wheezing or Shortness of Breath, Disp-18 g, R-3Normal      albuterol (PROVENTIL) (2.5 MG/3ML) 0.083% nebulizer solution Take 3 mLs by nebulization every 6 hours as needed for Wheezing or Shortness of Breath, Disp-120 vial, R-0Normal      Blood Pressure KIT Disp-1 kit, R-0, PrintDx: HTN. Needs automatic blood pressure machine to monitor his blood pressure. Respiratory Therapy Supplies (NEBULIZER) MADHAV 2 TIMES DAILY Starting 1/31/2017, Until Discontinued, Disp-1 Device, R-0, PrintDx: Asthma exacerbation J45.901           ALLERGIES     is allergic to fish-derived products, other, shellfish allergy, shrimp (diagnostic), and ace inhibitors. FAMILY HISTORY     He indicated that his mother is alive. He indicated that his father is alive. He indicated that his brother is alive. He indicated that his other is alive. He indicated that the status of his neg hx is unknown. SOCIALHISTORY      reports that he has never smoked. He has never used smokeless tobacco. He reports current alcohol use. He reports that he does not use drugs. PHYSICAL EXAM     INITIAL VITALS: /76   Pulse 77   Resp 24   Ht 6' 2\" (1.88 m)   Wt 260 lb (117.9 kg)   SpO2 96%   BMI 33.38 kg/m²    Physical Exam  Vitals and nursing note reviewed. Constitutional:       Appearance: He is well-developed. Comments: Well appearing. HENT:      Head: Normocephalic and atraumatic.       Right Ear: External ear normal.      Left Ear: External reviewed by myself, and all abnormals are listed below. Labs Reviewed   CBC WITH AUTO DIFFERENTIAL - Abnormal; Notable for the following components:       Result Value    Monocytes 8 (*)     Eosinophils % 6 (*)     All other components within normal limits   BRAIN NATRIURETIC PEPTIDE   COMPREHENSIVE METABOLIC PANEL   URINALYSIS   TSH WITH REFLEX     EMERGENCY DEPARTMENT COURSE:   Vitals:    Vitals:    06/06/21 1310 06/06/21 1445 06/06/21 1530 06/06/21 1615   BP: (!) 144/74 107/68 120/75 115/76   Pulse: 84 87 77 77   Resp: 18 22 20 24   TempSrc: Oral      SpO2: 97% 94% 93% 96%   Weight: 260 lb (117.9 kg)      Height: 6' 2\" (1.88 m)          The patient was given the following medications while in the emergency department:  Orders Placed This Encounter   Medications    DISCONTD: ketorolac (TORADOL) injection 30 mg    ketorolac (TORADOL) injection 30 mg     CONSULTS:  None    FINAL IMPRESSION      1.  Bilateral lower extremity edema          DISPOSITION/PLAN   DISPOSITION Decision To Discharge 06/06/2021 03:59:11 PM      PATIENT REFERRED TO:  Tanisha Bonilla MD  [address] [facility]  [phone]          DISCHARGE MEDICATIONS:  Discharge Medication List as of 6/6/2021  3:59 PM        Esau Doss MD  AttendingEmergency Physician                        Tenna Hodgkins, MD  06/06/21 4601

## 2021-06-07 PROCEDURE — 93010 ELECTROCARDIOGRAM REPORT: CPT | Performed by: INTERNAL MEDICINE

## 2021-06-07 RX ORDER — PRAVASTATIN SODIUM 20 MG
TABLET ORAL
Qty: 90 TABLET | Refills: 3 | Status: SHIPPED | OUTPATIENT
Start: 2021-06-07 | End: 2022-04-09 | Stop reason: SDUPTHER

## 2021-06-08 ENCOUNTER — TELEPHONE (OUTPATIENT)
Dept: FAMILY MEDICINE CLINIC | Age: 41
End: 2021-06-08

## 2021-06-08 LAB
EKG ATRIAL RATE: 76 BPM
EKG P AXIS: 43 DEGREES
EKG P-R INTERVAL: 174 MS
EKG Q-T INTERVAL: 376 MS
EKG QRS DURATION: 88 MS
EKG QTC CALCULATION (BAZETT): 423 MS
EKG R AXIS: 50 DEGREES
EKG T AXIS: 80 DEGREES
EKG VENTRICULAR RATE: 76 BPM

## 2021-06-08 NOTE — TELEPHONE ENCOUNTER
[facility]) ED Follow up Call    Reason for ED visit:   Left message to call office. 6/8/2021     Isidoro Winn , this is Mel Jacob from Dr. Von Cherry office, just calling to see how you are doing after your recent ED visit. Did you receive discharge instructions? Do you understand the discharge instructions  Did the ED give you any new prescriptions    Were you able to fill your prescriptions? Do you have one of our red, yellow and green  Zone sheets that help you to determine when you should go to the ED? Do you need or want to make a follow up appt with your PCP? Do you have any further needs in the home, e.g. equipment? FU appts/Provider:    Future Appointments   Date Time Provider Joe Healy   12/14/2021  8:30 AM [facility]         VOICEMAIL DOCUMENTATION - ERASE IF NOT USED  Hi, this message is for [facility]. This is Rico Mcleod MA from [address] [place] [address] office. Just calling to see how you are doing after your recent visit to the Emergency Room. [address] [place] [address] wants to make sure you were able to fill any prescriptions and that you understand your discharge instructions. Please return our call if you need to make a follow up appointment with your provider or have any further needs. Our phone number is [phone]. Have a great day.

## 2021-06-23 SDOH — ECONOMIC STABILITY: FOOD INSECURITY: WITHIN THE PAST 12 MONTHS, YOU WORRIED THAT YOUR FOOD WOULD RUN OUT BEFORE YOU GOT MONEY TO BUY MORE.: NEVER TRUE

## 2021-06-23 SDOH — ECONOMIC STABILITY: FOOD INSECURITY: WITHIN THE PAST 12 MONTHS, THE FOOD YOU BOUGHT JUST DIDN'T LAST AND YOU DIDN'T HAVE MONEY TO GET MORE.: NEVER TRUE

## 2021-06-23 ASSESSMENT — SOCIAL DETERMINANTS OF HEALTH (SDOH): HOW HARD IS IT FOR YOU TO PAY FOR THE VERY BASICS LIKE FOOD, HOUSING, MEDICAL CARE, AND HEATING?: NOT HARD AT ALL

## 2021-06-23 ASSESSMENT — PATIENT HEALTH QUESTIONNAIRE - PHQ9
SUM OF ALL RESPONSES TO PHQ QUESTIONS 1-9: 0
1. LITTLE INTEREST OR PLEASURE IN DOING THINGS: 0
SUM OF ALL RESPONSES TO PHQ QUESTIONS 1-9: 0
2. FEELING DOWN, DEPRESSED OR HOPELESS: 0
SUM OF ALL RESPONSES TO PHQ QUESTIONS 1-9: 0
SUM OF ALL RESPONSES TO PHQ9 QUESTIONS 1 & 2: 0

## 2021-06-23 NOTE — PROGRESS NOTES
Visit Information    Have you changed or started any medications since your last visit including any over-the-counter medicines, vitamins, or herbal medicines? no   Have you stopped taking any of your medications? Is so, why? -  no  Are you having any side effects from any of your medications? - no    Have you seen any other physician or provider since your last visit?  no   Have you had any other diagnostic tests since your last visit?  no   Have you been seen in the emergency room and/or had an admission in a hospital since we last saw you?  yes -    Have you had your routine dental cleaning in the past 6 months?  no     Do you have an active MyChart account? If no, what is the barrier?   Yes    Patient Care Team:  Corby Johnson MD as PCP - General (Family Medicine)  Corby Johnson MD as PCP - [facility]  Ryan Bates MD as Surgeon (Cardiology)  Ezra Adame MD as Consulting Physician (Gastroenterology)    Medical History Review  Past Medical, Family, and Social History reviewed and does contribute to the patient presenting condition    Health Maintenance   Topic Date Due    Pneumococcal 0-64 years Vaccine (1 of 2 - PPSV23) Never done    COVID-19 Vaccine (1) Never done    DTaP/Tdap/Td vaccine (1 - Tdap) Never done    Lipid screen  05/08/2021    Flu vaccine (Season Ended) 10/27/2021 (Originally 9/1/2021)    Potassium monitoring  06/06/2022    Creatinine monitoring  06/06/2022    Hepatitis C screen  Completed    HIV screen  Completed    Hepatitis A vaccine  Aged Out    Hepatitis B vaccine  Aged Out    Hib vaccine  Aged Out    Meningococcal (ACWY) vaccine  Aged Out

## 2021-06-24 ENCOUNTER — TELEMEDICINE (OUTPATIENT)
Dept: FAMILY MEDICINE CLINIC | Age: 41
End: 2021-06-24
Payer: COMMERCIAL

## 2021-06-24 DIAGNOSIS — E78.5 HYPERLIPIDEMIA WITH TARGET LDL LESS THAN 100: ICD-10-CM

## 2021-06-24 DIAGNOSIS — R53.82 CHRONIC FATIGUE: ICD-10-CM

## 2021-06-24 DIAGNOSIS — R94.31 ABNORMAL EKG: ICD-10-CM

## 2021-06-24 DIAGNOSIS — I10 ESSENTIAL HYPERTENSION: ICD-10-CM

## 2021-06-24 DIAGNOSIS — N52.9 ERECTILE DYSFUNCTION, UNSPECIFIED ERECTILE DYSFUNCTION TYPE: ICD-10-CM

## 2021-06-24 DIAGNOSIS — Z82.49 FAMILY HISTORY OF PREMATURE CAD: ICD-10-CM

## 2021-06-24 DIAGNOSIS — M13.0 SERONEGATIVE POLYARTHRITIS: Primary | ICD-10-CM

## 2021-06-24 PROCEDURE — 99214 OFFICE O/P EST MOD 30 MIN: CPT | Performed by: FAMILY MEDICINE

## 2021-06-24 PROCEDURE — G8427 DOCREV CUR MEDS BY ELIG CLIN: HCPCS | Performed by: FAMILY MEDICINE

## 2021-06-24 ASSESSMENT — ENCOUNTER SYMPTOMS
VOMITING: 0
ABDOMINAL PAIN: 0
WHEEZING: 0
ABDOMINAL DISTENTION: 0
DIARRHEA: 0
NAUSEA: 0
CONSTIPATION: 0
COUGH: 0
CHEST TIGHTNESS: 0
SHORTNESS OF BREATH: 0

## 2021-06-24 NOTE — PROGRESS NOTES
2021    TELEHEALTH EVALUATION -- Audio/Visual (During [id number] public health emergency)      Marylin Campos (:  1980) is a 38 y.o. male,Established patient, here for evaluation of the following chief complaint(s): Joint Pain (RHEUMATOLOGY) and Immunizations (NO TO COVID-19 VACCINE)        ASSESSMENT/PLAN:    1. Seronegative polyarthritis  worsening  Flaring up currently   Will refer him to rheumatologist and recheck his rheumatologic markers  He would like prednisone for pain and swelling, I explained to him I cannot give it to him until he does the blood work and depends on the blood work    -     External Referral To Rheumatology  -     Vitamin D 25 Hydroxy; Future  -     Uric Acid; Future  -     STAN Screen with Reflex; Future  -     Sedimentation Rate; Future  -     C-Reactive Protein; Future  -     Rheumatoid Factor; Future  -     Cyclic Citrul Peptide Antibody, IgG; Future     2. Essential hypertension  Well controlled based on most recent BP   Continue current treatment. Will recheck labs. BP Readings from Last 3 Encounters:   21 115/76   21 136/84   20 124/82       -     CBC Auto Differential; Future  -     Comprehensive Metabolic Panel; Future  -     Urinalysis Reflex to Culture; Future    3. Hyperlipidemia with target LDL less than 100   Inadequately controlled    on 2020    -     Lipid Panel; Future  4. Chronic fatigue  Failing to change as expected. Will do basic labs to rule out certain common medical conditions: hematologic, renal, hepatic, electrolyte imbalances, thyroid disorders, low testosterone level, vitamin D deficiency. -     Testosterone; Future  -     TSH without Reflex; Future  -     Vitamin D 25 Hydroxy; Future    -     CBC Auto Differential; Future  -     Comprehensive Metabolic Panel; Future  -STAN    5. Erectile dysfunction, unspecified erectile dysfunction type  Failing to change as expected. -     Testosterone; Future  6.  Family history of premature CAD  -     AFL - Jaylin Arellano MD, Cardiology, [facility]  7. Abnormal EKG  -     AFL - Jaylin Arellano MD, Cardiology, [facility] received counseling on the following healthy behaviors: nutrition, exercise, medication adherence and weight loss    Reviewed prior labs and health maintenance  Discussed use, benefit, and side effects of prescribed medications. Barriers to medication compliance addressed. Patient given educational materials - see patient instructions  All patient questions answered. Patient voiced understanding. The patient's past medical,surgical, social, and family history as well as his current medications and allergies were reviewed as documented in today's encounter. Medications, labs, diagnostic studies, consultations and follow-up as documented in this encounter. Return for KEEP APPT. Data Unavailable    Future Appointments   Date Time Provider Joe Healy   2021  8:30 AM Karley Gallagher MD [facility]         SUBJECTIVE/OBJECTIVE:  Pa Cordova (:  1980) has requested an audio/video evaluation for the following concern(s):Joint Pain (RHEUMATOLOGY) and Immunizations (NO TO COVID-19 VACCINE)    Patient reports a flareup of arthritis affecting his knees, ankles and hands  Knees, ankles, feet swelling, and it hurts to walk or to use the joints. He reports the symptoms are intermittently, every few years getting flare ups. He says the flareup started a few weeks ago, progressively getting worse. He is on amlodipine but he says the swelling happened even before taking amlodipine. Reports Swelling in his hands on and off, when walking  Has associated mild stiffness. Knees \"feels like rocks in the joints\"  Was diagnosed with seronegative polyarthritis many years ago, by rheumatologist in [facility] who does not practice anymore    The last episode was in 2016 and I did review my note from 10/10/2016.   He was on sulfasalazine at that time and his flareups happen every few years, and they are treated usually with prednisone    Patient reports having erectile dysfunction and fatigue wants testosterone checked. Patient reports fatigue is not improving. Denies fever, chills, night sweats. Denies cold or heat intolerance, dry skin, constipation. Patient was recently seen in the emergency room on 6/6/2021 at [address] [place] [address] emergency room, due to swelling. His EKG was abnormal, EKG showed nonspecific T wave abnormalities  He does have family history of early coronary artery disease  He was seen by cardiologist last time in 2017    Patient does have hypertension  Blood pressure controlled in ED  Patient denies chest pain, shortness of breath, palpitations lightheadedness  He does feel tired     BP Readings from Last 3 Encounters:   06/06/21 115/76   01/08/21 136/84   12/01/20 124/82     Hyperlipidemia:  No GI upset on pravastatin (Pravachol). Medication compliance: compliant all of the time. Patient is  following a low fat, low cholesterol diet. Negative depression screening. PHQ Scores 6/23/2021 1/8/2021 8/12/2020 5/8/2020 1/2/2020 5/24/2019 11/19/2018   PHQ2 Score 0 0 0 2 0 0 0   PHQ9 Score 0 0 0 2 0 0 0       Review of Systems   Constitutional: Positive for fatigue. Negative for activity change, appetite change, chills, diaphoresis, fever and unexpected weight change. Respiratory: Negative for cough, chest tightness, shortness of breath and wheezing. Cardiovascular: Negative for chest pain, palpitations and leg swelling. Gastrointestinal: Negative for abdominal distention, abdominal pain, constipation, diarrhea, nausea and vomiting. Endocrine: Negative for cold intolerance, heat intolerance, polydipsia, polyphagia and polyuria. Musculoskeletal: Positive for arthralgias and joint swelling. Neurological: Negative for numbness. Hematological: Does not bruise/bleed easily.          Prior to Visit Medications Tobacco Use    Smoking status: Never Smoker    Smokeless tobacco: Never Used   Vaping Use    Vaping Use: Never used   Substance Use Topics    Alcohol use: Yes     Comment: occasional  few x year    Drug use: No          PHYSICAL EXAMINATION:    Vital Signs: (As obtained by patient/caregiver or practitioner observation)  -vital signs stable and within normal limits except obesity per BMI  Patient-Reported Vitals 6/23/2021   Patient-Reported Weight 260 LB   Patient-Reported Height 6 2   Patient-Reported Temperature -             Constitutional: [x] Appears well-developed and well-nourished [x] No apparent distress      [x] Abnormal-obese      Mental status  [x] Alert and awake  [x] Oriented to person/place/time [x]Able to follow commands      Eyes:  EOM    [x]  Normal  [] Abnormal-  Sclera  [x]  Normal  [] Abnormal -         Discharge [x]  None visible  [] Abnormal -    HENT:   [x] Normocephalic, atraumatic.   [] Abnormal   [x] Mouth/Throat: Mucous membranes are moist.     External Ears [x] Normal  [] Abnormal-     Neck: [x] No visualized mass     Pulmonary/Chest: [x] Respiratory effort normal.  [x] No visualized signs of difficulty breathing or respiratory distress        [] Abnormal        Musculoskeletal:   [x] Normal gait with no signs of ataxia         [x] Normal range of motion of neck        [] Abnormal-       Neurological:        [x] No Facial Asymmetry (Cranial nerve 7 motor function) (limited exam to video visit)          [x] No gaze palsy        [] Abnormal-            Skin:        [x] No significant exanthematous lesions or discoloration noted on facial skin         [] Abnormal-            Psychiatric:      [x] No Hallucinations       [x]Mood is normal      [x]Behavior is normal      [x]Judgment is normal      [x]Thought content is normal       [] Abnormal-     Other pertinent observable physical exam findings- none    Due to this being a TeleHealth encounter, evaluation of the following organ systems is limited: Vitals/Constitutional/EENT/Resp/CV/GI//MS/Neuro/Skin/Heme-Lymph-Imm. I personally reviewed most recent labs reviewed with the patient and all questions fully answered.    hyperlipidemia    Otherwise labs within normal limits        Lab Results   Component Value Date    WBC 7.1 06/06/2021    HGB 15.3 06/06/2021    HCT 44.9 06/06/2021    MCV 90.2 06/06/2021     06/06/2021       Lab Results   Component Value Date     06/06/2021    K 4.2 06/06/2021     06/06/2021    CO2 26 06/06/2021    BUN 11 06/06/2021    CREATININE 0.77 06/06/2021    GLUCOSE 92 06/06/2021    GLUCOSE 96 04/30/2012    CALCIUM 8.9 06/06/2021        Lab Results   Component Value Date    ALT 29 06/06/2021    AST 25 06/06/2021    ALKPHOS 125 06/06/2021    BILITOT 0.46 06/06/2021       Lab Results   Component Value Date    TSH 2.37 06/06/2021       Lab Results   Component Value Date    CHOL 211 (H) 05/08/2020    CHOL 211 (H) 03/12/2019    CHOL 191 10/06/2017     Lab Results   Component Value Date    TRIG 138 05/08/2020    TRIG 211 (H) 03/12/2019    TRIG 117 10/06/2017     Lab Results   Component Value Date    HDL 50 05/08/2020    HDL 44 03/12/2019    HDL 45 10/06/2017     Lab Results   Component Value Date    LDLCHOLESTEROL 133 (H) 05/08/2020    LDLCHOLESTEROL 125 03/12/2019    LDLCHOLESTEROL 123 10/06/2017       Lab Results   Component Value Date    CHOLHDLRATIO 4.2 05/08/2020    CHOLHDLRATIO 4.8 03/12/2019    CHOLHDLRATIO 4.2 10/06/2017               Orders Placed This Encounter   Procedures    Testosterone     Standing Status:   Future     Number of Occurrences:   1     Standing Expiration Date:   8/7/2021    CBC Auto Differential     Standing Status:   Future     Number of Occurrences:   1     Standing Expiration Date:   8/7/2021    Comprehensive Metabolic Panel     Standing Status:   Future     Number of Occurrences:   1     Standing Expiration Date:   8/7/2021    Lipid Panel     Standing Status:   Future Cardiology     Number of Visits Requested:   1       Medications Discontinued During This Encounter   Medication Reason    naproxen (NAPROSYN) 500 MG tablet Therapy completed              Gerry Whiting, was evaluated through a synchronous (real-time) audio-video encounter. The patient (or guardian if applicable) is aware that this is a billable service. Verbal consent to proceed has been obtained within the past 12 months. The visit was conducted pursuant to the emergency declaration under the [address] [place] [address], [address] [place] [address] and the [employer] Act. Patient identification was verified    Patient was alone   The patient was located in a state where the provider was credentialed to provide care. On this date 6/24/2021 I have spent 35 minutes reviewing previous notes, test results and face to face with the patient discussing the diagnosis and importance of compliance with the treatment plan as well as documenting on the day of the visit. --Neil Salinas MD on 6/28/2021 at 3:14 PM    An electronic signature was used to authenticate this note.

## 2021-06-25 ENCOUNTER — HOSPITAL ENCOUNTER (OUTPATIENT)
Age: 41
Setting detail: SPECIMEN
Discharge: HOME | End: 2021-06-25
Payer: COMMERCIAL

## 2021-06-25 DIAGNOSIS — I10 ESSENTIAL HYPERTENSION: ICD-10-CM

## 2021-06-25 DIAGNOSIS — N52.9 ERECTILE DYSFUNCTION, UNSPECIFIED ERECTILE DYSFUNCTION TYPE: ICD-10-CM

## 2021-06-25 DIAGNOSIS — M13.0 SERONEGATIVE POLYARTHRITIS: ICD-10-CM

## 2021-06-25 DIAGNOSIS — R53.82 CHRONIC FATIGUE: ICD-10-CM

## 2021-06-25 DIAGNOSIS — E78.5 HYPERLIPIDEMIA WITH TARGET LDL LESS THAN 100: ICD-10-CM

## 2021-06-25 LAB
ABSOLUTE EOS #: 0.54 K/UL (ref 0–0.44)
ABSOLUTE IMMATURE GRANULOCYTE: <0.03 K/UL (ref 0–0.3)
ABSOLUTE LYMPH #: 2.22 K/UL (ref 1.1–3.7)
ABSOLUTE MONO #: 0.66 K/UL (ref 0.1–1.2)
ALBUMIN SERPL-MCNC: 4.4 G/DL (ref 3.5–5.2)
ALBUMIN/GLOBULIN RATIO: 1.3 (ref 1–2.5)
ALP BLD-CCNC: 118 U/L (ref 40–129)
ALT SERPL-CCNC: 34 U/L (ref 5–41)
ANION GAP SERPL CALCULATED.3IONS-SCNC: 16 MMOL/L (ref 9–17)
AST SERPL-CCNC: 30 U/L
BASOPHILS # BLD: 1 % (ref 0–2)
BASOPHILS ABSOLUTE: 0.06 K/UL (ref 0–0.2)
BILIRUB SERPL-MCNC: 0.89 MG/DL (ref 0.3–1.2)
BILIRUBIN URINE: NEGATIVE
BUN BLDV-MCNC: 10 MG/DL (ref 6–20)
BUN/CREAT BLD: NORMAL (ref 9–20)
C-REACTIVE PROTEIN: <3 MG/L (ref 0–5)
CALCIUM SERPL-MCNC: 9.4 MG/DL (ref 8.6–10.4)
CHLORIDE BLD-SCNC: 105 MMOL/L (ref 98–107)
CHOLESTEROL/HDL RATIO: 5.6
CHOLESTEROL: 247 MG/DL
CO2: 23 MMOL/L (ref 20–31)
COLOR: ABNORMAL
COMMENT UA: ABNORMAL
CREAT SERPL-MCNC: 0.99 MG/DL (ref 0.7–1.2)
DIFFERENTIAL TYPE: ABNORMAL
EOSINOPHILS RELATIVE PERCENT: 8 % (ref 1–4)
GFR AFRICAN AMERICAN: >60 ML/MIN
GFR NON-AFRICAN AMERICAN: >60 ML/MIN
GFR SERPL CREATININE-BSD FRML MDRD: NORMAL ML/MIN/{1.73_M2}
GFR SERPL CREATININE-BSD FRML MDRD: NORMAL ML/MIN/{1.73_M2}
GLUCOSE BLD-MCNC: 97 MG/DL (ref 70–99)
GLUCOSE URINE: NEGATIVE
HCT VFR BLD CALC: 48 % (ref 40.7–50.3)
HDLC SERPL-MCNC: 44 MG/DL
HEMOGLOBIN: 15.5 G/DL (ref 13–17)
IMMATURE GRANULOCYTES: 0 %
KETONES, URINE: NEGATIVE
LDL CHOLESTEROL: 167 MG/DL (ref 0–130)
LEUKOCYTE ESTERASE, URINE: NEGATIVE
LYMPHOCYTES # BLD: 35 % (ref 24–43)
MCH RBC QN AUTO: 30 PG (ref 25.2–33.5)
MCHC RBC AUTO-ENTMCNC: 32.3 G/DL (ref 28.4–34.8)
MCV RBC AUTO: 92.8 FL (ref 82.6–102.9)
MONOCYTES # BLD: 10 % (ref 3–12)
NITRITE, URINE: NEGATIVE
NRBC AUTOMATED: 0 PER 100 WBC
PDW BLD-RTO: 13.1 % (ref 11.8–14.4)
PH UA: 6 (ref 5–8)
PLATELET # BLD: 319 K/UL (ref 138–453)
PLATELET ESTIMATE: ABNORMAL
PMV BLD AUTO: 10.8 FL (ref 8.1–13.5)
POTASSIUM SERPL-SCNC: 4.5 MMOL/L (ref 3.7–5.3)
PROTEIN UA: NEGATIVE
RBC # BLD: 5.17 M/UL (ref 4.21–5.77)
RBC # BLD: ABNORMAL 10*6/UL
RHEUMATOID FACTOR: <10 IU/ML
SEDIMENTATION RATE, ERYTHROCYTE: 3 MM (ref 0–15)
SEG NEUTROPHILS: 46 % (ref 36–65)
SEGMENTED NEUTROPHILS ABSOLUTE COUNT: 2.94 K/UL (ref 1.5–8.1)
SODIUM BLD-SCNC: 144 MMOL/L (ref 135–144)
SPECIFIC GRAVITY UA: 1.02 (ref 1–1.03)
TESTOSTERONE TOTAL: 406 NG/DL (ref 220–1000)
TOTAL PROTEIN: 7.7 G/DL (ref 6.4–8.3)
TRIGL SERPL-MCNC: 180 MG/DL
TSH SERPL DL<=0.05 MIU/L-ACNC: 4.07 MIU/L (ref 0.3–5)
TURBIDITY: CLEAR
URIC ACID: 6.1 MG/DL (ref 3.4–7)
URINE HGB: NEGATIVE
UROBILINOGEN, URINE: NORMAL
VITAMIN D 25-HYDROXY: 39.8 NG/ML (ref 30–100)
VLDLC SERPL CALC-MCNC: ABNORMAL MG/DL (ref 1–30)
WBC # BLD: 6.4 K/UL (ref 3.5–11.3)
WBC # BLD: ABNORMAL 10*3/UL

## 2021-06-28 LAB
ANTI DNA DOUBLE STRANDED: 0.7 IU/ML
ANTI-NUCLEAR ANTIBODY (ANA): NEGATIVE
CCP IGG ANTIBODIES: 1.3 U/ML (ref 0–7)
ENA ANTIBODIES SCREEN: 0.2 U/ML

## 2021-09-08 ENCOUNTER — HOSPITAL ENCOUNTER (OUTPATIENT)
Age: 41
Setting detail: SPECIMEN
Discharge: HOME | End: 2021-09-08
Payer: COMMERCIAL

## 2021-09-08 LAB
ABSOLUTE EOS #: 0.48 K/UL (ref 0–0.44)
ABSOLUTE IMMATURE GRANULOCYTE: <0.03 K/UL (ref 0–0.3)
ABSOLUTE LYMPH #: 2.73 K/UL (ref 1.1–3.7)
ABSOLUTE MONO #: 0.64 K/UL (ref 0.1–1.2)
ALBUMIN SERPL-MCNC: 4.6 G/DL (ref 3.5–5.2)
ALBUMIN/GLOBULIN RATIO: 1.5 (ref 1–2.5)
ALP BLD-CCNC: 110 U/L (ref 40–129)
ALT SERPL-CCNC: 36 U/L (ref 5–41)
ANION GAP SERPL CALCULATED.3IONS-SCNC: 14 MMOL/L (ref 9–17)
AST SERPL-CCNC: 24 U/L
BASOPHILS # BLD: 1 % (ref 0–2)
BASOPHILS ABSOLUTE: 0.06 K/UL (ref 0–0.2)
BILIRUB SERPL-MCNC: 0.33 MG/DL (ref 0.3–1.2)
BUN BLDV-MCNC: 9 MG/DL (ref 6–20)
BUN/CREAT BLD: NORMAL (ref 9–20)
C-PEPTIDE: 6.1 NG/ML (ref 1.1–4.4)
C-REACTIVE PROTEIN: <3 MG/L (ref 0–5)
CALCIUM SERPL-MCNC: 9.3 MG/DL (ref 8.6–10.4)
CHLORIDE BLD-SCNC: 106 MMOL/L (ref 98–107)
CO2: 24 MMOL/L (ref 20–31)
CREAT SERPL-MCNC: 0.97 MG/DL (ref 0.7–1.2)
DIFFERENTIAL TYPE: ABNORMAL
EOSINOPHILS RELATIVE PERCENT: 6 % (ref 1–4)
GFR AFRICAN AMERICAN: >60 ML/MIN
GFR NON-AFRICAN AMERICAN: >60 ML/MIN
GFR SERPL CREATININE-BSD FRML MDRD: NORMAL ML/MIN/{1.73_M2}
GFR SERPL CREATININE-BSD FRML MDRD: NORMAL ML/MIN/{1.73_M2}
GLUCOSE BLD-MCNC: 72 MG/DL (ref 70–99)
HCT VFR BLD CALC: 49.3 % (ref 40.7–50.3)
HEMOGLOBIN: 15.7 G/DL (ref 13–17)
IMMATURE GRANULOCYTES: 0 %
LYMPHOCYTES # BLD: 36 % (ref 24–43)
MCH RBC QN AUTO: 30 PG (ref 25.2–33.5)
MCHC RBC AUTO-ENTMCNC: 31.8 G/DL (ref 28.4–34.8)
MCV RBC AUTO: 94.1 FL (ref 82.6–102.9)
MONOCYTES # BLD: 9 % (ref 3–12)
NRBC AUTOMATED: 0 PER 100 WBC
PDW BLD-RTO: 13.9 % (ref 11.8–14.4)
PLATELET # BLD: 346 K/UL (ref 138–453)
PLATELET ESTIMATE: ABNORMAL
PMV BLD AUTO: 10.8 FL (ref 8.1–13.5)
POTASSIUM SERPL-SCNC: 4.6 MMOL/L (ref 3.7–5.3)
RBC # BLD: 5.24 M/UL (ref 4.21–5.77)
RBC # BLD: ABNORMAL 10*6/UL
RHEUMATOID FACTOR: <10 IU/ML
SEDIMENTATION RATE, ERYTHROCYTE: 3 MM (ref 0–15)
SEG NEUTROPHILS: 48 % (ref 36–65)
SEGMENTED NEUTROPHILS ABSOLUTE COUNT: 3.63 K/UL (ref 1.5–8.1)
SODIUM BLD-SCNC: 144 MMOL/L (ref 135–144)
TOTAL PROTEIN: 7.7 G/DL (ref 6.4–8.3)
URIC ACID: 5.4 MG/DL (ref 3.4–7)
WBC # BLD: 7.6 K/UL (ref 3.5–11.3)
WBC # BLD: ABNORMAL 10*3/UL

## 2021-09-09 LAB
ANTI DNA DOUBLE STRANDED: <0.5 IU/ML
ANTI-NUCLEAR ANTIBODY (ANA): NEGATIVE
ENA ANTIBODIES SCREEN: 0.2 U/ML

## 2021-09-28 LAB — SARS-COV-2: DETECTED

## 2021-10-03 ENCOUNTER — APPOINTMENT (OUTPATIENT)
Dept: CT IMAGING | Age: 41
DRG: 177 | End: 2021-10-03
Payer: COMMERCIAL

## 2021-10-03 ENCOUNTER — HOSPITAL ENCOUNTER (INPATIENT)
Age: 41
LOS: 5 days | Discharge: HOME | DRG: 177 | End: 2021-10-08
Attending: EMERGENCY MEDICINE | Admitting: INTERNAL MEDICINE
Payer: COMMERCIAL

## 2021-10-03 ENCOUNTER — APPOINTMENT (OUTPATIENT)
Dept: GENERAL RADIOLOGY | Age: 41
DRG: 177 | End: 2021-10-03
Payer: COMMERCIAL

## 2021-10-03 DIAGNOSIS — M54.2 NECK PAIN: ICD-10-CM

## 2021-10-03 DIAGNOSIS — M48.061 DEGENERATIVE LUMBAR SPINAL STENOSIS: ICD-10-CM

## 2021-10-03 DIAGNOSIS — U07.1 COVID-19: Primary | ICD-10-CM

## 2021-10-03 DIAGNOSIS — R29.898 LEFT LEG WEAKNESS: ICD-10-CM

## 2021-10-03 PROBLEM — G45.9 TIA (TRANSIENT ISCHEMIC ATTACK): Status: ACTIVE | Noted: 2021-10-03

## 2021-10-03 LAB
% CKMB: 1.1 % (ref 0–3.5)
ABSOLUTE EOS #: 0.03 K/UL (ref 0–0.44)
ABSOLUTE IMMATURE GRANULOCYTE: <0.03 K/UL (ref 0–0.3)
ABSOLUTE LYMPH #: 1.79 K/UL (ref 1.1–3.7)
ABSOLUTE MONO #: 0.51 K/UL (ref 0.1–1.2)
ALBUMIN SERPL-MCNC: 4.1 G/DL (ref 3.5–5.2)
ALBUMIN/GLOBULIN RATIO: 1.1 (ref 1–2.5)
ALLEN TEST: ABNORMAL
ALP BLD-CCNC: 106 U/L (ref 40–129)
ALT SERPL-CCNC: 25 U/L (ref 5–41)
ANION GAP SERPL CALCULATED.3IONS-SCNC: 14 MMOL/L (ref 9–17)
ANION GAP: 13 MMOL/L (ref 7–16)
AST SERPL-CCNC: 32 U/L
BASOPHILS # BLD: 1 % (ref 0–2)
BASOPHILS ABSOLUTE: 0.05 K/UL (ref 0–0.2)
BILIRUB SERPL-MCNC: 0.16 MG/DL (ref 0.3–1.2)
BILIRUBIN DIRECT: <0.08 MG/DL
BILIRUBIN, INDIRECT: ABNORMAL MG/DL (ref 0–1)
BUN BLDV-MCNC: 16 MG/DL (ref 6–20)
BUN/CREAT BLD: ABNORMAL (ref 9–20)
C-REACTIVE PROTEIN: 60.8 MG/L (ref 0–5)
CALCIUM SERPL-MCNC: 8.5 MG/DL (ref 8.6–10.4)
CHLORIDE BLD-SCNC: 99 MMOL/L (ref 98–107)
CHOLESTEROL/HDL RATIO: 6
CHOLESTEROL: 167 MG/DL
CK MB: <1 NG/ML
CKMB INTERPRETATION: ABNORMAL
CO2: 23 MMOL/L (ref 20–31)
CREAT SERPL-MCNC: 1.08 MG/DL (ref 0.7–1.2)
DIFFERENTIAL TYPE: ABNORMAL
EOSINOPHILS RELATIVE PERCENT: 0 % (ref 1–4)
FIBRINOGEN: 536 MG/DL (ref 140–420)
FIO2: ABNORMAL
GFR AFRICAN AMERICAN: >60 ML/MIN
GFR NON-AFRICAN AMERICAN: >60 ML/MIN
GFR NON-AFRICAN AMERICAN: >60 ML/MIN
GFR SERPL CREATININE-BSD FRML MDRD: >60 ML/MIN
GFR SERPL CREATININE-BSD FRML MDRD: ABNORMAL ML/MIN/{1.73_M2}
GFR SERPL CREATININE-BSD FRML MDRD: ABNORMAL ML/MIN/{1.73_M2}
GFR SERPL CREATININE-BSD FRML MDRD: NORMAL ML/MIN/{1.73_M2}
GLOBULIN: ABNORMAL G/DL (ref 1.5–3.8)
GLUCOSE BLD-MCNC: 86 MG/DL (ref 70–99)
GLUCOSE BLD-MCNC: 93 MG/DL (ref 74–100)
HCO3 VENOUS: 27.1 MMOL/L (ref 22–29)
HCT VFR BLD CALC: 50.5 % (ref 40.7–50.3)
HDLC SERPL-MCNC: 28 MG/DL
HEMOGLOBIN: 16.5 G/DL (ref 13–17)
IMMATURE GRANULOCYTES: 0 %
INR BLD: 1
LACTATE DEHYDROGENASE: 228 U/L (ref 135–225)
LDL CHOLESTEROL: 81 MG/DL (ref 0–130)
LYMPHOCYTES # BLD: 21 % (ref 24–43)
MCH RBC QN AUTO: 29.8 PG (ref 25.2–33.5)
MCHC RBC AUTO-ENTMCNC: 32.7 G/DL (ref 28.4–34.8)
MCV RBC AUTO: 91.2 FL (ref 82.6–102.9)
MODE: ABNORMAL
MONOCYTES # BLD: 6 % (ref 3–12)
MYOGLOBIN: 35 NG/ML (ref 28–72)
NEGATIVE BASE EXCESS, VEN: ABNORMAL (ref 0–2)
NRBC AUTOMATED: 0 PER 100 WBC
O2 DEVICE/FLOW/%: ABNORMAL
O2 SAT, VEN: 38 % (ref 60–85)
PARTIAL THROMBOPLASTIN TIME: 32.2 SEC (ref 20.5–30.5)
PATIENT TEMP: ABNORMAL
PCO2, VEN: 43.8 MM HG (ref 41–51)
PDW BLD-RTO: 13.3 % (ref 11.8–14.4)
PH VENOUS: 7.4 (ref 7.32–7.43)
PLATELET # BLD: 235 K/UL (ref 138–453)
PLATELET ESTIMATE: ABNORMAL
PMV BLD AUTO: 10.7 FL (ref 8.1–13.5)
PO2, VEN: 22.4 MM HG (ref 30–50)
POC BUN: 17 MG/DL (ref 8–26)
POC CHLORIDE: 101 MMOL/L (ref 98–107)
POC CREATININE: 1.13 MG/DL (ref 0.51–1.19)
POC HEMATOCRIT: 54 % (ref 41–53)
POC HEMOGLOBIN: 18.4 G/DL (ref 13.5–17.5)
POC IONIZED CALCIUM: 1.05 MMOL/L (ref 1.15–1.33)
POC LACTIC ACID: 1.02 MMOL/L (ref 0.56–1.39)
POC PCO2 TEMP: ABNORMAL MM HG
POC PH TEMP: ABNORMAL
POC PO2 TEMP: ABNORMAL MM HG
POC POTASSIUM: 3.5 MMOL/L (ref 3.5–4.5)
POC SODIUM: 140 MMOL/L (ref 138–146)
POC TCO2: 28 MMOL/L (ref 22–30)
POSITIVE BASE EXCESS, VEN: 2 (ref 0–3)
POTASSIUM SERPL-SCNC: 3.8 MMOL/L (ref 3.7–5.3)
PROCALCITONIN: 0.09 NG/ML
PROTHROMBIN TIME: 10.6 SEC (ref 9.1–12.3)
RBC # BLD: 5.54 M/UL (ref 4.21–5.77)
RBC # BLD: ABNORMAL 10*6/UL
SAMPLE SITE: ABNORMAL
SEG NEUTROPHILS: 72 % (ref 36–65)
SEGMENTED NEUTROPHILS ABSOLUTE COUNT: 6.26 K/UL (ref 1.5–8.1)
SODIUM BLD-SCNC: 136 MMOL/L (ref 135–144)
TOTAL CK: 89 U/L (ref 39–308)
TOTAL CO2, VENOUS: ABNORMAL MMOL/L (ref 23–30)
TOTAL PROTEIN: 7.7 G/DL (ref 6.4–8.3)
TRIGL SERPL-MCNC: 290 MG/DL
TROPONIN INTERP: ABNORMAL
TROPONIN T: ABNORMAL NG/ML
TROPONIN, HIGH SENSITIVITY: <6 NG/L (ref 0–22)
VLDLC SERPL CALC-MCNC: ABNORMAL MG/DL (ref 1–30)
WBC # BLD: 8.7 K/UL (ref 3.5–11.3)
WBC # BLD: ABNORMAL 10*3/UL

## 2021-10-03 PROCEDURE — 82330 ASSAY OF CALCIUM: CPT

## 2021-10-03 PROCEDURE — 85610 PROTHROMBIN TIME: CPT

## 2021-10-03 PROCEDURE — 6370000000 HC RX 637 (ALT 250 FOR IP): Performed by: STUDENT IN AN ORGANIZED HEALTH CARE EDUCATION/TRAINING PROGRAM

## 2021-10-03 PROCEDURE — 84484 ASSAY OF TROPONIN QUANT: CPT

## 2021-10-03 PROCEDURE — 82553 CREATINE MB FRACTION: CPT

## 2021-10-03 PROCEDURE — 82728 ASSAY OF FERRITIN: CPT

## 2021-10-03 PROCEDURE — 80048 BASIC METABOLIC PNL TOTAL CA: CPT

## 2021-10-03 PROCEDURE — 99285 EMERGENCY DEPT VISIT HI MDM: CPT

## 2021-10-03 PROCEDURE — 85025 COMPLETE CBC W/AUTO DIFF WBC: CPT

## 2021-10-03 PROCEDURE — 85730 THROMBOPLASTIN TIME PARTIAL: CPT

## 2021-10-03 PROCEDURE — 83605 ASSAY OF LACTIC ACID: CPT

## 2021-10-03 PROCEDURE — 85014 HEMATOCRIT: CPT

## 2021-10-03 PROCEDURE — 82550 ASSAY OF CK (CPK): CPT

## 2021-10-03 PROCEDURE — 70450 CT HEAD/BRAIN W/O DYE: CPT

## 2021-10-03 PROCEDURE — 80076 HEPATIC FUNCTION PANEL: CPT

## 2021-10-03 PROCEDURE — 85384 FIBRINOGEN ACTIVITY: CPT

## 2021-10-03 PROCEDURE — 80061 LIPID PANEL: CPT

## 2021-10-03 PROCEDURE — 6360000004 HC RX CONTRAST MEDICATION: Performed by: STUDENT IN AN ORGANIZED HEALTH CARE EDUCATION/TRAINING PROGRAM

## 2021-10-03 PROCEDURE — 2580000003 HC RX 258: Performed by: STUDENT IN AN ORGANIZED HEALTH CARE EDUCATION/TRAINING PROGRAM

## 2021-10-03 PROCEDURE — 96374 THER/PROPH/DIAG INJ IV PUSH: CPT

## 2021-10-03 PROCEDURE — 80051 ELECTROLYTE PANEL: CPT

## 2021-10-03 PROCEDURE — 83874 ASSAY OF MYOGLOBIN: CPT

## 2021-10-03 PROCEDURE — 6360000002 HC RX W HCPCS: Performed by: STUDENT IN AN ORGANIZED HEALTH CARE EDUCATION/TRAINING PROGRAM

## 2021-10-03 PROCEDURE — 86140 C-REACTIVE PROTEIN: CPT

## 2021-10-03 PROCEDURE — 99223 1ST HOSP IP/OBS HIGH 75: CPT | Performed by: INTERNAL MEDICINE

## 2021-10-03 PROCEDURE — 71045 X-RAY EXAM CHEST 1 VIEW: CPT

## 2021-10-03 PROCEDURE — 93005 ELECTROCARDIOGRAM TRACING: CPT

## 2021-10-03 PROCEDURE — 70496 CT ANGIOGRAPHY HEAD: CPT

## 2021-10-03 PROCEDURE — 1200000000 HC SEMI PRIVATE

## 2021-10-03 PROCEDURE — 83615 LACTATE (LD) (LDH) ENZYME: CPT

## 2021-10-03 PROCEDURE — 84145 PROCALCITONIN (PCT): CPT

## 2021-10-03 PROCEDURE — 84520 ASSAY OF UREA NITROGEN: CPT

## 2021-10-03 PROCEDURE — 82947 ASSAY GLUCOSE BLOOD QUANT: CPT

## 2021-10-03 PROCEDURE — 8E0ZXY6 ISOLATION: ICD-10-PCS | Performed by: STUDENT IN AN ORGANIZED HEALTH CARE EDUCATION/TRAINING PROGRAM

## 2021-10-03 PROCEDURE — 96375 TX/PRO/DX INJ NEW DRUG ADDON: CPT

## 2021-10-03 PROCEDURE — 82803 BLOOD GASES ANY COMBINATION: CPT

## 2021-10-03 PROCEDURE — 82565 ASSAY OF CREATININE: CPT

## 2021-10-03 PROCEDURE — 99448 NTRPROF PH1/NTRNET/EHR 21-30: CPT | Performed by: PSYCHIATRY & NEUROLOGY

## 2021-10-03 RX ORDER — SODIUM CHLORIDE 0.9 % (FLUSH) 0.9 %
5-40 SYRINGE (ML) INJECTION EVERY 12 HOURS SCHEDULED
Status: DISCONTINUED | OUTPATIENT
Start: 2021-10-03 | End: 2021-10-08 | Stop reason: HOSPADM

## 2021-10-03 RX ORDER — SODIUM CHLORIDE 9 MG/ML
INJECTION, SOLUTION INTRAVENOUS CONTINUOUS
Status: DISCONTINUED | OUTPATIENT
Start: 2021-10-03 | End: 2021-10-08 | Stop reason: HOSPADM

## 2021-10-03 RX ORDER — ONDANSETRON 2 MG/ML
4 INJECTION INTRAMUSCULAR; INTRAVENOUS EVERY 6 HOURS PRN
Status: DISCONTINUED | OUTPATIENT
Start: 2021-10-03 | End: 2021-10-08 | Stop reason: HOSPADM

## 2021-10-03 RX ORDER — SODIUM CHLORIDE 0.9 % (FLUSH) 0.9 %
5-40 SYRINGE (ML) INJECTION PRN
Status: DISCONTINUED | OUTPATIENT
Start: 2021-10-03 | End: 2021-10-08 | Stop reason: HOSPADM

## 2021-10-03 RX ORDER — ACETAMINOPHEN 325 MG/1
650 TABLET ORAL EVERY 6 HOURS PRN
Status: DISCONTINUED | OUTPATIENT
Start: 2021-10-03 | End: 2021-10-08 | Stop reason: HOSPADM

## 2021-10-03 RX ORDER — MORPHINE SULFATE 4 MG/ML
4 INJECTION, SOLUTION INTRAMUSCULAR; INTRAVENOUS ONCE
Status: COMPLETED | OUTPATIENT
Start: 2021-10-03 | End: 2021-10-03

## 2021-10-03 RX ORDER — ACETAMINOPHEN 650 MG/1
650 SUPPOSITORY RECTAL EVERY 6 HOURS PRN
Status: DISCONTINUED | OUTPATIENT
Start: 2021-10-03 | End: 2021-10-08 | Stop reason: HOSPADM

## 2021-10-03 RX ORDER — POLYETHYLENE GLYCOL 3350 17 G/17G
17 POWDER, FOR SOLUTION ORAL DAILY PRN
Status: DISCONTINUED | OUTPATIENT
Start: 2021-10-03 | End: 2021-10-08 | Stop reason: HOSPADM

## 2021-10-03 RX ORDER — ONDANSETRON 2 MG/ML
4 INJECTION INTRAMUSCULAR; INTRAVENOUS ONCE
Status: COMPLETED | OUTPATIENT
Start: 2021-10-03 | End: 2021-10-03

## 2021-10-03 RX ORDER — PRAVASTATIN SODIUM 20 MG
20 TABLET ORAL NIGHTLY
Status: DISCONTINUED | OUTPATIENT
Start: 2021-10-03 | End: 2021-10-04

## 2021-10-03 RX ORDER — CLOPIDOGREL 300 MG/1
300 TABLET, FILM COATED ORAL ONCE
Status: COMPLETED | OUTPATIENT
Start: 2021-10-03 | End: 2021-10-03

## 2021-10-03 RX ORDER — ASPIRIN 325 MG
325 TABLET ORAL ONCE
Status: COMPLETED | OUTPATIENT
Start: 2021-10-03 | End: 2021-10-03

## 2021-10-03 RX ORDER — BUDESONIDE AND FORMOTEROL FUMARATE DIHYDRATE 160; 4.5 UG/1; UG/1
2 AEROSOL RESPIRATORY (INHALATION) 2 TIMES DAILY
Status: DISCONTINUED | OUTPATIENT
Start: 2021-10-03 | End: 2021-10-08 | Stop reason: HOSPADM

## 2021-10-03 RX ORDER — ALBUTEROL SULFATE 90 UG/1
2 AEROSOL, METERED RESPIRATORY (INHALATION) EVERY 6 HOURS PRN
Status: DISCONTINUED | OUTPATIENT
Start: 2021-10-03 | End: 2021-10-08 | Stop reason: HOSPADM

## 2021-10-03 RX ORDER — SODIUM CHLORIDE 9 MG/ML
25 INJECTION, SOLUTION INTRAVENOUS PRN
Status: DISCONTINUED | OUTPATIENT
Start: 2021-10-03 | End: 2021-10-08 | Stop reason: HOSPADM

## 2021-10-03 RX ORDER — ONDANSETRON 4 MG/1
4 TABLET, ORALLY DISINTEGRATING ORAL EVERY 8 HOURS PRN
Status: DISCONTINUED | OUTPATIENT
Start: 2021-10-03 | End: 2021-10-08 | Stop reason: HOSPADM

## 2021-10-03 RX ADMIN — ACETAMINOPHEN 650 MG: 325 TABLET ORAL at 23:40

## 2021-10-03 RX ADMIN — SODIUM CHLORIDE: 9 INJECTION, SOLUTION INTRAVENOUS at 23:40

## 2021-10-03 RX ADMIN — SODIUM CHLORIDE, PRESERVATIVE FREE 10 ML: 5 INJECTION INTRAVENOUS at 23:41

## 2021-10-03 RX ADMIN — MORPHINE SULFATE 4 MG: 4 INJECTION INTRAVENOUS at 20:13

## 2021-10-03 RX ADMIN — ONDANSETRON 4 MG: 2 INJECTION INTRAMUSCULAR; INTRAVENOUS at 19:27

## 2021-10-03 RX ADMIN — IOPAMIDOL 90 ML: 755 INJECTION, SOLUTION INTRAVENOUS at 19:30

## 2021-10-03 RX ADMIN — CLOPIDOGREL BISULFATE 300 MG: 300 TABLET, FILM COATED ORAL at 20:13

## 2021-10-03 RX ADMIN — ASPIRIN 325 MG: 325 TABLET ORAL at 20:13

## 2021-10-03 ASSESSMENT — PAIN SCALES - GENERAL
PAINLEVEL_OUTOF10: 8
PAINLEVEL_OUTOF10: 7
PAINLEVEL_OUTOF10: 7

## 2021-10-03 ASSESSMENT — PAIN DESCRIPTION - PAIN TYPE: TYPE: ACUTE PAIN

## 2021-10-03 ASSESSMENT — PAIN DESCRIPTION - LOCATION: LOCATION: NECK

## 2021-10-03 NOTE — ED NOTES
Pt placed on continuous cardiac monitoring, BP, Pulse ox. EKG obtained. IV established and labs drawn.  The following labs labeled with pt sticker and tubed to lab:     [x] Blue     [x] Lavender   [] on ice  [x] Green/yellow  [x] Green/black [] on ice  [] Yellow  [] Red  [] Pink      [] COVID-19 swab    [] Rapid  [] PCR  [] Peds Viral Panel     [] Urine Sample  [] Pelvic Cultures  [] Blood Cultures          Tristan Ramos RN  10/03/21 [id number]

## 2021-10-03 NOTE — CONSULTS
Department of Endovascular Neurosurgery                                         Resident Consult Note    Reason for Consult: Left lower extremity weakness  Endovascular Neurosurgeon:   []Dr. Tiffanie Elizabeth  [x]Dr. Bing Redd  []Dr. Julio César Delacruz    History Obtained From:  patient    CHIEF COMPLAINT:       Left lower extremity weakness    HISTORY OF PRESENT ILLNESS:       The patient is a 38 y.o. male who presents with left lower extremity weakness that started around 3 PM, patient has Covid positive since Monday symptomatic, upon arrival [facility] 1 for mild left lower extremity drift, also patient noted to have 4/5 strength of left lower extremity weakness, patient denied any history of seizure, stroke, TIA, patient has a history of hypertension, hyperlipidemia, he is on pravastatin 20 mg,    CT head: no acute intercranial abnl  CTA: No LVO     PAST MEDICAL HISTORY :       Past Medical History:        Diagnosis Date    Asthma     COPD (chronic obstructive pulmonary disease) ([facility] [address] 11/19/2018    Depression     Erectile dysfunction 9/29/2017    Essential hypertension 1/2/2017    GERD (gastroesophageal reflux disease) 11/19/2018    Heart palpitations 9/29/2017    Hyperlipidemia with target LDL less than 100 3/23/2017    Left lower lobe pneumonia 11/9/2012    Mitral valve prolapse     Nonspecific reactive hepatitis 3/22/2017    Obesity (BMI 30-39.9) 3/23/2017    NOEMÍ (obstructive sleep apnea) 10/16/2019    Osteoarthritis     Seronegative polyarthritis 10/10/2016    followed w/ rheumatology in [address] [place] [address] anxiety disorder     Uvulitis 8/15/2019       Past Surgical History:        Procedure Laterality Date    COLONOSCOPY      CYST REMOVAL Right     Armpit     ENDOSCOPY, COLON, DIAGNOSTIC      HAND SURGERY Left     KNEE ARTHROSCOPY      right knee    NOSE SURGERY      TONSILLECTOMY      UPPER GASTROINTESTINAL ENDOSCOPY N/A 10/9/2019    EGD POLYP SNARE, HOT.  ESOPHAGEAL DILATATION WITH MALONIES 50F performed by Anayeli Mejias MD at [facility] EXTRACTION         Social History:   Social History     Socioeconomic History    Marital status:      Spouse name: Not on file    Number of children: Not on file    Years of education: Not on file    Highest education level: Not on file   Occupational History    Not on file   Tobacco Use    Smoking status: Never Smoker    Smokeless tobacco: Never Used   Vaping Use    Vaping Use: Never used   Substance and Sexual Activity    Alcohol use: Yes     Comment: occasional  few x year    Drug use: No    Sexual activity: Yes     Partners: Male   Other Topics Concern    Not on file   Social History Narrative    Not on file     Social Determinants of Health     Financial Resource Strain: Low Risk     Difficulty of Paying Living Expenses: Not hard at all   Food Insecurity: No Food Insecurity    Worried About [address] in the Last Year: Never true    [address] in the Last Year: Never true   Transportation Needs:     Lack of Transportation (Medical):      Lack of Transportation (Non-Medical):    Physical Activity:     Days of Exercise per Week:     Minutes of Exercise per Session:    Stress:     Feeling of Stress :    Social Connections:     Frequency of Communication with Friends and Family:     Frequency of Social Gatherings with Friends and Family:     Attends Oriental orthodox Services:     Active Member of Clubs or Organizations:     Attends Club or Organization Meetings:     Marital Status:    Intimate Partner Violence:     Fear of Current or Ex-Partner:     Emotionally Abused:     Physically Abused:     Sexually Abused:        Family History:       Problem Relation Age of Onset    Diabetes Father     High Cholesterol Father     Other Mother         autoimmune hepatitis     High Blood Pressure Brother     Heart Disease Brother     Heart Attack Brother 40        cabg age 40     Rheum Arthritis Other 7    Colon Cancer Neg Hx     Cancer Neg Hx        Allergies:  Fish-derived products, Other, Shellfish allergy, Shrimp (diagnostic), and Ace inhibitors    Home Medications:  Prior to Admission medications    Medication Sig Start Date End Date Taking? Authorizing Provider   pravastatin (PRAVACHOL) 20 MG tablet take 1 tablet by mouth every evening **STOP ATORVASTATIN** 6/7/21   Ashish Hayes MD   amLODIPine (NORVASC) 10 MG tablet take 1 tablet by mouth every morning 1/8/21   Ashish Hayes MD   buPROPion (WELLBUTRIN XL) 150 MG extended release tablet Take 1 tablet by mouth every morning 1/8/21   Ashish Hayes MD   busPIRone (BUSPAR) 10 MG tablet Take 1 tablet by mouth 3 times daily as needed (anxiety) 1/8/21   Ashish Hayes MD   loratadine (CLARITIN) 10 MG tablet Take 1 tablet by mouth daily 1/8/21   Ashish Hayes MD   omeprazole (PRILOSEC) 40 MG delayed release capsule TAKE 1 CAPSULE DAILY 1/8/21   Ashish Hayes MD   celecoxib (CELEBREX) 100 MG capsule Take 1 capsule by mouth daily as needed for Pain 1/8/21   Ashish Hayes MD   cyclobenzaprine (FLEXERIL) 5 MG tablet Take 1 tablet by mouth 3 times daily as needed for Muscle spasms  Patient not taking: Reported on 6/23/2021 1/8/21   Ashish Hayes MD   fluticasone (FLONASE) 50 MCG/ACT nasal spray 2 sprays by Nasal route daily 10/27/20 10/27/21  Cheli Booker, APRN - CNP   mometasone-formoterol (DULERA) 100-5 MCG/ACT inhaler Inhale 2 puffs into the lungs 2 times daily Stop Flovent 5/8/20   Ashish Hayes MD   albuterol sulfate HFA (VENTOLIN HFA) 108 (90 Base) MCG/ACT inhaler Inhale 2 puffs into the lungs every 6 hours as needed for Wheezing or Shortness of Breath 5/8/20   Ashish Hayes MD   albuterol (PROVENTIL) (2.5 MG/3ML) 0.083% nebulizer solution Take 3 mLs by nebulization every 6 hours as needed for Wheezing or Shortness of Breath 1/2/20   Ashish Hayes MD   Blood Pressure KIT Dx: HTN.  Needs automatic blood pressure machine to monitor his blood pressure. Patient not taking: Reported on 6/23/2021 3/22/17   Madison Hatchet, MD   Respiratory Therapy Supplies (NEBULIZER) MADHAV 1 Units by Does not apply route 2 times daily Dx: Asthma exacerbation J45.901 1/31/17   Phillip Skelton MD       Current Medications:   Current Facility-Administered Medications: iopamidol (ISOVUE-370) 76 % injection 90 mL, 90 mL, IntraVENous, ONCE PRN    REVIEW OF SYSTEMS:       CONSTITUTIONAL: negative for fatigue and malaise   EYES: negative for double vision and photophobia    HEENT: negative for tinnitus and sore throat   RESPIRATORY: negative for cough, shortness of breath   CARDIOVASCULAR: negative for chest pain, palpitations   GASTROINTESTINAL: negative for nausea, vomiting   GENITOURINARY: negative for incontinence   MUSCULOSKELETAL: negative for neck or back pain   NEUROLOGICAL: negative for seizures   PSYCHIATRIC: negative for fatigue     Review of systems otherwise negative. PHYSICAL EXAM:       /80   Pulse 93   Temp 99.1 °F (37.3 °C)   Resp 17   Ht 6' 2\" (1.88 m)   Wt 260 lb (117.9 kg)   SpO2 93%   BMI 33.38 kg/m²     CONSTITUTIONAL:  Well developed, well nourished, alert and oriented x 3, in no acute distress. GCS 15, nontoxic. No dysarthria, no aphasia. EOMI.     HEAD:  normocephalic, atraumatic    EYES:  PERRLA, EOMI.   ENT:  moist mucous membranes   NECK:  supple, symmetric, no midline tenderness to palpation    BACK:  without midline tenderness, step-offs or deformities    LUNGS:  Equal air entry bilaterally   CARDIOVASCULAR:  normal s1 / s2   ABDOMEN:  Soft, no rigidity   NEUROLOGIC:  Mental Status:  A & O x3,awake             Cranial Nerves:    cranial nerves II-XII are grossly intact    Motor Exam:    Drift:  present - LLE     Motor exam is 5 out of 5 all extremities with the exception of LLE 4/5     Sensory:    Touch:    Right Upper Extremity:  normal  Left Upper Extremity:  normal  Right Lower Extremity: normal  Left Lower Extremity:  normal    Deep Tendon Reflexes:    Right Bicep:  2+  Left Bicep:  2+  Right Knee:  2+  Left Knee:  2+    Plantar Response:  Right:  equivocal  Left:  equivocal    Clonus:  N/A  Huff's:  N/A    Coordination/Dysmetria:    Finger to Nose:   Right:  normal  Left:  normal   Dysdiadochokinesia:  N/A    Gait:  Not assessed     INITIAL NIH STROKE SCALE:     1a.  Level of consciousness:  0 - alert; keenly responsive  1b. Level of consciousness questions:  0 - answers both questions correctly  1c. Level of consciousness questions:  0 - performs both tasks correctly  2. Best Gaze:  0 - normal  3. Visual:  0 - no visual loss  4. Facial Palsy:  0 - normal symmetric movement  5a. Motor left arm:  0 - no drift, limb holds 90 (or 45) degrees for full 10 seconds  5b. Motor right arm:  0 - no drift, limb holds 90 (or 45) degrees for full 10 seconds  6a. Motor left le - drift; leg falls by the end of the 5 second period but does not hit bed  6b. Motor right le - no drift; leg holds 30 degree position for full 5 seconds  7. Limb Ataxia:  0 - absent  8. Sensory:  0 - normal; no sensory loss  9. Best Language:  0 - no aphasia, normal  10. Dysarthria:  0 - normal  11.   Extinction and Inattention:  0 - no abnormality    TOTAL:  1     SKIN:  no rash      LABS AND IMAGING:     CBC with Differential:    Lab Results   Component Value Date    WBC 8.7 10/03/2021    RBC 5.54 10/03/2021    RBC 4.97 2012    HGB 16.5 10/03/2021    HCT 50.5 10/03/2021     10/03/2021     2012    MCV 91.2 10/03/2021    MCH 29.8 10/03/2021    MCHC 32.7 10/03/2021    RDW 13.3 10/03/2021    LYMPHOPCT 21 10/03/2021    MONOPCT 6 10/03/2021    BASOPCT 1 10/03/2021    MONOSABS 0.51 10/03/2021    LYMPHSABS 1.79 10/03/2021    EOSABS 0.03 10/03/2021    BASOSABS 0.05 10/03/2021    DIFFTYPE NOT REPORTED 10/03/2021     BMP:    Lab Results   Component Value Date    NA PENDING 10/03/2021 K PENDING 10/03/2021    CL PENDING 10/03/2021    CO2 PENDING 10/03/2021    BUN PENDING 10/03/2021    LABALBU 4.6 09/08/2021    LABALBU 4.5 04/30/2012    CREATININE PENDING 10/03/2021    CREATININE 1.13 10/03/2021    CALCIUM PENDING 10/03/2021    GFRAA PENDING 10/03/2021    LABGLOM PENDING 10/03/2021    GLUCOSE PENDING 10/03/2021    GLUCOSE 96 04/30/2012             ASSESSMENT AND PLAN:       Patient Active Problem List   Diagnosis    Seronegative polyarthritis    Fatigue    Essential hypertension    Social anxiety disorder    Nonspecific reactive hepatitis    Hyperglycemia    Obesity (BMI 30-39. 9)    LVH (left ventricular hypertrophy) due to hypertensive disease    Hyperlipidemia with target LDL less than 100    Asthma, Moderate persistent asthma without complication    Hypertriglyceridemia    Erectile dysfunction    Family history of premature CAD    Gastroesophageal reflux disease with esophagitis without hemorrhage    Refused pneumococcal vaccination    Refuses tetanus, diphtheria, and acellular pertussis (Tdap) vaccination    Family history of thyroid nodule    Gastric polyp    NOEMÍ (obstructive sleep apnea)    Eosinophilic esophagitis    Mild episode of recurrent major depressive disorder ([facility] [address]    Allergic rhinitis due to allergen    Influenza vaccination declined    Left elbow tendinitis    Biceps tendinitis of left upper extremity       The patient is a 38 y.o. male who presents with left lower extremity weakness that started around 3 PM, patient has Covid positive since Monday symptomatic, upon arrival [facility] for mild left lower extremity drift, also patient noted to have 4/5 strength of left lower extremity weakness, patient denied any history of seizure, stroke, TIA, patient has a history of hypertension, hyperlipidemia, he is on pravastatin 20 mg,    CT head: no acute intercranial abnl  CTA: No LVO  - Discussed with : Patient outside of the window for TPA, will admit the patient for stroke work-up, MRI in the morning CT and CTA: Unremarkable, patient loaded with aspirin and Plavix, is going to stay on maintenance dose until proven negative stroke, high dose of pravastatin,     Labs     - A1C, Fasting Lipid panel   Orders   - PT, OT, Speech eval, PMR c/s   - Telemetry    - Neuro checks per protocol  - We recommend SBP <200  - Blood glucose goal less than 180  - Please avoid dextrose containing solutions     Additional recommendations may follow    Please contact [facility] with any changes in patients neurologic status.            Bhupinder Whiting MD   10/3/2021  7:22 PM

## 2021-10-03 NOTE — ED PROVIDER NOTES
[facility]     Emergency Department     Faculty Attestation    I performed a history and physical examination of the patient and discussed management with the resident. I reviewed the residents note and agree with the documented findings and plan of care. Any areas of disagreement are noted on the chart. I was personally present for the key portions of any procedures. I have documented in the chart those procedures where I was not present during the key portions. I have reviewed the emergency nurses triage note. I agree with the chief complaint, past medical history, past surgical history, allergies, medications, social and family history as documented unless otherwise noted below. For Physician Assistant/ Nurse Practitioner cases/documentation I have personally evaluated this patient and have completed at least one if not all key elements of the E/M (history, physical exam, and MDM). Additional findings are as noted. Patient who had a positive Covid test on Monday presents with weakness to his left lower extremity that he says started at around 2:00 this afternoon. He says he has been able to walk but feels like he is dragging his foot when doing so. He denies any pain to the leg or the back. He says he has had some pain in his neck and chest.  He says he has had some increased shortness of breath as well. He denies abdominal pain, vomiting or diarrhea. He denies any weakness or numbness to his upper extremities. Patient says that he got tested for Covid because his wife had tested positive for. He says he did start having a fever earlier in the week. On exam, patient is resting comfortably in the bed. He is alert and oriented and answering questions appropriately. Lungs clear to auscultation bilaterally heart sounds are normal.  Abdomen soft nontender. There is weakness to the left lower extremity compared to the right. Sensation is intact. We will call a stroke alert.   Will get CT and CTA of the head and neck. Will check labs and plan to admit patient.     EKG Interpretation    Interpreted by emergency department physician    Rhythm: normal sinus   Rate: normal  Axis: normal  Ectopy: none  Conduction: normal  ST Segments: normal  T Waves: normal  Q Waves: none    Clinical Impression: non-specific EKG    MD Rona Cristina MD  Attending Emergency  Physician              Orlando Boone MD  10/03/21 7547

## 2021-10-03 NOTE — ED PROVIDER NOTES
[facility] ED  Emergency Department Encounter  Emergency Medicine Resident     Pt Name: Edward Huitron  MRN: [id number]  [place] 1980  Date of evaluation: 10/3/21  PCP:  Vannesa Kimble MD    [address] [place] [address]       Chief Complaint   Patient presents with    Positive For Covid-19    Cough    Chest Pain    Fever       HISTORY OFPRESENT ILLNESS  (Location/Symptom, Timing/Onset, Context/Setting, Quality, Duration, Modifying Leeta Joanna.)      Edward Huitron is a 38 y.o. male with history of asthma, COPD, essential hypertension, GERD, heart palpitations, hyperlipidemia, left lower lobe pneumonia, mitral valve prolapse, obesity with no stress test done within the past 5 years as per our records who presents with concerns for chest discomfort, difficulty breathing as well as for positive COVID-19 diagnosis. Patient has most recent Covid test on Monday, presented today with concerns for acute onset of weakness of the left lower extremity which occurred at approximately 2 PM this afternoon. Patient states he has had some difficulty with walking secondary to left lower extremity weakness. Patient denies any cervical, thoracic or lumbar back pain. Stroke alert called with concerns for acute onset of left lower extremity weakness. Patient denies history of similar symptoms, does endorse generalized arthralgias as well as cough, chest pain. PAST MEDICAL / SURGICAL / SOCIAL / FAMILY HISTORY      has a past medical history of Asthma, COPD (chronic obstructive pulmonary disease) ([address] Depression, Erectile dysfunction, Essential hypertension, GERD (gastroesophageal reflux disease), Heart palpitations, Hyperlipidemia with target LDL less than 100, Left lower lobe pneumonia, Mitral valve prolapse, Nonspecific reactive hepatitis, Obesity (BMI 30-39.9), NOEMÍ (obstructive sleep apnea), Osteoarthritis, Seronegative polyarthritis, Social anxiety disorder, and Uvulitis.      has a past surgical history that includes Knee arthroscopy; Hand surgery (Left); Tonsillectomy; Nose surgery; Deep Gap tooth extraction; Colonoscopy; Endoscopy, colon, diagnostic; cyst removal (Right); and Upper gastrointestinal endoscopy (N/A, 10/9/2019). Social History     Socioeconomic History    Marital status:      Spouse name: Not on file    Number of children: Not on file    Years of education: Not on file    Highest education level: Not on file   Occupational History    Not on file   Tobacco Use    Smoking status: Never Smoker    Smokeless tobacco: Never Used   Vaping Use    Vaping Use: Never used   Substance and Sexual Activity    Alcohol use: Yes     Comment: occasional  few x year    Drug use: No    Sexual activity: Yes     Partners: Male   Other Topics Concern    Not on file   Social History Narrative    Not on file     Social Determinants of Health     Financial Resource Strain: Low Risk     Difficulty of Paying Living Expenses: Not hard at all   Food Insecurity: No Food Insecurity    Worried About [address] in the Last Year: Never true    [address] [employer] in the Last Year: Never true   Transportation Needs:     Lack of Transportation (Medical):      Lack of Transportation (Non-Medical):    Physical Activity:     Days of Exercise per Week:     Minutes of Exercise per Session:    Stress:     Feeling of Stress :    Social Connections:     Frequency of Communication with Friends and Family:     Frequency of Social Gatherings with Friends and Family:     Attends Religion Services:     Active Member of Clubs or Organizations:     Attends Club or Organization Meetings:     Marital Status:    Intimate Partner Violence:     Fear of Current or Ex-Partner:     Emotionally Abused:     Physically Abused:     Sexually Abused:        Family History   Problem Relation Age of Onset    Diabetes Father     High Cholesterol Father     Other Mother         autoimmune hepatitis     High Blood Pressure Brother     Heart Disease Brother     Heart Attack Brother 40        cabg age 40     Rheum Arthritis Other 7    Colon Cancer Neg Hx     Cancer Neg Hx         Allergies:  Fish-derived products, Other, Shellfish allergy, Shrimp (diagnostic), and Ace inhibitors    Home Medications:  Prior to Admission medications    Medication Sig Start Date End Date Taking? Authorizing Provider   pravastatin (PRAVACHOL) 20 MG tablet take 1 tablet by mouth every evening **STOP ATORVASTATIN** 6/7/21   Missy Worthington MD   amLODIPine (NORVASC) 10 MG tablet take 1 tablet by mouth every morning 1/8/21   Missy Worthington MD   buPROPion (WELLBUTRIN XL) 150 MG extended release tablet Take 1 tablet by mouth every morning 1/8/21   Missy Worthington MD   busPIRone (BUSPAR) 10 MG tablet Take 1 tablet by mouth 3 times daily as needed (anxiety) 1/8/21   Missy Worthington MD   loratadine (CLARITIN) 10 MG tablet Take 1 tablet by mouth daily 1/8/21   Missy Worthington MD   omeprazole (PRILOSEC) 40 MG delayed release capsule TAKE 1 CAPSULE DAILY 1/8/21   Missy Worthington MD   celecoxib (CELEBREX) 100 MG capsule Take 1 capsule by mouth daily as needed for Pain 1/8/21   Missy Worthington MD   fluticasone (FLONASE) 50 MCG/ACT nasal spray 2 sprays by Nasal route daily 10/27/20 10/27/21  Cheli Booker, APRN - CADEN   mometasone-formoterol (DULERA) 100-5 MCG/ACT inhaler Inhale 2 puffs into the lungs 2 times daily Stop Flovent 5/8/20   Missy Worthington MD   albuterol sulfate HFA (VENTOLIN HFA) 108 (90 Base) MCG/ACT inhaler Inhale 2 puffs into the lungs every 6 hours as needed for Wheezing or Shortness of Breath 5/8/20   Missy Worthington MD   albuterol (PROVENTIL) (2.5 MG/3ML) 0.083% nebulizer solution Take 3 mLs by nebulization every 6 hours as needed for Wheezing or Shortness of Breath 1/2/20   Missy Worthington MD   Blood Pressure KIT Dx: HTN.  Needs automatic blood pressure machine to monitor his blood pressure. Patient not taking: Reported on 6/23/2021 3/22/17   Junior Montana MD   Respiratory Therapy Supplies (NEBULIZER) MADHAV 1 Units by Does not apply route 2 times daily Dx: Asthma exacerbation J45.901 1/31/17   Maryann Ibrahim MD       REVIEW OFSYSTEMS    (2-9 systems for level 4, 10 or more for level 5)      Review of Systems   Constitutional: Positive for fatigue. Negative for chills, diaphoresis and fever. HENT: Negative for rhinorrhea, sore throat, tinnitus and trouble swallowing. Eyes: Negative for visual disturbance. Respiratory: Positive for chest tightness and shortness of breath. Negative for cough and wheezing. Cardiovascular: Negative for chest pain and leg swelling. Gastrointestinal: Positive for nausea. Negative for abdominal distention, abdominal pain, constipation, diarrhea and vomiting. Endocrine: Negative for polyuria. Genitourinary: Negative for dysuria, flank pain and frequency. Musculoskeletal: Positive for arthralgias, neck pain and neck stiffness. Negative for back pain, joint swelling and myalgias. Neurological: Positive for weakness and headaches. Negative for dizziness, tremors, seizures, light-headedness and numbness. PHYSICAL EXAM   (up to 7 for level 4, 8 or more forlevel 5)      INITIAL VITALS:   ED Triage Vitals   BP Temp Temp src Pulse Resp SpO2 Height Weight   -- -- -- -- -- -- -- --       Physical Exam  Constitutional:       General: He is not in acute distress. Appearance: He is not ill-appearing. HENT:      Head: Normocephalic and atraumatic. Right Ear: External ear normal.      Left Ear: External ear normal.   Eyes:      Extraocular Movements: Extraocular movements intact. Cardiovascular:      Rate and Rhythm: Regular rhythm. Tachycardia present. Pulmonary:      Effort: Pulmonary effort is normal.   Abdominal:      General: Abdomen is flat. Musculoskeletal:         General: No deformity or signs of injury.       Cervical back: Tenderness present. Skin:     General: Skin is warm. Capillary Refill: Capillary refill takes less than 2 seconds. Neurological:      Mental Status: He is oriented to person, place, and time. Mental status is at baseline. Motor: Weakness (Weakness of the left lower extremity with extension, intact sensation, dorsa and flexion of the feet.) present. Psychiatric:         Mood and Affect: Mood normal.         DIFFERENTIAL  DIAGNOSIS     PLAN (LABS / IMAGING / EKG):  Orders Placed This Encounter   Procedures    COVID-19, Rapid    XR CHEST PORTABLE    CT HEAD WO CONTRAST    CTA HEAD NECK W CONTRAST    MRI BRAIN WO CONTRAST    MRI LUMBAR SPINE WO CONTRAST    STROKE PANEL    ELECTROLYTES PLUS    Hemoglobin and hematocrit, blood    CALCIUM, IONIC (POC)    Procalcitonin    Ferritin    Fibrinogen    C-Reactive Protein    Lactate Dehydrogenase    C-REACTIVE PROTEIN    Lipid Panel    Hepatic Function Panel    HEMOGLOBIN A1C    COVID-19    D-DIMER, QUANTITATIVE    Basic Metabolic Panel w/ Reflex to MG    CBC auto differential    ADULT DIET;  Regular    Vital signs per unit routine    Notify physician    Up as tolerated    Telemetry monitoring - continuous duration    Full Code    Inpatient consult to Stroke Team    Inpatient consult to Hospitalist    Consult to Infectious Disease    Inpatient consult to Case Management   [address] [place] [address] to Dose: Other - See Comments: Pls start remdesevir 5 days Decadron 6 mg daily x 10 Barcitinib 14 days at 4mg po daily ty    Inpatient consult to Neurosurgery    Airborne, Droplet, and Contact Isolation    Droplet Plus Isolation    OT eval and treat    PT evaluation and treat    Initiate Oxygen Therapy Protocol    Pulse oximetry, continuous    Nasal Cannula oxygen    Venous Blood Gas, POC    Creatinine W/GFR Point of Care    POCT urea (BUN)    Lactic Acid, POC    POCT Glucose    EKG 12 Lead    PATIENT STATUS (FROM ED OR OR/PROCEDURAL) Inpatient       MEDICATIONS ORDERED:  Orders Placed This Encounter   Medications    iopamidol (ISOVUE-370) 76 % injection 90 mL    ondansetron (ZOFRAN) injection 4 mg    morphine injection 4 mg    aspirin tablet 325 mg    clopidogrel (PLAVIX) tablet 300 mg    albuterol sulfate  (90 Base) MCG/ACT inhaler 2 puff     Order Specific Question:   Initiate RT Bronchodilator Protocol     Answer:    Yes    budesonide-formoterol (SYMBICORT) 160-4.5 MCG/ACT inhaler 2 puff    DISCONTD: pravastatin (PRAVACHOL) tablet 20 mg    sodium chloride flush 0.9 % injection 5-40 mL    sodium chloride flush 0.9 % injection 5-40 mL    0.9 % sodium chloride infusion    enoxaparin (LOVENOX) injection 30 mg    OR Linked Order Group     ondansetron (ZOFRAN-ODT) disintegrating tablet 4 mg     ondansetron (ZOFRAN) injection 4 mg    polyethylene glycol (GLYCOLAX) packet 17 g    OR Linked Order Group     acetaminophen (TYLENOL) tablet 650 mg     acetaminophen (TYLENOL) suppository 650 mg    0.9 % sodium chloride infusion    DISCONTD: aspirin chewable tablet 81 mg    DISCONTD: clopidogrel (PLAVIX) tablet 75 mg    morphine injection 4 mg    atorvastatin (LIPITOR) tablet 40 mg    fluticasone (FLONASE) 50 MCG/ACT nasal spray 1 spray    sodium chloride (OCEAN) 0.65 % nasal spray 1 spray    dexamethasone (DECADRON) injection 6 mg    FOLLOWED BY Linked Order Group     remdesivir 200 mg in sodium chloride 0.9 % 250 mL IVPB      Order Specific Question:   Antimicrobial Indications      Answer:   Pneumonia (CAP)     remdesivir 100 mg in sodium chloride 0.9 % 250 mL IVPB      Order Specific Question:   Antimicrobial Indications      Answer:   Pneumonia (CAP)    0.9 % sodium chloride bolus    baricitinib (OLUMIANT) tablet 4 mg    oxyCODONE (ROXICODONE) immediate release tablet 5 mg    ketorolac (TORADOL) injection 30 mg    oxyCODONE-acetaminophen (PERCOCET) 5-325 MG per tablet 1 tablet       DDX: COVID-19, encephalitis, meningitis, stroke, TIA    Initial MDM/Plan/ED COURSE:    38 y.o. male who presents with Alert critical called this patient has last known well of 3 PM today, states that he has decreased pain to the left foot, states that it feels not in comparison to his right with difficulties with ambulation. On physical examination patient has intact sensation, proprioception however is decreased strength in the left lower extremity below the knee. Patient has no lumbar back pain, does endorse paraspinal cervical pain,. Stroke alert critical called. Concern for potential Covid meningitis, encephalitis in this patient, stroke alert called, obvious CTA, CTA head and neck. ED Course as of Oct 07 0817   Sotero Mary Oct 03, 2021   5227 Alert critical called this patient has last known well of 3 PM today, states that he has decreased pain to the left foot, states that it feels not in comparison to his right with difficulties with ambulation. On physical examination patient has intact sensation, proprioception however is decreased strength in the left lower extremity below the knee. Patient has no lumbar back pain, does endorse paraspinal cervical pain,. Stroke alert critical called. [GP]   1921 Patient able to have CT head and neck with IV contrast    POC Creatinine: 1.13 [GP]   2022 Troponin, High Sensitivity: <6 [GP]      ED Course User Index  [GP] Hemant Quijano MD   Stroke alert called, Covid positive, stroke panel ordered with concerns for potential acute ischemic event, Covid inflammatory markers ordered as patient does have concerns for decreased saturation from baseline. Patient to be admitted to [facility] with neurology following with concerns for potential stroke. Patient will require extended work-up with concerns for persistent left lower extremity weakness of uncertain etiology, CT head, CTA head and neck negative for acute pathology explaining patient's symptoms.   Spoke above potentially doing a lumbar puncture, neurology states not warranted at this time, prefer to start patient on aspirin, Plavix.:     DIAGNOSTIC RESULTS / EMERGENCYDEPARTMENT COURSE / MDM     LABS:  Labs Reviewed   COVID-19, RAPID - Abnormal; Notable for the following components:       Result Value    SARS-CoV-2, Rapid DETECTED (*)     All other components within normal limits    Narrative:     Maksimedgardo Kerr RCP     10/4/2021  5:24 AM  RAPID Covid 19 swab taken from right nare, labeled, placed in red   dot bag, and handed off to second healthcare worker outside of   room for transport to laboratory per hospital policy and   procedure. Patient tolerated procedure well.    STROKE PANEL - Abnormal; Notable for the following components:    Calcium 8.5 (*)     Hematocrit 50.5 (*)     Seg Neutrophils 72 (*)     Lymphocytes 21 (*)     Eosinophils % 0 (*)     PTT 32.2 (*)     All other components within normal limits   HGB/HCT - Abnormal; Notable for the following components:    POC Hemoglobin 18.4 (*)     POC Hematocrit 54 (*)     All other components within normal limits   CALCIUM, IONIC (POC) - Abnormal; Notable for the following components:    POC Ionized Calcium 1.05 (*)     All other components within normal limits   PROCALCITONIN - Abnormal; Notable for the following components:    Procalcitonin 0.09 (*)     All other components within normal limits   FERRITIN - Abnormal; Notable for the following components:    Ferritin 410 (*)     All other components within normal limits   FIBRINOGEN - Abnormal; Notable for the following components:    Fibrinogen 536 (*)     All other components within normal limits   C-REACTIVE PROTEIN - Abnormal; Notable for the following components:    CRP 60.8 (*)     All other components within normal limits   LACTATE DEHYDROGENASE - Abnormal; Notable for the following components:     (*)     All other components within normal limits   BASIC METABOLIC PANEL W/ REFLEX TO MG FOR LOW K - Abnormal; Notable for the following components:    Calcium 8.4 (*)     Sodium 133 (*)     CO2 17 (*)     All other components within normal limits   CBC WITH AUTO DIFFERENTIAL - Abnormal; Notable for the following components:    Seg Neutrophils 68 (*)     Eosinophils % 0 (*)     All other components within normal limits   C-REACTIVE PROTEIN - Abnormal; Notable for the following components:    CRP 57.4 (*)     All other components within normal limits   LIPID PANEL - Abnormal; Notable for the following components:    HDL 28 (*)     Chol/HDL Ratio 6.0 (*)     Triglycerides 290 (*)     VLDL NOT REPORTED (*)     All other components within normal limits   HEPATIC FUNCTION PANEL - Abnormal; Notable for the following components:     Total Bilirubin 0.16 (*)     All other components within normal limits   BASIC METABOLIC PANEL W/ REFLEX TO MG FOR LOW K - Abnormal; Notable for the following components:    Glucose 112 (*)     CREATININE 0.65 (*)     Calcium 8.0 (*)     CO2 17 (*)     All other components within normal limits   CBC WITH AUTO DIFFERENTIAL - Abnormal; Notable for the following components:    Monocytes 8 (*)     Eosinophils % 0 (*)     All other components within normal limits   C-REACTIVE PROTEIN - Abnormal; Notable for the following components:    CRP 45.6 (*)     All other components within normal limits   BASIC METABOLIC PANEL W/ REFLEX TO MG FOR LOW K - Abnormal; Notable for the following components:    Glucose 118 (*)     CREATININE 0.67 (*)     Calcium 8.5 (*)     All other components within normal limits   CBC WITH AUTO DIFFERENTIAL - Abnormal; Notable for the following components:    Seg Neutrophils 79 (*)     Lymphocytes 15 (*)     Eosinophils % 0 (*)     Immature Granulocytes 1 (*)     All other components within normal limits   C-REACTIVE PROTEIN - Abnormal; Notable for the following components:    CRP 21.8 (*)     All other components within normal limits   BASIC METABOLIC PANEL W/ REFLEX TO MG FOR LOW K - Abnormal; Notable for the following components:    Glucose 120 (*)     CREATININE 0.63 (*)     Calcium 8.2 (*)     All other components within normal limits   CBC WITH AUTO DIFFERENTIAL - Abnormal; Notable for the following components:    WBC 12.7 (*)     NRBC Automated 0.2 (*)     Seg Neutrophils 79 (*)     Lymphocytes 14 (*)     Eosinophils % 0 (*)     Immature Granulocytes 1 (*)     Segs Absolute 10.01 (*)     All other components within normal limits   VENOUS BLOOD GAS, POINT OF CARE - Abnormal; Notable for the following components:    pO2, Seth 22.4 (*)     O2 Sat, Seth 38 (*)     All other components within normal limits   ELECTROLYTES PLUS   HEMOGLOBIN A1C   D-DIMER, QUANTITATIVE   C-REACTIVE PROTEIN   CREATININE W/GFR POINT OF CARE   UREA N (POC)   LACTIC ACID,POINT OF CARE   POCT GLUCOSE           No results found. PROCEDURES:  None    CONSULTS:  IP CONSULT TO STROKE TEAM  IP CONSULT TO HOSPITALIST  IP CONSULT TO INFECTIOUS DISEASES  IP CONSULT TO CASE MANAGEMENT  IP CONSULT TO PHARMACY  IP CONSULT TO NEUROSURGERY    CRITICAL CARE:  Please see attending note    FINAL IMPRESSION      1. COVID-19    2. Neck pain    3. Left leg weakness          DISPOSITION / PLAN     DISPOSITION Admitted 10/03/2021 09:14:16 PM      PATIENT REFERRED TO:  No follow-up provider specified.     DISCHARGE MEDICATIONS:  Current Discharge Medication List          Elijah Padilla MD  Emergency Medicine Resident    (Please note that portions of this note were completed with a voice recognition program.Efforts were made to edit the dictations but occasionally words are mis-transcribed.)        Elijah Padilla MD  Resident  10/07/21 0541

## 2021-10-03 NOTE — ED NOTES
Patient states that he tested positive for COVID Monday, comes into states that he is having increased difficulty breathing. And weakness in his legs. Patient states that last known well is 1500 today. Patient is not on blood thinners at this time.       Pablo Gonzalez RN  10/03/21 1911

## 2021-10-04 LAB
ABSOLUTE EOS #: 0 K/UL (ref 0–0.4)
ABSOLUTE IMMATURE GRANULOCYTE: 0 K/UL (ref 0–0.3)
ABSOLUTE LYMPH #: 1.76 K/UL (ref 1–4.8)
ABSOLUTE MONO #: 0.33 K/UL (ref 0.1–0.8)
ANION GAP SERPL CALCULATED.3IONS-SCNC: 15 MMOL/L (ref 9–17)
BASOPHILS # BLD: 0 % (ref 0–2)
BASOPHILS ABSOLUTE: 0 K/UL (ref 0–0.2)
BUN BLDV-MCNC: 13 MG/DL (ref 6–20)
BUN/CREAT BLD: ABNORMAL (ref 9–20)
C-REACTIVE PROTEIN: 57.4 MG/L (ref 0–5)
CALCIUM SERPL-MCNC: 8.4 MG/DL (ref 8.6–10.4)
CHLORIDE BLD-SCNC: 101 MMOL/L (ref 98–107)
CO2: 17 MMOL/L (ref 20–31)
CREAT SERPL-MCNC: 0.85 MG/DL (ref 0.7–1.2)
D-DIMER QUANTITATIVE: 0.8 MG/L FEU
DIFFERENTIAL TYPE: ABNORMAL
EOSINOPHILS RELATIVE PERCENT: 0 % (ref 1–4)
ESTIMATED AVERAGE GLUCOSE: 111 MG/DL
FERRITIN: 410 UG/L (ref 30–400)
GFR AFRICAN AMERICAN: >60 ML/MIN
GFR NON-AFRICAN AMERICAN: >60 ML/MIN
GFR SERPL CREATININE-BSD FRML MDRD: ABNORMAL ML/MIN/{1.73_M2}
GFR SERPL CREATININE-BSD FRML MDRD: ABNORMAL ML/MIN/{1.73_M2}
GLUCOSE BLD-MCNC: 83 MG/DL (ref 70–99)
HBA1C MFR BLD: 5.5 % (ref 4–6)
HCT VFR BLD CALC: 48.1 % (ref 40.7–50.3)
HEMOGLOBIN: 16 G/DL (ref 13–17)
IMMATURE GRANULOCYTES: 0 %
LYMPHOCYTES # BLD: 27 % (ref 24–44)
MCH RBC QN AUTO: 30.6 PG (ref 25.2–33.5)
MCHC RBC AUTO-ENTMCNC: 33.3 G/DL (ref 28.4–34.8)
MCV RBC AUTO: 92 FL (ref 82.6–102.9)
MONOCYTES # BLD: 5 % (ref 1–7)
MORPHOLOGY: NORMAL
NRBC AUTOMATED: 0 PER 100 WBC
PDW BLD-RTO: 13.4 % (ref 11.8–14.4)
PLATELET # BLD: 310 K/UL (ref 138–453)
PLATELET ESTIMATE: ABNORMAL
PMV BLD AUTO: 10.2 FL (ref 8.1–13.5)
POTASSIUM SERPL-SCNC: 3.7 MMOL/L (ref 3.7–5.3)
RBC # BLD: 5.23 M/UL (ref 4.21–5.77)
RBC # BLD: ABNORMAL 10*6/UL
SARS-COV-2, RAPID: DETECTED
SEG NEUTROPHILS: 68 % (ref 36–66)
SEGMENTED NEUTROPHILS ABSOLUTE COUNT: 4.41 K/UL (ref 1.8–7.7)
SODIUM BLD-SCNC: 133 MMOL/L (ref 135–144)
SPECIMEN DESCRIPTION: ABNORMAL
WBC # BLD: 6.5 K/UL (ref 3.5–11.3)
WBC # BLD: ABNORMAL 10*3/UL

## 2021-10-04 PROCEDURE — 85379 FIBRIN DEGRADATION QUANT: CPT

## 2021-10-04 PROCEDURE — 6360000002 HC RX W HCPCS: Performed by: STUDENT IN AN ORGANIZED HEALTH CARE EDUCATION/TRAINING PROGRAM

## 2021-10-04 PROCEDURE — 83036 HEMOGLOBIN GLYCOSYLATED A1C: CPT

## 2021-10-04 PROCEDURE — 6370000000 HC RX 637 (ALT 250 FOR IP): Performed by: STUDENT IN AN ORGANIZED HEALTH CARE EDUCATION/TRAINING PROGRAM

## 2021-10-04 PROCEDURE — 2500000003 HC RX 250 WO HCPCS: Performed by: INTERNAL MEDICINE

## 2021-10-04 PROCEDURE — 86140 C-REACTIVE PROTEIN: CPT

## 2021-10-04 PROCEDURE — 2580000003 HC RX 258: Performed by: STUDENT IN AN ORGANIZED HEALTH CARE EDUCATION/TRAINING PROGRAM

## 2021-10-04 PROCEDURE — 6360000002 HC RX W HCPCS: Performed by: NURSE PRACTITIONER

## 2021-10-04 PROCEDURE — 94761 N-INVAS EAR/PLS OXIMETRY MLT: CPT

## 2021-10-04 PROCEDURE — 99222 1ST HOSP IP/OBS MODERATE 55: CPT | Performed by: INTERNAL MEDICINE

## 2021-10-04 PROCEDURE — XW033E5 INTRODUCTION OF REMDESIVIR ANTI-INFECTIVE INTO PERIPHERAL VEIN, PERCUTANEOUS APPROACH, NEW TECHNOLOGY GROUP 5: ICD-10-PCS | Performed by: INTERNAL MEDICINE

## 2021-10-04 PROCEDURE — 6370000000 HC RX 637 (ALT 250 FOR IP): Performed by: INTERNAL MEDICINE

## 2021-10-04 PROCEDURE — 97535 SELF CARE MNGMENT TRAINING: CPT

## 2021-10-04 PROCEDURE — 36415 COLL VENOUS BLD VENIPUNCTURE: CPT

## 2021-10-04 PROCEDURE — 1200000000 HC SEMI PRIVATE

## 2021-10-04 PROCEDURE — 97162 PT EVAL MOD COMPLEX 30 MIN: CPT

## 2021-10-04 PROCEDURE — 94640 AIRWAY INHALATION TREATMENT: CPT

## 2021-10-04 PROCEDURE — 87635 SARS-COV-2 COVID-19 AMP PRB: CPT

## 2021-10-04 PROCEDURE — 80048 BASIC METABOLIC PNL TOTAL CA: CPT

## 2021-10-04 PROCEDURE — 2580000003 HC RX 258: Performed by: INTERNAL MEDICINE

## 2021-10-04 PROCEDURE — 6360000002 HC RX W HCPCS

## 2021-10-04 PROCEDURE — 97530 THERAPEUTIC ACTIVITIES: CPT

## 2021-10-04 PROCEDURE — 2700000000 HC OXYGEN THERAPY PER DAY

## 2021-10-04 PROCEDURE — 6370000000 HC RX 637 (ALT 250 FOR IP): Performed by: NURSE PRACTITIONER

## 2021-10-04 PROCEDURE — 99222 1ST HOSP IP/OBS MODERATE 55: CPT | Performed by: STUDENT IN AN ORGANIZED HEALTH CARE EDUCATION/TRAINING PROGRAM

## 2021-10-04 PROCEDURE — 97166 OT EVAL MOD COMPLEX 45 MIN: CPT

## 2021-10-04 PROCEDURE — 97116 GAIT TRAINING THERAPY: CPT

## 2021-10-04 PROCEDURE — 85025 COMPLETE CBC W/AUTO DIFF WBC: CPT

## 2021-10-04 RX ORDER — FLUTICASONE PROPIONATE 50 MCG
1 SPRAY, SUSPENSION (ML) NASAL DAILY
Status: DISCONTINUED | OUTPATIENT
Start: 2021-10-04 | End: 2021-10-08 | Stop reason: HOSPADM

## 2021-10-04 RX ORDER — 0.9 % SODIUM CHLORIDE 0.9 %
30 INTRAVENOUS SOLUTION INTRAVENOUS PRN
Status: DISCONTINUED | OUTPATIENT
Start: 2021-10-04 | End: 2021-10-08 | Stop reason: HOSPADM

## 2021-10-04 RX ORDER — ASPIRIN 81 MG/1
81 TABLET, CHEWABLE ORAL DAILY
Status: DISCONTINUED | OUTPATIENT
Start: 2021-10-04 | End: 2021-10-06

## 2021-10-04 RX ORDER — CLOPIDOGREL BISULFATE 75 MG/1
75 TABLET ORAL DAILY
Status: DISCONTINUED | OUTPATIENT
Start: 2021-10-04 | End: 2021-10-06

## 2021-10-04 RX ORDER — MORPHINE SULFATE 4 MG/ML
4 INJECTION, SOLUTION INTRAMUSCULAR; INTRAVENOUS EVERY 4 HOURS PRN
Status: COMPLETED | OUTPATIENT
Start: 2021-10-04 | End: 2021-10-04

## 2021-10-04 RX ORDER — DEXAMETHASONE SODIUM PHOSPHATE 10 MG/ML
6 INJECTION INTRAMUSCULAR; INTRAVENOUS EVERY 24 HOURS
Status: DISCONTINUED | OUTPATIENT
Start: 2021-10-04 | End: 2021-10-08 | Stop reason: HOSPADM

## 2021-10-04 RX ORDER — ATORVASTATIN CALCIUM 40 MG/1
40 TABLET, FILM COATED ORAL NIGHTLY
Status: DISCONTINUED | OUTPATIENT
Start: 2021-10-04 | End: 2021-10-08 | Stop reason: HOSPADM

## 2021-10-04 RX ORDER — ECHINACEA PURPUREA EXTRACT 125 MG
1 TABLET ORAL PRN
Status: DISCONTINUED | OUTPATIENT
Start: 2021-10-04 | End: 2021-10-08 | Stop reason: HOSPADM

## 2021-10-04 RX ADMIN — ENOXAPARIN SODIUM 30 MG: 30 INJECTION SUBCUTANEOUS at 20:26

## 2021-10-04 RX ADMIN — FLUTICASONE PROPIONATE 1 SPRAY: 50 SPRAY, METERED NASAL at 16:04

## 2021-10-04 RX ADMIN — ACETAMINOPHEN 650 MG: 325 TABLET ORAL at 16:20

## 2021-10-04 RX ADMIN — ACETAMINOPHEN 650 MG: 325 TABLET ORAL at 09:29

## 2021-10-04 RX ADMIN — REMDESIVIR 200 MG: 100 INJECTION, POWDER, LYOPHILIZED, FOR SOLUTION INTRAVENOUS at 16:06

## 2021-10-04 RX ADMIN — BARICITINIB 4 MG: 2 TABLET, FILM COATED ORAL at 16:05

## 2021-10-04 RX ADMIN — ASPIRIN 81 MG: 81 TABLET, CHEWABLE ORAL at 09:29

## 2021-10-04 RX ADMIN — CLOPIDOGREL 75 MG: 75 TABLET, FILM COATED ORAL at 09:29

## 2021-10-04 RX ADMIN — BUDESONIDE AND FORMOTEROL FUMARATE DIHYDRATE 2 PUFF: 160; 4.5 AEROSOL RESPIRATORY (INHALATION) at 18:50

## 2021-10-04 RX ADMIN — SODIUM CHLORIDE, PRESERVATIVE FREE 10 ML: 5 INJECTION INTRAVENOUS at 20:25

## 2021-10-04 RX ADMIN — DESMOPRESSIN ACETATE 40 MG: 0.2 TABLET ORAL at 20:34

## 2021-10-04 RX ADMIN — ONDANSETRON 4 MG: 2 INJECTION INTRAMUSCULAR; INTRAVENOUS at 06:08

## 2021-10-04 RX ADMIN — PRAVASTATIN SODIUM 20 MG: 20 TABLET ORAL at 01:25

## 2021-10-04 RX ADMIN — ENOXAPARIN SODIUM 30 MG: 30 INJECTION SUBCUTANEOUS at 09:29

## 2021-10-04 RX ADMIN — ENOXAPARIN SODIUM 30 MG: 30 INJECTION SUBCUTANEOUS at 01:25

## 2021-10-04 RX ADMIN — BUDESONIDE AND FORMOTEROL FUMARATE DIHYDRATE 2 PUFF: 160; 4.5 AEROSOL RESPIRATORY (INHALATION) at 09:12

## 2021-10-04 RX ADMIN — ONDANSETRON 4 MG: 2 INJECTION INTRAMUSCULAR; INTRAVENOUS at 16:19

## 2021-10-04 RX ADMIN — DEXAMETHASONE SODIUM PHOSPHATE 6 MG: 10 INJECTION INTRAMUSCULAR; INTRAVENOUS at 16:05

## 2021-10-04 RX ADMIN — MORPHINE SULFATE 4 MG: 4 INJECTION INTRAVENOUS at 06:08

## 2021-10-04 ASSESSMENT — PAIN SCALES - GENERAL
PAINLEVEL_OUTOF10: 3
PAINLEVEL_OUTOF10: 3
PAINLEVEL_OUTOF10: 5
PAINLEVEL_OUTOF10: 0
PAINLEVEL_OUTOF10: 0
PAINLEVEL_OUTOF10: 8
PAINLEVEL_OUTOF10: 8
PAINLEVEL_OUTOF10: 0

## 2021-10-04 ASSESSMENT — ENCOUNTER SYMPTOMS
ABDOMINAL PAIN: 0
CHEST TIGHTNESS: 0
ABDOMINAL DISTENTION: 0
CHOKING: 0
TACHYPNEA: 1
SHORTNESS OF BREATH: 1
APNEA: 0
COLOR CHANGE: 0
COUGH: 1
EYE ITCHING: 0
ANAL BLEEDING: 0

## 2021-10-04 ASSESSMENT — PAIN DESCRIPTION - LOCATION
LOCATION: HEAD;NECK
LOCATION: HEAD;NECK

## 2021-10-04 ASSESSMENT — PAIN DESCRIPTION - PAIN TYPE
TYPE: ACUTE PAIN
TYPE: ACUTE PAIN

## 2021-10-04 ASSESSMENT — PAIN DESCRIPTION - FREQUENCY
FREQUENCY: CONTINUOUS
FREQUENCY: CONTINUOUS

## 2021-10-04 NOTE — H&P
[address]     Department of Internal Medicine - Staff Internal Medicine Teaching Service          ADMISSION NOTE/HISTORY AND PHYSICAL EXAMINATION   Date: 10/3/2021  Patient Name: Ana Dexter  Date of admission: 10/3/2021  6:43 PM  YOB: 1980  PCP: Oralia Palma MD  History Obtained From:  patient, electronic medical record    CHIEF COMPLAINT     Chief complaint: LLE weakness, fever, shortness of breath    HISTORY OF PRESENTING ILLNESS     This patient is COVID positive and has possible ischemic stroke/TIA. This patient 78-year-old male with past medical history of essential hypertension, obesity, hyperlipidemia, seronegative polyarthritis, LVH due to hypertensive disease, asthma,NOEMÍ, eosinophilic esophagitis and depressive disorder presented with weakness to his left lower extremity started today afternoon and unable to bear the weight of the body. The patient denies any loss of consciousness, head trauma, headache, weakness of left upper extremity/right upper/right lower extremity. Reports numbness of left lower extremity. Reports high-grade fever, highest recorded 103 F, since Monday started after contact with Covid patient and tested positive for Covid. The fever gets better with Tylenol. He has associated dry cough and dyspnea even at rest.  The patient  covid unvaccinated. Rest of review of systems is unremarkable. On my evaluation the patient is lying comfortably in the bed, saturating on room air, in no apparent distress, answering questions appropriately. Vitals stable. sensation of lower extremities are intact. Power on left lower side is 3/5 with intact reflexes. Chest examination shows bilateral equal air entry without added sounds. Rest of examination is unremarkable. Pertinent labs are pro-Bob 0.09, CRP 60.8, , fibrinogen 536, chest x-ray left basilar airspace disease represent early pneumonia and or atelectasis.   CT head wo contrast-no acute intracranial abnormality. CTA head neck with contrast:Pansinusitis, possible COVID pulmonary infiltrates. Neuro and ID on board. MRI tomorrow a.m. Review of Systems   Constitutional: Positive for activity change and fever. Negative for appetite change and chills. HENT: Negative for congestion, dental problem and drooling. Respiratory: Positive for cough and shortness of breath. Negative for apnea, choking and chest tightness. Cardiovascular: Negative for chest pain, palpitations and leg swelling. Gastrointestinal: Negative for abdominal distention, abdominal pain and anal bleeding. Endocrine: Negative for cold intolerance and heat intolerance. Genitourinary: Negative for difficulty urinating and dysuria. Musculoskeletal: Negative for arthralgias. Neurological: Positive for dizziness, weakness and numbness. Negative for facial asymmetry, light-headedness and headaches. Psychiatric/Behavioral: Negative for agitation and behavioral problems.      PAST MEDICAL HISTORY     Past Medical History:   Diagnosis Date    Asthma     COPD (chronic obstructive pulmonary disease) ([facility] [address] 11/19/2018    Depression     Erectile dysfunction 9/29/2017    Essential hypertension 1/2/2017    GERD (gastroesophageal reflux disease) 11/19/2018    Heart palpitations 9/29/2017    Hyperlipidemia with target LDL less than 100 3/23/2017    Left lower lobe pneumonia 11/9/2012    Mitral valve prolapse     Nonspecific reactive hepatitis 3/22/2017    Obesity (BMI 30-39.9) 3/23/2017    NOEMÍ (obstructive sleep apnea) 10/16/2019    Osteoarthritis     Seronegative polyarthritis 10/10/2016    followed w/ rheumatology in [address] [place] [address] anxiety disorder     Uvulitis 8/15/2019       PAST SURGICAL HISTORY     Past Surgical History:   Procedure Laterality Date    COLONOSCOPY      CYST REMOVAL Right     Armpit     ENDOSCOPY, COLON, DIAGNOSTIC      HAND SURGERY Left     KNEE ARTHROSCOPY right knee    NOSE SURGERY      TONSILLECTOMY      UPPER GASTROINTESTINAL ENDOSCOPY N/A 10/9/2019    EGD POLYP SNARE, HOT. ESOPHAGEAL DILATATION WITH MALONIES 50F performed by Rochelle Bernard MD at [address] [place] [address]     Fish-derived products, Other, Shellfish allergy, Shrimp (diagnostic), and Ace inhibitors    MEDICATIONS PRIOR TO ADMISSION     Prior to Admission medications    Medication Sig Start Date End Date Taking?  Authorizing Provider   pravastatin (PRAVACHOL) 20 MG tablet take 1 tablet by mouth every evening **STOP ATORVASTATIN** 6/7/21   Ashish Hayes MD   amLODIPine (NORVASC) 10 MG tablet take 1 tablet by mouth every morning 1/8/21   Ashish Hayes MD   buPROPion (WELLBUTRIN XL) 150 MG extended release tablet Take 1 tablet by mouth every morning 1/8/21   Ashish Hayes MD   busPIRone (BUSPAR) 10 MG tablet Take 1 tablet by mouth 3 times daily as needed (anxiety) 1/8/21   Ashish Hayes MD   loratadine (CLARITIN) 10 MG tablet Take 1 tablet by mouth daily 1/8/21   Ashish Hayes MD   omeprazole (PRILOSEC) 40 MG delayed release capsule TAKE 1 CAPSULE DAILY 1/8/21   Ashish Hayes MD   celecoxib (CELEBREX) 100 MG capsule Take 1 capsule by mouth daily as needed for Pain 1/8/21   Ashish Hayes MD   cyclobenzaprine (FLEXERIL) 5 MG tablet Take 1 tablet by mouth 3 times daily as needed for Muscle spasms  Patient not taking: Reported on 6/23/2021 1/8/21   Ashish Hayes MD   fluticasone (FLONASE) 50 MCG/ACT nasal spray 2 sprays by Nasal route daily 10/27/20 10/27/21  Cheli Booker, APRN - CNP   mometasone-formoterol (DULERA) 100-5 MCG/ACT inhaler Inhale 2 puffs into the lungs 2 times daily Stop Flovent 5/8/20   Ashish Hayes MD   albuterol sulfate HFA (VENTOLIN HFA) 108 (90 Base) MCG/ACT inhaler Inhale 2 puffs into the lungs every 6 hours as needed for Wheezing or Shortness of Breath 5/8/20   Ashish Hayes MD albuterol (PROVENTIL) (2.5 MG/3ML) 0.083% nebulizer solution Take 3 mLs by nebulization every 6 hours as needed for Wheezing or Shortness of Breath 20   Governor Estelle MD   Blood Pressure KIT Dx: HTN. Needs automatic blood pressure machine to monitor his blood pressure. Patient not taking: Reported on 2021 3/22/17   Governor Estelle MD   Respiratory Therapy Supplies (NEBULIZER) MADHAV 1 Units by Does not apply route 2 times daily Dx: Asthma exacerbation J45.901 17   Irma Galvez MD       SOCIAL HISTORY     Tobacco: non smoker  Alcohol: DENIES  Illicits: DENIES  Occupation:     FAMILY HISTORY     Family History   Problem Relation Age of Onset    Diabetes Father     High Cholesterol Father     Other Mother         autoimmune hepatitis     High Blood Pressure Brother     Heart Disease Brother     Heart Attack Brother 40        cabg age 40    Teresita Shayna Rheum Arthritis Other 7    Colon Cancer Neg Hx     Cancer Neg Hx        PHYSICAL EXAM     Vitals: /70   Pulse 93   Temp 102 °F (38.9 °C) (Oral)   Resp 20   Ht 6' 2\" (1.88 m)   Wt 260 lb (117.9 kg)   SpO2 100%   BMI 33.38 kg/m²   Tmax: Temp (24hrs), Av.5 °F (38.1 °C), Min:99.1 °F (37.3 °C), Max:102 °F (38.9 °C)    Last Body weight:   Wt Readings from Last 3 Encounters:   10/03/21 260 lb (117.9 kg)   21 260 lb (117.9 kg)   21 250 lb (113.4 kg)     Body Mass Index : Body mass index is 33.38 kg/m². Physical Exam  Constitutional:       General: He is not in acute distress. Appearance: He is not ill-appearing or toxic-appearing. Cardiovascular:      Heart sounds: No murmur heard. No gallop. Pulmonary:      Effort: No respiratory distress. Breath sounds: No stridor. No wheezing. Abdominal:      General: There is no distension. Palpations: There is no mass. Tenderness: There is no abdominal tenderness. Musculoskeletal:         General: No swelling or deformity. Normal range of motion. Skin:     Coloration: Skin is not jaundiced. Neurological:      Cranial Nerves: No cranial nerve deficit. Motor: Weakness present.        INVESTIGATIONS     Laboratory Testing:     Recent Results (from the past 24 hour(s))   Venous Blood Gas, POC    Collection Time: 10/03/21  7:01 PM   Result Value Ref Range    pH, Seth 7.400 7.320 - 7.430    pCO2, Seth 43.8 41.0 - 51.0 mm Hg    pO2, Seth 22.4 (L) 30 - 50 mm Hg    HCO3, Venous 27.1 22.0 - 29.0 mmol/L    Total CO2, Venous NOT REPORTED 23.0 - 30.0 mmol/L    Negative Base Excess, Seth NOT REPORTED 0.0 - 2.0    Positive Base Excess, Seth 2 0.0 - 3.0    O2 Sat, Seth 38 (L) 60.0 - 85.0 %    O2 Device/Flow/% NOT REPORTED     Jaswinder Test NOT REPORTED     Sample Site NOT REPORTED     Mode NOT REPORTED     FIO2 NOT REPORTED     Pt Temp NOT REPORTED     POC pH Temp NOT REPORTED     POC pCO2 Temp NOT REPORTED mm Hg    POC pO2 Temp NOT REPORTED mm Hg   ELECTROLYTES PLUS    Collection Time: 10/03/21  7:01 PM   Result Value Ref Range    POC Sodium 140 138 - 146 mmol/L    POC Potassium 3.5 3.5 - 4.5 mmol/L    POC Chloride 101 98 - 107 mmol/L    POC TCO2 28 22 - 30 mmol/L    Anion Gap 13 7 - 16 mmol/L   Hemoglobin and hematocrit, blood    Collection Time: 10/03/21  7:01 PM   Result Value Ref Range    POC Hemoglobin 18.4 (H) 13.5 - 17.5 g/dL    POC Hematocrit 54 (H) 41 - 53 %   Creatinine W/GFR Point of Care    Collection Time: 10/03/21  7:01 PM   Result Value Ref Range    POC Creatinine 1.13 0.51 - 1.19 mg/dL    GFR Comment >60 >60 mL/min    GFR Non-African American >60 >60 mL/min    GFR Comment         CALCIUM, IONIC (POC)    Collection Time: 10/03/21  7:01 PM   Result Value Ref Range    POC Ionized Calcium 1.05 (L) 1.15 - 1.33 mmol/L   POCT urea (BUN)    Collection Time: 10/03/21  7:01 PM   Result Value Ref Range    POC BUN 17 8 - 26 mg/dL   Lactic Acid, POC    Collection Time: 10/03/21  7:01 PM   Result Value Ref Range    POC Lactic Acid 1.02 0.56 - 1.39 mmol/L   POCT Glucose Collection Time: 10/03/21  7:01 PM   Result Value Ref Range    POC Glucose 93 74 - 100 mg/dL   STROKE PANEL    Collection Time: 10/03/21  7:05 PM   Result Value Ref Range    Glucose 86 70 - 99 mg/dL    BUN 16 6 - 20 mg/dL    CREATININE 1.08 0.70 - 1.20 mg/dL    Bun/Cre Ratio NOT REPORTED 9 - 20    Calcium 8.5 (L) 8.6 - 10.4 mg/dL    Sodium 136 135 - 144 mmol/L    Potassium 3.8 3.7 - 5.3 mmol/L    Chloride 99 98 - 107 mmol/L    CO2 23 20 - 31 mmol/L    Anion Gap 14 9 - 17 mmol/L    GFR Non-African American >60 >60 mL/min    GFR African American >60 >60 mL/min    GFR Comment          GFR Staging NOT REPORTED     WBC 8.7 3.5 - 11.3 k/uL    RBC 5.54 4.21 - 5.77 m/uL    Hemoglobin 16.5 13.0 - 17.0 g/dL    Hematocrit 50.5 (H) 40.7 - 50.3 %    MCV 91.2 82.6 - 102.9 fL    MCH 29.8 25.2 - 33.5 pg    MCHC 32.7 28.4 - 34.8 g/dL    RDW 13.3 11.8 - 14.4 %    Platelets 511 347 - 135 k/uL    MPV 10.7 8.1 - 13.5 fL    NRBC Automated 0.0 0.0 per 100 WBC    Total CK 89 39 - 308 U/L    CK-MB <1.0 <10.5 ng/mL    % CKMB 1.1 0.0 - 3.5 %    CKMB Interpretation NORMAL ISOENZYME PATTERN     Differential Type NOT REPORTED     Seg Neutrophils 72 (H) 36 - 65 %    Lymphocytes 21 (L) 24 - 43 %    Monocytes 6 3 - 12 %    Eosinophils % 0 (L) 1 - 4 %    Basophils 1 0 - 2 %    Immature Granulocytes 0 0 %    Segs Absolute 6.26 1.50 - 8.10 k/uL    Absolute Lymph # 1.79 1.10 - 3.70 k/uL    Absolute Mono # 0.51 0.10 - 1.20 k/uL    Absolute Eos # 0.03 0.00 - 0.44 k/uL    Basophils Absolute 0.05 0.00 - 0.20 k/uL    Absolute Immature Granulocyte <0.03 0.00 - 0.30 k/uL    WBC Morphology NOT REPORTED     RBC Morphology NOT REPORTED     Platelet Estimate NOT REPORTED     Myoglobin 35 28 - 72 ng/mL    Protime 10.6 9.1 - 12.3 sec    INR 1.0     PTT 32.2 (H) 20.5 - 30.5 sec    Troponin, High Sensitivity <6 0 - 22 ng/L    Troponin T NOT REPORTED <0.03 ng/mL    Troponin Interp NOT REPORTED    Procalcitonin    Collection Time: 10/03/21  7:05 PM   Result Value Ref Range    Procalcitonin 0.09 (H) <0.09 ng/mL   Fibrinogen    Collection Time: 10/03/21  7:05 PM   Result Value Ref Range    Fibrinogen 536 (H) 140 - 420 mg/dL   C-Reactive Protein    Collection Time: 10/03/21  7:05 PM   Result Value Ref Range    CRP 60.8 (H) 0.0 - 5.0 mg/L       Imaging:   CT HEAD WO CONTRAST    Result Date: 10/3/2021  No acute intracranial abnormality. RECOMMENDATIONS: The findings were sent to the Radiology Results [address] at 7:36 pm on 10/3/2021to be communicated to a licensed caregiver. XR CHEST PORTABLE    Result Date: 10/3/2021  Lateral left basilar airspace disease may represent small/early pneumonia and or atelectasis. CTA HEAD NECK W CONTRAST    Result Date: 10/3/2021  Pansinusitis, possible COVID pulmonary infiltrates, otherwise unremarkable CTA of the head and neck. RECOMMENDATIONS: Recommend CT chest.       ASSESSMENT & PLAN     1. TIA/ischemic stroke-CT head without contrast shows no acute intracranial abnormality, unremarkable CTA head neck W contrast-MRI brain tomorrow a.m. Neuro on board. Received aspirin and clopidogrel. 2.COVID : Currently saturating on room air, will follow ID recommendation, Lovenox 30 twice daily  3. Essential hypertension-currently controlled  4. Hyperlipidemia-LDL within target range, continue pravastatin 20    DVT ppx: Lovenox 30  GI ppx: None    PT/OT/SW: Ongoing  Discharge Planning: CM to assist with    Juliana Haney MD  Internal Medicine Resident, PGY-1  [address], [place]  10/3/2021, 10:59 PM    Attending Physician Statement  I have discussed the care of [address], including pertinent history and exam findings,  with the resident. I have seen and examined the patient and the key elements of all parts of the encounter have been performed by me. I agree with the assessment, plan and orders as documented by the resident with additions .       Treatment plan Discussed with nursing staff in detail , all questions answered . Electronically signed by Moises Valdes MD on   10/4/21 at 1:58 PM EDT    Please note that this chart was generated using voice recognition Dragon dictation software. Although every effort was made to ensure the accuracy of this automated transcription, some errors in transcription may have occurred.

## 2021-10-04 NOTE — CONSULTS
[facility] -   Infectious diseases evaluation  admission date 10/3/2021    reason for consultation:   covid +    Impression :   Current:  · covid pneumonia   · increased infl markers  · Left LE weakness  Other:  ·   Discussion / summary of stay / plan of care   ·   Recommendations   · Active covid  · Start remdesevir x 5 days  · lovenox and decadron 6 mg  · Start barcitinib 4 mg x 14 days  · Watch response of the infl markers  Await MRI brain  Infection Control Recommendations   · [place] Precautions  · Contact Isolation   · Airborne isolation  · Droplet Isolation    Isolate till 10/18/21  Antimicrobial Stewardship Recommendations   · No antibiotics       Coordination ofOutpatient Care:   · Estimated Length of IV antimicrobials:  · Patient will need Midline / picc Catheter Insertion:   · Patient will need SNF:  · Patient will need outpatient wound care:     History of Present Illness:   Initial history:  Richie Steinberg is a 38y.o.-year-old male came for tachycardia and left leg sudden weakness    tested positive about a week ago for the Covid, he had a high fever 103, ongoing for about a week. He presented with weakness on the left leg, got a little better after admission but not resolved  Cough and SOB and aches present    Patient is not vaccinated. Chest x-ray is showing possible early pneumonia possibly Covid related. Neurology on board and planning on MRI. CT scan of the head was negative.       Other:  Hypertension obese hyperlipidemia, seronegative polyarthritis, asthma obstructive sleep apnea, congestive heart failure, lymphatic: Esophagitis, depression,    Interval changes  10/4/2021   Patient Vitals for the past 8 hrs:   BP Temp Temp src Pulse Resp SpO2   10/04/21 0915 139/72 100.6 °F (38.1 °C) Oral 93 21 94 %   10/04/21 0912 -- -- -- -- 15 93 %   10/04/21 0500 132/74 99.3 °F (37.4 °C) Oral 86 26 91 %       Summary of relevant labs:  Labs:  Procalcitonin0. [facility]   [id number]   CRP60.8High   [id number]   ALT25U/L AST32     CREATININE0.85     Micro:  COVID +9/28  And 10/4    Imaging:  CT heard  Pansinusitis, possible COVID pulmonary infiltrates, otherwise unremarkable   CTA of the head and neck. CXR  Lateral left basilar airspace disease may represent small/early pneumonia and or atelectasis. I have personally reviewed the past medical history, past surgical history, medications, social history, and family history, and I haveupdated the database accordingly. Allergies:   Fish-derived products, Other, Shellfish allergy, Shrimp (diagnostic), and Ace inhibitors     Review of Systems:     Review of Systems   Constitutional: Positive for activity change, chills and fatigue. HENT: Positive for congestion. Eyes: Negative for itching. Respiratory: Positive for cough and shortness of breath. Cardiovascular: Negative for chest pain. Gastrointestinal: Negative for abdominal distention. Endocrine: Negative for polydipsia. Genitourinary: Negative for dysuria and hematuria. Musculoskeletal: Negative for arthralgias. Skin: Negative for color change. Allergic/Immunologic: Negative for immunocompromised state. Neurological: Positive for weakness. Negative for dizziness. Hematological: Negative for adenopathy. Psychiatric/Behavioral: Negative for agitation. Physical Examination :       Physical Exam  Constitutional:       Appearance: Normal appearance. He is not ill-appearing. HENT:      Head: Normocephalic and atraumatic. Nose: Nose normal.      Mouth/Throat:      Mouth: Mucous membranes are moist.   Eyes:      General: No scleral icterus. Conjunctiva/sclera: Conjunctivae normal.   Cardiovascular:      Rate and Rhythm: Normal rate and regular rhythm. Heart sounds: Normal heart sounds. No murmur heard. Pulmonary:      Effort: No respiratory distress.       Breath sounds: Normal breath sounds. Abdominal:      General: There is no distension. Palpations: Abdomen is soft. Tenderness: There is no abdominal tenderness. Genitourinary:     Comments: No louis  Musculoskeletal:         General: No swelling or deformity. Cervical back: Neck supple. No rigidity. Skin:     General: Skin is dry. Coloration: Skin is not jaundiced. Neurological:      General: No focal deficit present. Mental Status: He is alert. Cranial Nerves: No cranial nerve deficit. Psychiatric:         Mood and Affect: Mood normal.         Thought Content: Thought content normal.         Past Medical History:     Past Medical History:   Diagnosis Date    Asthma     COPD (chronic obstructive pulmonary disease) ([facility] [address] 11/19/2018    Depression     Erectile dysfunction 9/29/2017    Essential hypertension 1/2/2017    GERD (gastroesophageal reflux disease) 11/19/2018    Heart palpitations 9/29/2017    Hyperlipidemia with target LDL less than 100 3/23/2017    Left lower lobe pneumonia 11/9/2012    Mitral valve prolapse     Nonspecific reactive hepatitis 3/22/2017    Obesity (BMI 30-39.9) 3/23/2017    NOEMÍ (obstructive sleep apnea) 10/16/2019    Osteoarthritis     Seronegative polyarthritis 10/10/2016    followed w/ rheumatology in [address] [place] [address] anxiety disorder     Uvulitis 8/15/2019       Past Surgical  History:     Past Surgical History:   Procedure Laterality Date    COLONOSCOPY      CYST REMOVAL Right     Armpit     ENDOSCOPY, COLON, DIAGNOSTIC      HAND SURGERY Left     KNEE ARTHROSCOPY      right knee    NOSE SURGERY      TONSILLECTOMY      UPPER GASTROINTESTINAL ENDOSCOPY N/A 10/9/2019    EGD POLYP SNARE, HOT.  ESOPHAGEAL DILATATION WITH MALONIES 50F performed by Lorraine Jorge MD at [facility]         Medications:      aspirin  81 mg Oral Daily    clopidogrel  75 mg Oral Daily    atorvastatin  40 mg Oral Nightly    budesonide-formoterol 2 puff Inhalation BID    sodium chloride flush  5-40 mL IntraVENous 2 times per day    enoxaparin  30 mg SubCUTAneous BID       Social History:     Social History     Socioeconomic History    Marital status:      Spouse name: Not on file    Number of children: Not on file    Years of education: Not on file    Highest education level: Not on file   Occupational History    Not on file   Tobacco Use    Smoking status: Never Smoker    Smokeless tobacco: Never Used   Vaping Use    Vaping Use: Never used   Substance and Sexual Activity    Alcohol use: Yes     Comment: occasional  few x year    Drug use: No    Sexual activity: Yes     Partners: Male   Other Topics Concern    Not on file   Social History Narrative    Not on file     Social Determinants of Health     Financial Resource Strain: Low Risk     Difficulty of Paying Living Expenses: Not hard at all   Food Insecurity: No Food Insecurity    Worried About Running Out of Food in the Last Year: Never true    Megan of Food in the Last Year: Never true   Transportation Needs:     Lack of Transportation (Medical):      Lack of Transportation (Non-Medical):    Physical Activity:     Days of Exercise per Week:     Minutes of Exercise per Session:    Stress:     Feeling of Stress :    Social Connections:     Frequency of Communication with Friends and Family:     Frequency of Social Gatherings with Friends and Family:     Attends Alevism Services:     Active Member of Clubs or Organizations:     Attends Club or Organization Meetings:     Marital Status:    Intimate Partner Violence:     Fear of Current or Ex-Partner:     Emotionally Abused:     Physically Abused:     Sexually Abused:        Family History:     Family History   Problem Relation Age of Onset    Diabetes Father     High Cholesterol Father     Other Mother         autoimmune hepatitis     High Blood Pressure Brother     Heart Disease Brother     Heart Attack Brother 40        cabg age 40     Rheum Arthritis Other 7    Colon Cancer Neg Hx     Cancer Neg Hx       Medical Decision Making:   I have independently reviewed/ordered the following labs:    CBC with Differential:   Recent Labs     10/03/21  1905 10/04/21  0841   WBC 8.7 6.5   HGB 16.5 16.0   HCT 50.5* 48.1    310   LYMPHOPCT 21* 27   MONOPCT 6 5     BMP:  Recent Labs     10/03/21  1905 10/04/21  0841    133*   K 3.8 3.7   CL 99 101   CO2 23 17*   BUN 16 13   CREATININE 1.08 0.85     Hepatic Function Panel:   Recent Labs     10/03/21  1905   PROT 7.7   LABALBU 4.1   BILIDIR <0.08   IBILI CANNOT BE CALCULATED   BILITOT 0.16*   ALKPHOS 106   ALT 25   AST 32     No results for input(s): RPR in the last 72 hours. No results for input(s): HIV in the last 72 hours. No results for input(s): BC in the last 72 hours. Lab Results   Component Value Date    CREATININE 0.85 10/04/2021    GLUCOSE 83 10/04/2021    GLUCOSE 96 04/30/2012       Detailed results: Thank you for allowing us to participate in the care of this patient. Please call with questions. This note is created with the assistance of a speech recognition program.  While intending to generate adocument that actually reflects the content of the visit, the document can still have some errors including those of syntax and sound a like substitutions which may escape proof reading. It such instances, actual meaningcan be extrapolated by contextual diversion.     Geovani Cortes MD  Office: [phone]  Perfect serve / office [phone]

## 2021-10-04 NOTE — PROGRESS NOTES
Occupational Therapy   Occupational Therapy Initial Assessment  Date: 10/4/2021   Patient Name: Amber Arguello  MRN: [id number]     : 1980    Date of Service: 10/4/2021  Chief Complaint   Patient presents with    Positive For Covid-19    Cough    Chest Pain    Fever       Discharge Recommendations: Further therapy recommended at discharge. The patient should be able to tolerate at least three hours of therapy per day over 5 days or 15 hours over 7 days. Patient would benefit from continued therapy after discharge  OT Equipment Recommendations  Equipment Needed: Yes  Mobility Devices: Druscilla Covert; ADL Assistive Devices  Walker: Rolling  ADL Assistive Devices: Shower Chair with back; Reacher;Sock-Aid Hard;Dressing Stick;Long-handled Sponge;Long-handled Shoe Horn    Assessment   Performance deficits / Impairments: Decreased functional mobility ; Decreased endurance;Decreased ADL status; Decreased high-level IADLs;Decreased balance  Prognosis: Good  Decision Making: Medium Complexity  OT Education: OT Role;Plan of Care;ADL Adaptive Strategies;Transfer Training;Precautions  Patient Education: Good return from pt  REQUIRES OT FOLLOW UP: Yes  Activity Tolerance  Activity Tolerance: Patient Tolerated treatment well;Treatment limited secondary to medical complications (free text)  Activity Tolerance: LLE weakness, SOB  Safety Devices  Safety Devices in place: Yes  Type of devices: Call light within reach;Gait belt;Left in bed;Nurse notified; Patient at risk for falls  Restraints  Initially in place: No           Patient Diagnosis(es): The primary encounter diagnosis was COVID-19. Diagnoses of Neck pain and Left leg weakness were also pertinent to this visit.      has a past medical history of Asthma, COPD (chronic obstructive pulmonary disease) ([facility] [address] Depression, Erectile dysfunction, Essential hypertension, GERD (gastroesophageal reflux disease), Heart palpitations, Hyperlipidemia with target LDL less than 100, Left lower lobe pneumonia, Mitral valve prolapse, Nonspecific reactive hepatitis, Obesity (BMI 30-39.9), NOEMÍ (obstructive sleep apnea), Osteoarthritis, Seronegative polyarthritis, Social anxiety disorder, and Uvulitis. has a past surgical history that includes Knee arthroscopy; Hand surgery (Left); Tonsillectomy; Nose surgery; [place] tooth extraction; Colonoscopy; Endoscopy, colon, diagnostic; cyst removal (Right); and Upper gastrointestinal endoscopy (N/A, 10/9/2019).          Restrictions  Restrictions/Precautions  Restrictions/Precautions: General Precautions, Fall Risk, Up as Tolerated, Isolation  Required Braces or Orthoses?: No  Position Activity Restriction  Other position/activity restrictions: covid+/droplet+, SBP goal <200    Subjective   General  Patient assessed for rehabilitation services?: Yes  Family / Caregiver Present: No  Diagnosis: LLE weakness, covid+, pansinusitis, fever, SOB  Patient Currently in Pain: Denies  Pain Assessment  Pain Assessment: 0-10  Pain Level: 0  Patient Currently in Pain: Denies  Oxygen Therapy  SpO2: 90 %  Social/Functional History  Social/Functional History  Lives With: Spouse, Family (wife is taking time off to assist, (15 YO, 7YO, 11 month old children)  Type of Home: Apartment (2nd floor, no elevator, flight of stairs to get to 2nd level)  Home Layout: One level  Home Access: Stairs to enter with rails  Entrance Stairs - Number of Steps: 1  Entrance Stairs - Rails: Right  Bathroom Shower/Tub: Tub/Shower unit  Bathroom Toilet: Standard  ADL Assistance: Independent  Homemaking Assistance: Independent  Homemaking Responsibilities: Yes  Ambulation Assistance: Independent  Transfer Assistance: Independent  Active : Yes  Mode of Transportation: Car  Occupation: Unemployed  Type of occupation: Off work for past 2 months-pt did not state why  Leisure & Hobbies: coaches softball  Additional Comments: Wife recently diagnosed with Covid as well and at home currently     Objective Vision: Within Functional Limits  Hearing: Within functional limits    Orientation  Overall Orientation Status: Within Functional Limits     Balance  Sitting Balance: Modified independent   Standing Balance: Contact guard assistance  Standing Balance  Time: 7 min  Activity: Pt stood bedside x2, short func mob at bedside (anterior and side-stepping)  Functional Mobility  Functional - Mobility Device: Rolling Walker  Activity: Other  Assist Level: Minimal assistance  Functional Mobility Comments: L knee buckling at times although t did not lose balance fully this date, pt educated by PT on step-to method for func mob to reduce chance of buckling, pt learning quickly how to use RW properly, limited to bedside d/t balance concerns, major fall risk, on O2 via NC entire session  ADL  Feeding: Independent;Setup  Grooming: Modified independent ;Setup; Increased time to complete  UE Bathing: Supervision; Increased time to complete;Setup  LE Bathing: Contact guard assistance; Increased time to complete;Setup  UE Dressing: Increased time to complete;Setup  LE Dressing: Moderate assistance; Increased time to complete;Setup  Toileting: Setup; Increased time to complete; Moderate assistance  Additional Comments: Pt limited by LLE weakness, pt demo'd poor figure-4 technique so pt leaned forwards to don L sock which make breathing more difficult, SOB for entire session (sats as low as 82% briefly), pt with good BUE strength, poor standing tolerance, pt sat for oral care activity with setup  Tone RUE  RUE Tone: Normotonic  Tone LUE  LUE Tone: Normotonic  Coordination  Movements Are Fluid And Coordinated: Yes     Bed mobility  Supine to Sit: Contact guard assistance  Sit to Supine: Minimal assistance (For LLE assist back into bed at end of session)  Scooting: Supervision  Comment: Pt supine in bed upon arrival/exit this date with [facility] elevated  Transfers  Sit to stand: Minimal assistance  Stand to sit: Contact guard assistance  Transfer Comments: RW used     Cognition  Overall Cognitive Status: WFL        Sensation  Overall Sensation Status: WFL        LUE AROM (degrees)  LUE AROM : WFL  RUE AROM (degrees)  RUE AROM : WFL  LUE Strength  Gross LUE Strength: WFL  L Shoulder Flex: 5/5  L Elbow Flex: 5/5  L Elbow Ext: 5/5  L Hand General: 5/5  RUE Strength  Gross RUE Strength: WFL  R Shoulder Flex: 5/5  R Elbow Flex: 5/5  R Elbow Ext: 5/5  R Hand General: 5/5         Plan   Plan  Times per week: 4-5x    AM-PAC Score        AM-PAC Inpatient Daily Activity Raw Score: 17 (10/04/21 1321)  AM-PAC Inpatient ADL T-Scale Score : 37.26 (10/04/21 1321)  ADL Inpatient CMS 0-100% Score: 50.11 (10/04/21 1321)  ADL Inpatient CMS G-Code Modifier : CK (10/04/21 1321)    Goals  Short term goals  Time Frame for Short term goals: Pt will by discharge  Short term goal 1: demo good safety awareness during func mob around room using LRD PRN and SUP  Short term goal 2: demo ADL UB bathing/dressing activity with increased time and mod I  Short term goal 3: demo ADL LB bathing/dressing activity with increased time, adaptive tech's, and CGA  Short term goal 4: demo mod I for all bed mobility using railing PRN  Short term goal 5: demo standing during func activity for 10 min + using RW PRN, 1 seated rest break PRN, and SBA     Therapy Time   Individual Concurrent Group Co-treatment   Time In 1012         Time Out 1047         Minutes 35         Timed Code Treatment Minutes: 23 Minutes       Sanjay Carr OTR/L

## 2021-10-04 NOTE — PLAN OF CARE
increase energy and improved vitality  Outcome: Ongoing     Problem: Patient Education: Go to Patient Education Activity  Goal: Patient/Family Education  Outcome: Ongoing

## 2021-10-04 NOTE — PROGRESS NOTES
[facility]  Internal Medicine Teaching Residency Program  Inpatient Daily Progress Note  ______________________________________________________________________________    Patient: Mamadou Nguyễn  YOB: 1980   [facility]:[id number]    Acct: [de-identified]     Room: 2010/2010-01  Admit date: 10/3/2021  Today's date: 10/04/21  Number of days in the hospital: 1    SUBJECTIVE   Admitting Diagnosis: TIA (transient ischemic attack)  CC:   Pt examined at bedside. Chart & results reviewed. No acute events overnight  Vitals stable  O2 inhalation NC 2 L, SPO2 94%  CRP 57.4  ROS:  Constitutional:  negative for chills, fevers, sweats  Respiratory: Positive for cough, dyspnea on exertion,  Cardiovascular:  negative for chest pain, chest pressure/discomfort, lower extremity edema, palpitations  Gastrointestinal:  negative for abdominal pain, constipation, diarrhea, nausea, vomiting  Neurological:  negative for dizziness, headache  BRIEF HISTORY     This patient is COVID positive and has possible ischemic stroke/TIA.     This patient 78-year-old male with past medical history of essential hypertension, obesity, hyperlipidemia, seronegative polyarthritis, LVH due to hypertensive disease, asthma,NOEMÍ, eosinophilic esophagitis and depressive disorder presented with weakness to his left lower extremity started today afternoon and unable to bear the weight of the body. The patient denies any loss of consciousness, head trauma, headache, weakness of left upper extremity/right upper/right lower extremity. Reports numbness of left lower extremity.     Reports high-grade fever, highest recorded 103 F, since Monday started after contact with Covid patient and tested positive for Covid. The fever gets better with Tylenol.   He has associated dry cough and dyspnea even at rest.  The patient  covid unvaccinated.     Rest of review of systems is unremarkable.     On my evaluation the patient is lying comfortably in the bed, saturating on room air, in no apparent distress, answering questions appropriately. Vitals stable. sensation of lower extremities are intact. Power on left lower side is 3/5 with intact reflexes. Chest examination shows bilateral equal air entry without added sounds. Rest of examination is unremarkable.     Pertinent labs are pro-Bob 0.09, CRP 60.8, , fibrinogen 536, chest x-ray left basilar airspace disease represent early pneumonia and or atelectasis. CT head wo contrast-no acute intracranial abnormality. CTA head neck with contrast:Pansinusitis, possible COVID pulmonary infiltrates. Neuro and ID on board. MRI tomorrow a.m. OBJECTIVE     Vital Signs:  BP (!) 147/81   Pulse 95   Temp 102 °F (38.9 °C) (Oral)   Resp 24   Ht 6' 2\" (1.88 m)   Wt 260 lb (117.9 kg)   SpO2 91%   BMI 33.38 kg/m²     Temp (24hrs), Av.5 °F (38.1 °C), Min:99.1 °F (37.3 °C), Max:102 °F (38.9 °C)    No intake/output data recorded. Physical Exam:  Constitutional: This is a well developed, well nourished,38y.o. year old male who is alert, oriented, cooperative and in no apparent distress. Head:normocephalic and atraumatic. EENT:  PERRLA. No conjunctival injections. Septum was midline, mucosa was without erythema, exudates or cobblestoning. No thrush was noted. Neck: Supple without thyromegaly. No elevated JVP. Trachea was midline. Respiratory: Chest was symmetrical without dullness to percussion. Breath sounds bilaterally were clear to auscultation. There were no wheezes, rhonchi or rales. There is no intercostal retraction or use of accessory muscles. No egophony noted. Cardiovascular: Regular without murmur, clicks, gallops or rubs. Abdomen: Slightly rounded and soft without organomegaly. No rebound, rigidity or guarding was appreciated. Lymphatic: No lymphadenopathy. Musculoskeletal: Normal curvature of the spine. No gross muscle weakness.     Extremities: Left lower limb weakness noted. Power 4/5, sensation and reflexes intact. Skin:  Warm and dry. Good color, turgor and pigmentation. No lesions or scars. No cyanosis or clubbing  Neurological/Psychiatric: The patient's general behavior, level of consciousness, thought content and emotional status is normal.        Medications:  Scheduled Medications:    aspirin  81 mg Oral Daily    clopidogrel  75 mg Oral Daily    budesonide-formoterol  2 puff Inhalation BID    pravastatin  20 mg Oral Nightly    sodium chloride flush  5-40 mL IntraVENous 2 times per day    enoxaparin  30 mg SubCUTAneous BID     Continuous Infusions:    sodium chloride      sodium chloride 100 mL/hr at 10/03/21 2340     PRN Medicationsmorphine, 4 mg, Q4H PRN  albuterol sulfate HFA, 2 puff, Q6H PRN  sodium chloride flush, 5-40 mL, PRN  sodium chloride, 25 mL, PRN  ondansetron, 4 mg, Q8H PRN   Or  ondansetron, 4 mg, Q6H PRN  polyethylene glycol, 17 g, Daily PRN  acetaminophen, 650 mg, Q6H PRN   Or  acetaminophen, 650 mg, Q6H PRN        Diagnostic Labs:  CBC:   Recent Labs     10/03/21  1905   WBC 8.7   RBC 5.54   HGB 16.5   HCT 50.5*   MCV 91.2   RDW 13.3        BMP:   Recent Labs     10/03/21  1901 10/03/21  1905   NA  --  136   K  --  3.8   CL  --  99   CO2  --  23   BUN  --  16   CREATININE 1.13 1.08     BNP: No results for input(s): BNP in the last 72 hours.   PT/INR:   Recent Labs     10/03/21  1905   PROTIME 10.6   INR 1.0     APTT:   Recent Labs     10/03/21  1905   APTT 32.2*     CARDIAC ENZYMES:   Recent Labs     10/03/21  1905   CKMB <1.0     FASTING LIPID PANEL:  Lab Results   Component Value Date    CHOL 167 10/03/2021    HDL 28 (L) 10/03/2021    TRIG 290 (H) 10/03/2021     LIVER PROFILE:   Recent Labs     10/03/21  1905   AST 32   ALT 25   BILIDIR <0.08   BILITOT 0.16*   ALKPHOS 106      MICROBIOLOGY:   Lab Results   Component Value Date/Time    CULTURE NO GROWTH 01/18/2016 10:00 PM    CULTURE  01/18/2016 10:00 PM     [facility] [address] [facility], [address] [facility] [phone]       Imaging:    CT HEAD WO CONTRAST    Result Date: 10/3/2021  No acute intracranial abnormality. RECOMMENDATIONS: The findings were sent to the Radiology Results [address] at 7:36 pm on 10/3/2021to be communicated to a licensed caregiver. XR CHEST PORTABLE    Result Date: 10/3/2021  Lateral left basilar airspace disease may represent small/early pneumonia and or atelectasis. CTA HEAD NECK W CONTRAST    Result Date: 10/3/2021  Pansinusitis, possible COVID pulmonary infiltrates, otherwise unremarkable CTA of the head and neck. RECOMMENDATIONS: Recommend CT chest.       ASSESSMENT & PLAN     1. TIA/ischemic stroke-CT head without contrast shows no acute intracranial abnormality, unremarkable CTA head neck W contrast-MRI brain tomorrow a.m. Neuro on board. Received aspirin and clopidogrel. 2.COVID : NC 2 L, SPO2 94%, will follow ID recommendation, Lovenox 30 twice daily  3. Essential hypertension-currently controlled  4. Hyperlipidemia-LDL within target range, continue pravastatin 20     DVT ppx: Lovenox 30  GI ppx: None     PT/OT/SW: Ongoing  Discharge Planning: CM to assist with    Melinda Dawkins MD  Internal Medicine Resident, PGY-1  [address] [facility], [facility]  10/4/2021, 5:30 AM   Attending Physician Statement  I have discussed the care of Kaylin Ruiz, including pertinent history and exam findings,  with the resident. I have seen and examined the patient and the key elements of all parts of the encounter have been performed by me. I agree with the assessment, plan and orders as documented by the resident with additions . Treatment plan Discussed with nursing staff in detail , all questions answered . Electronically signed by Gisella Jordan MD on   10/4/21 at 2:00 PM EDT    Please note that this chart was generated using voice recognition Dragon dictation software.   Although every effort was made to ensure the accuracy of this automated transcription, some errors in transcription may have occurred.

## 2021-10-04 NOTE — PROGRESS NOTES
Physical Therapy    Facility/Department: [facility] 2  Initial Assessment    NAME: Yeni Napoles  : 1980  MRN: [id number]    Date of Service: 10/4/2021  Chief Complaint   Patient presents with    Positive For Covid-19    Cough    Chest Pain    Fever    copied from chart:  Patient who had a positive Covid test on Monday presents with weakness to his left lower extremity that he says started at around 2:00 this afternoon. He says he has been able to walk but feels like he is dragging his foot when doing so. He denies any pain to the leg or the back. He says he has had some pain in his neck and chest.  He says he has had some increased shortness of breath as well. He denies abdominal pain, vomiting or diarrhea. He denies any weakness or numbness to his upper extremities. Patient says that he got tested for Covid because his wife had tested positive for. He says he did start having a fever earlier in the week. On exam, patient is resting comfortably in the bed. He is alert and oriented and answering questions appropriately. Lungs clear to auscultation bilaterally heart sounds are normal.  Abdomen soft nontender. There is weakness to the left lower extremity compared to the right. Sensation is intact. We will call a stroke alert. Will get CT and CTA of the head and neck. Will check labs and plan to admit patient.     Discharge Recommendations:  Patient would benefit from continued therapy after discharge   PT Equipment Recommendations  Equipment Needed: Yes  Mobility Devices: Romaine Torres: Rolling    Assessment   Body structures, Functions, Activity limitations: Decreased functional mobility ; Decreased posture;Decreased endurance;Decreased strength;Decreased balance  Assessment: Pt with mobility deficits requiring CGA to perform sit<>stand transfer and min-A to ambulate 5 feet with a RW. Pt with 2x LLE knee buckling bouts this date.   Pt is a high fall risk secondary to decreased LLE strength and control, decreased standing balance, and decreased endurance. Pt would benefit from additional intense therapy upon discharge and is expected to be able to tolerate at least 3 hours of therapy per day. Pt would be unsafe to return to prior living arrangements upon discharge. Prognosis: Good  Decision Making: Medium Complexity  PT Education: Goals;Transfer Training;PT Role;Functional Mobility Training;General Safety;Gait Training  REQUIRES PT FOLLOW UP: Yes  Activity Tolerance  Activity Tolerance: Patient Tolerated treatment well       Patient Diagnosis(es): The primary encounter diagnosis was COVID-19. Diagnoses of Neck pain and Left leg weakness were also pertinent to this visit. has a past medical history of Asthma, COPD (chronic obstructive pulmonary disease) ([facility] [address] Depression, Erectile dysfunction, Essential hypertension, GERD (gastroesophageal reflux disease), Heart palpitations, Hyperlipidemia with target LDL less than 100, Left lower lobe pneumonia, Mitral valve prolapse, Nonspecific reactive hepatitis, Obesity (BMI 30-39.9), NOEMÍ (obstructive sleep apnea), Osteoarthritis, Seronegative polyarthritis, Social anxiety disorder, and Uvulitis. has a past surgical history that includes Knee arthroscopy; Hand surgery (Left); Tonsillectomy; Nose surgery; [place] tooth extraction; Colonoscopy; Endoscopy, colon, diagnostic; cyst removal (Right); and Upper gastrointestinal endoscopy (N/A, 10/9/2019).     Restrictions  Restrictions/Precautions  Restrictions/Precautions: General Precautions, Fall Risk, Up as Tolerated, Isolation  Required Braces or Orthoses?: No  Position Activity Restriction  Other position/activity restrictions: covid+/droplet+, SBP goal <200  Vision/Hearing  Vision: Within Functional Limits  Hearing: Within functional limits     Subjective  General  Patient assessed for rehabilitation services?: Yes  Response To Previous Treatment: Not applicable  Family / Caregiver Present: No  Follows Commands: Within Functional Limits  Subjective  Subjective: Pt supine in bed and agreeable to therapy this morning, RN agreeable to therapy.   Pain Screening  Patient Currently in Pain: Denies  Vital Signs  Patient Currently in Pain: Denies       Social/Functional History  Social/Functional History  Lives With: Spouse, Family (wife is taking time off to assist, (15 YO, 5YO, 11 month old children))  Type of Home: Apartment (2nd floor, no elevator, flight of stairs to get to 2nd level)  Home Layout: One level  Home Access: Stairs to enter with rails  Entrance Stairs - Number of Steps: 1  Entrance Stairs - Rails: Right  Bathroom Shower/Tub: Tub/Shower unit  Bathroom Toilet: Standard  Home Equipment:  (reports not owning or using any DME at a baseline)  ADL Assistance: [address]: Independent  Homemaking Responsibilities: Yes  Ambulation Assistance: Independent  Transfer Assistance: Independent  Active : Yes  Mode of Transportation: Car  Occupation: Unemployed  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 [employer]  Additional Comments: Wife recently diagnosed with Covid as well and at home currently  [employer]  Overall Cognitive Status: WFL    Objective     Observation/Palpation  Posture: Fair    AROM RLE (degrees)  RLE AROM: WFL  AROM LLE (degrees)  LLE AROM : WFL  AROM RUE (degrees)  RUE AROM : WFL  AROM LUE (degrees)  LUE AROM : WFL  Strength RLE  Strength RLE: WFL  Comment: Grossly 4/5  Strength LLE  Strength LLE: Exception  L Hip Flexion: 3-/5  L Knee Flexion: 3/5  L Knee Extension: 3/5  L Ankle Dorsiflexion: 3+/5  L Ankle Plantar Flexion: 3/5  Strength RUE  Comment: Co-eval with OT, see OT note for UE detail  Strength LUE  Comment: Co-eval with OT, see OT note for UE detail     Sensation  Overall Sensation Status: WFL (pt denies any numbness/tingling)  Bed mobility  Supine to Sit: Contact guard assistance  Sit to Supine: Minimal assistance (for LLE progression)  Scooting: Supervision  Comment: Bed mobility performed with the HOB elevated ~20 degrees with use of handrails. Transfers  Sit to Stand: Minimal Assistance  Stand to sit: Contact guard assistance  Comment: verbal cues for hand placement  Ambulation  Ambulation?: Yes  More Ambulation?: No  Ambulation 1  Surface: level tile  Device: Rolling Walker  Assistance: Minimal assistance;Contact guard assistance (pt requires min-A during 2x LLE knee buckling bouts during ambulation)  Quality of Gait: Pt with step-to gait, decreased LLE stance phase, 2x LLE knee buckling this date requiring min-A to correct.   Gait Deviations: Slow Amelia;Decreased step length;Decreased step height  Distance: 4 side steps right and left, seated rest break, 5 feet forward and backwards  Stairs/Curb  Stairs?: No     Balance  Posture: Fair  Sitting - Static: Good  Sitting - Dynamic: Good  Standing - Static: Fair  Standing - Dynamic: Fair  Comments: standing balance assessed with a RW  Exercises  Hip Flexion: x8 bilateral in standing     Plan   Plan  Times per week: 5-6  Times per day: Daily  Current Treatment Recommendations: Strengthening, Transfer Training, Endurance Training, Balance Training, Gait Training, Functional Mobility Training, Stair training, Safety Education & Training, Home Exercise Program, Equipment Evaluation, Education, & procurement, Patient/Caregiver Education & Training  Safety Devices  Type of devices: Patient at risk for falls, Left in bed, Bed alarm in place, Call light within reach, Gait belt, Nurse notified  Restraints  Initially in place: No                                     AM-PAC Score  AM-PAC Inpatient Mobility Raw Score : 18 (10/04/21 1358)  AM-PAC Inpatient T-Scale Score : 43.63 (10/04/21 1358)  Mobility Inpatient CMS 0-100% Score: 46.58 (10/04/21 1358)  Mobility Inpatient CMS G-Code Modifier : CK (10/04/21 1358)          Goals  Short term goals  Time Frame for Short term goals: 14 visits  Short term goal 1: Pt will ambulate 75 feet with least restrictive device and SBA to increase functional independence. Short term goal 2: Pt will demonstrate good- standing balance to decrease fall risk. Short term goal 3: Pt will tolerate a 35 minute therapy session to promote increased endurance. Short term goal 4: Pt will perform sit<>stand transfer with supervision to increase functional independence. Short term goal 5: Pt will negotiate 4 stairs with a right sided handrail and SBA to allow the pt to enter prior living arrangements.   Patient Goals   Patient goals : none stated       Therapy Time   Individual Concurrent Group Co-treatment   Time In 1012         Time Out 1047         Minutes 35         Timed Code Treatment Minutes: 8 Minutes       Heaven Fisher, PT

## 2021-10-04 NOTE — PLAN OF CARE
Problem: Falls - Risk of:  Goal: Will remain free from falls  Description: Will remain free from falls  10/4/2021 1836 by Jenna Horowitz RN  Outcome: Ongoing  10/4/2021 1110 by Jenna Horowitz RN  Outcome: Ongoing  Goal: Absence of physical injury  Description: Absence of physical injury  10/4/2021 1836 by Jenna Horowitz RN  Outcome: Ongoing  10/4/2021 1110 by Jenna Horowitz RN  Outcome: Ongoing     Problem: Pain:  Goal: Pain level will decrease  Description: Pain level will decrease  10/4/2021 1836 by Jenna Horowitz RN  Outcome: Ongoing  10/4/2021 1110 by Jenna Horowitz RN  Outcome: Ongoing  Goal: Control of acute pain  Description: Control of acute pain  10/4/2021 1836 by Jenna Horowitz RN  Outcome: Ongoing  10/4/2021 1110 by Jenna Horowitz RN  Outcome: Ongoing  Goal: Control of chronic pain  Description: Control of chronic pain  10/4/2021 1836 by Jenna Horowitz RN  Outcome: Ongoing  10/4/2021 1110 by Jenna Horowitz RN  Outcome: Ongoing     Problem: Airway Clearance - Ineffective  Goal: Achieve or maintain patent airway  10/4/2021 1836 by Jenna Horowitz RN  Outcome: Ongoing  10/4/2021 1110 by Jenna Horowitz RN  Outcome: Ongoing     Problem: Gas Exchange - Impaired  Goal: Absence of hypoxia  10/4/2021 1836 by Jenna Horowitz RN  Outcome: Ongoing  10/4/2021 1110 by Jenna Horowitz RN  Outcome: Ongoing  Goal: Promote optimal lung function  10/4/2021 1836 by Jenna Horowitz RN  Outcome: Ongoing  10/4/2021 1110 by Jenna Horowitz RN  Outcome: Ongoing     Problem: Breathing Pattern - Ineffective  Goal: Ability to achieve and maintain a regular respiratory rate  10/4/2021 1836 by Jenna Horowitz RN  Outcome: Ongoing  10/4/2021 1110 by Jenna Horowitz RN  Outcome: Ongoing     Problem:  Body Temperature -  Risk of, Imbalanced  Goal: Ability to maintain a body temperature within defined limits  10/4/2021 1836 by Jenna Horowitz RN  Outcome: Ongoing  10/4/2021 1110 by Jenna Horowitz RN  Outcome: Ongoing  Goal: Will regain or maintain usual level of consciousness  10/4/2021 1836 by John Morales RN  Outcome: Ongoing  10/4/2021 1110 by John Morales RN  Outcome: Ongoing  Goal: Complications related to the disease process, condition or treatment will be avoided or minimized  10/4/2021 1836 by John Morales RN  Outcome: Ongoing  10/4/2021 1110 by John Morales RN  Outcome: Ongoing     Problem: Isolation Precautions - Risk of Spread of Infection  Goal: Prevent transmission of infection  10/4/2021 1836 by John Morales RN  Outcome: Ongoing  10/4/2021 1110 by John Morales RN  Outcome: Ongoing     Problem: Nutrition Deficits  Goal: Optimize nutritional status  10/4/2021 1836 by John Morales RN  Outcome: Ongoing  10/4/2021 1110 by John Morales RN  Outcome: Ongoing     Problem: Risk for Fluid Volume Deficit  Goal: Maintain normal heart rhythm  10/4/2021 1836 by John Morales RN  Outcome: Ongoing  10/4/2021 1110 by John Morales RN  Outcome: Ongoing  Goal: Maintain absence of muscle cramping  10/4/2021 1836 by John Morales RN  Outcome: Ongoing  10/4/2021 1110 by John Morales RN  Outcome: Ongoing  Goal: Maintain normal serum potassium, sodium, calcium, phosphorus, and pH  10/4/2021 1836 by John Morales RN  Outcome: Ongoing  10/4/2021 1110 by John Morales RN  Outcome: Ongoing     Problem: Loneliness or Risk for Loneliness  Goal: Demonstrate positive use of time alone when socialization is not possible  10/4/2021 1836 by John Morales RN  Outcome: Ongoing  10/4/2021 1110 by John Morales RN  Outcome: Ongoing     Problem: Fatigue  Goal: Verbalize increase energy and improved vitality  10/4/2021 1836 by John Morales RN  Outcome: Ongoing  10/4/2021 1110 by John Morales RN  Outcome: Ongoing     Problem: Patient Education: Go to Patient Education Activity  Goal: Patient/Family Education  10/4/2021 1836 by John Morales RN  Outcome: Ongoing  10/4/2021 1110 by John Morales RN  Outcome:

## 2021-10-04 NOTE — CONSULTS
[facility] Neurology   IN-PATIENT SERVICE      NEUROLOGY CONSULT  NOTE            Date:   10/4/2021  Patient name:  Linette Kim  Date of admission:  10/3/2021  YOB: 1980      Chief Complaint:     Chief Complaint   Patient presents with    Positive For Covid-19    Cough    Chest Pain    Fever       Reason for Consult:      Left lower extremity weakness    History of Present Illness:      The patient is a 38 y.o. male who presents with left lower extremity weakness that started around 3 PM, patient has Covid positive since Monday symptomatic, upon arrival [facility] 1 for mild left lower extremity drift, also patient noted to have 4/5 strength of left lower extremity weakness, patient denied any history of seizure, stroke, TIA, patient has a history of hypertension, hyperlipidemia, he is on pravastatin 20 mg,     CT head: no acute intercranial abnl  CTA: No LVO    Past Medical History:     Past Medical History:   Diagnosis Date    Asthma     COPD (chronic obstructive pulmonary disease) ([facility] [address] 11/19/2018    Depression     Erectile dysfunction 9/29/2017    Essential hypertension 1/2/2017    GERD (gastroesophageal reflux disease) 11/19/2018    Heart palpitations 9/29/2017    Hyperlipidemia with target LDL less than 100 3/23/2017    Left lower lobe pneumonia 11/9/2012    Mitral valve prolapse     Nonspecific reactive hepatitis 3/22/2017    Obesity (BMI 30-39.9) 3/23/2017    NOEMÍ (obstructive sleep apnea) 10/16/2019    Osteoarthritis     Seronegative polyarthritis 10/10/2016    followed w/ rheumatology in [address] [place] [address] anxiety disorder     Uvulitis 8/15/2019        Past Surgical History:     Past Surgical History:   Procedure Laterality Date    COLONOSCOPY      CYST REMOVAL Right     Armpit     ENDOSCOPY, COLON, DIAGNOSTIC      HAND SURGERY Left     KNEE ARTHROSCOPY      right knee    NOSE SURGERY      TONSILLECTOMY      UPPER GASTROINTESTINAL ENDOSCOPY N/A 10/9/2019    EGD POLYP SNARE, HOT. ESOPHAGEAL DILATATION WITH MALONIES 49F performed by Deyvi Rodney MD at [address]          Medications Prior to Admission:     Prior to Admission medications    Medication Sig Start Date End Date Taking? Authorizing Provider   pravastatin (PRAVACHOL) 20 MG tablet take 1 tablet by mouth every evening **STOP ATORVASTATIN** 6/7/21   Aaliyah Dennison MD   amLODIPine (NORVASC) 10 MG tablet take 1 tablet by mouth every morning 1/8/21   Aaliyah Dennison MD   buPROPion (WELLBUTRIN XL) 150 MG extended release tablet Take 1 tablet by mouth every morning 1/8/21   Aaliyah Dennison MD   busPIRone (BUSPAR) 10 MG tablet Take 1 tablet by mouth 3 times daily as needed (anxiety) 1/8/21   Aaliyah Dennison MD   loratadine (CLARITIN) 10 MG tablet Take 1 tablet by mouth daily 1/8/21   Aaliyah Dennison MD   omeprazole (PRILOSEC) 40 MG delayed release capsule TAKE 1 CAPSULE DAILY 1/8/21   Aaliyah Dennison MD   celecoxib (CELEBREX) 100 MG capsule Take 1 capsule by mouth daily as needed for Pain 1/8/21   Aaliyah Dennison MD   fluticasone (FLONASE) 50 MCG/ACT nasal spray 2 sprays by Nasal route daily 10/27/20 10/27/21  LAMAR Mcadams - CADEN   mometasone-formoterol (DULERA) 100-5 MCG/ACT inhaler Inhale 2 puffs into the lungs 2 times daily Stop Flovent 5/8/20   Aaliyah Dennison MD   albuterol sulfate HFA (VENTOLIN HFA) 108 (90 Base) MCG/ACT inhaler Inhale 2 puffs into the lungs every 6 hours as needed for Wheezing or Shortness of Breath 5/8/20   Aaliyah Dennison MD   albuterol (PROVENTIL) (2.5 MG/3ML) 0.083% nebulizer solution Take 3 mLs by nebulization every 6 hours as needed for Wheezing or Shortness of Breath 1/2/20   Aaliyah Dennison MD   Blood Pressure KIT Dx: HTN. Needs automatic blood pressure machine to monitor his blood pressure.   Patient not taking: Reported on 6/23/2021 3/22/17   Aaliyah Dennison MD   Respiratory Therapy Supplies (NEBULIZER) Jillian Martinez 1 Units by Does not apply route 2 times daily Dx: Asthma exacerbation J45.901 17   Sunshine Perkins MD        Allergies:     Fish-derived products, Other, Shellfish allergy, Shrimp (diagnostic), and Ace inhibitors    Social History:     Tobacco:    reports that he has never smoked. He has never used smokeless tobacco.  Alcohol:      reports current alcohol use. Drug Use:  reports no history of drug use.     Family History:     Family History   Problem Relation Age of Onset    Diabetes Father     High Cholesterol Father     Other Mother         autoimmune hepatitis     High Blood Pressure Brother     Heart Disease Brother     Heart Attack Brother 40        cabg age 40     Rheum Arthritis Other 7    Colon Cancer Neg Hx     Cancer Neg Hx        Review of Systems:       Constitutional Negative for fever and chills   HEENT Negative for ear discharge, ear pain, nosebleed   Eyes Negative for photophobia, pain and discharge   Respiratory Negative for hemoptysis and sputum   Cardiovascular Negative for orthopnea, claudication and PND   Gastrointestinal Negative for abdominal pain, diarrhea, blood in stool   Musculoskeletal Negative for joint pain, negative for myalgia   Skin Negative for rash or itching   hematology Negative for ecchymosis, anemia   Psychiatric Negative for suicidal ideation, anxiety, depression, hallucinations       Physical Exam:   BP (!) 147/81   Pulse 95   Temp 102 °F (38.9 °C) (Oral)   Resp 24   Ht 6' 2\" (1.88 m)   Wt 260 lb (117.9 kg)   SpO2 91%   BMI 33.38 kg/m²   Temp (24hrs), Av.5 °F (38.1 °C), Min:99.1 °F (37.3 °C), Max:102 °F (38.9 °C)        General examination:      General Appearance:  alert, well appearing, and in no acute distress  HEENT: Normocephalic, atraumatic, moist mucus membranes  Neck: supple, no carotid bruits, (-) nuchal rigidity  Lungs:  Respirations unlabored, chest wall no deformity, BS normal  Cardiovascular: normal rate, regular rhythm  Abdomen: Soft, nontender, nondistended, normal bowel sounds  Skin: No gross lesions, rashes, bruising or bleeding on exposed skin area  Extremities:  peripheral pulses palpable, no cyanosis, clubbing or edema  Psych: normal affect  CONSTITUTIONAL:  Well developed, well nourished, alert and oriented x 3, in no acute distress. GCS 15, nontoxic. No dysarthria, no aphasia. EOMI. HEAD:  normocephalic, atraumatic    EYES:  PERRLA, EOMI.   ENT:  moist mucous membranes   NECK:  supple, symmetric, no midline tenderness to palpation    BACK:  without midline tenderness, step-offs or deformities    LUNGS:  Equal air entry bilaterally   CARDIOVASCULAR:  normal s1 / s2   ABDOMEN:  Soft, no rigidity   NEUROLOGIC:  Mental Status:  A & O x3,awake             Cranial Nerves:    cranial nerves II-XII are grossly intact     Motor Exam:    Drift:  present - LLE      Motor exam is 5 out of 5 all extremities with the exception of LLE 4/5      Sensory:    Touch:    Right Upper Extremity:  normal  Left Upper Extremity:  normal  Right Lower Extremity:  normal  Left Lower Extremity:  normal     Deep Tendon Reflexes:    Right Bicep:  2+  Left Bicep:  2+  Right Knee:  2+  Left Knee:  2+     Plantar Response:  Right:  equivocal  Left:  equivocal     Clonus:  N/A  Huff's:  N/A     Coordination/Dysmetria:     Finger to Nose:   Right:  normal  Left:  normal   Dysdiadochokinesia:  N/A     Gait:  Not assessed      INITIAL NIH STROKE SCALE:      1a. Level of consciousness:  0 - alert; keenly responsive  1b. Level of consciousness questions:  0 - answers both questions correctly  1c. Level of consciousness questions:  0 - performs both tasks correctly  2. Best Gaze:  0 - normal  3. Visual:  0 - no visual loss  4. Facial Palsy:  0 - normal symmetric movement  5a. Motor left arm:  0 - no drift, limb holds 90 (or 45) degrees for full 10 seconds  5b. Motor right arm:  0 - no drift, limb holds 90 (or 45) degrees for full 10 seconds  6a.   Motor left le - drift; leg falls by the end of the 5 second period but does not hit bed  6b. Motor right le - no drift; leg holds 30 degree position for full 5 seconds  7. Limb Ataxia:  0 - absent  8. Sensory:  0 - normal; no sensory loss  9. Best Language:  0 - no aphasia, normal  10. Dysarthria:  0 - normal  11. Extinction and Inattention:  0 - no abnormality     TOTAL:  1      SKIN:  no rash           Diagnostics:      Laboratory Testing:  CBC:   Recent Labs     10/03/21  1905   WBC 8.7   HGB 16.5        BMP:    Recent Labs     10/03/21  1901 10/03/21  1905   NA  --  136   K  --  3.8   CL  --  99   CO2  --  23   BUN  --  16   CREATININE 1.13 1.08   GLUCOSE  --  86         Lab Results   Component Value Date    CHOL 167 10/03/2021    LDLCHOLESTEROL 81 10/03/2021    HDL 28 (L) 10/03/2021    TRIG 290 (H) 10/03/2021    ALT 25 10/03/2021    AST 32 10/03/2021    TSH 4.07 2021    INR 1.0 10/03/2021    LABA1C 5.5 2017    LABMICR 5 2015    [id number] 2014       No results found for: PHENYTOIN, PHENYTOIN, VALPROATE, CBMZ      Imaging/Diagnostics:  CT HEAD WO CONTRAST    Result Date: 10/3/2021  EXAMINATION: CT OF THE HEAD WITHOUT CONTRAST  10/3/2021 7:19 pm TECHNIQUE: CT of the head was performed without the administration of intravenous contrast. Dose modulation, iterative reconstruction, and/or weight based adjustment of the mA/kV was utilized to reduce the radiation dose to as low as reasonably achievable.  COMPARISON: MR brain February 3, 2012 and CT brain February 3, 2012 HISTORY: ORDERING SYSTEM PROVIDED HISTORY: left lower extremity decreased strength, weakness, 3 hours duration TECHNOLOGIST PROVIDED HISTORY: left lower extremity decreased strength, weakness, 3 hours duration Decision Support Exception - unselect if not a suspected or confirmed emergency medical condition->Emergency Medical Condition (MA) Reason for Exam: S/P Stroke Alert - left lower extremity reasonably achievable. COMPARISON: Noncontrast CT brain from today HISTORY: ORDERING SYSTEM PROVIDED HISTORY: concoern for stroke TECHNOLOGIST PROVIDED HISTORY: concoern for stroke Decision Support Exception - unselect if not a suspected or confirmed emergency medical condition->Emergency Medical Condition (MA) Reason for Exam: S/P Stroke Alert - Left lower extremity decreased strength, weakness, 3 hours duration Acuity: Unknown Type of Exam: Unknown FINDINGS: CTA NECK: AORTIC ARCH/ARCH VESSELS: No dissection or arterial injury. No significant stenosis of the brachiocephalic or subclavian arteries. CAROTID ARTERIES: No dissection, arterial injury, or hemodynamically significant stenosis by NASCET criteria. VERTEBRAL ARTERIES: No dissection, arterial injury, or significant stenosis. SOFT TISSUES: Several small ground-glass opacities suggestive of COVID-19. Rosa Alexsander No cervical or superior mediastinal lymphadenopathy. The larynx and pharynx are unremarkable. No acute abnormality of the salivary and thyroid glands. Air-fluid levels in the maxillary sinuses bilaterally. BONES: No acute osseous abnormality. CTA HEAD: ANTERIOR CIRCULATION: No significant stenosis of the intracranial internal carotid, anterior cerebral, or middle cerebral arteries. No aneurysm. POSTERIOR CIRCULATION: No significant stenosis of the vertebral, basilar, or posterior cerebral arteries. No aneurysm. Posterior communicating arteries not identified. OTHER: No dural venous sinus thrombosis on this non-dedicated study. BRAIN: No mass effect or midline shift. No extra-axial fluid collection. The gray-white differentiation is maintained. Pansinusitis, possible COVID pulmonary infiltrates, otherwise unremarkable CTA of the head and neck.  RECOMMENDATIONS: Recommend CT chest.           Impression and Plan :      The patient is a 38 y.o. male who presents with left lower extremity weakness that started around 3 PM, patient has Covid positive since Monday symptomatic, upon arrival [facility] of 1 for mild left lower extremity drift, also patient noted to have 4/5 strength of left lower extremity weakness, patient denied any history of seizure, stroke, TIA, patient has a history of hypertension, hyperlipidemia, he is on pravastatin 20 mg,     CT head: no acute intercranial abnl  CTA: No LVO  - Discussed with : Patient outside of the window for TPA, will admit the patient for stroke work-up, MRI in the morning CT and CTA: Unremarkable, patient loaded with aspirin and Plavix, is going to stay on maintenance dose until proven negative stroke, high dose of pravastatin,                 Labs                 - A1C, Fasting Lipid panel              Orders              - PT, OT, Speech eval, PMR c/s              - Telemetry               - Neuro checks per protocol  - We recommend SBP <200  - Blood glucose goal less than 180  - Please avoid dextrose containing solutions        Electronically signed by James Noel MD on 10/4/2021 at 12:46 AM      Electronically signed by   James Noel MD  10/4/2021  12:46 AM

## 2021-10-04 NOTE — PROGRESS NOTES
This is a 39-year-old male with past medical history of essential hypertension, seronegative polyarthritis, COPD, and hyperlipidemia presenting with weakness of left leg. Last known well 3 PM 10/3/2021. Patient also complains of cough shortness of breath and is not vaccinated against Covid. In the ED, patient had temperature of 103 F. Patient had contact with Covid patient and tested positive. Saturating well in room air not in distress. Pertinent labs: CRP 60.8. Chest x-ray left basilar airspace disease. CT head and CTA head and neck unremarkable    Assessment and plan:    Suspected acute ischemic CVA versus TIA: CT head and CTA head and neck unremarkable. Follow-up MRI brain. On aspirin 81 mg daily, Plavix 75 mg daily and pravastatin 20 mg nightly    Confirmed COVID-19 +: Supportive care. Trend CRP, SPO2 status and symptoms. Follow-up infectious disease recommendations. Essential hypertension: Controlled.   Resume home medications as needed    Hyperlipidemia: On pravastatin 20 mg nightly    Haley Marti MD  PGY-3, Internal Medicine Resident  MACRINA [address]  10/4/2021 6:10 AM

## 2021-10-04 NOTE — CARE COORDINATION
Case Management Initial Discharge [address] [facility],             Talked with:patient on the phone to discuss discharge plans. Information verified: address, contacts, phone number, , insurance Yes  Insurance Provider: N/A    Emergency Contact/Next of Kin name & number: beny Blum [phone]  Who are involved in patient's support system? Wife and parents    PCP: Vivian Chacon MD  Date of last visit: year ago      Discharge Planning    Living Arrangements:  Spouse/Significant Other, Children     Home-second floor apartment  5 stairs to climb to get into front door    Patient able to perform ADL's:Independent    Current Services (outpatient & in home) none  DME equipment: CPAP  DME provider: Stephania Granger    Is patient receiving oral anticoagulation therapy? No        Potential Assistance Needed:  N/A    Patient agreeable to home care: No  [place] of choice provided:  n/a    Prior SNF/Rehab Placement and Facility: N/A  Agreeable to SNF/Rehab: No  [place]asper of choice provided: n/a     Evaluation: n/a    Expected Discharge date:  10/06/21    Patient expects to be discharged to:   home    If home: is the family and/or caregiver wiling & able to provide support at home? yes  Who will be providing this support? family*    Follow Up Appointment: Best Day/ Time:      Transportation provider: beny Persaud  Transportation arrangements needed for discharge: No    Readmission Risk              Risk of Unplanned Readmission:  9             Does patient have a readmission risk score greater than 14?: No  If yes, follow-up appointment must be made within 7 days of discharge.      Goals of Care: breathe easier      Educated patient on transitional options, provided freedom of choice and are agreeable with plan      Discharge Plan: home with wife and kids    Pharmacy-[facility] on [place]          Electronically signed by Ellie Burgos RN on 10/4/21 at 12:43 PM EDT

## 2021-10-05 ENCOUNTER — APPOINTMENT (OUTPATIENT)
Dept: MRI IMAGING | Age: 41
DRG: 177 | End: 2021-10-05
Payer: COMMERCIAL

## 2021-10-05 LAB
ABSOLUTE EOS #: 0 K/UL (ref 0–0.4)
ABSOLUTE IMMATURE GRANULOCYTE: 0 K/UL (ref 0–0.3)
ABSOLUTE LYMPH #: 1.12 K/UL (ref 1–4.8)
ABSOLUTE MONO #: 0.34 K/UL (ref 0.1–0.8)
ANION GAP SERPL CALCULATED.3IONS-SCNC: 13 MMOL/L (ref 9–17)
BASOPHILS # BLD: 0 % (ref 0–2)
BASOPHILS ABSOLUTE: 0 K/UL (ref 0–0.2)
BUN BLDV-MCNC: 11 MG/DL (ref 6–20)
BUN/CREAT BLD: ABNORMAL (ref 9–20)
C-REACTIVE PROTEIN: 45.6 MG/L (ref 0–5)
CALCIUM SERPL-MCNC: 8 MG/DL (ref 8.6–10.4)
CHLORIDE BLD-SCNC: 107 MMOL/L (ref 98–107)
CO2: 17 MMOL/L (ref 20–31)
CREAT SERPL-MCNC: 0.65 MG/DL (ref 0.7–1.2)
DIFFERENTIAL TYPE: ABNORMAL
EOSINOPHILS RELATIVE PERCENT: 0 % (ref 1–4)
GFR AFRICAN AMERICAN: >60 ML/MIN
GFR NON-AFRICAN AMERICAN: >60 ML/MIN
GFR SERPL CREATININE-BSD FRML MDRD: ABNORMAL ML/MIN/{1.73_M2}
GFR SERPL CREATININE-BSD FRML MDRD: ABNORMAL ML/MIN/{1.73_M2}
GLUCOSE BLD-MCNC: 112 MG/DL (ref 70–99)
HCT VFR BLD CALC: 47.2 % (ref 40.7–50.3)
HEMOGLOBIN: 15.1 G/DL (ref 13–17)
IMMATURE GRANULOCYTES: 0 %
LYMPHOCYTES # BLD: 26 % (ref 24–44)
MCH RBC QN AUTO: 30.1 PG (ref 25.2–33.5)
MCHC RBC AUTO-ENTMCNC: 32 G/DL (ref 28.4–34.8)
MCV RBC AUTO: 94 FL (ref 82.6–102.9)
MONOCYTES # BLD: 8 % (ref 1–7)
MORPHOLOGY: NORMAL
NRBC AUTOMATED: 0 PER 100 WBC
PDW BLD-RTO: 13.3 % (ref 11.8–14.4)
PLATELET # BLD: 222 K/UL (ref 138–453)
PLATELET ESTIMATE: ABNORMAL
PMV BLD AUTO: 10.4 FL (ref 8.1–13.5)
POTASSIUM SERPL-SCNC: 4.5 MMOL/L (ref 3.7–5.3)
RBC # BLD: 5.02 M/UL (ref 4.21–5.77)
RBC # BLD: ABNORMAL 10*6/UL
SEG NEUTROPHILS: 66 % (ref 36–66)
SEGMENTED NEUTROPHILS ABSOLUTE COUNT: 2.84 K/UL (ref 1.8–7.7)
SODIUM BLD-SCNC: 137 MMOL/L (ref 135–144)
WBC # BLD: 4.3 K/UL (ref 3.5–11.3)
WBC # BLD: ABNORMAL 10*3/UL

## 2021-10-05 PROCEDURE — 2500000003 HC RX 250 WO HCPCS: Performed by: INTERNAL MEDICINE

## 2021-10-05 PROCEDURE — 6370000000 HC RX 637 (ALT 250 FOR IP): Performed by: STUDENT IN AN ORGANIZED HEALTH CARE EDUCATION/TRAINING PROGRAM

## 2021-10-05 PROCEDURE — 80048 BASIC METABOLIC PNL TOTAL CA: CPT

## 2021-10-05 PROCEDURE — 2580000003 HC RX 258: Performed by: INTERNAL MEDICINE

## 2021-10-05 PROCEDURE — 85025 COMPLETE CBC W/AUTO DIFF WBC: CPT

## 2021-10-05 PROCEDURE — 94640 AIRWAY INHALATION TREATMENT: CPT

## 2021-10-05 PROCEDURE — 94761 N-INVAS EAR/PLS OXIMETRY MLT: CPT

## 2021-10-05 PROCEDURE — 2700000000 HC OXYGEN THERAPY PER DAY

## 2021-10-05 PROCEDURE — 1200000000 HC SEMI PRIVATE

## 2021-10-05 PROCEDURE — 99233 SBSQ HOSP IP/OBS HIGH 50: CPT | Performed by: INTERNAL MEDICINE

## 2021-10-05 PROCEDURE — 6360000002 HC RX W HCPCS: Performed by: NURSE PRACTITIONER

## 2021-10-05 PROCEDURE — 99291 CRITICAL CARE FIRST HOUR: CPT | Performed by: INTERNAL MEDICINE

## 2021-10-05 PROCEDURE — 99232 SBSQ HOSP IP/OBS MODERATE 35: CPT | Performed by: PSYCHIATRY & NEUROLOGY

## 2021-10-05 PROCEDURE — 36415 COLL VENOUS BLD VENIPUNCTURE: CPT

## 2021-10-05 PROCEDURE — 86140 C-REACTIVE PROTEIN: CPT

## 2021-10-05 PROCEDURE — 70551 MRI BRAIN STEM W/O DYE: CPT

## 2021-10-05 PROCEDURE — 6370000000 HC RX 637 (ALT 250 FOR IP): Performed by: NURSE PRACTITIONER

## 2021-10-05 PROCEDURE — 2580000003 HC RX 258: Performed by: STUDENT IN AN ORGANIZED HEALTH CARE EDUCATION/TRAINING PROGRAM

## 2021-10-05 PROCEDURE — 6360000002 HC RX W HCPCS: Performed by: STUDENT IN AN ORGANIZED HEALTH CARE EDUCATION/TRAINING PROGRAM

## 2021-10-05 PROCEDURE — 72148 MRI LUMBAR SPINE W/O DYE: CPT

## 2021-10-05 PROCEDURE — 6370000000 HC RX 637 (ALT 250 FOR IP): Performed by: INTERNAL MEDICINE

## 2021-10-05 RX ORDER — KETOROLAC TROMETHAMINE 15 MG/ML
30 INJECTION, SOLUTION INTRAMUSCULAR; INTRAVENOUS 2 TIMES DAILY PRN
Status: DISCONTINUED | OUTPATIENT
Start: 2021-10-05 | End: 2021-10-08 | Stop reason: HOSPADM

## 2021-10-05 RX ORDER — OXYCODONE HYDROCHLORIDE 5 MG/1
5 TABLET ORAL ONCE
Status: COMPLETED | OUTPATIENT
Start: 2021-10-05 | End: 2021-10-05

## 2021-10-05 RX ORDER — CARBIDOPA AND LEVODOPA 50; 200 MG/1; MG/1
1 TABLET, EXTENDED RELEASE ORAL 2 TIMES DAILY
Status: CANCELLED | OUTPATIENT
Start: 2021-10-05

## 2021-10-05 RX ORDER — OXYCODONE HYDROCHLORIDE AND ACETAMINOPHEN 5; 325 MG/1; MG/1
1 TABLET ORAL EVERY 6 HOURS PRN
Status: DISCONTINUED | OUTPATIENT
Start: 2021-10-05 | End: 2021-10-08 | Stop reason: HOSPADM

## 2021-10-05 RX ADMIN — CLOPIDOGREL 75 MG: 75 TABLET, FILM COATED ORAL at 07:57

## 2021-10-05 RX ADMIN — ACETAMINOPHEN 650 MG: 325 TABLET ORAL at 07:57

## 2021-10-05 RX ADMIN — FLUTICASONE PROPIONATE 1 SPRAY: 50 SPRAY, METERED NASAL at 07:58

## 2021-10-05 RX ADMIN — BARICITINIB 4 MG: 2 TABLET, FILM COATED ORAL at 07:57

## 2021-10-05 RX ADMIN — SODIUM CHLORIDE, PRESERVATIVE FREE 10 ML: 5 INJECTION INTRAVENOUS at 21:30

## 2021-10-05 RX ADMIN — ENOXAPARIN SODIUM 30 MG: 30 INJECTION SUBCUTANEOUS at 07:57

## 2021-10-05 RX ADMIN — DESMOPRESSIN ACETATE 40 MG: 0.2 TABLET ORAL at 21:30

## 2021-10-05 RX ADMIN — ENOXAPARIN SODIUM 30 MG: 30 INJECTION SUBCUTANEOUS at 21:30

## 2021-10-05 RX ADMIN — SALINE NASAL SPRAY 1 SPRAY: 1.5 SOLUTION NASAL at 12:29

## 2021-10-05 RX ADMIN — DEXAMETHASONE SODIUM PHOSPHATE 6 MG: 10 INJECTION INTRAMUSCULAR; INTRAVENOUS at 12:29

## 2021-10-05 RX ADMIN — BUDESONIDE AND FORMOTEROL FUMARATE DIHYDRATE 2 PUFF: 160; 4.5 AEROSOL RESPIRATORY (INHALATION) at 10:15

## 2021-10-05 RX ADMIN — ASPIRIN 81 MG: 81 TABLET, CHEWABLE ORAL at 07:57

## 2021-10-05 RX ADMIN — OXYCODONE 5 MG: 5 TABLET ORAL at 12:29

## 2021-10-05 RX ADMIN — OXYCODONE HYDROCHLORIDE AND ACETAMINOPHEN 1 TABLET: 5; 325 TABLET ORAL at 21:59

## 2021-10-05 RX ADMIN — REMDESIVIR 100 MG: 100 INJECTION, POWDER, LYOPHILIZED, FOR SOLUTION INTRAVENOUS at 14:08

## 2021-10-05 ASSESSMENT — PAIN SCALES - GENERAL
PAINLEVEL_OUTOF10: 3
PAINLEVEL_OUTOF10: 7
PAINLEVEL_OUTOF10: 2
PAINLEVEL_OUTOF10: 0
PAINLEVEL_OUTOF10: 7
PAINLEVEL_OUTOF10: 0

## 2021-10-05 ASSESSMENT — ENCOUNTER SYMPTOMS
VOMITING: 0
DIARRHEA: 0
COUGH: 0
WHEEZING: 0
CHEST TIGHTNESS: 0
EYE ITCHING: 0
ABDOMINAL PAIN: 0
ABDOMINAL DISTENTION: 0
NAUSEA: 0
SHORTNESS OF BREATH: 1
COUGH: 1
COLOR CHANGE: 0
SHORTNESS OF BREATH: 0

## 2021-10-05 ASSESSMENT — PAIN DESCRIPTION - PAIN TYPE: TYPE: ACUTE PAIN

## 2021-10-05 ASSESSMENT — PAIN DESCRIPTION - LOCATION: LOCATION: HEAD

## 2021-10-05 NOTE — PROGRESS NOTES
[facility]  Internal Medicine Teaching Residency Program  Inpatient Daily Progress Note  ______________________________________________________________________________    Patient: Faiza Manriquez  YOB: 1980   BUA:[id number]    Acct: [de-identified]     Room: 2010/2010-01  Admit date: 10/3/2021  Today's date: 10/05/21  Number of days in the hospital: 2    SUBJECTIVE   Admitting Diagnosis: TIA (transient ischemic attack)  CC:   Pt examined at bedside. Chart & results reviewed. Hemodynamically stable. Afebrile. No acute events overnight. Patient currently saturating 94% 2 L nasal cannula. CRP downtrending from 57.4-45. 6. Patient complaining of pain radiating down to his leg. Plan to get MRI head and MRI of the lumbar spine today. ROS:  Constitutional:  negative for chills, fevers, sweats  Respiratory: negative for cough, dyspnea on exertion,  Cardiovascular:  negative for chest pain, chest pressure/discomfort, lower extremity edema, palpitations  Gastrointestinal:  negative for abdominal pain, constipation, diarrhea, nausea, vomiting  Neurological:  negative for dizziness, headache  BRIEF HISTORY     This patient is COVID positive and has possible ischemic stroke/TIA.     This patient 58-year-old male with past medical history of essential hypertension, obesity, hyperlipidemia, seronegative polyarthritis, LVH due to hypertensive disease, asthma,NOEMÍ, eosinophilic esophagitis and depressive disorder presented with weakness to his left lower extremity started today afternoon and unable to bear the weight of the body. The patient denies any loss of consciousness, head trauma, headache, weakness of left upper extremity/right upper/right lower extremity. Reports numbness of left lower extremity.     Reports high-grade fever, highest recorded 103 F, since Monday started after contact with Covid patient and tested positive for Covid.   The fever gets better with Tylenol. He has associated dry cough and dyspnea even at rest.  The patient  covid unvaccinated.     Rest of review of systems is unremarkable.     On my evaluation the patient is lying comfortably in the bed, saturating on room air, in no apparent distress, answering questions appropriately. Vitals stable. sensation of lower extremities are intact. Power on left lower side is 3/5 with intact reflexes. Chest examination shows bilateral equal air entry without added sounds. Rest of examination is unremarkable.     Pertinent labs are pro-Bob 0.09, CRP 60.8, , fibrinogen 536, chest x-ray left basilar airspace disease represent early pneumonia and or atelectasis. CT head wo contrast-no acute intracranial abnormality. CTA head neck with contrast:Pansinusitis, possible COVID pulmonary infiltrates. Neuro and ID on board. MRI tomorrow a.m. OBJECTIVE     Vital Signs:  /80   Pulse 78   Temp 98.3 °F (36.8 °C) (Oral)   Resp 23   Ht 6' 2\" (1.88 m)   Wt 255 lb 14.4 oz (116.1 kg)   SpO2 92%   BMI 32.86 kg/m²     Temp (24hrs), Av.8 °F (37.1 °C), Min:98.2 °F (36.8 °C), Max:100.8 °F (38.2 °C)    In: -   Out: 1375 [Urine:1375]    Physical Exam:  Constitutional: This is a well developed, well nourished,38y.o. year old male who is alert, oriented, cooperative and in no apparent distress. Head:normocephalic and atraumatic. Neck: Supple without thyromegaly. Respiratory: Chest was symmetrical without dullness to percussion. Breath sounds bilaterally were clear to auscultation. There were no wheezes, rhonchi or rales. Cardiovascular: Regular without murmur, clicks, gallops or rubs. Abdomen: Slightly rounded and soft without organomegaly. No rebound, rigidity or guarding was appreciated. Musculoskeletal: Normal curvature of the spine. No gross muscle weakness. Extremities: Left lower limb weakness noted. Tenderness noted. Skin:  Warm and dry. Good color, turgor and pigmentation.  No lesions or scars. Neurological/Psychiatric: The patient's general behavior, level of consciousness, thought content and emotional status is normal.        Medications:  Scheduled Medications:    oxyCODONE  5 mg Oral Once    aspirin  81 mg Oral Daily    clopidogrel  75 mg Oral Daily    atorvastatin  40 mg Oral Nightly    fluticasone  1 spray Each Nostril Daily    dexamethasone  6 mg IntraVENous Q24H    remdesivir IVPB  100 mg IntraVENous Q24H    baricitinib  4 mg Oral Daily    budesonide-formoterol  2 puff Inhalation BID    sodium chloride flush  5-40 mL IntraVENous 2 times per day    enoxaparin  30 mg SubCUTAneous BID     Continuous Infusions:    sodium chloride      sodium chloride 100 mL/hr at 10/03/21 2340     PRN Medicationssodium chloride, 1 spray, PRN  sodium chloride, 30 mL, PRN  albuterol sulfate HFA, 2 puff, Q6H PRN  sodium chloride flush, 5-40 mL, PRN  sodium chloride, 25 mL, PRN  ondansetron, 4 mg, Q8H PRN   Or  ondansetron, 4 mg, Q6H PRN  polyethylene glycol, 17 g, Daily PRN  acetaminophen, 650 mg, Q6H PRN   Or  acetaminophen, 650 mg, Q6H PRN        Diagnostic Labs:  CBC:   Recent Labs     10/03/21  1905 10/04/21  0841 10/05/21  0602   WBC 8.7 6.5 4.3   RBC 5.54 5.23 5.02   HGB 16.5 16.0 15.1   HCT 50.5* 48.1 47.2   MCV 91.2 92.0 94.0   RDW 13.3 13.4 13.3    310 222     BMP:   Recent Labs     10/03/21  1905 10/04/21  0841 10/05/21  0602    133* 137   K 3.8 3.7 4.5   CL 99 101 107   CO2 23 17* 17*   BUN 16 13 11   CREATININE 1.08 0.85 0.65*     BNP: No results for input(s): BNP in the last 72 hours.   PT/INR:   Recent Labs     10/03/21  1905   PROTIME 10.6   INR 1.0     APTT:   Recent Labs     10/03/21  1905   APTT 32.2*     CARDIAC ENZYMES:   Recent Labs     10/03/21  1905   CKMB <1.0     FASTING LIPID PANEL:  Lab Results   Component Value Date    CHOL 167 10/03/2021    HDL 28 (L) 10/03/2021    TRIG 290 (H) 10/03/2021     LIVER PROFILE:   Recent Labs     10/03/21  1905   AST 32 ALT 25   BILIDIR <0.08   BILITOT 0.16*   ALKPHOS 106      MICROBIOLOGY:   Lab Results   Component Value Date/Time    CULTURE NO GROWTH 01/18/2016 10:00 PM    CULTURE  01/18/2016 10:00 PM     [facility] [address] [facility], [address] [place] [address] [phone]       Imaging:    CT HEAD WO CONTRAST    Result Date: 10/3/2021  No acute intracranial abnormality. RECOMMENDATIONS: The findings were sent to the Radiology Results [address] at 7:36 pm on 10/3/2021to be communicated to a licensed caregiver. XR CHEST PORTABLE    Result Date: 10/3/2021  Lateral left basilar airspace disease may represent small/early pneumonia and or atelectasis. CTA HEAD NECK W CONTRAST    Result Date: 10/3/2021  Pansinusitis, possible COVID pulmonary infiltrates, otherwise unremarkable CTA of the head and neck. RECOMMENDATIONS: Recommend CT chest.       ASSESSMENT & PLAN     1. TIA/ischemic stroke-CT head without contrast shows no acute intracranial abnormality, unremarkable CTA head neck W contrast-patient scheduled for MRI of the head and MRI of lumbar spine. Neurology on board. Patient currently on aspirin Plavix. 2.COVID : NC 2 L, SPO2 94%, ID following. Patient started on remdesivir for 5 days, Decadron 6 mg for 10 days, baricitinib 4 mg for 14 days. Lovenox 30 twice daily  3. Essential hypertension-currently controlled  4. Hyperlipidemia-LDL within target range, continue pravastatin 20     DVT ppx: Lovenox 30 BID  GI ppx: None     PT/OT/SW: Ongoing  Discharge Planning:   consulted. Discussed with neurology      Abilio Ignacio MD  Internal Medicine Resident, PGY-2  [facility]; [facility], [facility]  10/5/2021, 12:05 PM   Attending Physician Statement  I have discussed the care of [address], including pertinent history and exam findings,  with the resident. I have seen and examined the patient and the key elements of all parts of the encounter have been performed by me.   I agree with the assessment, plan and orders as documented by the resident with additions . Discussed with neurology. His CTA of the head has been normal.  He has 1 leg weakness. I am concerned that he probably has a compression of the spinal cord at the level of lumbosacral region and we should get an MRI of the lumbosacral area to rule out any compression. No evidence of cauda equina lesion. No urinary bladder problems. He had Covid and the radiology department for good reason is reluctant to do the MRI. I do believe that he needs the MRI to rule out any compression of the spinal cord. Treatment plan Discussed with nursing staff in detail , all questions answered . Electronically signed by Norm Pacheco MD on   10/5/21 at 12:33 PM EDT  CC time 30 minutes  Please note that this chart was generated using voice recognition Dragon dictation software. Although every effort was made to ensure the accuracy of this automated transcription, some errors in transcription may have occurred.

## 2021-10-05 NOTE — PLAN OF CARE
PROVIDE ADEQUATE OXYGENATION WITH ACCEPTABLE SP02/ABG'S    [x]  IDENTIFY APPROPRIATE OXYGEN THERAPY  [x]   MONITOR SP02/ABG'S AS NEEDED   [x]   PATIENT EDUCATION AS NEEDED       BRONCHOSPASM/BRONCHOCONSTRICTION     [x]         IMPROVE AERATION/BREATH SOUNDS  [x]   ADMINISTER BRONCHODILATOR THERAPY AS APPROPRIATE  [x]   ASSESS BREATH SOUNDS  []   IMPLEMENT AEROSOL/MDI PROTOCOL  [x]   PATIENT EDUCATION AS NEEDED

## 2021-10-05 NOTE — PROGRESS NOTES
[facility] Neurology   [facility]    Progress Note             Date:   10/5/2021  Patient name:  Kristi Ferrari  Date of admission:  10/3/2021  6:43 PM  MRN:   [id number]  Account:  [de-identified]  YOB: 1980  PCP:    Matthew Dean MD  Room:   2010/2010-01  Code Status:    Full Code    Chief Complaint:     Chief Complaint   Patient presents with    Positive For Covid-19    Cough    Chest Pain    Fever       Interval hx: The patient was seen and examined at bedside. Is vitally stable, alert and oriented x 3. No acute events overnight. The patient stated that he is still having left lower extremity weakness. Patient worked with PT yesterday, ambulated 5 feet with a rolling walker. Brief History of Present Illness: The patient is a 38 y.o.  /  male who presents with Positive For Covid-19, Cough, Chest Pain, and Fever   and he is admitted to the hospital for the management of Covid and left lower extremity weakness. Patient is a past medical history of hypertension, seronegative polyarthritis, COPD, hyperlipidemia presented with left leg weakness, last known well at 3 PM on 10/3. NIH 1, patient had mild left lower extremity drift, strength 4 out of 5. Patient was loaded with aspirin and Plavix and started on Lipitor.   Continue aspirin 81 mg, Plavix 75 mg.  CT head without contrast normal, CT head and neck with contrast unremarkable  Hemoglobin A1c 5.5  LDL 81  MRI brain and lumbar spine and echo pending        Past Medical History:     Past Medical History:   Diagnosis Date    Asthma     COPD (chronic obstructive pulmonary disease) ([facility] [address] 11/19/2018    Depression     Erectile dysfunction 9/29/2017    Essential hypertension 1/2/2017    GERD (gastroesophageal reflux disease) 11/19/2018    Heart palpitations 9/29/2017    Hyperlipidemia with target LDL less than 100 3/23/2017    Left lower lobe pneumonia 11/9/2012    Mitral valve prolapse     Nonspecific reactive hepatitis 3/22/2017    Obesity (BMI 30-39.9) 3/23/2017    NOEMÍ (obstructive sleep apnea) 10/16/2019    Osteoarthritis     Seronegative polyarthritis 10/10/2016    followed w/ rheumatology in [address] [place] [address] anxiety disorder     Uvulitis 8/15/2019        Past Surgical History:     Past Surgical History:   Procedure Laterality Date    COLONOSCOPY      CYST REMOVAL Right     Armpit     ENDOSCOPY, COLON, DIAGNOSTIC      HAND SURGERY Left     KNEE ARTHROSCOPY      right knee    NOSE SURGERY      TONSILLECTOMY      UPPER GASTROINTESTINAL ENDOSCOPY N/A 10/9/2019    EGD POLYP SNARE, HOT. ESOPHAGEAL DILATATION WITH MALONIES 50F performed by Elsie Ganser, [facility]          Medications Prior to Admission:     Prior to Admission medications    Medication Sig Start Date End Date Taking?  Authorizing Provider   pravastatin (PRAVACHOL) 20 MG tablet take 1 tablet by mouth every evening **STOP ATORVASTATIN** 6/7/21   Mami Contreras MD   amLODIPine (NORVASC) 10 MG tablet take 1 tablet by mouth every morning 1/8/21   [facility], MD   buPROPion (WELLBUTRIN XL) 150 MG extended release tablet Take 1 tablet by mouth every morning 1/8/21   [facility], MD   busPIRone (BUSPAR) 10 MG tablet Take 1 tablet by mouth 3 times daily as needed (anxiety) 1/8/21   Mami Contreras MD   loratadine (CLARITIN) 10 MG tablet Take 1 tablet by mouth daily 1/8/21   [facility] MD Ben   omeprazole (PRILOSEC) 40 MG delayed release capsule TAKE 1 CAPSULE DAILY 1/8/21   [facility], MD   celecoxib (CELEBREX) 100 MG capsule Take 1 capsule by mouth daily as needed for Pain 1/8/21   Mami Contreras MD   fluticasone (FLONASE) 50 MCG/ACT nasal spray 2 sprays by Nasal route daily 10/27/20 10/27/21  Cheli Booker, APRN - CADEN   mometasone-formoterol (DULERA) 100-5 MCG/ACT inhaler Inhale 2 puffs into the lungs 2 times daily Stop Flovent 5/8/20   Graham Thorpe MD   albuterol sulfate HFA (VENTOLIN HFA) 108 (90 Base) MCG/ACT inhaler Inhale 2 puffs into the lungs every 6 hours as needed for Wheezing or Shortness of Breath 5/8/20   Graham Thorpe MD   albuterol (PROVENTIL) (2.5 MG/3ML) 0.083% nebulizer solution Take 3 mLs by nebulization every 6 hours as needed for Wheezing or Shortness of Breath 1/2/20   Graham Thorpe MD   Blood Pressure KIT Dx: HTN. Needs automatic blood pressure machine to monitor his blood pressure. Patient not taking: Reported on 6/23/2021 3/22/17   Graham Thorpe MD   Respiratory Therapy Supplies (NEBULIZER) MADHAV 1 Units by Does not apply route 2 times daily Dx: Asthma exacerbation J45.901 1/31/17   Salvador Moncada MD        Allergies:     Fish-derived products, Other, Shellfish allergy, Shrimp (diagnostic), and Ace inhibitors    Social History:     Tobacco:    reports that he has never smoked. He has never used smokeless tobacco.  Alcohol:      reports current alcohol use. Drug Use:  reports no history of drug use. Family History:     Family History   Problem Relation Age of Onset    Diabetes Father     High Cholesterol Father     Other Mother         autoimmune hepatitis     High Blood Pressure Brother     Heart Disease Brother     Heart Attack Brother 40        cabg age 40     Rheum Arthritis Other 7    Colon Cancer Neg Hx     Cancer Neg Hx        Review of Systems:     Review of Systems   Constitutional: Negative for activity change, appetite change, chills and fever. HENT: Negative for congestion. Respiratory: Negative for cough, chest tightness, shortness of breath and wheezing. Cardiovascular: Negative for chest pain and leg swelling. Gastrointestinal: Negative for abdominal distention, abdominal pain, diarrhea, nausea and vomiting. Genitourinary: Negative for dysuria and flank pain. Skin: Negative for rash.    Neurological: Negative for dizziness, Not tested        Investigations:      Laboratory Testing:  Recent Results (from the past 24 hour(s))   Basic Metabolic Panel w/ Reflex to MG    Collection Time: 10/05/21  6:02 AM   Result Value Ref Range    Glucose 112 (H) 70 - 99 mg/dL    BUN 11 6 - 20 mg/dL    CREATININE 0.65 (L) 0.70 - 1.20 mg/dL    Bun/Cre Ratio NOT REPORTED 9 - 20    Calcium 8.0 (L) 8.6 - 10.4 mg/dL    Sodium 137 135 - 144 mmol/L    Potassium 4.5 3.7 - 5.3 mmol/L    Chloride 107 98 - 107 mmol/L    CO2 17 (L) 20 - 31 mmol/L    Anion Gap 13 9 - 17 mmol/L    GFR Non-African American >60 >60 mL/min    GFR African American >60 >60 mL/min    GFR Comment          GFR Staging NOT REPORTED    CBC auto differential    Collection Time: 10/05/21  6:02 AM   Result Value Ref Range    WBC 4.3 3.5 - 11.3 k/uL    RBC 5.02 4.21 - 5.77 m/uL    Hemoglobin 15.1 13.0 - 17.0 g/dL    Hematocrit 47.2 40.7 - 50.3 %    MCV 94.0 82.6 - 102.9 fL    MCH 30.1 25.2 - 33.5 pg    MCHC 32.0 28.4 - 34.8 g/dL    RDW 13.3 11.8 - 14.4 %    Platelets 082 380 - 365 k/uL    MPV 10.4 8.1 - 13.5 fL    NRBC Automated 0.0 0.0 per 100 WBC    Differential Type NOT REPORTED     WBC Morphology NOT REPORTED     RBC Morphology NOT REPORTED     Platelet Estimate NOT REPORTED     Immature Granulocytes 0 0 %    Seg Neutrophils 66 36 - 66 %    Lymphocytes 26 24 - 44 %    Monocytes 8 (H) 1 - 7 %    Eosinophils % 0 (L) 1 - 4 %    Basophils 0 0 - 2 %    Absolute Immature Granulocyte 0.00 0.00 - 0.30 k/uL    Segs Absolute 2.84 1.8 - 7.7 k/uL    Absolute Lymph # 1.12 1.0 - 4.8 k/uL    Absolute Mono # 0.34 0.1 - 0.8 k/uL    Absolute Eos # 0.00 0.0 - 0.4 k/uL    Basophils Absolute 0.00 0.0 - 0.2 k/uL    Morphology Normal    C-REACTIVE PROTEIN    Collection Time: 10/05/21  6:02 AM   Result Value Ref Range    CRP 45.6 (H) 0.0 - 5.0 mg/L     Recent Labs     10/05/21  0602   WBC 4.3   RBC 5.02   HGB 15.1   HCT 47.2   MCV 94.0   MCH 30.1   MCHC 32.0   RDW 13.3      MPV 10.4     Recent Labs 10/03/21  1905 10/04/21  0841 10/05/21  0602      < > 137   K 3.8   < > 4.5   CL 99   < > 107   CO2 23   < > 17*   BUN 16   < > 11   CREATININE 1.08   < > 0.65*   GLUCOSE 86   < > 112*   CALCIUM 8.5*   < > 8.0*   PROT 7.7  --   --    LABALBU 4.1  --   --    BILITOT 0.16*  --   --    ALKPHOS 106  --   --    AST 32  --   --    ALT 25  --   --     < > = values in this interval not displayed. Hemoglobin A1C   Date Value Ref Range Status   10/04/2021 5.5 4.0 - 6.0 % Final       Assessment :      Primary Problem  TIA (transient ischemic attack)    Active Hospital Problems    Diagnosis Date Noted    Left leg weakness [R29.898]     TIA (transient ischemic attack) [G45.9] 10/03/2021    COVID-19 [U07.1] 10/03/2021    Obesity (BMI 30-39. 9) [E66.9] 03/23/2017    Hyperlipidemia with target LDL less than 100 [E78.5] 03/23/2017    Essential hypertension [I10] 01/02/2017       Patient is a 38 y.o. male with COVID-19 pneumonia presenting with left lower extremity weakness affecting proximal muscles, NIH 1. Concern for stroke versus lumbar pathology. CT, CTA head and neck unremarkable. Awaiting MRI brain and lumbar spine, 2D echo hemoglobin A1c 5.5  LDL 81  Start on aspirin 81 mg, Plavix 75 mg, Lipitor 40 mg    Plan:     -Continue aspirin 81 mg Plavix 75 mg  -Continue Lipitor 40 mg  -MRI brain and lumbar spine pending  -2D echo  -PT/OT/PMNR  -Rest of management per primary      Follow-up further recommendations after discussing the case with attending  The plan was discussed with the patient, patient's family and the medical staff.    Consultations:   IP CONSULT TO STROKE TEAM  IP CONSULT TO HOSPITALIST  IP CONSULT TO INFECTIOUS DISEASES  IP CONSULT TO CASE MANAGEMENT  IP CONSULT TO PHARMACY    Patient is admitted as inpatient status because of co-morbidities listed above, severity of signs and symptoms as outlined, requirement for current medical therapies and most importantly because of direct risk to patient if

## 2021-10-05 NOTE — PROGRESS NOTES
[facility] -   Infectious diseases evaluation  admission date 10/3/2021    reason for consultation:   covid +    Impression :   Current:  · covid pneumonia   · increased infl markers  · Left LE weakness  Other:  ·   Discussion / summary of stay / plan of care   ·   Recommendations   · Active covid  · 10/4 remdesevir x 5 days  · lovenox and decadron 6 mg  · 10/5  barcitinib 4 mg x 14 days  · Watch response of the infl markers    Infection Control Recommendations   · Colon Precautions  · Contact Isolation   · Airborne isolation  · Droplet Isolation    Isolate till 10/18/21  Antimicrobial Stewardship Recommendations   · No antibiotics       Coordination ofOutpatient Care:   · Estimated Length of IV antimicrobials:  · Patient will need Midline / picc Catheter Insertion:   · Patient will need SNF:  · Patient will need outpatient wound care:     History of Present Illness:   Initial history:  Felicity Reagan is a 38y.o.-year-old male came for tachycardia and left leg sudden weakness    tested positive about a week ago for the Covid, he had a high fever 103, ongoing for about a week. He presented with weakness on the left leg, got a little better after admission but not resolved  Cough and SOB and aches present    Patient is not vaccinated. Chest x-ray is showing possible early pneumonia possibly Covid related. Neurology on board and planning on MRI. CT scan of the head was negative.       Other:  Hypertension obese hyperlipidemia, seronegative polyarthritis, asthma obstructive sleep apnea, congestive heart failure, lymphatic: Esophagitis, depression,    Interval changes  10/5/2021   Patient Vitals for the past 8 hrs:   BP Temp Temp src Pulse Resp SpO2   10/05/21 0750 125/80 98.3 °F (36.8 °C) Oral 78 23 92 %   10/05/21 0606 -- -- -- 79 26 91 %   10/05/21 0522 125/76 98.2 °F (36.8 °C) Oral 80 25 95 %   10/05/21 0400 -- -- -- 79 -- --     No fever, he feels much better at this point, he is on 2 L of nasal cannula 94% saturation,  CRP improved 45 ferritin 14, less short of breath and feels better in general    MRI of the brain negative but MRI of the lumbar spine as below, canal stenosis    Summary of relevant labs:  Labs:  Procalcitonin0. [facility]   [id number]   CRP60.8High   PT248Fhrq   ALT25U/L AST32     CREATININE0.85     Micro:  COVID +9/28  And 10/4    Imaging:  MRI brain neg     MRI LS spine  1. Grade 1 anterolisthesis of L5 on S1 secondary to bilateral L5   spondylolysis with resultant severe bilateral L5 neural foraminal narrowing. 2. Severe L5-S1 disc height loss and desiccation. 3. Mild L4-5 spinal canal stenosis and mild right lateral recess narrowing   secondary to a posterior disc protrusion. CT heard  Pansinusitis, possible COVID pulmonary infiltrates, otherwise unremarkable   CTA of the head and neck. CXR  Lateral left basilar airspace disease may represent small/early pneumonia and or atelectasis. I have personally reviewed the past medical history, past surgical history, medications, social history, and family history, and I haveupdated the database accordingly. Allergies:   Fish-derived products, Other, Shellfish allergy, Shrimp (diagnostic), and Ace inhibitors     Review of Systems:     Review of Systems   Constitutional: Positive for activity change. Negative for chills and fatigue. HENT: Positive for congestion. Eyes: Negative for itching. Respiratory: Positive for cough and shortness of breath. Cardiovascular: Negative for chest pain. Gastrointestinal: Negative for abdominal distention. Endocrine: Negative for polydipsia. Genitourinary: Negative for dysuria and hematuria. Musculoskeletal: Negative for arthralgias. Skin: Negative for color change. Allergic/Immunologic: Negative for immunocompromised state. Neurological: Positive for weakness. Negative for dizziness, light-headedness and headaches.    Hematological: Negative for adenopathy. Psychiatric/Behavioral: Negative for agitation. Physical Examination :       Physical Exam  Constitutional:       Appearance: Normal appearance. He is not ill-appearing. HENT:      Head: Normocephalic and atraumatic. Nose: Nose normal.      Mouth/Throat:      Mouth: Mucous membranes are moist.   Eyes:      General: No scleral icterus. Conjunctiva/sclera: Conjunctivae normal.   Cardiovascular:      Rate and Rhythm: Normal rate and regular rhythm. Heart sounds: Normal heart sounds. No murmur heard. Pulmonary:      Effort: No respiratory distress. Breath sounds: Normal breath sounds. Abdominal:      General: There is no distension. Palpations: Abdomen is soft. Tenderness: There is no abdominal tenderness. Genitourinary:     Comments: No louis  Musculoskeletal:         General: No swelling or deformity. Cervical back: Neck supple. No rigidity. Skin:     General: Skin is dry. Coloration: Skin is not jaundiced. Neurological:      General: No focal deficit present. Mental Status: He is alert. Cranial Nerves: No cranial nerve deficit. Psychiatric:         Mood and Affect: Mood normal.         Thought Content:  Thought content normal.         Past Medical History:     Past Medical History:   Diagnosis Date    Asthma     COPD (chronic obstructive pulmonary disease) ([facility] [address] 11/19/2018    Depression     Erectile dysfunction 9/29/2017    Essential hypertension 1/2/2017    GERD (gastroesophageal reflux disease) 11/19/2018    Heart palpitations 9/29/2017    Hyperlipidemia with target LDL less than 100 3/23/2017    Left lower lobe pneumonia 11/9/2012    Mitral valve prolapse     Nonspecific reactive hepatitis 3/22/2017    Obesity (BMI 30-39.9) 3/23/2017    NOEMÍ (obstructive sleep apnea) 10/16/2019    Osteoarthritis     Seronegative polyarthritis 10/10/2016    followed w/ rheumatology in [address] [place] [address] anxiety disorder  Uvulitis 8/15/2019       Past Surgical  History:     Past Surgical History:   Procedure Laterality Date    COLONOSCOPY      CYST REMOVAL Right     Armpit     ENDOSCOPY, COLON, DIAGNOSTIC      HAND SURGERY Left     KNEE ARTHROSCOPY      right knee    NOSE SURGERY      TONSILLECTOMY      UPPER GASTROINTESTINAL ENDOSCOPY N/A 10/9/2019    EGD POLYP SNARE, HOT. ESOPHAGEAL DILATATION WITH MALONIES 50F performed by Elvie [facility]ano MD at [facility]         Medications:      aspirin  81 mg Oral Daily    clopidogrel  75 mg Oral Daily    atorvastatin  40 mg Oral Nightly    fluticasone  1 spray Each Nostril Daily    dexamethasone  6 mg IntraVENous Q24H    remdesivir IVPB  100 mg IntraVENous Q24H    baricitinib  4 mg Oral Daily    budesonide-formoterol  2 puff Inhalation BID    sodium chloride flush  5-40 mL IntraVENous 2 times per day    enoxaparin  30 mg SubCUTAneous BID       Social History:     Social History     Socioeconomic History    Marital status:      Spouse name: Not on file    Number of children: Not on file    Years of education: Not on file    Highest education level: Not on file   Occupational History    Not on file   Tobacco Use    Smoking status: Never Smoker    Smokeless tobacco: Never Used   Vaping Use    Vaping Use: Never used   Substance and Sexual Activity    Alcohol use: Yes     Comment: occasional  few x year    Drug use: No    Sexual activity: Yes     Partners: Male   Other Topics Concern    Not on file   Social History Narrative    Not on file     Social Determinants of Health     Financial Resource Strain: Low Risk     Difficulty of Paying Living Expenses: Not hard at all   Food Insecurity: No Food Insecurity    Worried About Running Out of Food in the Last Year: Never true    Megan of Food in the Last Year: Never true   Transportation Needs:     Lack of Transportation (Medical):      Lack of Transportation (Non-Medical): Physical Activity:     Days of Exercise per Week:     Minutes of Exercise per Session:    Stress:     Feeling of Stress :    Social Connections:     Frequency of Communication with Friends and Family:     Frequency of Social Gatherings with Friends and Family:     Attends [employer] Services:     Active Member of Clubs or Organizations:     Attends Club or Organization Meetings:     Marital Status:    Intimate Partner Violence:     Fear of Current or Ex-Partner:     Emotionally Abused:     Physically Abused:     Sexually Abused:        Family History:     Family History   Problem Relation Age of Onset    Diabetes Father     High Cholesterol Father     Other Mother         autoimmune hepatitis     High Blood Pressure Brother     Heart Disease Brother     Heart Attack Brother 40        cabg age 40     Rheum Arthritis Other 7    Colon Cancer Neg Hx     Cancer Neg Hx       Medical Decision Making:   I have independently reviewed/ordered the following labs:    CBC with Differential:   Recent Labs     10/04/21  0841 10/05/21  0602   WBC 6.5 4.3   HGB 16.0 15.1   HCT 48.1 47.2    222   LYMPHOPCT 27 26   MONOPCT 5 8*     BMP:  Recent Labs     10/04/21  0841 10/05/21  0602   * 137   K 3.7 4.5    107   CO2 17* 17*   BUN 13 11   CREATININE 0.85 0.65*     Hepatic Function Panel:   Recent Labs     10/03/21  1905   PROT 7.7   LABALBU 4.1   BILIDIR <0.08   IBILI CANNOT BE CALCULATED   BILITOT 0.16*   ALKPHOS 106   ALT 25   AST 32     No results for input(s): RPR in the last 72 hours. No results for input(s): HIV in the last 72 hours. No results for input(s): BC in the last 72 hours. Lab Results   Component Value Date    CREATININE 0.65 10/05/2021    GLUCOSE 112 10/05/2021    GLUCOSE 96 04/30/2012       Detailed results: Thank you for allowing us to participate in the care of this patient. Please call with questions.     This note is created with the assistance of a speech recognition program.  While intending to generate adocument that actually reflects the content of the visit, the document can still have some errors including those of syntax and sound a like substitutions which may escape proof reading. It such instances, actual meaningcan be extrapolated by contextual diversion.     Rebecca Schwab MD  Office: [phone]  Perfect serve / office [phone]

## 2021-10-05 NOTE — PLAN OF CARE
Problem: Falls - Risk of:  Goal: Will remain free from falls  Description: Will remain free from falls  10/5/2021 0450 by Fadumo Urena RN  Outcome: Ongoing  10/4/2021 1836 by Galina Wooten RN  Outcome: Ongoing  Goal: Absence of physical injury  Description: Absence of physical injury  10/5/2021 0450 by Fadumo Urena RN  Outcome: Ongoing  10/4/2021 1836 by Galina Wooten RN  Outcome: Ongoing     Problem: Pain:  Goal: Pain level will decrease  Description: Pain level will decrease  10/5/2021 0450 by Fadumo Urena RN  Outcome: Ongoing  10/4/2021 1836 by Galina Wooten RN  Outcome: Ongoing  Goal: Control of acute pain  Description: Control of acute pain  10/5/2021 0450 by Fadumo Urena RN  Outcome: Ongoing  10/4/2021 1836 by Galina Wooten RN  Outcome: Ongoing  Goal: Control of chronic pain  Description: Control of chronic pain  10/5/2021 0450 by Fadumo Urena RN  Outcome: Ongoing  10/4/2021 1836 by Galina Wooten RN  Outcome: Ongoing     Problem: Airway Clearance - Ineffective  Goal: Achieve or maintain patent airway  10/5/2021 0450 by Fadumo Urena RN  Outcome: Ongoing  10/4/2021 1836 by Galina Wooten RN  Outcome: Ongoing     Problem: Gas Exchange - Impaired  Goal: Absence of hypoxia  10/5/2021 0450 by Fadumo Urena RN  Outcome: Ongoing  10/4/2021 1836 by Galina Wooten RN  Outcome: Ongoing  Goal: Promote optimal lung function  10/5/2021 0450 by Fadumo Urena RN  Outcome: Ongoing  10/4/2021 1836 by Galina Wooten RN  Outcome: Ongoing     Problem: Breathing Pattern - Ineffective  Goal: Ability to achieve and maintain a regular respiratory rate  10/5/2021 0450 by Fadumo Urena RN  Outcome: Ongoing  10/4/2021 1836 by Galina Wooten RN  Outcome: Ongoing     Problem:  Body Temperature -  Risk of, Imbalanced  Goal: Ability to maintain a body temperature within defined limits  10/5/2021 0450 by Fadumo Urena RN  Outcome: Ongoing  10/4/2021 1836 by Galina Wooten RN  Outcome: Ongoing  Goal: Will regain or maintain usual level of consciousness  10/5/2021 0450 by Gilbert Galarza RN  Outcome: Ongoing  10/4/2021 1836 by Shelby Jarrell RN  Outcome: Ongoing  Goal: Complications related to the disease process, condition or treatment will be avoided or minimized  10/5/2021 0450 by Gilbert Galarza RN  Outcome: Ongoing  10/4/2021 1836 by Shelby Jarrell RN  Outcome: Ongoing     Problem: Isolation Precautions - Risk of Spread of Infection  Goal: Prevent transmission of infection  10/5/2021 0450 by Gilbert Galarza RN  Outcome: Ongoing  10/4/2021 1836 by Shelby Jarrell RN  Outcome: Ongoing     Problem: Nutrition Deficits  Goal: Optimize nutritional status  10/5/2021 0450 by Gilbert Galarza RN  Outcome: Ongoing  10/4/2021 1836 by Shelby Jarrell RN  Outcome: Ongoing     Problem: Risk for Fluid Volume Deficit  Goal: Maintain normal heart rhythm  10/5/2021 0450 by Gilbert Galarza RN  Outcome: Ongoing  10/4/2021 1836 by Shelby Jarrell RN  Outcome: Ongoing  Goal: Maintain absence of muscle cramping  10/5/2021 0450 by Gilbert Galarza RN  Outcome: Ongoing  10/4/2021 1836 by Shelby Jarrell RN  Outcome: Ongoing  Goal: Maintain normal serum potassium, sodium, calcium, phosphorus, and pH  10/5/2021 0450 by Gilbert Galarza RN  Outcome: Ongoing  10/4/2021 1836 by Shelby Jarrell RN  Outcome: Ongoing     Problem: Loneliness or Risk for Loneliness  Goal: Demonstrate positive use of time alone when socialization is not possible  10/5/2021 0450 by Gilbert Galarza RN  Outcome: Ongoing  10/4/2021 1836 by Shelby Jarrell RN  Outcome: Ongoing     Problem: Fatigue  Goal: Verbalize increase energy and improved vitality  10/5/2021 0450 by Gilbert Galarza RN  Outcome: Ongoing  10/4/2021 1836 by Shelby Jarrell RN  Outcome: Ongoing     Problem: Patient Education: Go to Patient Education Activity  Goal: Patient/Family Education  10/5/2021 0450 by Gilbert Galarza RN  Outcome: Ongoing  10/4/2021 1836 by Shelby Jarrell RN  Outcome: Ongoing     Problem: HEMODYNAMIC STATUS  Goal: Patient has stable vital signs and fluid balance  10/5/2021 0450 by Fadumo Urena RN  Outcome: Ongoing  10/4/2021 1836 by Galina Wooten RN  Outcome: Ongoing     Problem: ACTIVITY INTOLERANCE/IMPAIRED MOBILITY  Goal: Mobility/activity is maintained at optimum level for patient  10/5/2021 0450 by Fadumo Urena RN  Outcome: Ongoing  10/4/2021 1836 by Galina Wooten RN  Outcome: Ongoing     Problem: COMMUNICATION IMPAIRMENT  Goal: Ability to express needs and understand communication  10/5/2021 0450 by Fadumo Urena RN  Outcome: Ongoing  10/4/2021 1836 by Galina Wooten RN  Outcome: Ongoing

## 2021-10-05 NOTE — PROGRESS NOTES
Physician Progress Note      Parag Dick  [facility] #:                  [id number]  :                       1980  ADMIT DATE:       10/3/2021 6:43 PM  [address] [place] DATE:  RESPONDING  PROVIDER #:        Hermes Agarwal MD          QUERY TEXT:    Pt admitted with COVID-19 and noted to have some SIRS criteria. If possible,   please document in progress notes and discharge summary if you are evaluating   and/or treating: The medical record reflects the following:  Risk Factors: TIA symptoms, Covid positive/unvaccinated  Clinical Indicators: Presented with weakness,cough,SOB. Tem 103F in ED. Covid   Positive. CRP60,Tachycardic 90s-100s. CRP60, Fibrinogen 536. CXR- Lateral left basilar airspace disease may   represent small/early pneumonia and  or atelectasis. CTA- possible COVID pulmonary infiltrates. Suspected acute   ischemic CVA versus TIA on admit  Treatment: ID consult pending, stepdown monitoring  Options provided:  -- Sepsis present on admission due to COVID-19 infection  -- Covid-19 infection without sepsis  -- Other - I will add my own diagnosis  -- Disagree - Not applicable / Not valid  -- Disagree - Clinically unable to determine / Unknown  -- Refer to Clinical Documentation Reviewer    PROVIDER RESPONSE TEXT:    This patient has Covid-19 infection without sepsis.     Query created by: Aleshia Chin on 10/4/2021 11:54 AM      Electronically signed by:  Hermes Agarwal MD 10/5/2021 8:45 AM

## 2021-10-06 PROBLEM — M48.061 DEGENERATIVE LUMBAR SPINAL STENOSIS: Status: ACTIVE | Noted: 2021-10-06

## 2021-10-06 LAB
ABSOLUTE EOS #: <0.03 K/UL (ref 0–0.44)
ABSOLUTE IMMATURE GRANULOCYTE: 0.05 K/UL (ref 0–0.3)
ABSOLUTE LYMPH #: 1.48 K/UL (ref 1.1–3.7)
ABSOLUTE MONO #: 0.53 K/UL (ref 0.1–1.2)
ANION GAP SERPL CALCULATED.3IONS-SCNC: 11 MMOL/L (ref 9–17)
BASOPHILS # BLD: 0 % (ref 0–2)
BASOPHILS ABSOLUTE: <0.03 K/UL (ref 0–0.2)
BUN BLDV-MCNC: 10 MG/DL (ref 6–20)
BUN/CREAT BLD: ABNORMAL (ref 9–20)
C-REACTIVE PROTEIN: 21.8 MG/L (ref 0–5)
CALCIUM SERPL-MCNC: 8.5 MG/DL (ref 8.6–10.4)
CHLORIDE BLD-SCNC: 105 MMOL/L (ref 98–107)
CO2: 22 MMOL/L (ref 20–31)
CREAT SERPL-MCNC: 0.67 MG/DL (ref 0.7–1.2)
DIFFERENTIAL TYPE: ABNORMAL
EOSINOPHILS RELATIVE PERCENT: 0 % (ref 1–4)
GFR AFRICAN AMERICAN: >60 ML/MIN
GFR NON-AFRICAN AMERICAN: >60 ML/MIN
GFR SERPL CREATININE-BSD FRML MDRD: ABNORMAL ML/MIN/{1.73_M2}
GFR SERPL CREATININE-BSD FRML MDRD: ABNORMAL ML/MIN/{1.73_M2}
GLUCOSE BLD-MCNC: 118 MG/DL (ref 70–99)
HCT VFR BLD CALC: 46.4 % (ref 40.7–50.3)
HEMOGLOBIN: 15 G/DL (ref 13–17)
IMMATURE GRANULOCYTES: 1 %
LYMPHOCYTES # BLD: 15 % (ref 24–43)
MCH RBC QN AUTO: 29.6 PG (ref 25.2–33.5)
MCHC RBC AUTO-ENTMCNC: 32.3 G/DL (ref 28.4–34.8)
MCV RBC AUTO: 91.7 FL (ref 82.6–102.9)
MONOCYTES # BLD: 6 % (ref 3–12)
NRBC AUTOMATED: 0 PER 100 WBC
PDW BLD-RTO: 13.3 % (ref 11.8–14.4)
PLATELET # BLD: 254 K/UL (ref 138–453)
PLATELET ESTIMATE: ABNORMAL
PMV BLD AUTO: 11.2 FL (ref 8.1–13.5)
POTASSIUM SERPL-SCNC: 4.5 MMOL/L (ref 3.7–5.3)
RBC # BLD: 5.06 M/UL (ref 4.21–5.77)
RBC # BLD: ABNORMAL 10*6/UL
SEG NEUTROPHILS: 79 % (ref 36–65)
SEGMENTED NEUTROPHILS ABSOLUTE COUNT: 7.52 K/UL (ref 1.5–8.1)
SODIUM BLD-SCNC: 138 MMOL/L (ref 135–144)
WBC # BLD: 9.6 K/UL (ref 3.5–11.3)
WBC # BLD: ABNORMAL 10*3/UL

## 2021-10-06 PROCEDURE — 6370000000 HC RX 637 (ALT 250 FOR IP): Performed by: STUDENT IN AN ORGANIZED HEALTH CARE EDUCATION/TRAINING PROGRAM

## 2021-10-06 PROCEDURE — APPSS60 APP SPLIT SHARED TIME 46-60 MINUTES: Performed by: REGISTERED NURSE

## 2021-10-06 PROCEDURE — 86140 C-REACTIVE PROTEIN: CPT

## 2021-10-06 PROCEDURE — 6370000000 HC RX 637 (ALT 250 FOR IP): Performed by: INTERNAL MEDICINE

## 2021-10-06 PROCEDURE — 2580000003 HC RX 258: Performed by: INTERNAL MEDICINE

## 2021-10-06 PROCEDURE — 94640 AIRWAY INHALATION TREATMENT: CPT

## 2021-10-06 PROCEDURE — 2500000003 HC RX 250 WO HCPCS: Performed by: INTERNAL MEDICINE

## 2021-10-06 PROCEDURE — 1200000000 HC SEMI PRIVATE

## 2021-10-06 PROCEDURE — 97110 THERAPEUTIC EXERCISES: CPT

## 2021-10-06 PROCEDURE — 6360000002 HC RX W HCPCS: Performed by: STUDENT IN AN ORGANIZED HEALTH CARE EDUCATION/TRAINING PROGRAM

## 2021-10-06 PROCEDURE — 94761 N-INVAS EAR/PLS OXIMETRY MLT: CPT

## 2021-10-06 PROCEDURE — 80048 BASIC METABOLIC PNL TOTAL CA: CPT

## 2021-10-06 PROCEDURE — 99231 SBSQ HOSP IP/OBS SF/LOW 25: CPT | Performed by: PSYCHIATRY & NEUROLOGY

## 2021-10-06 PROCEDURE — 85025 COMPLETE CBC W/AUTO DIFF WBC: CPT

## 2021-10-06 PROCEDURE — 97116 GAIT TRAINING THERAPY: CPT

## 2021-10-06 PROCEDURE — 99232 SBSQ HOSP IP/OBS MODERATE 35: CPT | Performed by: INTERNAL MEDICINE

## 2021-10-06 PROCEDURE — 36415 COLL VENOUS BLD VENIPUNCTURE: CPT

## 2021-10-06 PROCEDURE — 6360000002 HC RX W HCPCS: Performed by: NURSE PRACTITIONER

## 2021-10-06 PROCEDURE — 2580000003 HC RX 258: Performed by: STUDENT IN AN ORGANIZED HEALTH CARE EDUCATION/TRAINING PROGRAM

## 2021-10-06 PROCEDURE — 2700000000 HC OXYGEN THERAPY PER DAY

## 2021-10-06 RX ADMIN — SODIUM CHLORIDE: 9 INJECTION, SOLUTION INTRAVENOUS at 01:14

## 2021-10-06 RX ADMIN — FLUTICASONE PROPIONATE 1 SPRAY: 50 SPRAY, METERED NASAL at 08:44

## 2021-10-06 RX ADMIN — ENOXAPARIN SODIUM 30 MG: 30 INJECTION SUBCUTANEOUS at 08:44

## 2021-10-06 RX ADMIN — ENOXAPARIN SODIUM 30 MG: 30 INJECTION SUBCUTANEOUS at 20:02

## 2021-10-06 RX ADMIN — BUDESONIDE AND FORMOTEROL FUMARATE DIHYDRATE 2 PUFF: 160; 4.5 AEROSOL RESPIRATORY (INHALATION) at 21:27

## 2021-10-06 RX ADMIN — REMDESIVIR 100 MG: 100 INJECTION, POWDER, LYOPHILIZED, FOR SOLUTION INTRAVENOUS at 14:50

## 2021-10-06 RX ADMIN — SODIUM CHLORIDE, PRESERVATIVE FREE 10 ML: 5 INJECTION INTRAVENOUS at 08:44

## 2021-10-06 RX ADMIN — CLOPIDOGREL 75 MG: 75 TABLET, FILM COATED ORAL at 08:44

## 2021-10-06 RX ADMIN — SODIUM CHLORIDE: 9 INJECTION, SOLUTION INTRAVENOUS at 12:42

## 2021-10-06 RX ADMIN — OXYCODONE HYDROCHLORIDE AND ACETAMINOPHEN 1 TABLET: 5; 325 TABLET ORAL at 12:04

## 2021-10-06 RX ADMIN — DESMOPRESSIN ACETATE 40 MG: 0.2 TABLET ORAL at 20:02

## 2021-10-06 RX ADMIN — ASPIRIN 81 MG: 81 TABLET, CHEWABLE ORAL at 08:44

## 2021-10-06 RX ADMIN — BUDESONIDE AND FORMOTEROL FUMARATE DIHYDRATE 2 PUFF: 160; 4.5 AEROSOL RESPIRATORY (INHALATION) at 08:15

## 2021-10-06 RX ADMIN — BARICITINIB 4 MG: 2 TABLET, FILM COATED ORAL at 08:44

## 2021-10-06 RX ADMIN — DEXAMETHASONE SODIUM PHOSPHATE 6 MG: 10 INJECTION INTRAMUSCULAR; INTRAVENOUS at 12:03

## 2021-10-06 ASSESSMENT — PAIN SCALES - GENERAL
PAINLEVEL_OUTOF10: 1
PAINLEVEL_OUTOF10: 0
PAINLEVEL_OUTOF10: 0
PAINLEVEL_OUTOF10: 6
PAINLEVEL_OUTOF10: 10
PAINLEVEL_OUTOF10: 0
PAINLEVEL_OUTOF10: 0
PAINLEVEL_OUTOF10: 6

## 2021-10-06 ASSESSMENT — ENCOUNTER SYMPTOMS
VOMITING: 0
EYE ITCHING: 0
COLOR CHANGE: 0
ABDOMINAL DISTENTION: 0
NAUSEA: 0
SHORTNESS OF BREATH: 1
WHEEZING: 0
ABDOMINAL PAIN: 0
DIARRHEA: 0
COUGH: 1
SHORTNESS OF BREATH: 0
CHEST TIGHTNESS: 0
COUGH: 0
EYE DISCHARGE: 0

## 2021-10-06 NOTE — PROGRESS NOTES
[facility] -   Infectious diseases evaluation  admission date 10/3/2021    reason for consultation:   covid +    Impression :   Current:  · covid pneumonia   · increased infl markers  · Left LE weakness  Other:  ·   Discussion / summary of stay / plan of care   ·   Recommendations   · Active covid  · 10/4 remdesevir x 5 days  · lovenox and decadron 6 mg  · 10/5  barcitinib 4 mg x 14 days  · Improved clinically and with inflammatory markers  · Anticipate continued therapy through 10/7    Infection Control Recommendations   · [place] Precautions  · Contact Isolation   · Airborne isolation  · Droplet Isolation    Isolate till 10/18/21  Antimicrobial Stewardship Recommendations   · No antibiotics       Coordination ofOutpatient Care:   · Estimated Length of IV antimicrobials:  · Patient will need Midline / picc Catheter Insertion:   · Patient will need SNF:  · Patient will need outpatient wound care:     History of Present Illness:   Initial history:  Cheyenne Ospina is a 38y.o.-year-old male came for tachycardia and left leg sudden weakness    tested positive about a week ago for the Covid, he had a high fever 103, ongoing for about a week. He presented with weakness on the left leg, got a little better after admission but not resolved  Cough and SOB and aches present    Patient is not vaccinated. Chest x-ray is showing possible early pneumonia possibly Covid related. Neurology on board and planning on MRI. CT scan of the head was negative.       Other:  Hypertension obese hyperlipidemia, seronegative polyarthritis, asthma obstructive sleep apnea, congestive heart failure, lymphatic: Esophagitis, depression,    Interval changes  10/6/2021   Patient Vitals for the past 8 hrs:   BP Temp Temp src Pulse Resp SpO2   10/06/21 0833 133/77 98.3 °F (36.8 °C) Oral 76 29 93 %   10/06/21 0800 -- -- -- 74 -- --   10/06/21 0420 -- -- -- -- 20 93 %   10/06/21 0400 129/77 98.1 °F (36.7 °C) Axillary 64 24 92 %     No fever, he feels much better at this point, he is on 2 L of nasal cannula 94% saturation,  CRP improved 45 ferritin 14, less short of breath and feels better in general    MRI of the brain negative but MRI of the lumbar spine as below, canal stenosis    10/6  He looks great doing physical therapy, CRP 21 improved, continues on baricitinib 3 L of nasal cannula,       Summary of relevant labs:  Labs:  Procalcitonin0. [facility]   [id number]   CRP60.8High   UT133Ivzt   ALT25U/L AST32     CREATININE0.85     Micro:  COVID +9/28  And 10/4    Imaging:  MRI brain neg     MRI LS spine  1. Grade 1 anterolisthesis of L5 on S1 secondary to bilateral L5   spondylolysis with resultant severe bilateral L5 neural foraminal narrowing. 2. Severe L5-S1 disc height loss and desiccation. 3. Mild L4-5 spinal canal stenosis and mild right lateral recess narrowing   secondary to a posterior disc protrusion. CT heard  Pansinusitis, possible COVID pulmonary infiltrates, otherwise unremarkable   CTA of the head and neck. CXR  Lateral left basilar airspace disease may represent small/early pneumonia and or atelectasis. I have personally reviewed the past medical history, past surgical history, medications, social history, and family history, and I haveupdated the database accordingly. Allergies:   Fish-derived products, Other, Shellfish allergy, Shrimp (diagnostic), and Ace inhibitors     Review of Systems:     Review of Systems   Constitutional: Positive for activity change. Negative for chills and fatigue. HENT: Positive for congestion. Eyes: Negative for discharge and itching. Respiratory: Positive for cough and shortness of breath. Cardiovascular: Negative for chest pain. Gastrointestinal: Negative for abdominal distention. Endocrine: Negative for polydipsia and polyphagia. Genitourinary: Negative for dysuria and hematuria. Musculoskeletal: Negative for arthralgias. Skin: Negative for color change. Allergic/Immunologic: Negative for immunocompromised state. Neurological: Positive for weakness. Negative for dizziness, light-headedness and headaches. Hematological: Negative for adenopathy. Psychiatric/Behavioral: Negative for agitation. Physical Examination :       Physical Exam  Constitutional:       Appearance: Normal appearance. He is not ill-appearing. HENT:      Head: Normocephalic and atraumatic. Nose: Nose normal.      Mouth/Throat:      Mouth: Mucous membranes are moist.   Eyes:      General: No scleral icterus. Conjunctiva/sclera: Conjunctivae normal.   Cardiovascular:      Rate and Rhythm: Normal rate and regular rhythm. Heart sounds: Normal heart sounds. No murmur heard. Pulmonary:      Effort: No respiratory distress. Breath sounds: Normal breath sounds. Abdominal:      General: There is no distension. Palpations: Abdomen is soft. Tenderness: There is no abdominal tenderness. Genitourinary:     Comments: No louis  Musculoskeletal:         General: No swelling, tenderness, deformity or signs of injury. Cervical back: Neck supple. No rigidity. Skin:     General: Skin is dry. Coloration: Skin is not jaundiced. Neurological:      General: No focal deficit present. Mental Status: He is alert. Cranial Nerves: No cranial nerve deficit. Psychiatric:         Mood and Affect: Mood normal.         Thought Content:  Thought content normal.         Past Medical History:     Past Medical History:   Diagnosis Date    Asthma     [facility]    Depression     Erectile dysfunction 9/29/2017    Essential hypertension 1/2/2017    GERD (gastroesophageal reflux disease) 11/19/2018    Heart palpitations 9/29/2017    Hyperlipidemia with target LDL less than 100 3/23/2017    Left lower lobe pneumonia 11/9/2012    Mitral valve prolapse     Nonspecific reactive hepatitis 3/22/2017    Obesity (BMI 30-39.9) 3/23/2017    NOEMÍ (obstructive sleep apnea) 10/16/2019    Osteoarthritis     Seronegative polyarthritis 10/10/2016    followed w/ rheumatology in [address] [place] [address] anxiety disorder     Uvulitis 8/15/2019       Past Surgical  History:     Past Surgical History:   Procedure Laterality Date    COLONOSCOPY      CYST REMOVAL Right     Armpit     ENDOSCOPY, COLON, DIAGNOSTIC      HAND SURGERY Left     KNEE ARTHROSCOPY      right knee    NOSE SURGERY      TONSILLECTOMY      UPPER GASTROINTESTINAL ENDOSCOPY N/A 10/9/2019    EGD POLYP SNARE, HOT.  ESOPHAGEAL DILATATION WITH MALONIES 50F performed by Shanika Lawrence MD at [facility]         Medications:      atorvastatin  40 mg Oral Nightly    fluticasone  1 spray Each Nostril Daily    dexamethasone  6 mg IntraVENous Q24H    remdesivir IVPB  100 mg IntraVENous Q24H    baricitinib  4 mg Oral Daily    budesonide-formoterol  2 puff Inhalation BID    sodium chloride flush  5-40 mL IntraVENous 2 times per day    enoxaparin  30 mg SubCUTAneous BID       Social History:     Social History     Socioeconomic History    Marital status:      Spouse name: Not on file    Number of children: Not on file    Years of education: Not on file    Highest education level: Not on file   Occupational History    Not on file   Tobacco Use    Smoking status: Never Smoker    Smokeless tobacco: Never Used   Vaping Use    Vaping Use: Never used   Substance and Sexual Activity    Alcohol use: Yes     Comment: occasional  few x year    Drug use: No    Sexual activity: Yes     Partners: Male   Other Topics Concern    Not on file   Social History Narrative    Not on file     Social Determinants of Health     Financial Resource Strain: Low Risk     Difficulty of Paying Living Expenses: Not hard at all   Food Insecurity: No Food Insecurity    Worried About Running Out of Food in the Last Year: Never true    Ran Out of Food in the Last Year: Never true   Transportation Needs:     Lack of Transportation (Medical):  Lack of Transportation (Non-Medical):    Physical Activity:     Days of Exercise per Week:     Minutes of Exercise per Session:    Stress:     Feeling of Stress :    Social Connections:     Frequency of Communication with Friends and Family:     Frequency of Social Gatherings with Friends and Family:     Attends [employer] Services:     Active Member of Clubs or Organizations:     Attends Club or Organization Meetings:     Marital Status:    Intimate Partner Violence:     Fear of Current or Ex-Partner:     Emotionally Abused:     Physically Abused:     Sexually Abused:        Family History:     Family History   Problem Relation Age of Onset    Diabetes Father     High Cholesterol Father     Other Mother         autoimmune hepatitis     High Blood Pressure Brother     Heart Disease Brother     Heart Attack Brother 40        cabg age 40     Rheum Arthritis Other 7    Colon Cancer Neg Hx     Cancer Neg Hx       Medical Decision Making:   I have independently reviewed/ordered the following labs:    CBC with Differential:   Recent Labs     10/05/21  0602 10/06/21  0522   WBC 4.3 9.6   HGB 15.1 15.0   HCT 47.2 46.4    254   LYMPHOPCT 26 15*   MONOPCT 8* 6     BMP:  Recent Labs     10/05/21  0602 10/06/21  0522    138   K 4.5 4.5    105   CO2 17* 22   BUN 11 10   CREATININE 0.65* 0.67*     Hepatic Function Panel:   Recent Labs     10/03/21  1905   PROT 7.7   LABALBU 4.1   BILIDIR <0.08   IBILI CANNOT BE CALCULATED   BILITOT 0.16*   ALKPHOS 106   ALT 25   AST 32     No results for input(s): RPR in the last 72 hours. No results for input(s): HIV in the last 72 hours. No results for input(s): BC in the last 72 hours.   Lab Results   Component Value Date    CREATININE 0.67 10/06/2021    GLUCOSE 118 10/06/2021    GLUCOSE 96 04/30/2012       Detailed results: Thank you for allowing us to participate in the care of this patient. Please call with questions. This note is created with the assistance of a speech recognition program.  While intending to generate adocument that actually reflects the content of the visit, the document can still have some errors including those of syntax and sound a like substitutions which may escape proof reading. It such instances, actual meaningcan be extrapolated by contextual diversion.     Gaurav Benitez MD  Office: [phone]  Perfect serve / office [phone]

## 2021-10-06 NOTE — PROGRESS NOTES
[facility]  Internal Medicine Teaching Residency Program  Inpatient Daily Progress Note  ______________________________________________________________________________    Patient: Willem Gutierrez  YOB: 1980   OZP:[id number]    Acct: [de-identified]     Room: 2010/2010-01  Admit date: 10/3/2021  Today's date: 10/06/21  Number of days in the hospital: 3    SUBJECTIVE   Admitting Diagnosis: TIA (transient ischemic attack)  CC:   Pt examined at bedside. Chart & results reviewed.    No acute issues overnight  Vitals stable  Neurology, neurosurgery and ID following    ROS:  Constitutional:  negative for chills, fevers, sweats  Respiratory: Positive for cough, dyspnea on exertion,  Cardiovascular:  negative for chest pain, chest pressure/discomfort, lower extremity edema, palpitations  Gastrointestinal:  negative for abdominal pain, constipation, diarrhea, nausea, vomiting  Neurological:  negative for dizziness, headache  BRIEF HISTORY     This patient is COVID positive and has possible ischemic stroke/TIA.     This patient 29-year-old male with past medical history of essential hypertension, obesity, hyperlipidemia, seronegative polyarthritis, LVH due to hypertensive disease, asthma,NOEMÍ, eosinophilic esophagitis and depressive disorder presented with weakness to his left lower extremity started today afternoon and unable to bear the weight of the body.  The patient denies any loss of consciousness, head trauma, headache, weakness of left upper extremity/right upper/right lower extremity.  Reports numbness of left lower extremity.     Reports high-grade fever, highest recorded 103 F, since Monday started after contact with Covid patient and tested positive for Covid.  The fever gets better with Tylenol.  He has associated dry cough and dyspnea even at rest.  The patient  covid unvaccinated.     Rest of review of systems is unremarkable.     On my evaluation the patient is lying comfortably in the bed, saturating on room air, in no apparent distress, answering questions appropriately.  Vitals stable. sensation of lower extremities are intact.  Power on left lower side is 3/5 with intact reflexes.  Chest examination shows bilateral equal air entry without added sounds.  Rest of examination is unremarkable.     Pertinent labs are pro-Bob 0.09, CRP 60.8, , fibrinogen 536, chest x-ray left basilar airspace disease represent early pneumonia and or atelectasis.  CT head wo contrast-no acute intracranial abnormality.  CTA head neck with contrast:Pansinusitis, possible COVID pulmonary infiltrates. Neuro and ID on board.     OBJECTIVE     Vital Signs:  /77   Pulse 64   Temp 98.1 °F (36.7 °C) (Axillary)   Resp 20   Ht 6' 2\" (1.88 m)   Wt 254 lb 11.2 oz (115.5 kg)   SpO2 93%   BMI 32.70 kg/m²     Temp (24hrs), Av.9 °F (36.6 °C), Min:97.3 °F (36.3 °C), Max:98.3 °F (36.8 °C)    In: 10   Out: 1425 [Urine:1425]    Physical Exam:  Constitutional: This is a well developed, well nourished,  38y.o. year old male who is alert, oriented, cooperative and in no apparent distress. Head:normocephalic and atraumatic. EENT:  PERRLA. No conjunctival injections. Septum was midline, mucosa was without erythema, exudates or cobblestoning. No thrush was noted. Neck: Supple without thyromegaly. No elevated JVP. Trachea was midline. Respiratory: Chest was symmetrical without dullness to percussion. Breath sounds bilaterally were clear to auscultation. There were no wheezes, rhonchi or rales. There is no intercostal retraction or use of accessory muscles. No egophony noted. Cardiovascular: Regular without murmur, clicks, gallops or rubs. Abdomen: Slightly rounded and soft without organomegaly. No rebound, rigidity or guarding was appreciated. Lymphatic: No lymphadenopathy. Musculoskeletal: Normal curvature of the spine. No gross muscle weakness.     Extremities:  No lower extremity edema, ulcerations, tenderness, varicosities or erythema. Left extremity power 4/5. Normal reflexea. No involuntary movements are noted. Skin:  Warm and dry. Good color, turgor and pigmentation. No lesions or scars. No cyanosis or clubbing  Neurological/Psychiatric: The patient's general behavior, level of consciousness, thought content and emotional status is normal.        Medications:  Scheduled Medications:    aspirin  81 mg Oral Daily    clopidogrel  75 mg Oral Daily    atorvastatin  40 mg Oral Nightly    fluticasone  1 spray Each Nostril Daily    dexamethasone  6 mg IntraVENous Q24H    remdesivir IVPB  100 mg IntraVENous Q24H    baricitinib  4 mg Oral Daily    budesonide-formoterol  2 puff Inhalation BID    sodium chloride flush  5-40 mL IntraVENous 2 times per day    enoxaparin  30 mg SubCUTAneous BID     Continuous Infusions:    sodium chloride      sodium chloride 100 mL/hr at 10/06/21 0114     PRN Medicationsketorolac, 30 mg, BID PRN  oxyCODONE-acetaminophen, 1 tablet, Q6H PRN  sodium chloride, 1 spray, PRN  sodium chloride, 30 mL, PRN  albuterol sulfate HFA, 2 puff, Q6H PRN  sodium chloride flush, 5-40 mL, PRN  sodium chloride, 25 mL, PRN  ondansetron, 4 mg, Q8H PRN   Or  ondansetron, 4 mg, Q6H PRN  polyethylene glycol, 17 g, Daily PRN  acetaminophen, 650 mg, Q6H PRN   Or  acetaminophen, 650 mg, Q6H PRN        Diagnostic Labs:  CBC:   Recent Labs     10/03/21  1905 10/04/21  0841 10/05/21  0602   WBC 8.7 6.5 4.3   RBC 5.54 5.23 5.02   HGB 16.5 16.0 15.1   HCT 50.5* 48.1 47.2   MCV 91.2 92.0 94.0   RDW 13.3 13.4 13.3    310 222     BMP:   Recent Labs     10/03/21  1905 10/04/21  0841 10/05/21  0602    133* 137   K 3.8 3.7 4.5   CL 99 101 107   CO2 23 17* 17*   BUN 16 13 11   CREATININE 1.08 0.85 0.65*     BNP: No results for input(s): BNP in the last 72 hours.   PT/INR:   Recent Labs     10/03/21  1905   PROTIME 10.6   INR 1.0     APTT:   Recent Labs 10/03/21  1905   APTT 32.2*     CARDIAC ENZYMES:   Recent Labs     10/03/21  1905   CKMB <1.0     FASTING LIPID PANEL:  Lab Results   Component Value Date    CHOL 167 10/03/2021    HDL 28 (L) 10/03/2021    TRIG 290 (H) 10/03/2021     LIVER PROFILE:   Recent Labs     10/03/21  1905   AST 32   ALT 25   BILIDIR <0.08   BILITOT 0.16*   ALKPHOS 106      MICROBIOLOGY:   Lab Results   Component Value Date/Time    CULTURE NO GROWTH 01/18/2016 10:00 PM    CULTURE  01/18/2016 10:00 PM     [employer] [id number] [facility], [address] [place] [address] [phone]       Imaging:    CT HEAD WO CONTRAST    Result Date: 10/3/2021  No acute intracranial abnormality. RECOMMENDATIONS: The findings were sent to the Radiology Results [address] at 7:36 pm on 10/3/2021to be communicated to a licensed caregiver. MRI LUMBAR SPINE WO CONTRAST    Result Date: 10/5/2021  1. Grade 1 anterolisthesis of L5 on S1 secondary to bilateral L5 spondylolysis with resultant severe bilateral L5 neural foraminal narrowing. 2. Severe L5-S1 disc height loss and desiccation. 3. Mild L4-5 spinal canal stenosis and mild right lateral recess narrowing secondary to a posterior disc protrusion. XR CHEST PORTABLE    Result Date: 10/3/2021  Lateral left basilar airspace disease may represent small/early pneumonia and or atelectasis. CTA HEAD NECK W CONTRAST    Result Date: 10/3/2021  Pansinusitis, possible COVID pulmonary infiltrates, otherwise unremarkable CTA of the head and neck. RECOMMENDATIONS: Recommend CT chest.     MRI BRAIN WO CONTRAST    Result Date: 10/5/2021  1. No acute intracranial abnormality. 2. Paranasal sinusitis. ASSESSMENT & PLAN     1. Left lower extremity weakness-Neuro work up negative except, Severe bilateral L5 neural foraminal narrowing on MRI, Neurology: possible lumber radiculopathy, EMG OP. No Neurosurgical intervention at this point. Patient currently on aspirin Plavix. Echo: No significant abnormality  2. COVID : NC 3 L, SPO2 93%, ID following. Patient started on remdesivir for 5 days, Decadron 6 mg for 10 days, baricitinib 4 mg for 14 days. Lovenox 30 twice daily  3. Essential hypertension-currently controlled  4. Hyperlipidemia-LDL within target range, continue pravastatin 20     DVT ppx: Lovenox 30 BID  GI ppx: None     PT/OT/SW: Ongoing  Discharge Planning:   consulted. Discussed with neurology       Manuel Franklin MD  Internal Medicine Resident, PGY-1  [facility];  [place], [place]  10/6/2021, 5:51 AM

## 2021-10-06 NOTE — PLAN OF CARE
Problem: Falls - Risk of:  Goal: Will remain free from falls  Description: Will remain free from falls  Outcome: Ongoing  Goal: Absence of physical injury  Description: Absence of physical injury  Outcome: Ongoing     Problem: Pain:  Goal: Pain level will decrease  Description: Pain level will decrease  Outcome: Ongoing  Goal: Control of acute pain  Description: Control of acute pain  Outcome: Ongoing  Goal: Control of chronic pain  Description: Control of chronic pain  Outcome: Ongoing     Problem: Airway Clearance - Ineffective  Goal: Achieve or maintain patent airway  Outcome: Ongoing     Problem: Gas Exchange - Impaired  Goal: Absence of hypoxia  Outcome: Ongoing  Goal: Promote optimal lung function  Outcome: Ongoing     Problem: Breathing Pattern - Ineffective  Goal: Ability to achieve and maintain a regular respiratory rate  Outcome: Ongoing     Problem:  Body Temperature -  Risk of, Imbalanced  Goal: Ability to maintain a body temperature within defined limits  Outcome: Ongoing  Goal: Will regain or maintain usual level of consciousness  Outcome: Ongoing  Goal: Complications related to the disease process, condition or treatment will be avoided or minimized  Outcome: Ongoing     Problem: Isolation Precautions - Risk of Spread of Infection  Goal: Prevent transmission of infection  Outcome: Ongoing     Problem: Nutrition Deficits  Goal: Optimize nutritional status  Outcome: Ongoing     Problem: Risk for Fluid Volume Deficit  Goal: Maintain normal heart rhythm  Outcome: Ongoing  Goal: Maintain absence of muscle cramping  Outcome: Ongoing  Goal: Maintain normal serum potassium, sodium, calcium, phosphorus, and pH  Outcome: Ongoing     Problem: Loneliness or Risk for Loneliness  Goal: Demonstrate positive use of time alone when socialization is not possible  Outcome: Ongoing     Problem: Fatigue  Goal: Verbalize increase energy and improved vitality  Outcome: Ongoing     Problem: Patient Education: Go to Patient Education Activity  Goal: Patient/Family Education  Outcome: Ongoing     Problem: HEMODYNAMIC STATUS  Goal: Patient has stable vital signs and fluid balance  Outcome: Ongoing     Problem: ACTIVITY INTOLERANCE/IMPAIRED MOBILITY  Goal: Mobility/activity is maintained at optimum level for patient  Outcome: Ongoing     Problem: COMMUNICATION IMPAIRMENT  Goal: Ability to express needs and understand communication  Outcome: Ongoing

## 2021-10-06 NOTE — PROGRESS NOTES
Physical Therapy  Facility/Department: [facility] 2  Daily Treatment Note  NAME: Mary Kate Cordova  : 1980  MRN: [id number]    Date of Service: 10/6/2021    Discharge Recommendations:  Patient would benefit from continued therapy after discharge   PT Equipment Recommendations  Equipment Needed: Yes  Walker: Rolling    Assessment   Assessment: Pt with mobility deficits requiring CGA to perform sit<>stand transfer and CGA to ambulate 10 feet forward/retro with a RW. Pt presents with LLE swelling. Demos a decreased step length. Pt is a high fall risk due to decreased LLE strength and control. Pt would benefit from additional intense therapy upon discharge and can tolerate at least 3 hours of therapy per day. Pt would be unsafe to return to prior living arrangements upon discharge. Prognosis: Good  Decision Making: Medium Complexity  PT Education: Goals;Transfer Training;PT Role;Functional Mobility Training;General Safety;Gait Training  REQUIRES PT FOLLOW UP: Yes  Activity Tolerance  Activity Tolerance: Patient Tolerated treatment well     Patient Diagnosis(es): The primary encounter diagnosis was COVID-19. Diagnoses of Neck pain and Left leg weakness were also pertinent to this visit. has a past medical history of Asthma, COPD (chronic obstructive pulmonary disease) ([facility] [address] Depression, Erectile dysfunction, Essential hypertension, GERD (gastroesophageal reflux disease), Heart palpitations, Hyperlipidemia with target LDL less than 100, Left lower lobe pneumonia, Mitral valve prolapse, Nonspecific reactive hepatitis, Obesity (BMI 30-39.9), NOEMÍ (obstructive sleep apnea), Osteoarthritis, Seronegative polyarthritis, Social anxiety disorder, and Uvulitis. has a past surgical history that includes Knee arthroscopy; Hand surgery (Left); Tonsillectomy;  Nose surgery; Avalon tooth extraction; Colonoscopy; Endoscopy, colon, diagnostic; cyst removal (Right); and Upper gastrointestinal endoscopy (N/A, 10/9/2019). Restrictions  Restrictions/Precautions  Restrictions/Precautions: General Precautions, Fall Risk, Up as Tolerated, Isolation  Required Braces or Orthoses?: No  Position Activity Restriction  Other position/activity restrictions: covid+/droplet+, SBP goal <200  Subjective   General  Chart Reviewed: Yes  Response To Previous Treatment: Not applicable  Family / Caregiver Present: No  Subjective  Subjective: Pt supine in bed and agreeable to therapy this morning, RN agreeable to therapy. General Comment  Comments: pt returned to bed post session. call light in reach. Pain Screening  Patient Currently in Pain: Yes  Pain Assessment  Pain Level: 6  Vital Signs  Patient Currently in Pain: Yes       Orientation  Orientation  Overall Orientation Status: Within Functional Limits  Cognition   Cognition  Overall Cognitive Status: WFL  Objective   Bed mobility  Supine to Sit: Contact guard assistance  Sit to Supine: Contact guard assistance  Scooting: Supervision  Transfers  Sit to Stand: Contact guard assistance  Stand to sit: Contact guard assistance  Ambulation  Ambulation?: Yes  More Ambulation?: No  Ambulation 1  Surface: level tile  Device: Rolling Walker  Assistance: Minimal assistance;Contact guard assistance  Quality of Gait: Pt with step-to gait, decreased LLE stance phase, 2x LLE knee buckling this date requiring min-A to correct. Distance: 10 ft forward/retro using a rolling walker. Requires increased time to complete due to swelling in LLE. Balance  Posture: Fair  Sitting - Static: Good  Sitting - Dynamic: Good  Standing - Static: Fair  Standing - Dynamic: Fair  Comments: standing balance assessed with a RW  Exercises  Hip Flexion: x10 BLE  Hip Abduction: x10 BLE  Knee Long Arc Quad: x10 BLE  Ankle Pumps: x20 BLE  Core Strengthening: Pt sat EOB for 10 mins wihtout LOB.                                                                   AM-PAC Score  AM-PAC Inpatient Mobility Raw Score : 18

## 2021-10-06 NOTE — PLAN OF CARE
Problem: Falls - Risk of:  Goal: Will remain free from falls  Description: Will remain free from falls  10/6/2021 1054 by Aretha Saxena RN  Outcome: Ongoing  10/5/2021 2312 by Mary Moe RN  Outcome: Ongoing  Goal: Absence of physical injury  Description: Absence of physical injury  10/6/2021 1054 by Aretha Saxena RN  Outcome: Ongoing  10/5/2021 2312 by Mary Moe RN  Outcome: Ongoing     Problem: Pain:  Goal: Pain level will decrease  Description: Pain level will decrease  10/6/2021 1054 by Aretha Saxena RN  Outcome: Ongoing  10/5/2021 2312 by Mary Moe RN  Outcome: Ongoing  Goal: Control of acute pain  Description: Control of acute pain  10/6/2021 1054 by Aretha Saxena RN  Outcome: Ongoing  10/5/2021 2312 by Mary Moe RN  Outcome: Ongoing  Goal: Control of chronic pain  Description: Control of chronic pain  10/6/2021 1054 by Aretha Saxena RN  Outcome: Ongoing  10/5/2021 2312 by Mary Moe RN  Outcome: Ongoing     Problem: Airway Clearance - Ineffective  Goal: Achieve or maintain patent airway  10/6/2021 1054 by Aretha Saxena RN  Outcome: Ongoing  10/5/2021 2312 by Mary Moe RN  Outcome: Ongoing     Problem: Gas Exchange - Impaired  Goal: Absence of hypoxia  10/6/2021 1054 by Aretha Saxena RN  Outcome: Ongoing  10/5/2021 2312 by Mary Moe RN  Outcome: Ongoing  Goal: Promote optimal lung function  10/6/2021 1054 by Aretha Saxena RN  Outcome: Ongoing  10/5/2021 2312 by Mary Moe RN  Outcome: Ongoing     Problem: Breathing Pattern - Ineffective  Goal: Ability to achieve and maintain a regular respiratory rate  10/6/2021 1054 by Aretha Saxena RN  Outcome: Ongoing  10/5/2021 2312 by Mary Moe RN  Outcome: Ongoing     Problem:  Body Temperature -  Risk of, Imbalanced  Goal: Ability to maintain a body temperature within defined limits  10/6/2021 1054 by Aretha Saxena RN  Outcome: Ongoing  10/5/2021 2312 by Mary Moe RN  Outcome: Ongoing  Goal: Will regain or maintain usual level of consciousness  10/6/2021 1054 by Ajit Sena RN  Outcome: Ongoing  10/5/2021 2312 by Raúl Hernandez RN  Outcome: Ongoing  Goal: Complications related to the disease process, condition or treatment will be avoided or minimized  10/6/2021 1054 by Ajit Sena RN  Outcome: Ongoing  10/5/2021 2312 by Raúl Hernandez RN  Outcome: Ongoing     Problem: Isolation Precautions - Risk of Spread of Infection  Goal: Prevent transmission of infection  10/6/2021 1054 by Ajit Sena RN  Outcome: Ongoing  10/5/2021 2312 by Raúl Hernandez RN  Outcome: Ongoing     Problem: Nutrition Deficits  Goal: Optimize nutritional status  10/6/2021 1054 by Ajit Sena RN  Outcome: Ongoing  10/5/2021 2312 by Raúl Hernandez RN  Outcome: Ongoing     Problem: Risk for Fluid Volume Deficit  Goal: Maintain normal heart rhythm  10/6/2021 1054 by Ajit Sena RN  Outcome: Ongoing  10/5/2021 2312 by Raúl Hernandez RN  Outcome: Ongoing  Goal: Maintain absence of muscle cramping  10/6/2021 1054 by Ajit Sena RN  Outcome: Ongoing  10/5/2021 2312 by Raúl Hernandez RN  Outcome: Ongoing  Goal: Maintain normal serum potassium, sodium, calcium, phosphorus, and pH  10/6/2021 1054 by Ajit Sena RN  Outcome: Ongoing  10/5/2021 2312 by Raúl Hernandez RN  Outcome: Ongoing     Problem: Loneliness or Risk for Loneliness  Goal: Demonstrate positive use of time alone when socialization is not possible  10/6/2021 1054 by Ajit Sena RN  Outcome: Ongoing  10/5/2021 2312 by Raúl Hernandez RN  Outcome: Ongoing     Problem: Fatigue  Goal: Verbalize increase energy and improved vitality  10/6/2021 1054 by Ajit Sena RN  Outcome: Ongoing  10/5/2021 2312 by Raúl Hernandez RN  Outcome: Ongoing     Problem: Patient Education: Go to Patient Education Activity  Goal: Patient/Family Education  10/6/2021 1054 by Ajit Sena RN  Outcome: Ongoing  10/5/2021 2312 by Lisa Bowen RN  Outcome: Ongoing     Problem: HEMODYNAMIC STATUS  Goal: Patient has stable vital signs and fluid balance  10/6/2021 1054 by Ama Verdin RN  Outcome: Ongoing  10/5/2021 2312 by Lisa Bowen RN  Outcome: Ongoing     Problem: ACTIVITY INTOLERANCE/IMPAIRED MOBILITY  Goal: Mobility/activity is maintained at optimum level for patient  10/6/2021 1054 by Ama Verdin RN  Outcome: Ongoing  10/5/2021 2312 by Lisa Bowen RN  Outcome: Ongoing     Problem: COMMUNICATION IMPAIRMENT  Goal: Ability to express needs and understand communication  10/6/2021 1054 by Ama Verdin RN  Outcome: Ongoing  10/5/2021 2312 by Lisa Bowen RN  Outcome: Ongoing

## 2021-10-06 NOTE — PROGRESS NOTES
[facility] Neurology   [facility]    Progress Note             Date:   10/6/2021  Patient name:  Alicia Ulloa  Date of admission:  10/3/2021  6:43 PM  MRN:   [id number]  Account:  [de-identified]  YOB: 1980  PCP:    Katia Ramos MD  Room:   2010/2010-01  Code Status:    Full Code    Chief Complaint:     Chief Complaint   Patient presents with    Positive For Covid-19    Cough    Chest Pain    Fever       Interval hx: The patient was seen and examined at bedside. Is vitally stable, alert and oriented x 3. No acute events overnight. Patient states that his left lower extremity weakness is slightly improved from yesterday, strength 3+ out of 5. Patient has no new complaints. Brief History of Present Illness: The patient is a 38 y.o.  /  male who presents with Positive For Covid-19, Cough, Chest Pain, and Fever   and he is admitted to the hospital for the management of Covid and left lower extremity weakness. Patient is a past medical history of hypertension, seronegative polyarthritis, COPD, hyperlipidemia presented with left leg weakness, last known well at 3 PM on 10/3. NIH 1, patient had mild left lower extremity drift, strength 4 out of 5. Patient was loaded with aspirin and Plavix and started on Lipitor.   Continue aspirin 81 mg, Plavix 75 mg.  CT head without contrast normal, CT head and neck with contrast unremarkable  Hemoglobin A1c 5.5  LDL 81  MRI brain and lumbar spine and echo pending        Past Medical History:     Past Medical History:   Diagnosis Date    Asthma     COPD (chronic obstructive pulmonary disease) ([facility] [address] 11/19/2018    Depression     Erectile dysfunction 9/29/2017    Essential hypertension 1/2/2017    GERD (gastroesophageal reflux disease) 11/19/2018    Heart palpitations 9/29/2017    Hyperlipidemia with target LDL less than 100 3/23/2017    Left lower lobe pneumonia 11/9/2012    Mitral valve prolapse     Nonspecific reactive hepatitis 3/22/2017    Obesity (BMI 30-39.9) 3/23/2017    NOEMÍ (obstructive sleep apnea) 10/16/2019    Osteoarthritis     Seronegative polyarthritis 10/10/2016    followed w/ rheumatology in [address] [place] [address] anxiety disorder     Uvulitis 8/15/2019        Past Surgical History:     Past Surgical History:   Procedure Laterality Date    COLONOSCOPY      CYST REMOVAL Right     Armpit     ENDOSCOPY, COLON, DIAGNOSTIC      HAND SURGERY Left     KNEE ARTHROSCOPY      right knee    NOSE SURGERY      TONSILLECTOMY      UPPER GASTROINTESTINAL ENDOSCOPY N/A 10/9/2019    EGD POLYP SNARE, HOT. ESOPHAGEAL DILATATION WITH MALONIES 50F performed by Vanessa Alcaraz MD at [address] [facility]          Medications Prior to Admission:     Prior to Admission medications    Medication Sig Start Date End Date Taking?  Authorizing Provider   pravastatin (PRAVACHOL) 20 MG tablet take 1 tablet by mouth every evening **STOP ATORVASTATIN** 6/7/21   Cherri Spurling, MD   amLODIPine (NORVASC) 10 MG tablet take 1 tablet by mouth every morning 1/8/21   Cherri Spurling, MD   buPROPion (WELLBUTRIN XL) 150 MG extended release tablet Take 1 tablet by mouth every morning 1/8/21   Cherri Spurling, MD   busPIRone (BUSPAR) 10 MG tablet Take 1 tablet by mouth 3 times daily as needed (anxiety) 1/8/21   Cherri Spurling, MD   loratadine (CLARITIN) 10 MG tablet Take 1 tablet by mouth daily 1/8/21   Cherri Spurling, MD   omeprazole (PRILOSEC) 40 MG delayed release capsule TAKE 1 CAPSULE DAILY 1/8/21   Cherri Spurling, MD   celecoxib (CELEBREX) 100 MG capsule Take 1 capsule by mouth daily as needed for Pain 1/8/21   Cherri Spurling, MD   fluticasone (FLONASE) 50 MCG/ACT nasal spray 2 sprays by Nasal route daily 10/27/20 10/27/21  Cheli Booker, APRN - CADEN   mometasone-formoterol (DULERA) 100-5 MCG/ACT inhaler Inhale 2 puffs into the lungs 2 times daily Stop Flovent 5/8/20   Vandana Novak MD   albuterol sulfate HFA (VENTOLIN HFA) 108 (90 Base) MCG/ACT inhaler Inhale 2 puffs into the lungs every 6 hours as needed for Wheezing or Shortness of Breath 5/8/20   Missy Worthington MD   albuterol (PROVENTIL) (2.5 MG/3ML) 0.083% nebulizer solution Take 3 mLs by nebulization every 6 hours as needed for Wheezing or Shortness of Breath 1/2/20   Missy Worthington MD   Blood Pressure KIT Dx: HTN. Needs automatic blood pressure machine to monitor his blood pressure. Patient not taking: Reported on 6/23/2021 3/22/17   Missy Worthington MD   Respiratory Therapy Supplies (NEBULIZER) MADHAV 1 Units by Does not apply route 2 times daily Dx: Asthma exacerbation J45.901 1/31/17   Latanya Charles MD        Allergies:     Fish-derived products, Other, Shellfish allergy, Shrimp (diagnostic), and Ace inhibitors    Social History:     Tobacco:    reports that he has never smoked. He has never used smokeless tobacco.  Alcohol:      reports current alcohol use. Drug Use:  reports no history of drug use. Family History:     Family History   Problem Relation Age of Onset    Diabetes Father     High Cholesterol Father     Other Mother         autoimmune hepatitis     High Blood Pressure Brother     Heart Disease Brother     Heart Attack Brother 40        cabg age 40     Rheum Arthritis Other 7    Colon Cancer Neg Hx     Cancer Neg Hx        Review of Systems:     Review of Systems   Constitutional: Negative for activity change, appetite change, chills and fever. HENT: Negative for congestion. Respiratory: Negative for cough, chest tightness, shortness of breath and wheezing. Cardiovascular: Negative for chest pain and leg swelling. Gastrointestinal: Negative for abdominal distention, abdominal pain, diarrhea, nausea and vomiting. Genitourinary: Negative for dysuria and flank pain. Skin: Negative for rash.    Neurological: Negative for dizziness, weakness, numbness and headaches. Psychiatric/Behavioral: Negative for agitation, behavioral problems, confusion and sleep disturbance.        Physical Exam:   /77   Pulse 76   Temp 98.3 °F (36.8 °C) (Oral)   Resp 29   Ht 6' 2\" (1.88 m)   Wt 254 lb 11.2 oz (115.5 kg)   SpO2 93%   BMI 32.70 kg/m²   Temp (24hrs), Av.9 °F (36.6 °C), Min:97.3 °F (36.3 °C), Max:98.3 °F (36.8 °C)    Recent Labs     10/03/21  1901   POCGLU 93       Intake/Output Summary (Last 24 hours) at 10/6/2021 1352  Last data filed at 10/6/2021 1107  Gross per 24 hour   Intake 1715 ml   Output 1700 ml   Net 15 ml         Neurologic Exam     GENERAL  Appears comfortable and in no distress   HEENT  NC/ AT   HEART  S1 and S2 heard; palpation of pulses: radial pulse    NECK  Supple and no bruits heard   MENTAL STATUS:  Alert, oriented, intact memory, no confusion, normal speech, normal language, no hallucination or delusion   CRANIAL NERVES: II     -      Visual fields intact to confrontation  III,IV,VI -  PERR, EOMs full, no ptosis  V     -     Normal facial sensation   VII    -     Normal facial symmetry  VIII   -     Intact hearing   IX,X -     Symmetrical palate  XI    -     Symmetrical shoulder shrug  XII   -     Midline tongue, no atrophy    MOTOR FUNCTION: RUE: Significant for good strength of grade 5/5 in proximal and distal muscle groups   LUE: Significant for good strength of grade 5/5 in proximal and distal muscle groups   RLE: Significant for good strength of grade 5/5 in proximal and distal muscle groups   LLE: Significant for good strength of grade 3/5 in proximal and distal muscle groups      Normal bulk, normal tone and no involuntary movements, no tremor   SENSORY FUNCTION:  Normal touch, normal pinprick, normal vibration, normal proprioception   CEREBELLAR FUNCTION:  Intact fine motor control over upper limbs and lower limbs   REFLEX FUNCTION:  Symmetric in upper and lower extremities, no Babinski sign   STATION and GAIT  Not tested        Investigations:      Laboratory Testing:  Recent Results (from the past 24 hour(s))   Basic Metabolic Panel w/ Reflex to MG    Collection Time: 10/06/21  5:22 AM   Result Value Ref Range    Glucose 118 (H) 70 - 99 mg/dL    BUN 10 6 - 20 mg/dL    CREATININE 0.67 (L) 0.70 - 1.20 mg/dL    Bun/Cre Ratio NOT REPORTED 9 - 20    Calcium 8.5 (L) 8.6 - 10.4 mg/dL    Sodium 138 135 - 144 mmol/L    Potassium 4.5 3.7 - 5.3 mmol/L    Chloride 105 98 - 107 mmol/L    CO2 22 20 - 31 mmol/L    Anion Gap 11 9 - 17 mmol/L    GFR Non-African American >60 >60 mL/min    GFR African American >60 >60 mL/min    GFR Comment          GFR Staging NOT REPORTED    CBC auto differential    Collection Time: 10/06/21  5:22 AM   Result Value Ref Range    WBC 9.6 3.5 - 11.3 k/uL    RBC 5.06 4.21 - 5.77 m/uL    Hemoglobin 15.0 13.0 - 17.0 g/dL    Hematocrit 46.4 40.7 - 50.3 %    MCV 91.7 82.6 - 102.9 fL    MCH 29.6 25.2 - 33.5 pg    MCHC 32.3 28.4 - 34.8 g/dL    RDW 13.3 11.8 - 14.4 %    Platelets 872 437 - 653 k/uL    MPV 11.2 8.1 - 13.5 fL    NRBC Automated 0.0 0.0 per 100 WBC    Differential Type NOT REPORTED     WBC Morphology NOT REPORTED     RBC Morphology NOT REPORTED     Platelet Estimate NOT REPORTED     Seg Neutrophils 79 (H) 36 - 65 %    Lymphocytes 15 (L) 24 - 43 %    Monocytes 6 3 - 12 %    Eosinophils % 0 (L) 1 - 4 %    Basophils 0 0 - 2 %    Immature Granulocytes 1 (H) 0 %    Segs Absolute 7.52 1.50 - 8.10 k/uL    Absolute Lymph # 1.48 1.10 - 3.70 k/uL    Absolute Mono # 0.53 0.10 - 1.20 k/uL    Absolute Eos # <0.03 0.00 - 0.44 k/uL    Basophils Absolute <0.03 0.00 - 0.20 k/uL    Absolute Immature Granulocyte 0.05 0.00 - 0.30 k/uL   C-REACTIVE PROTEIN    Collection Time: 10/06/21  5:22 AM   Result Value Ref Range    CRP 21.8 (H) 0.0 - 5.0 mg/L     Recent Labs     10/06/21  0522   WBC 9.6   RBC 5.06   HGB 15.0   HCT 46.4   MCV 91.7   MCH 29.6   MCHC 32.3   RDW 13.3      MPV 11.2     Recent Labs     10/03/21  1905 10/04/21  0841 10/06/21  0522      < > 138   K 3.8   < > 4.5   CL 99   < > 105   CO2 23   < > 22   BUN 16   < > 10   CREATININE 1.08   < > 0.67*   GLUCOSE 86   < > 118*   CALCIUM 8.5*   < > 8.5*   PROT 7.7  --   --    LABALBU 4.1  --   --    BILITOT 0.16*  --   --    ALKPHOS 106  --   --    AST 32  --   --    ALT 25  --   --     < > = values in this interval not displayed. Hemoglobin A1C   Date Value Ref Range Status   10/04/2021 5.5 4.0 - 6.0 % Final       Assessment :      Primary Problem  <principal problem not specified>    Active Hospital Problems    Diagnosis Date Noted    Degenerative lumbar spinal stenosis [M48.061] 10/06/2021    Left leg weakness [R29.898]     COVID-19 [U07.1] 10/03/2021    Obesity (BMI 30-39. 9) [E66.9] 03/23/2017    Hyperlipidemia with target LDL less than 100 [E78.5] 03/23/2017    Essential hypertension [I10] 01/02/2017       Patient is a 38 y.o. male with COVID-19 pneumonia presenting with left lower extremity weakness affecting proximal muscles, NIH 1.    CT, CTA head and neck unremarkable. MRI brain 10/5: no acute intracranial normality  MRI lumbar spine 10/5: shows grade 1 anterolisthesis of L5 on S1 secondary to bilateral L5 spondylosis-with severe bilateral L5 neuroforaminal narrowing  hemoglobin A1c 5.5  LDL 81    Patient's weakness likely secondary to L5 neuroforaminal narrowing, awaiting neurosurgery recommendations    Plan:     -Discontinue aspirin and Plavix, MRI negative for stroke  -MRI brain no acute intracranial normality  -MRI lumbar spine shows grade 1 anterolisthesis of L5 on S1 secondary to bilateral L5 spondylosis-with severe bilateral L5 neuroforaminal narrowing  -Continue Lipitor 40 mg  -Neurosurgery consulted, awaiting recommendations  -PT/OT/PMNR  -Rest of management per primary      Follow-up further recommendations after discussing the case with attending  The plan was discussed with the patient, patient's family and the medical staff. Consultations:   IP CONSULT TO STROKE TEAM  IP CONSULT TO HOSPITALIST  IP CONSULT TO INFECTIOUS DISEASES  IP CONSULT TO CASE MANAGEMENT  IP CONSULT TO PHARMACY  IP CONSULT TO NEUROSURGERY    Patient is admitted as inpatient status because of co-morbidities listed above, severity of signs and symptoms as outlined, requirement for current medical therapies and most importantly because of direct risk to patient if care not provided in a hospital setting.     Joy Lala MD  10/6/2021  1:52 PM    Copy sent to Dr. Junior Montana MD

## 2021-10-06 NOTE — CARE COORDINATION
Transitional planning-talked with patient on the phone. Plan is to go home with his family, one of his family members is positive. Remdesivir until 10-8, IV decadron.

## 2021-10-06 NOTE — CONSULTS
Department of Neurosurgery                                            Nurse Practitioner Consult Note      Reason for Consult:  LLE weakness, spondylosis with bilat neural foraminal narrowing  Requesting Physician:  Dr Ellen Donovan  Neurosurgeon:   [x] Dr. Gregory Cadena  [] Dr. Chandu Pelayo  [] Dr. Paresh Merrill  [] Dr. Brian Orozco      History Obtained From:  patient, electronic medical record    CHIEF COMPLAINT:         Chief Complaint   Patient presents with    Positive For Covid-19    Cough    Chest Pain    Fever       HISTORY OF PRESENT ILLNESS:       The patient is a 38 y.o. male who presented on 10/3 with COVID positive with shortness of breath, chest palpitations, and sudden onset left leg weakness. States he got up that morning and was fine, walked to the bathroom and when he walked back to the living room he noticed he was having trouble moving his left leg. Stroke w/u was unremarkable. Loaded with aspirin and plavix on arrival.  MRI lumbar spine was done which shows grade 1 anterolisthesis of L5 on S1 secondary to bilateral L5 spondylosis-with severe bilateral L5 neuroforaminal narrowing. States he is a  and started having low back pain this past summer with intermittent bilat leg numbness (left > right) while standing for long periods. Numbness wound improve after sitting down. He as been seeing a chiropractor with some improvement. Denies any therapy, pain management consult, injections or back surgery in the past. Denies bowel or bladder incontinence.      PAST MEDICAL HISTORY :       Past Medical History:        Diagnosis Date    Asthma     COPD (chronic obstructive pulmonary disease) ([facility] [address] 11/19/2018    Depression     Erectile dysfunction 9/29/2017    Essential hypertension 1/2/2017    GERD (gastroesophageal reflux disease) 11/19/2018    Heart palpitations 9/29/2017    Hyperlipidemia with target LDL less than 100 3/23/2017    Left lower lobe pneumonia 11/9/2012    Mitral valve prolapse  Nonspecific reactive hepatitis 3/22/2017    Obesity (BMI 30-39.9) 3/23/2017    NOEMÍ (obstructive sleep apnea) 10/16/2019    Osteoarthritis     Seronegative polyarthritis 10/10/2016    followed w/ rheumatology in [address] [place] [address] anxiety disorder     Uvulitis 8/15/2019       Past Surgical History:        Procedure Laterality Date    COLONOSCOPY      CYST REMOVAL Right     Armpit     ENDOSCOPY, COLON, DIAGNOSTIC      HAND SURGERY Left     KNEE ARTHROSCOPY      right knee    NOSE SURGERY      TONSILLECTOMY      UPPER GASTROINTESTINAL ENDOSCOPY N/A 10/9/2019    EGD POLYP SNARE, HOT. ESOPHAGEAL DILATATION WITH MALONIES 49F performed by Sarah Morales MD at [facility]         Social History:   Social History     Socioeconomic History    Marital status:      Spouse name: Not on file    Number of children: Not on file    Years of education: Not on file    Highest education level: Not on file   Occupational History    Not on file   Tobacco Use    Smoking status: Never Smoker    Smokeless tobacco: Never Used   Vaping Use    Vaping Use: Never used   Substance and Sexual Activity    Alcohol use: Yes     Comment: occasional  few x year    Drug use: No    Sexual activity: Yes     Partners: Male   Other Topics Concern    Not on file   Social History Narrative    Not on file     Social Determinants of Health     Financial Resource Strain: Low Risk     Difficulty of Paying Living Expenses: Not hard at all   Food Insecurity: No Food Insecurity    Worried About [address] in the Last Year: Never true    [address] in the Last Year: Never true   Transportation Needs:     Lack of Transportation (Medical):      Lack of Transportation (Non-Medical):    Physical Activity:     Days of Exercise per Week:     Minutes of Exercise per Session:    Stress:     Feeling of Stress :    Social Connections:     Frequency of Communication with Friends and Family:     Frequency of Social Gatherings with Friends and Family:     Attends Confucianist Services:     Active Member of Clubs or Organizations:     Attends Club or Organization Meetings:     Marital Status:    Intimate Partner Violence:     Fear of Current or Ex-Partner:     Emotionally Abused:     Physically Abused:     Sexually Abused:        Family History:       Problem Relation Age of Onset    Diabetes Father     High Cholesterol Father     Other Mother         autoimmune hepatitis     High Blood Pressure Brother     Heart Disease Brother     Heart Attack Brother 40        cabg age 40     Rheum Arthritis Other 7    Colon Cancer Neg Hx     Cancer Neg Hx        Allergies:  Fish-derived products, Other, Shellfish allergy, Shrimp (diagnostic), and Ace inhibitors    Home Medications:  Prior to Admission medications    Medication Sig Start Date End Date Taking?  Authorizing Provider   pravastatin (PRAVACHOL) 20 MG tablet take 1 tablet by mouth every evening **STOP ATORVASTATIN** 6/7/21   Paulina Dawson MD   amLODIPine (NORVASC) 10 MG tablet take 1 tablet by mouth every morning 1/8/21   Paulina Dawson MD   buPROPion (WELLBUTRIN XL) 150 MG extended release tablet Take 1 tablet by mouth every morning 1/8/21   Paulina Dawson MD   busPIRone (BUSPAR) 10 MG tablet Take 1 tablet by mouth 3 times daily as needed (anxiety) 1/8/21   Paulina Dawson MD   loratadine (CLARITIN) 10 MG tablet Take 1 tablet by mouth daily 1/8/21   Paulina Dawson MD   omeprazole (PRILOSEC) 40 MG delayed release capsule TAKE 1 CAPSULE DAILY 1/8/21   Paulina Dawson MD   celecoxib (CELEBREX) 100 MG capsule Take 1 capsule by mouth daily as needed for Pain 1/8/21   Paulina Dawson MD   fluticasone (FLONASE) 50 MCG/ACT nasal spray 2 sprays by Nasal route daily 10/27/20 10/27/21  Cheli Booker, APRN - CNP   mometasone-formoterol (DULERA) 100-5 MCG/ACT inhaler Inhale 2 puffs into the lungs 2 times daily Stop Flovent 5/8/20   Je Shelley MD   albuterol sulfate HFA (VENTOLIN HFA) 108 (90 Base) MCG/ACT inhaler Inhale 2 puffs into the lungs every 6 hours as needed for Wheezing or Shortness of Breath 5/8/20   Je Shelley MD   albuterol (PROVENTIL) (2.5 MG/3ML) 0.083% nebulizer solution Take 3 mLs by nebulization every 6 hours as needed for Wheezing or Shortness of Breath 1/2/20   Je Shelley MD   Blood Pressure KIT Dx: HTN. Needs automatic blood pressure machine to monitor his blood pressure.   Patient not taking: Reported on 6/23/2021 3/22/17   Je Shelley MD   Respiratory Therapy Supplies (NEBULIZER) MADHAV 1 Units by Does not apply route 2 times daily Dx: Asthma exacerbation J45.901 1/31/17   Anton Orellana MD       Current Medications:   Current Facility-Administered Medications: ketorolac (TORADOL) injection 30 mg, 30 mg, IntraVENous, BID PRN  oxyCODONE-acetaminophen (PERCOCET) 5-325 MG per tablet 1 tablet, 1 tablet, Oral, Q6H PRN  atorvastatin (LIPITOR) tablet 40 mg, 40 mg, Oral, Nightly  fluticasone (FLONASE) 50 MCG/ACT nasal spray 1 spray, 1 spray, Each Nostril, Daily  sodium chloride (OCEAN) 0.65 % nasal spray 1 spray, 1 spray, Each Nostril, PRN  dexamethasone (DECADRON) injection 6 mg, 6 mg, IntraVENous, Q24H  [COMPLETED] remdesivir 200 mg in sodium chloride 0.9 % 250 mL IVPB, 200 mg, IntraVENous, Once **FOLLOWED BY** remdesivir 100 mg in sodium chloride 0.9 % 250 mL IVPB, 100 mg, IntraVENous, Q24H  0.9 % sodium chloride bolus, 30 mL, IntraVENous, PRN  baricitinib (OLUMIANT) tablet 4 mg, 4 mg, Oral, Daily  albuterol sulfate  (90 Base) MCG/ACT inhaler 2 puff, 2 puff, Inhalation, Q6H PRN  budesonide-formoterol (SYMBICORT) 160-4.5 MCG/ACT inhaler 2 puff, 2 puff, Inhalation, BID  sodium chloride flush 0.9 % injection 5-40 mL, 5-40 mL, IntraVENous, 2 times per day  sodium chloride flush 0.9 % injection 5-40 mL, 5-40 mL, IntraVENous, PRN  0.9 % sodium chloride infusion, 25 mL, IntraVENous, PRN  enoxaparin (LOVENOX) injection 30 mg, 30 mg, SubCUTAneous, BID  ondansetron (ZOFRAN-ODT) disintegrating tablet 4 mg, 4 mg, Oral, Q8H PRN **OR** ondansetron (ZOFRAN) injection 4 mg, 4 mg, IntraVENous, Q6H PRN  polyethylene glycol (GLYCOLAX) packet 17 g, 17 g, Oral, Daily PRN  acetaminophen (TYLENOL) tablet 650 mg, 650 mg, Oral, Q6H PRN **OR** acetaminophen (TYLENOL) suppository 650 mg, 650 mg, Rectal, Q6H PRN  0.9 % sodium chloride infusion, , IntraVENous, Continuous    REVIEW OF SYSTEMS:       CONSTITUTIONAL: negative for fatigue and malaise   RESPIRATORY: positive for cough, shortness of breath   CARDIOVASCULAR: negative for chest pain, palpitations   GASTROINTESTINAL: negative for nausea, vomiting   GENITOURINARY: negative for incontinence   MUSCULOSKELETAL: negative for neck pain, positive for back pain     Review of systems otherwise negative.     PHYSICAL EXAM:       /77   Pulse 81   Temp 98.3 °F (36.8 °C) (Oral)   Resp 26   Ht 6' 2\" (1.88 m)   Wt 254 lb 11.2 oz (115.5 kg)   SpO2 93%   BMI 32.70 kg/m²       CONSTITUTIONAL: no apparent distress, appears stated age   BACK: without midline tenderness   NEUROLOGIC:  EYE OPENING     Spontaneous - 4 [x]       To voice - 3 []       To pain - 2 []       None - 1 []    VERBAL RESPONSE     Appropriate, oriented - 5 [x]       Dazed or confused - 4 []       Syllables, expletives - 3 []       Grunts - 2 []       None - 1 []    MOTOR RESPONSE     Spontaneous, command - 6 [x]       Localizes pain - 5 []       Withdraws pain - 4 []       Abnormal flexion - 3 []       Abnormal extension - 2 []       None - 1 []            Total GCS: 15    Mental Status:  Alert and oriented x3, follows all commands, speech clear          Motor Exam:    Tone:  normal    Motor exam is symmetrical 5 out of 5 all extremities bilaterally    Sensory:    Right Upper Extremity:  normal  Left Upper Extremity:  normal  Right Lower Extremity:  normal  Left Lower Extremity: normal    Deep Tendon Reflexes:    Right Bicep:  2+  Left Bicep:  2+  Right Knee:  2+  Left Knee:  2+    Plantar Response:    Right:  downgoing  Left:  downgoing    Clonus:  absent  Huff's:  absent    Gait:  Slow ad guarded, used walker       LABS AND IMAGING:     CBC with Differential:    Lab Results   Component Value Date    WBC 9.6 10/06/2021    RBC 5.06 10/06/2021    RBC 4.97 04/30/2012    HGB 15.0 10/06/2021    HCT 46.4 10/06/2021     10/06/2021     04/30/2012    MCV 91.7 10/06/2021    MCH 29.6 10/06/2021    MCHC 32.3 10/06/2021    RDW 13.3 10/06/2021    LYMPHOPCT 15 10/06/2021    MONOPCT 6 10/06/2021    BASOPCT 0 10/06/2021    MONOSABS 0.53 10/06/2021    LYMPHSABS 1.48 10/06/2021    EOSABS <0.03 10/06/2021    BASOSABS <0.03 10/06/2021    DIFFTYPE NOT REPORTED 10/06/2021     BMP:    Lab Results   Component Value Date     10/06/2021    K 4.5 10/06/2021     10/06/2021    CO2 22 10/06/2021    BUN 10 10/06/2021    LABALBU 4.1 10/03/2021    LABALBU 4.5 04/30/2012    CREATININE 0.67 10/06/2021    CALCIUM 8.5 10/06/2021    GFRAA >60 10/06/2021    LABGLOM >60 10/06/2021    GLUCOSE 118 10/06/2021    GLUCOSE 96 04/30/2012       Radiology Review:      CT HEAD WO CONTRAST    Result Date: 10/3/2021  EXAMINATION: CT OF THE HEAD WITHOUT CONTRAST  10/3/2021 7:19 pm TECHNIQUE: CT of the head was performed without the administration of intravenous contrast. Dose modulation, iterative reconstruction, and/or weight based adjustment of the mA/kV was utilized to reduce the radiation dose to as low as reasonably achievable.  COMPARISON: MR brain February 3, 2012 and CT brain February 3, 2012 HISTORY: ORDERING SYSTEM PROVIDED HISTORY: left lower extremity decreased strength, weakness, 3 hours duration TECHNOLOGIST PROVIDED HISTORY: left lower extremity decreased strength, weakness, 3 hours duration Decision Support Exception - unselect if not a suspected or confirmed emergency medical condition->Emergency Medical Condition ([place]) Reason for Exam: S/P Stroke Alert - left lower extremity decreased strength, weakness, 3 hours duration. Acuity: Unknown Type of Exam: Unknown FINDINGS: BRAIN/VENTRICLES: There is no acute intracranial hemorrhage, mass effect or midline shift. No abnormal extra-axial fluid collection. The gray-white differentiation is maintained without evidence of an acute infarct. There is no evidence of hydrocephalus. ORBITS: The visualized portion of the orbits demonstrate no acute abnormality. SINUSES: The visualized paranasal sinuses and mastoid air cells demonstrate no acute abnormality. SOFT TISSUES/SKULL:  No acute abnormality of the visualized skull or soft tissues. No acute intracranial abnormality. RECOMMENDATIONS: The findings were sent to the Radiology Results [id number] at 7:36 pm on 10/3/2021to be communicated to a licensed caregiver. MRI LUMBAR SPINE WO CONTRAST    Result Date: 10/5/2021  EXAMINATION: MRI OF THE LUMBAR SPINE WITHOUT CONTRAST, 10/5/2021 6:02 pm TECHNIQUE: Multiplanar multisequence MRI of the lumbar spine was performed without the administration of intravenous contrast. COMPARISON: Radiographs 01/28/2014 HISTORY: ORDERING SYSTEM PROVIDED HISTORY: Lower extremity weakness TECHNOLOGIST PROVIDED HISTORY: Lower extremity weakness FINDINGS: BONES/ALIGNMENT: Vertebral body heights are maintained. There is 10 mm grade 1 anterolisthesis of L5 on S1 secondary to bilateral L5 spondylolysis. Alignment is otherwise normal.  Chronic fatty marrow changes are noted about the L5-S1 disc and within the L5 pedicles. No marrow edema or suspect osseous lesion is evident. SPINAL CORD: The conus terminates normally at the upper L1 level. The visualized spinal cord has normal signal and morphology. No evidence of mass or abnormal fluid collection within the spinal canal. SOFT TISSUES: Paraspinal soft tissues are unremarkable. L1-L2: Disc height and signal maintained.   No Unknown FINDINGS: CTA NECK: AORTIC ARCH/ARCH VESSELS: No dissection or arterial injury. No significant stenosis of the brachiocephalic or subclavian arteries. CAROTID ARTERIES: No dissection, arterial injury, or hemodynamically significant stenosis by NASCET criteria. VERTEBRAL ARTERIES: No dissection, arterial injury, or significant stenosis. SOFT TISSUES: Several small ground-glass opacities suggestive of COVID-19. Cloteal Mcclelland No cervical or superior mediastinal lymphadenopathy. The larynx and pharynx are unremarkable. No acute abnormality of the salivary and thyroid glands. Air-fluid levels in the maxillary sinuses bilaterally. BONES: No acute osseous abnormality. CTA HEAD: ANTERIOR CIRCULATION: No significant stenosis of the intracranial internal carotid, anterior cerebral, or middle cerebral arteries. No aneurysm. POSTERIOR CIRCULATION: No significant stenosis of the vertebral, basilar, or posterior cerebral arteries. No aneurysm. Posterior communicating arteries not identified. OTHER: No dural venous sinus thrombosis on this non-dedicated study. BRAIN: No mass effect or midline shift. No extra-axial fluid collection. The gray-white differentiation is maintained. Pansinusitis, possible COVID pulmonary infiltrates, otherwise unremarkable CTA of the head and neck. RECOMMENDATIONS: Recommend CT chest.     MRI BRAIN WO CONTRAST    Result Date: 10/5/2021  EXAMINATION: MRI OF THE BRAIN WITHOUT CONTRAST  10/5/2021 6:02 pm TECHNIQUE: Multiplanar multisequence MRI of the brain was performed without the administration of intravenous contrast. COMPARISON: 02/03/2012 HISTORY: ORDERING SYSTEM PROVIDED HISTORY: stroke workup TECHNOLOGIST PROVIDED HISTORY: stroke workup FINDINGS: INTRACRANIAL STRUCTURES/VENTRICLES: There is no acute infarct or mass. No mass effect or midline shift. No evidence of an acute intracranial hemorrhage. The ventricles and sulci are normal in size and configuration.  The sellar/suprasellar regions appear unremarkable. The normal signal voids within the major intracranial vessels appear maintained. ORBITS: The visualized portion of the orbits demonstrate no acute abnormality. SINUSES: Near complete opacification of the paranasal sinuses with mucoperiosteal thickening and layering fluid. No mastoid effusion. BONES/SOFT TISSUES: The bone marrow signal intensity appears normal. The soft tissues demonstrate no acute abnormality. 1. No acute intracranial abnormality. 2. Paranasal sinusitis. ASSESSMENT AND PLAN:       Patient Active Problem List   Diagnosis    Seronegative polyarthritis    Fatigue    Essential hypertension    Social anxiety disorder    Nonspecific reactive hepatitis    Hyperglycemia    Obesity (BMI 30-39. 9)    LVH (left ventricular hypertrophy) due to hypertensive disease    Hyperlipidemia with target LDL less than 100    Asthma, Moderate persistent asthma without complication    Hypertriglyceridemia    Erectile dysfunction    Family history of premature CAD    Gastroesophageal reflux disease with esophagitis without hemorrhage    Refused pneumococcal vaccination    Refuses tetanus, diphtheria, and acellular pertussis (Tdap) vaccination    Family history of thyroid nodule    Gastric polyp    NOEMÍ (obstructive sleep apnea)    Eosinophilic esophagitis    Mild episode of recurrent major depressive disorder ([facility] [address]    Allergic rhinitis due to allergen    Influenza vaccination declined    Left elbow tendinitis    Biceps tendinitis of left upper extremity    COVID-19    Left leg weakness    Degenerative lumbar spinal stenosis         A/P:  This is a 38 y.o. male with     Patient care will be discussed with attending, will reevaluated patient along with attending.      - No neurosurgical interventions needed at this time  - continue PT and OT    Additional recommendations may follow    Please contact neurosurgery with any changes in patients neurologic status. Thank you for your consult.        LAMAR Ray - CADEN   NS pager [phone]  10/6/2021  4:05 PM

## 2021-10-07 LAB
ABSOLUTE EOS #: <0.03 K/UL (ref 0–0.44)
ABSOLUTE IMMATURE GRANULOCYTE: 0.1 K/UL (ref 0–0.3)
ABSOLUTE LYMPH #: 1.76 K/UL (ref 1.1–3.7)
ABSOLUTE MONO #: 0.76 K/UL (ref 0.1–1.2)
ANION GAP SERPL CALCULATED.3IONS-SCNC: 11 MMOL/L (ref 9–17)
BASOPHILS # BLD: 0 % (ref 0–2)
BASOPHILS ABSOLUTE: 0.03 K/UL (ref 0–0.2)
BUN BLDV-MCNC: 10 MG/DL (ref 6–20)
BUN/CREAT BLD: ABNORMAL (ref 9–20)
C-REACTIVE PROTEIN: 12.5 MG/L (ref 0–5)
CALCIUM SERPL-MCNC: 8.2 MG/DL (ref 8.6–10.4)
CHLORIDE BLD-SCNC: 107 MMOL/L (ref 98–107)
CO2: 21 MMOL/L (ref 20–31)
CREAT SERPL-MCNC: 0.63 MG/DL (ref 0.7–1.2)
DIFFERENTIAL TYPE: ABNORMAL
EKG ATRIAL RATE: 94 BPM
EKG P AXIS: 42 DEGREES
EKG P-R INTERVAL: 180 MS
EKG Q-T INTERVAL: 334 MS
EKG QRS DURATION: 72 MS
EKG QTC CALCULATION (BAZETT): 417 MS
EKG R AXIS: 4 DEGREES
EKG T AXIS: 75 DEGREES
EKG VENTRICULAR RATE: 94 BPM
EOSINOPHILS RELATIVE PERCENT: 0 % (ref 1–4)
GFR AFRICAN AMERICAN: >60 ML/MIN
GFR NON-AFRICAN AMERICAN: >60 ML/MIN
GFR SERPL CREATININE-BSD FRML MDRD: ABNORMAL ML/MIN/{1.73_M2}
GFR SERPL CREATININE-BSD FRML MDRD: ABNORMAL ML/MIN/{1.73_M2}
GLUCOSE BLD-MCNC: 120 MG/DL (ref 70–99)
HCT VFR BLD CALC: 45.4 % (ref 40.7–50.3)
HEMOGLOBIN: 14.7 G/DL (ref 13–17)
IMMATURE GRANULOCYTES: 1 %
LYMPHOCYTES # BLD: 14 % (ref 24–43)
MCH RBC QN AUTO: 30 PG (ref 25.2–33.5)
MCHC RBC AUTO-ENTMCNC: 32.4 G/DL (ref 28.4–34.8)
MCV RBC AUTO: 92.7 FL (ref 82.6–102.9)
MONOCYTES # BLD: 6 % (ref 3–12)
NRBC AUTOMATED: 0.2 PER 100 WBC
PDW BLD-RTO: 13.4 % (ref 11.8–14.4)
PLATELET # BLD: 261 K/UL (ref 138–453)
PLATELET ESTIMATE: ABNORMAL
PMV BLD AUTO: 11.1 FL (ref 8.1–13.5)
POTASSIUM SERPL-SCNC: 4.2 MMOL/L (ref 3.7–5.3)
RBC # BLD: 4.9 M/UL (ref 4.21–5.77)
RBC # BLD: ABNORMAL 10*6/UL
SEG NEUTROPHILS: 79 % (ref 36–65)
SEGMENTED NEUTROPHILS ABSOLUTE COUNT: 10.01 K/UL (ref 1.5–8.1)
SODIUM BLD-SCNC: 139 MMOL/L (ref 135–144)
WBC # BLD: 12.7 K/UL (ref 3.5–11.3)
WBC # BLD: ABNORMAL 10*3/UL

## 2021-10-07 PROCEDURE — 2500000003 HC RX 250 WO HCPCS: Performed by: INTERNAL MEDICINE

## 2021-10-07 PROCEDURE — 2580000003 HC RX 258: Performed by: INTERNAL MEDICINE

## 2021-10-07 PROCEDURE — 2700000000 HC OXYGEN THERAPY PER DAY

## 2021-10-07 PROCEDURE — 6360000002 HC RX W HCPCS: Performed by: STUDENT IN AN ORGANIZED HEALTH CARE EDUCATION/TRAINING PROGRAM

## 2021-10-07 PROCEDURE — 36415 COLL VENOUS BLD VENIPUNCTURE: CPT

## 2021-10-07 PROCEDURE — 6370000000 HC RX 637 (ALT 250 FOR IP): Performed by: STUDENT IN AN ORGANIZED HEALTH CARE EDUCATION/TRAINING PROGRAM

## 2021-10-07 PROCEDURE — 97110 THERAPEUTIC EXERCISES: CPT

## 2021-10-07 PROCEDURE — 99233 SBSQ HOSP IP/OBS HIGH 50: CPT | Performed by: INTERNAL MEDICINE

## 2021-10-07 PROCEDURE — 2580000003 HC RX 258: Performed by: STUDENT IN AN ORGANIZED HEALTH CARE EDUCATION/TRAINING PROGRAM

## 2021-10-07 PROCEDURE — 97116 GAIT TRAINING THERAPY: CPT

## 2021-10-07 PROCEDURE — 80048 BASIC METABOLIC PNL TOTAL CA: CPT

## 2021-10-07 PROCEDURE — 6370000000 HC RX 637 (ALT 250 FOR IP): Performed by: INTERNAL MEDICINE

## 2021-10-07 PROCEDURE — 1200000000 HC SEMI PRIVATE

## 2021-10-07 PROCEDURE — 85025 COMPLETE CBC W/AUTO DIFF WBC: CPT

## 2021-10-07 PROCEDURE — 86140 C-REACTIVE PROTEIN: CPT

## 2021-10-07 PROCEDURE — 94640 AIRWAY INHALATION TREATMENT: CPT

## 2021-10-07 PROCEDURE — 6360000002 HC RX W HCPCS: Performed by: NURSE PRACTITIONER

## 2021-10-07 RX ORDER — PANTOPRAZOLE SODIUM 40 MG/1
40 TABLET, DELAYED RELEASE ORAL
Status: DISCONTINUED | OUTPATIENT
Start: 2021-10-08 | End: 2021-10-08 | Stop reason: HOSPADM

## 2021-10-07 RX ADMIN — ENOXAPARIN SODIUM 30 MG: 30 INJECTION SUBCUTANEOUS at 10:02

## 2021-10-07 RX ADMIN — ENOXAPARIN SODIUM 30 MG: 30 INJECTION SUBCUTANEOUS at 20:09

## 2021-10-07 RX ADMIN — BARICITINIB 4 MG: 2 TABLET, FILM COATED ORAL at 10:02

## 2021-10-07 RX ADMIN — ONDANSETRON 4 MG: 2 INJECTION INTRAMUSCULAR; INTRAVENOUS at 20:24

## 2021-10-07 RX ADMIN — REMDESIVIR 100 MG: 100 INJECTION, POWDER, LYOPHILIZED, FOR SOLUTION INTRAVENOUS at 12:43

## 2021-10-07 RX ADMIN — SODIUM CHLORIDE: 9 INJECTION, SOLUTION INTRAVENOUS at 10:01

## 2021-10-07 RX ADMIN — SODIUM CHLORIDE, PRESERVATIVE FREE 10 ML: 5 INJECTION INTRAVENOUS at 20:09

## 2021-10-07 RX ADMIN — SODIUM CHLORIDE: 9 INJECTION, SOLUTION INTRAVENOUS at 20:23

## 2021-10-07 RX ADMIN — DEXAMETHASONE SODIUM PHOSPHATE 6 MG: 10 INJECTION INTRAMUSCULAR; INTRAVENOUS at 12:42

## 2021-10-07 RX ADMIN — FLUTICASONE PROPIONATE 1 SPRAY: 50 SPRAY, METERED NASAL at 10:01

## 2021-10-07 RX ADMIN — BUDESONIDE AND FORMOTEROL FUMARATE DIHYDRATE 2 PUFF: 160; 4.5 AEROSOL RESPIRATORY (INHALATION) at 21:59

## 2021-10-07 RX ADMIN — OXYCODONE HYDROCHLORIDE AND ACETAMINOPHEN 1 TABLET: 5; 325 TABLET ORAL at 13:14

## 2021-10-07 RX ADMIN — SODIUM CHLORIDE, PRESERVATIVE FREE 10 ML: 5 INJECTION INTRAVENOUS at 10:01

## 2021-10-07 RX ADMIN — DESMOPRESSIN ACETATE 40 MG: 0.2 TABLET ORAL at 20:08

## 2021-10-07 RX ADMIN — BUDESONIDE AND FORMOTEROL FUMARATE DIHYDRATE 2 PUFF: 160; 4.5 AEROSOL RESPIRATORY (INHALATION) at 09:26

## 2021-10-07 ASSESSMENT — ENCOUNTER SYMPTOMS
SHORTNESS OF BREATH: 1
COLOR CHANGE: 0
DIARRHEA: 0
ABDOMINAL PAIN: 0
NAUSEA: 1
WHEEZING: 0
COUGH: 0
SORE THROAT: 0
EYE DISCHARGE: 0
TROUBLE SWALLOWING: 0
EYE ITCHING: 0
VOMITING: 0
CHEST TIGHTNESS: 1
RHINORRHEA: 0
CONSTIPATION: 0
BACK PAIN: 0
COUGH: 1
ABDOMINAL DISTENTION: 0

## 2021-10-07 ASSESSMENT — PAIN SCALES - GENERAL
PAINLEVEL_OUTOF10: 7
PAINLEVEL_OUTOF10: 4
PAINLEVEL_OUTOF10: 0
PAINLEVEL_OUTOF10: 0

## 2021-10-07 NOTE — PROGRESS NOTES
[facility]  Internal Medicine Teaching Residency Program  Inpatient Daily Progress Note  ______________________________________________________________________________    Patient: Sravan Aguillon  YOB: 1980   [id number]    Acct: [de-identified]     Room: 2010/2010-01  Admit date: 10/3/2021  Today's date: 10/07/21  Number of days in the hospital: 4    SUBJECTIVE   Admitting Diagnosis: <principal problem not specified>  CC:   Pt examined at bedside. Chart & results reviewed.    No acute issues overnight   vitals stable  Complaining of dizziness  NC 3 L, SPO2 93%  Neurology and ID following    ROS:  Constitutional:  negative for chills, fevers, sweats  Respiratory:   positive for cough, dyspnea on exertion,   Cardiovascular:  negative for chest pain, chest pressure/discomfort, lower extremity edema, palpitations  Gastrointestinal:  negative for abdominal pain, constipation, diarrhea, nausea, vomiting  Neurological: Positive for dizziness,   BRIEF HISTORY     This patient is COVID positive and has possible ischemic stroke/TIA.     This patient 50-year-old male with past medical history of essential hypertension, obesity, hyperlipidemia, seronegative polyarthritis, LVH due to hypertensive disease, asthma,NOEMÍ, eosinophilic esophagitis and depressive disorder presented with weakness to his left lower extremity started today afternoon and unable to bear the weight of the body.  The patient denies any loss of consciousness, head trauma, headache, weakness of left upper extremity/right upper/right lower extremity.  Reports numbness of left lower extremity.     Reports high-grade fever, highest recorded 103 F, since Monday started after contact with Covid patient and tested positive for Covid.  The fever gets better with Tylenol.  He has associated dry cough and dyspnea even at rest.  The patient  covid unvaccinated.     Rest of review of systems is unremarkable.     On Extremities: Bilateral lower extremity power 5/5, intact sensation, normal reflexes  Skin:  Warm and dry. Good color, turgor and pigmentation. No lesions or scars. No cyanosis or clubbing  Neurological/Psychiatric: The patient's general behavior, level of consciousness, thought content and emotional status is normal.        Medications:  Scheduled Medications:    atorvastatin  40 mg Oral Nightly    fluticasone  1 spray Each Nostril Daily    dexamethasone  6 mg IntraVENous Q24H    remdesivir IVPB  100 mg IntraVENous Q24H    baricitinib  4 mg Oral Daily    budesonide-formoterol  2 puff Inhalation BID    sodium chloride flush  5-40 mL IntraVENous 2 times per day    enoxaparin  30 mg SubCUTAneous BID     Continuous Infusions:    sodium chloride      sodium chloride 100 mL/hr at 10/06/21 1242     PRN Medicationsketorolac, 30 mg, BID PRN  oxyCODONE-acetaminophen, 1 tablet, Q6H PRN  sodium chloride, 1 spray, PRN  sodium chloride, 30 mL, PRN  albuterol sulfate HFA, 2 puff, Q6H PRN  sodium chloride flush, 5-40 mL, PRN  sodium chloride, 25 mL, PRN  ondansetron, 4 mg, Q8H PRN   Or  ondansetron, 4 mg, Q6H PRN  polyethylene glycol, 17 g, Daily PRN  acetaminophen, 650 mg, Q6H PRN   Or  acetaminophen, 650 mg, Q6H PRN        Diagnostic Labs:  CBC:   Recent Labs     10/04/21  0841 10/05/21  0602 10/06/21  0522   WBC 6.5 4.3 9.6   RBC 5.23 5.02 5.06   HGB 16.0 15.1 15.0   HCT 48.1 47.2 46.4   MCV 92.0 94.0 91.7   RDW 13.4 13.3 13.3    222 254     BMP:   Recent Labs     10/04/21  0841 10/05/21  0602 10/06/21  0522   * 137 138   K 3.7 4.5 4.5    107 105   CO2 17* 17* 22   BUN 13 11 10   CREATININE 0.85 0.65* 0.67*     BNP: No results for input(s): BNP in the last 72 hours. PT/INR: No results for input(s): PROTIME, INR in the last 72 hours. APTT: No results for input(s): APTT in the last 72 hours. CARDIAC ENZYMES: No results for input(s): CKMB, CKMBINDEX, TROPONINI in the last 72 hours.     Invalid input(s): CKTOTAL;3  FASTING LIPID PANEL:  Lab Results   Component Value Date    CHOL 167 10/03/2021    HDL 28 (L) 10/03/2021    TRIG 290 (H) 10/03/2021     LIVER PROFILE: No results for input(s): AST, ALT, ALB, BILIDIR, BILITOT, ALKPHOS in the last 72 hours. MICROBIOLOGY:   Lab Results   Component Value Date/Time    CULTURE NO GROWTH 01/18/2016 10:00 PM    CULTURE  01/18/2016 10:00 PM     [employer] [address] [facility], [address] [place] [address] [phone]       Imaging:    CT HEAD WO CONTRAST    Result Date: 10/3/2021  No acute intracranial abnormality. RECOMMENDATIONS: The findings were sent to the Radiology Results [id number] at 7:36 pm on 10/3/2021to be communicated to a licensed caregiver. MRI LUMBAR SPINE WO CONTRAST    Result Date: 10/5/2021  1. Grade 1 anterolisthesis of L5 on S1 secondary to bilateral L5 spondylolysis with resultant severe bilateral L5 neural foraminal narrowing. 2. Severe L5-S1 disc height loss and desiccation. 3. Mild L4-5 spinal canal stenosis and mild right lateral recess narrowing secondary to a posterior disc protrusion. XR CHEST PORTABLE    Result Date: 10/3/2021  Lateral left basilar airspace disease may represent small/early pneumonia and or atelectasis. CTA HEAD NECK W CONTRAST    Result Date: 10/3/2021  Pansinusitis, possible COVID pulmonary infiltrates, otherwise unremarkable CTA of the head and neck. RECOMMENDATIONS: Recommend CT chest.     MRI BRAIN WO CONTRAST    Result Date: 10/5/2021  1. No acute intracranial abnormality. 2. Paranasal sinusitis. ASSESSMENT & PLAN     1. Left lower extremity weakness-Neuro work up negative except, Severe bilateral L5 neural foraminal narrowing on MRI, Neurology: possible lumber radiculopathy, EMG OP. No Neurosurgical intervention at this point. Patient currently on aspirin Plavix. Echo: No significant abnormality  2. COVID : NC 3 L, SPO2 93%, ID following.  Patient started on remdesivir for 5 days, Decadron 6 mg for 10 days, baricitinib 4 mg for 14 days. Lovenox 30 twice daily  3. Essential hypertension-stable  4. Hyperlipidemia-LDL within target range, continue pravastatin 20  5. Dizziness-monitor, walk with support,  IV bolus. DVT ppx: Lovenox 30 BID  GI ppx: None     PT/OT/SW: Ongoing  Discharge Planning:   consulted. Possible discharge today       Myra Blum MD  Internal Medicine Resident, PGY-1  [facility];  [place], [place]  10/7/2021, 7:04 AM

## 2021-10-07 NOTE — PLAN OF CARE
Education Activity  Goal: Patient/Family Education  Outcome: Ongoing     Problem: HEMODYNAMIC STATUS  Goal: Patient has stable vital signs and fluid balance  Outcome: Ongoing     Problem: ACTIVITY INTOLERANCE/IMPAIRED MOBILITY  Goal: Mobility/activity is maintained at optimum level for patient  Outcome: Ongoing     Problem: COMMUNICATION IMPAIRMENT  Goal: Ability to express needs and understand communication  Outcome: Ongoing

## 2021-10-07 NOTE — FLOWSHEET NOTE
10/07/21 1020   Vitals   Pulse 83   Resp 25   Orthostatic B/P and Pulse?  Yes   Blood Pressure Lying 124/76   Pulse Lying 82 PER MINUTE   Blood Pressure Sitting 130/77   Pulse Sitting 80 PER MINUTE   Blood Pressure Standing 117/74   Pulse Standing 96 PER MINUTE   Oxygen Therapy   SpO2 93 %
[facility] [address]     Emergency/Trauma Note    PATIENT NAME: Jeremy Cordova    Shift date: 10/3/21  Shift day: Sunday   Shift # 2    Room # 04/04   Name: Amber Arguello            Age: 38 y.o. Gender: male          Denominational: [place] [employer] of Mosque: unknown    Trauma/Incident type: Stroke Alert  Admit Date & Time: 10/3/2021  6:43 PM  TRAUMA NAME: Pa Cordova    ADVANCE DIRECTIVES IN CHART? No    NAME OF DECISION MAKER: unknown    RELATIONSHIP OF DECISION MAKER TO PATIENT: unknown    PATIENT/EVENT DESCRIPTION:  Amber Arguello is a 38 y.o. male . Multi disciplinary stroke alert critical       SPIRITUAL ASSESSMENT/INTERVENTION:   visited as per Multi disciplinary team stroke alert critical. As  approached the room the nurse said that the patient is 'positive' but I could visit from the door.  greeted the patient who was alert and appeared to be in good spirits. Patient mentioned he was experiencing some chest pain which had increased since his covid diagnosis.  offered prayer for the patient which was gratefully received. PATIENT BELONGINGS:  With patient    ANY BELONGINGS OF SIGNIFICANT VALUE NOTED:  no    REGISTRATION STAFF NOTIFIED? No      WHAT IS YOUR SPIRITUAL CARE PLAN FOR THIS PATIENT?:   Karie Hayward will continue visiting and offering a sustaining presence.     Electronically signed by Demetria Lopez on 10/3/2021 at 7:17 PM.  [facility]  [phone]
walking

## 2021-10-07 NOTE — PROGRESS NOTES
[facility] -   Infectious diseases evaluation  admission date 10/3/2021    reason for consultation:   covid +    Impression :   Current:  · covid pneumonia   · increased infl markers  · Left LE weakness  Other:  ·   Discussion / summary of stay / plan of care   ·   Recommendations   · Active covid  · 10/4 remdesevir x 5 days  · lovenox and decadron 6 mg  · 10/5  barcitinib 4 mg x 14 days  · Improve numbers but patient feels worse clinically. · Repeat CRP tomorrow, keep same therapy    Infection Control Recommendations   · [place] Precautions  · Contact Isolation   · Airborne isolation  · Droplet Isolation    Isolate till 10/18/21  Antimicrobial Stewardship Recommendations   · No antibiotics       Coordination ofOutpatient Care:   · Estimated Length of IV antimicrobials:  · Patient will need Midline / picc Catheter Insertion:   · Patient will need SNF:  · Patient will need outpatient wound care:     History of Present Illness:   Initial history:  Kaylin Ruiz is a 38y.o.-year-old male came for tachycardia and left leg sudden weakness    tested positive about a week ago for the Covid, he had a high fever 103, ongoing for about a week. He presented with weakness on the left leg, got a little better after admission but not resolved  Cough and SOB and aches present    Patient is not vaccinated. Chest x-ray is showing possible early pneumonia possibly Covid related. Neurology on board and planning on MRI. CT scan of the head was negative.       Other:  Hypertension obese hyperlipidemia, seronegative polyarthritis, asthma obstructive sleep apnea, congestive heart failure, lymphatic: Esophagitis, depression,    Interval changes  10/7/2021   Patient Vitals for the past 8 hrs:   BP Temp Temp src Pulse Resp SpO2   10/07/21 1020 -- -- -- 83 25 93 %   10/07/21 0945 131/77 98.1 °F (36.7 °C) Oral 84 20 95 %   10/07/21 0352 131/73 97.3 °F (36.3 °C) Axillary 70 28 93 %     No for abdominal distention. Endocrine: Negative for polydipsia and polyphagia. Genitourinary: Negative for dysuria, flank pain and hematuria. Musculoskeletal: Negative for arthralgias. Skin: Negative for color change. Allergic/Immunologic: Negative for immunocompromised state. Neurological: Positive for weakness. Negative for dizziness, speech difficulty, light-headedness and headaches. Hematological: Negative for adenopathy. Psychiatric/Behavioral: Negative for agitation. Physical Examination :       Physical Exam  Constitutional:       Appearance: Normal appearance. He is not ill-appearing. HENT:      Head: Normocephalic and atraumatic. Nose: Nose normal.      Mouth/Throat:      Mouth: Mucous membranes are moist.   Eyes:      General: No scleral icterus. Conjunctiva/sclera: Conjunctivae normal.   Cardiovascular:      Rate and Rhythm: Normal rate and regular rhythm. Heart sounds: Normal heart sounds. No murmur heard. Pulmonary:      Effort: No respiratory distress. Breath sounds: Normal breath sounds. Abdominal:      General: There is no distension. Palpations: Abdomen is soft. Tenderness: There is no abdominal tenderness. Genitourinary:     Comments: No louis  Musculoskeletal:         General: No tenderness or signs of injury. Cervical back: Neck supple. No rigidity. Right lower leg: No edema. Left lower leg: No edema. Skin:     General: Skin is dry. Coloration: Skin is not jaundiced. Neurological:      General: No focal deficit present. Mental Status: He is alert. Cranial Nerves: No cranial nerve deficit. Psychiatric:         Mood and Affect: Mood normal.         Thought Content:  Thought content normal.         Past Medical History:     Past Medical History:   Diagnosis Date    Asthma     [facility]/19[facility]    Depression     Erectile dysfunction 9/29/2017    Essential hypertension 1/2/2017    GERD (gastroesophageal reflux disease) 11/19/2018    Heart palpitations 9/29/2017    Hyperlipidemia with target LDL less than 100 3/23/2017    Left lower lobe pneumonia 11/9/2012    Mitral valve prolapse     Nonspecific reactive hepatitis 3/22/2017    Obesity (BMI 30-39.9) 3/23/2017    NOEMÍ (obstructive sleep apnea) 10/16/2019    Osteoarthritis     Seronegative polyarthritis 10/10/2016    followed w/ rheumatology in [address] [place] [address] anxiety disorder     Uvulitis 8/15/2019       Past Surgical  History:     Past Surgical History:   Procedure Laterality Date    COLONOSCOPY      CYST REMOVAL Right     Armpit     ENDOSCOPY, COLON, DIAGNOSTIC      HAND SURGERY Left     KNEE ARTHROSCOPY      right knee    NOSE SURGERY      TONSILLECTOMY      UPPER GASTROINTESTINAL ENDOSCOPY N/A 10/9/2019    EGD POLYP SNARE, HOT.  ESOPHAGEAL DILATATION WITH MALONIES 50F performed by Diomedes Flores MD at [facility]         Medications:      atorvastatin  40 mg Oral Nightly    fluticasone  1 spray Each Nostril Daily    dexamethasone  6 mg IntraVENous Q24H    remdesivir IVPB  100 mg IntraVENous Q24H    baricitinib  4 mg Oral Daily    budesonide-formoterol  2 puff Inhalation BID    sodium chloride flush  5-40 mL IntraVENous 2 times per day    enoxaparin  30 mg SubCUTAneous BID       Social History:     Social History     Socioeconomic History    Marital status:      Spouse name: Not on file    Number of children: Not on file    Years of education: Not on file    Highest education level: Not on file   Occupational History    Not on file   Tobacco Use    Smoking status: Never Smoker    Smokeless tobacco: Never Used   Vaping Use    Vaping Use: Never used   Substance and Sexual Activity    Alcohol use: Yes     Comment: occasional  few x year    Drug use: No    Sexual activity: Yes     Partners: Male   Other Topics Concern    Not on file   Social History Narrative    Not on file     Social Determinants of Health     Financial Resource Strain: Low Risk     Difficulty of Paying Living Expenses: Not hard at all   Food Insecurity: No Food Insecurity    Worried About Running Out of Food in the Last Year: Never true    [address] in the Last Year: Never true   Transportation Needs:     Lack of Transportation (Medical):  Lack of Transportation (Non-Medical):    Physical Activity:     Days of Exercise per Week:     Minutes of Exercise per Session:    Stress:     Feeling of Stress :    Social Connections:     Frequency of Communication with Friends and Family:     Frequency of Social Gatherings with Friends and Family:     Attends Buddhism Services:     Active Member of Clubs or Organizations:     Attends Club or Organization Meetings:     Marital Status:    Intimate Partner Violence:     Fear of Current or Ex-Partner:     Emotionally Abused:     Physically Abused:     Sexually Abused:        Family History:     Family History   Problem Relation Age of Onset    Diabetes Father     High Cholesterol Father     Other Mother         autoimmune hepatitis     High Blood Pressure Brother     Heart Disease Brother     Heart Attack Brother 40        cabg age 40     Rheum Arthritis Other 7    Colon Cancer Neg Hx     Cancer Neg Hx       Medical Decision Making:   I have independently reviewed/ordered the following labs:    CBC with Differential:   Recent Labs     10/06/21  0522 10/07/21  0605   WBC 9.6 12.7*   HGB 15.0 14.7   HCT 46.4 45.4    261   LYMPHOPCT 15* 14*   MONOPCT 6 6     BMP:  Recent Labs     10/06/21  0522 10/07/21  0605    139   K 4.5 4.2    107   CO2 22 21   BUN 10 10   CREATININE 0.67* 0.63*     Hepatic Function Panel:   No results for input(s): PROT, LABALBU, BILIDIR, IBILI, BILITOT, ALKPHOS, ALT, AST in the last 72 hours. No results for input(s): RPR in the last 72 hours.   No results for input(s): HIV in the last 72 hours. No results for input(s): BC in the last 72 hours. Lab Results   Component Value Date    CREATININE 0.63 10/07/2021    GLUCOSE 120 10/07/2021    GLUCOSE 96 04/30/2012       Detailed results: Thank you for allowing us to participate in the care of this patient. Please call with questions. This note is created with the assistance of a speech recognition program.  While intending to generate adocument that actually reflects the content of the visit, the document can still have some errors including those of syntax and sound a like substitutions which may escape proof reading. It such instances, actual meaningcan be extrapolated by contextual diversion.     Jacquelyn Strickland MD  Office: [phone]  Perfect serve / office [phone]

## 2021-10-07 NOTE — PLAN OF CARE
Problem: Falls - Risk of:  Goal: Will remain free from falls  Description: Will remain free from falls  10/6/2021 2049 by Getachew Guillen RN  Outcome: Ongoing  10/6/2021 1054 by Shaina Pak RN  Outcome: Ongoing  Goal: Absence of physical injury  Description: Absence of physical injury  10/6/2021 2049 by Getachew Guillen RN  Outcome: Ongoing  10/6/2021 1054 by Shaina Pak RN  Outcome: Ongoing     Problem: Pain:  Goal: Pain level will decrease  Description: Pain level will decrease  10/6/2021 2049 by Getachew Guillen RN  Outcome: Ongoing  10/6/2021 1054 by Shaina Pak RN  Outcome: Ongoing  Goal: Control of acute pain  Description: Control of acute pain  10/6/2021 2049 by Getachew Guillen RN  Outcome: Ongoing  10/6/2021 1054 by Shaina Pak RN  Outcome: Ongoing  Goal: Control of chronic pain  Description: Control of chronic pain  10/6/2021 2049 by Getachew Guillen RN  Outcome: Ongoing  10/6/2021 1054 by Shania Pak RN  Outcome: Ongoing     Problem: Airway Clearance - Ineffective  Goal: Achieve or maintain patent airway  10/6/2021 2049 by Getachew Guillen RN  Outcome: Ongoing  10/6/2021 1054 by Shaina Pak RN  Outcome: Ongoing     Problem: Gas Exchange - Impaired  Goal: Absence of hypoxia  10/6/2021 2049 by Getachew Guillen RN  Outcome: Ongoing  10/6/2021 1054 by Shaina Pak RN  Outcome: Ongoing  Goal: Promote optimal lung function  10/6/2021 2049 by Getachew Guillen RN  Outcome: Ongoing  10/6/2021 1054 by Shaina Pak RN  Outcome: Ongoing     Problem: Breathing Pattern - Ineffective  Goal: Ability to achieve and maintain a regular respiratory rate  10/6/2021 2049 by Getachew Guillen RN  Outcome: Ongoing  10/6/2021 1054 by Shaina Pak RN  Outcome: Ongoing     Problem:  Body Temperature -  Risk of, Imbalanced  Goal: Ability to maintain a body temperature within defined limits  10/6/2021 2049 by Getachew Guillen RN  Outcome: Ongoing  10/6/2021 1054 by Shaina Pak RN  Outcome: Ongoing  Goal: Will regain or maintain usual level of consciousness  10/6/2021 2049 by Mary Moe RN  Outcome: Ongoing  10/6/2021 1054 by Aretha Saxena RN  Outcome: Ongoing  Goal: Complications related to the disease process, condition or treatment will be avoided or minimized  10/6/2021 2049 by Mary Moe RN  Outcome: Ongoing  10/6/2021 1054 by Aretha Saxena RN  Outcome: Ongoing     Problem: Isolation Precautions - Risk of Spread of Infection  Goal: Prevent transmission of infection  10/6/2021 2049 by Mary Moe RN  Outcome: Ongoing  10/6/2021 1054 by Aretha Saxena RN  Outcome: Ongoing     Problem: Nutrition Deficits  Goal: Optimize nutritional status  10/6/2021 2049 by Mary Moe RN  Outcome: Ongoing  10/6/2021 1054 by Aretha Saxena RN  Outcome: Ongoing     Problem: Risk for Fluid Volume Deficit  Goal: Maintain normal heart rhythm  10/6/2021 2049 by Mary Moe RN  Outcome: Ongoing  10/6/2021 1054 by Aretha Saxena RN  Outcome: Ongoing  Goal: Maintain absence of muscle cramping  10/6/2021 2049 by Mary Moe RN  Outcome: Ongoing  10/6/2021 1054 by Aretha Saxena RN  Outcome: Ongoing  Goal: Maintain normal serum potassium, sodium, calcium, phosphorus, and pH  10/6/2021 2049 by Mary Moe RN  Outcome: Ongoing  10/6/2021 1054 by Aretha Saxena RN  Outcome: Ongoing     Problem: Loneliness or Risk for Loneliness  Goal: Demonstrate positive use of time alone when socialization is not possible  10/6/2021 2049 by Mary Moe RN  Outcome: Ongoing  10/6/2021 1054 by Aretha Saxena RN  Outcome: Ongoing     Problem: Fatigue  Goal: Verbalize increase energy and improved vitality  10/6/2021 2049 by Mary Moe RN  Outcome: Ongoing  10/6/2021 1054 by Aretha Saxena RN  Outcome: Ongoing     Problem: Patient Education: Go to Patient Education Activity  Goal: Patient/Family Education  10/6/2021 2049 by Mary Moe RN  Outcome: Ongoing  10/6/2021 1054 by Sy Jasso RN  Outcome: Ongoing     Problem: HEMODYNAMIC STATUS  Goal: Patient has stable vital signs and fluid balance  10/6/2021 2049 by Estuardo Rowland RN  Outcome: Ongoing  10/6/2021 1054 by Sy Jasso RN  Outcome: Ongoing     Problem: ACTIVITY INTOLERANCE/IMPAIRED MOBILITY  Goal: Mobility/activity is maintained at optimum level for patient  10/6/2021 2049 by Estuardo Rowland RN  Outcome: Ongoing  10/6/2021 1054 by Sy Jasso RN  Outcome: Ongoing     Problem: COMMUNICATION IMPAIRMENT  Goal: Ability to express needs and understand communication  10/6/2021 2049 by Estuardo Rowland RN  Outcome: Ongoing  10/6/2021 1054 by Sy Jasso RN  Outcome: Ongoing

## 2021-10-07 NOTE — PROGRESS NOTES
Physical Therapy  Facility/Department: [facility]  Daily Treatment Note  NAME: Kristi Ferrari  : 1980  MRN: [id number]    Date of Service: 10/7/2021    Discharge Recommendations:  Patient would benefit from continued therapy after discharge   PT Equipment Recommendations  Equipment Needed: Yes  Mobility Devices: Fuentes Arturo: Rolling    Assessment   Body structures, Functions, Activity limitations: Decreased functional mobility ; Decreased posture;Decreased endurance;Decreased strength;Decreased balance  Assessment: Pt amb 15 ft wtih RW and CGA. Limited by decreased balance and endurance as well as increased anxiety re: mobility. Recommend continued PT after d/c to address deficits  Prognosis: Good  REQUIRES PT FOLLOW UP: Yes  Activity Tolerance  Activity Tolerance: Patient Tolerated treatment well     Patient Diagnosis(es): The primary encounter diagnosis was COVID-19. Diagnoses of Neck pain and Left leg weakness were also pertinent to this visit. has a past medical history of Asthma, COPD (chronic obstructive pulmonary disease) ([address] Depression, Erectile dysfunction, Essential hypertension, GERD (gastroesophageal reflux disease), Heart palpitations, Hyperlipidemia with target LDL less than 100, Left lower lobe pneumonia, Mitral valve prolapse, Nonspecific reactive hepatitis, Obesity (BMI 30-39.9), NOEMÍ (obstructive sleep apnea), Osteoarthritis, Seronegative polyarthritis, Social anxiety disorder, and Uvulitis. has a past surgical history that includes Knee arthroscopy; Hand surgery (Left); Tonsillectomy; Nose surgery; [place] tooth extraction; Colonoscopy; Endoscopy, colon, diagnostic; cyst removal (Right); and Upper gastrointestinal endoscopy (N/A, 10/9/2019).     Restrictions  Restrictions/Precautions  Restrictions/Precautions: General Precautions, Fall Risk, Up as Tolerated, Isolation  Required Braces or Orthoses?: No  Position Activity Restriction  Other position/activity restrictions: covid+/droplet+, SBP goal <200  Subjective   General  Response To Previous Treatment: Patient with no complaints from previous session. Family / Caregiver Present: No  Subjective  Subjective: Pt resting in bed upon arrival, agreeable to PT. Pleasant and cooperative throughout, appears anxious re mobility  Pain Screening  Patient Currently in Pain: No  Vital Signs  Patient Currently in Pain: No       Orientation  Orientation  Overall Orientation Status: Within Functional Limits  Cognition      Objective   Bed mobility  Rolling to Left: Contact guard assistance  Supine to Sit: Contact guard assistance  Scooting: Contact guard assistance  Transfers  Sit to Stand: Contact guard assistance  Stand to sit: Contact guard assistance  Bed to Chair: Contact guard assistance  Ambulation  Ambulation?: Yes  Ambulation 1  Surface: level tile  Device: Rolling Walker  Assistance: Contact guard assistance  Quality of Gait: extremely slow triny, cautious gait, short step length and height, no LOB  Distance: 15 ft  Comments: O2 90% after amb on 3L  Stairs/Curb  Stairs?: No     Balance  Posture: Good  Sitting - Static: Good  Sitting - Dynamic: Good  Standing - Static: Fair;+  Standing - Dynamic: Fair  Comments: standing balance assessed with a RW    Exercise  Standing heel/toe raise and marching in place x 10, increased time to complete  Seated LE exercise program: Long Arc Quads, hip abduction/adduction, heel/toe raises, and marches. Reps: 15x     Goals  Short term goals  Time Frame for Short term goals: 14 visits  Short term goal 1: Pt will ambulate 75 feet with least restrictive device and SBA to increase functional independence. Short term goal 2: Pt will demonstrate good- standing balance to decrease fall risk. Short term goal 3: Pt will tolerate a 35 minute therapy session to promote increased endurance. Short term goal 4: Pt will perform sit<>stand transfer with supervision to increase functional independence.   Short term goal 5: Pt will negotiate 4 stairs with a right sided handrail and SBA to allow the pt to enter prior living arrangements.   Patient Goals   Patient goals : none stated    Plan    Plan  Times per week: 5-6  Times per day: Daily  Current Treatment Recommendations: Strengthening, Transfer Training, Endurance Training, Balance Training, Gait Training, Functional Mobility Training, Stair training, Safety Education & Training, Home Exercise Program, Equipment Evaluation, Education, & procurement, Patient/Caregiver Education & Training  Safety Devices  Type of devices: Patient at risk for falls, Call light within reach, Gait belt, Nurse notified, Left in chair  Restraints  Initially in place: No     Therapy Time   Individual Concurrent Group Co-treatment   Time In 0820         Time Out 0849         Minutes 29         Timed Code Treatment Minutes: [address] [facility], [facility]

## 2021-10-07 NOTE — PROGRESS NOTES
PLAN OF CARE   NEUROLOGY INPATIENT       · MRI negative for acute cerebrovascular pathology   · Recommend outpatient EMG  · Per NSG note, no neurosurgical intervention at this time for bilateral L5 spondylosis  · Continue PT/OT   · Follow up with neurology outpatient in 4-6 wks        Neurology signing off. Thank you for the consult. Feel free to contact neurology with any further questions.          Jose Raul Tineo MD  10/7/2021  12:53 PM

## 2021-10-08 VITALS
DIASTOLIC BLOOD PRESSURE: 74 MMHG | TEMPERATURE: 98.2 F | WEIGHT: 255.8 LBS | OXYGEN SATURATION: 93 % | HEART RATE: 74 BPM | HEIGHT: 74 IN | SYSTOLIC BLOOD PRESSURE: 125 MMHG | BODY MASS INDEX: 32.83 KG/M2 | RESPIRATION RATE: 25 BRPM

## 2021-10-08 LAB — C-REACTIVE PROTEIN: 12.8 MG/L (ref 0–5)

## 2021-10-08 PROCEDURE — 94761 N-INVAS EAR/PLS OXIMETRY MLT: CPT

## 2021-10-08 PROCEDURE — 2580000003 HC RX 258: Performed by: INTERNAL MEDICINE

## 2021-10-08 PROCEDURE — 6370000000 HC RX 637 (ALT 250 FOR IP): Performed by: STUDENT IN AN ORGANIZED HEALTH CARE EDUCATION/TRAINING PROGRAM

## 2021-10-08 PROCEDURE — 2580000003 HC RX 258: Performed by: STUDENT IN AN ORGANIZED HEALTH CARE EDUCATION/TRAINING PROGRAM

## 2021-10-08 PROCEDURE — 6370000000 HC RX 637 (ALT 250 FOR IP)

## 2021-10-08 PROCEDURE — 86140 C-REACTIVE PROTEIN: CPT

## 2021-10-08 PROCEDURE — 2500000003 HC RX 250 WO HCPCS: Performed by: INTERNAL MEDICINE

## 2021-10-08 PROCEDURE — 6370000000 HC RX 637 (ALT 250 FOR IP): Performed by: INTERNAL MEDICINE

## 2021-10-08 PROCEDURE — 6360000002 HC RX W HCPCS: Performed by: NURSE PRACTITIONER

## 2021-10-08 PROCEDURE — 99232 SBSQ HOSP IP/OBS MODERATE 35: CPT | Performed by: INTERNAL MEDICINE

## 2021-10-08 PROCEDURE — 36415 COLL VENOUS BLD VENIPUNCTURE: CPT

## 2021-10-08 PROCEDURE — 2700000000 HC OXYGEN THERAPY PER DAY

## 2021-10-08 PROCEDURE — 94640 AIRWAY INHALATION TREATMENT: CPT

## 2021-10-08 PROCEDURE — 99239 HOSP IP/OBS DSCHRG MGMT >30: CPT | Performed by: INTERNAL MEDICINE

## 2021-10-08 PROCEDURE — 6360000002 HC RX W HCPCS: Performed by: STUDENT IN AN ORGANIZED HEALTH CARE EDUCATION/TRAINING PROGRAM

## 2021-10-08 RX ORDER — DEXAMETHASONE 6 MG/1
6 TABLET ORAL
Qty: 4 TABLET | Refills: 0 | Status: SHIPPED | OUTPATIENT
Start: 2021-10-09 | End: 2021-10-13

## 2021-10-08 RX ADMIN — BARICITINIB 4 MG: 2 TABLET, FILM COATED ORAL at 08:55

## 2021-10-08 RX ADMIN — FLUTICASONE PROPIONATE 1 SPRAY: 50 SPRAY, METERED NASAL at 08:54

## 2021-10-08 RX ADMIN — SODIUM CHLORIDE: 9 INJECTION, SOLUTION INTRAVENOUS at 06:24

## 2021-10-08 RX ADMIN — DEXAMETHASONE SODIUM PHOSPHATE 6 MG: 10 INJECTION INTRAMUSCULAR; INTRAVENOUS at 15:14

## 2021-10-08 RX ADMIN — ENOXAPARIN SODIUM 30 MG: 30 INJECTION SUBCUTANEOUS at 08:55

## 2021-10-08 RX ADMIN — PANTOPRAZOLE SODIUM 40 MG: 40 TABLET, DELAYED RELEASE ORAL at 06:24

## 2021-10-08 RX ADMIN — REMDESIVIR 100 MG: 100 INJECTION, POWDER, LYOPHILIZED, FOR SOLUTION INTRAVENOUS at 15:14

## 2021-10-08 RX ADMIN — BUDESONIDE AND FORMOTEROL FUMARATE DIHYDRATE 2 PUFF: 160; 4.5 AEROSOL RESPIRATORY (INHALATION) at 07:50

## 2021-10-08 RX ADMIN — OXYCODONE HYDROCHLORIDE AND ACETAMINOPHEN 1 TABLET: 5; 325 TABLET ORAL at 09:04

## 2021-10-08 ASSESSMENT — PAIN SCALES - GENERAL
PAINLEVEL_OUTOF10: 8
PAINLEVEL_OUTOF10: 3

## 2021-10-08 NOTE — PROGRESS NOTES
CLINICAL PHARMACY NOTE: MEDS TO BEDS    Total # of Prescriptions Filled: 1   The following medications were delivered to the patient:  · Dexamethasone 6 mg tab    Additional Documentation:Medications delivered to the patients RN 2 2:36pm .

## 2021-10-08 NOTE — CARE COORDINATION
Transitional planning-talked with patient. Plan is to go home with his family. Asked him about a walker, he states he doesn't need one.

## 2021-10-08 NOTE — DISCHARGE INSTR - COC
Continuity of Care Form    Patient Name: Mamadou Nguyễn   :  1980  MRN:  [id number]    Admit date:  10/3/2021  Discharge date:  ***    Code Status Order: Full Code   Advance Directives:      Admitting Physician:  Reddy Soriano MD  PCP: Governor Estelle MD    Discharging Nurse: [facility] Unit/Room#:   Discharging Unit Phone Number: ***    Emergency Contact:   Extended Emergency Contact Information  Primary Emergency Contact: Leo Cordova/Cleopatra  Address: [place], [address]  Home Phone: [phone]  Relation: Parent  Secondary Emergency Contact: Cori Cordova  Address: [facility] [address] [facility]  Home Phone: [phone]  Relation: Spouse    Past Surgical History:  Past Surgical History:   Procedure Laterality Date    COLONOSCOPY      CYST REMOVAL Right     Armpit     ENDOSCOPY, COLON, DIAGNOSTIC      HAND SURGERY Left     KNEE ARTHROSCOPY      right knee    NOSE SURGERY      TONSILLECTOMY      UPPER GASTROINTESTINAL ENDOSCOPY N/A 10/9/2019    EGD POLYP SNARE, HOT. ESOPHAGEAL DILATATION WITH MALONIES 50F performed by Carlitos Hackett MD at [facility] [address]. EXTRACTION         Immunization History: There is no immunization history on file for this patient. Active Problems:  Patient Active Problem List   Diagnosis Code    Seronegative polyarthritis M13.0    Fatigue R53.83    Essential hypertension I10    Social anxiety disorder F40.10    Nonspecific reactive hepatitis K75.2    Hyperglycemia R73.9    Obesity (BMI 30-39. 9) E66.9    LVH (left ventricular hypertrophy) due to hypertensive disease I11.9    Hyperlipidemia with target LDL less than 100 E78.5    Asthma, Moderate persistent asthma without complication N27.28    Hypertriglyceridemia E78.1    Erectile dysfunction N52.9    Family history of premature CAD Z82.49    Gastroesophageal reflux disease with esophagitis without hemorrhage K21.00    Refused pneumococcal vaccination Z28.21 Refuses tetanus, diphtheria, and acellular pertussis (Tdap) vaccination Z28.21    Family history of thyroid nodule Z83.49    Gastric polyp K31.7    NOEMÍ (obstructive sleep apnea) B73.44    Eosinophilic esophagitis Y05.2    Mild episode of recurrent major depressive disorder (HCC) F33.0    Allergic rhinitis due to allergen J30.9    Influenza vaccination declined Z28.21    Left elbow tendinitis M77.8    Biceps tendinitis of left upper extremity M75.22    COVID-19 U07.1    Left leg weakness R29.898    Degenerative lumbar spinal stenosis M48.061       Isolation/Infection:   Isolation            Droplet Plus          Unreconciled Outside Infections       Enable clinical decision support by reconciling outside information with the patient's chart.     .      Infection Onset Last Indicated Last Received Source    Infections with existing documentation    COVID-19 09/28/21 09/28/21 10/03/21 [facility]          Patient Infection Status       Infection Onset Added Last Indicated Last Indicated By Review Planned Expiration Resolved Resolved By    COVID-19 09/28/21 10/04/21 10/04/21 COVID-19, Rapid 10/11/21 10/18/21      Resolved    COVID-19 Rule Out 10/03/21 10/03/21 10/03/21 COVID-19, Rapid (Ordered)   10/04/21 Rule-Out Test Resulted    COVID-19 Rule Out 12/03/20 12/03/20 12/03/20 COVID-19 Ambulatory (Ordered)   12/06/20 Rule-Out Test Resulted    COVID-19 Rule Out 08/13/20 08/13/20 08/13/20 COVID-19 Ambulatory (Ordered)   08/16/20 Rule-Out Test Resulted            Nurse Assessment:  Last Vital Signs: /85   Pulse 70   Temp 98.1 °F (36.7 °C)   Resp 24   Ht 6' 2\" (1.88 m)   Wt 255 lb 12.8 oz (116 kg)   SpO2 94%   BMI 32.84 kg/m²     Last documented pain score (0-10 scale): Pain Level: 3  Last Weight:   Wt Readings from Last 1 Encounters:   10/06/21 255 lb 12.8 oz (116 kg)     Mental Status:  {IP PT MENTAL STATUS:20030:::0}    IV Access:  {[facility] IV ACCESS:379843918:::0}    Nursing Mobility/ADLs:  Walking {CHP DME ADLs:486891912:::0}  Transfer  {CHP DME ADLs:700584514:::0}  Bathing  {CHP DME ADLs:766153793:::0}  Dressing  {CHP DME ADLs:064016743:::0}  Toileting  {CHP DME ADLs:013795956:::0}  Feeding  {CHP DME ADLs:993895557:::0}  Med Admin  {CHP DME ADLs:644967042:::0}  Med Delivery   { RIVAS MED Delivery:731712496:::0}    Wound Care Documentation and Therapy:        Elimination:  Continence: Bowel: {YES / GI:37027}  Bladder: {YES / GX:45368}  Urinary Catheter: {Urinary Catheter:171356557:::0}   Colostomy/Ileostomy/Ileal Conduit: {YES / XF:08726}       Date of Last BM: ***    Intake/Output Summary (Last 24 hours) at 10/8/2021 1306  Last data filed at 10/8/2021 0412  Gross per 24 hour   Intake --   Output 2550 ml   Net -2550 ml     I/O last 3 completed shifts: In: 210 [P.O.:200;  I.V.:10]  Out: 3000 [Urine:3000]    Safety Concerns:     [address] Safety Concerns:150432250:::0}    Impairments/Disabilities:      [address] Impairments/Disabilities:753835084:::0}    Nutrition Therapy:  Current Nutrition Therapy:   [address] Diet List:036021239:::0}    Routes of Feeding: {CHP DME Other Feedings:535079710:::0}  Liquids: {Slp liquid thickness:83868}  Daily Fluid Restriction: {CHP DME Yes amt example:005198036:::0}  Last Modified Barium Swallow with Video (Video Swallowing Test): {Done Not Done QBHT:085898812:::0}    Treatments at the Time of Hospital Discharge:   Respiratory Treatments: ***  Oxygen Therapy:  {Therapy; copd oxygen:00805:::0}  Ventilator:    { CC Vent List:178241172:::0}    Rehab Therapies: {THERAPEUTIC INTERVENTION:9857263551}  Weight Bearing Status/Restrictions: [address]  Weight Bearin:::0}  Other Medical Equipment (for information only, NOT a DME order):  {EQUIPMENT:134074133}  Other Treatments: ***    Patient's personal belongings (please select all that are sent with patient):  {CHP DME Belongings:028575588:::0}    RN SIGNATURE:  {Esignature:894024274:::0}    CASE MANAGEMENT/SOCIAL WORK SECTION    Inpatient Status Date: ***    Readmission Risk Assessment Score:  Readmission Risk              Risk of Unplanned Readmission:  11           Discharging to Facility/ Agency   Name:   Address:  Phone:  Fax:    Dialysis Facility (if applicable)   Name:  Address:  Dialysis Schedule:  Phone:  Fax:    / signature: {Esignature:908654390:::0}    PHYSICIAN SECTION    Prognosis: {Prognosis:0543570007:::0}    Condition at Discharge: [address] [place] [address] Patient Condition:671515265:::0}    Rehab Potential (if transferring to Rehab): {Prognosis:7966020408:::0}    Recommended Labs or Other Treatments After Discharge: ***    Physician Certification: I certify the above information and transfer of Rosa Thakkar  is necessary for the continuing treatment of the diagnosis listed and that he requires {Admit to Appropriate Level of Care:80715:::0} for {GREATER/LESS:098305673} 30 days.      Update Admission H&P: {CHP DME Changes in HandP:204147310:::0}    PHYSICIAN SIGNATURE:  {Esignature:187333641:::0}

## 2021-10-08 NOTE — PROGRESS NOTES
Physical Therapy        Physical Therapy Cancel Note      DATE: 10/8/2021    NAME: Luis Armando Sandhu  MRN: [id number]   : 1980      Patient not seen this date for Physical Therapy due to:    Patient Declined: Pt finished home 02 eval with RT stated tired and stated he is being d/c later      Electronically signed by Erum Ortiz PTA on 10/8/2021 at 12:12 PM

## 2021-10-08 NOTE — CARE COORDINATION
Discharge [address] Case Management Department  Written by: Bryn Reyes RN    Patient Name: Narayan Smith  Attending Provider: No att. providers found  Admit Date: 10/3/2021  6:43 PM  MRN: [id number]  Account: [de-identified]                     : 1980  Discharge Date: 10/8/2021      Disposition: home    Bryn Reyes RN

## 2021-10-08 NOTE — PROGRESS NOTES
[facility]  Internal Medicine Teaching Residency Program  Inpatient Daily Progress Note  ______________________________________________________________________________    Patient: Tamika Mariscal  YOB: 1980   DRJ:[id number]    Acct: [de-identified]     Room: 2010/2010-01  Admit date: 10/3/2021  Today's date: 10/08/21  Number of days in the hospital: 5    SUBJECTIVE   Admitting Diagnosis: COVID-19  CC:   Pt examined at bedside. Chart & results reviewed.    No acute issues overnight   vitals stable  NC 3 L SPO2 93%  [id number] OP  WBC 12.7, CRP 12.8  Home O2 eval  Can be discharged home after ID clearance      ROS:  Constitutional:  negative for chills, fevers, sweats  Respiratory:  negative for cough, dyspnea on exertion, hemoptysis, shortness of breath, wheezing  Cardiovascular:  negative for chest pain, chest pressure/discomfort, lower extremity edema, palpitations  Gastrointestinal:  negative for abdominal pain, constipation, diarrhea, nausea, vomiting  Neurological: Positive for dizziness,   BRIEF HISTORY     This patient is COVID positive and has LEFT sided weakness     This patient 28-year-old male with past medical history of essential hypertension, obesity, hyperlipidemia, seronegative polyarthritis, LVH due to hypertensive disease, asthma,NOEMÍ, eosinophilic esophagitis and depressive disorder presented with weakness to his left lower extremity started today afternoon and unable to bear the weight of the body.  The patient denies any loss of consciousness, head trauma, headache, weakness of left upper extremity/right upper/right lower extremity.  Reports numbness of left lower extremity.     Reports high-grade fever, highest recorded 103 F, since Monday started after contact with Covid patient and tested positive for Covid.  The fever gets better with Tylenol.  He has associated dry cough and dyspnea even at rest.  The patient  covid unvaccinated.     Rest of review of systems is unremarkable.     On my evaluation the patient is lying comfortably in the bed, saturating on room air, in no apparent distress, answering questions appropriately.  Vitals stable. sensation of lower extremities are intact.  Power on left lower side is 3/5 with intact reflexes.  Chest examination shows bilateral equal air entry without added sounds.  Rest of examination is unremarkable.     Pertinent labs are pro-Bob 0.09, CRP 60.8, , fibrinogen 536, chest x-ray left basilar airspace disease represent early pneumonia and or atelectasis.  CT head wo contrast-no acute intracranial abnormality.  CTA head neck with contrast:Pansinusitis, possible COVID pulmonary infiltrates. OBJECTIVE     Vital Signs:  /80   Pulse 63   Temp 97.9 °F (36.6 °C) (Oral)   Resp 22   Ht 6' 2\" (1.88 m)   Wt 255 lb 12.8 oz (116 kg)   SpO2 93%   BMI 32.84 kg/m²     Temp (24hrs), Av.2 °F (36.8 °C), Min:97.9 °F (36.6 °C), Max:98.6 °F (37 °C)    In: 210   Out: 3000 [Urine:3000]    Physical Exam:  Constitutional: This is a well developed, well nourished,  38y.o. year old male who is alert, oriented, cooperative and in no apparent distress. Head:normocephalic and atraumatic. EENT:  PERRLA. No conjunctival injections. Septum was midline, mucosa was without erythema, exudates or cobblestoning. No thrush was noted. Neck: Supple without thyromegaly. No elevated JVP. Trachea was midline. Respiratory: Chest was symmetrical without dullness to percussion. Breath sounds bilaterally were clear to auscultation. There were no wheezes, rhonchi or rales. There is no intercostal retraction or use of accessory muscles. No egophony noted. Cardiovascular: Regular without murmur, clicks, gallops or rubs. Abdomen: Slightly rounded and soft without organomegaly. No rebound, rigidity or guarding was appreciated. Lymphatic: No lymphadenopathy.   Musculoskeletal: Normal curvature of the spine. No gross muscle weakness. Extremities:  No lower extremity edema, ulcerations, tenderness, varicosities or erythema. Muscle size, tone and strength are normal.  No involuntary movements are noted. Left lower extremity weakness, normal reflexes and sensation  Skin:  Warm and dry. Good color, turgor and pigmentation. No lesions or scars. No cyanosis or clubbing  Neurological/Psychiatric: The patient's general behavior, level of consciousness, thought content and emotional status is normal.        Medications:  Scheduled Medications:    pantoprazole  40 mg Oral QAM AC    atorvastatin  40 mg Oral Nightly    fluticasone  1 spray Each Nostril Daily    dexamethasone  6 mg IntraVENous Q24H    remdesivir IVPB  100 mg IntraVENous Q24H    baricitinib  4 mg Oral Daily    budesonide-formoterol  2 puff Inhalation BID    sodium chloride flush  5-40 mL IntraVENous 2 times per day    enoxaparin  30 mg SubCUTAneous BID     Continuous Infusions:    sodium chloride      sodium chloride 100 mL/hr at 10/08/21 0624     PRN Medicationsketorolac, 30 mg, BID PRN  oxyCODONE-acetaminophen, 1 tablet, Q6H PRN  sodium chloride, 1 spray, PRN  sodium chloride, 30 mL, PRN  albuterol sulfate HFA, 2 puff, Q6H PRN  sodium chloride flush, 5-40 mL, PRN  sodium chloride, 25 mL, PRN  ondansetron, 4 mg, Q8H PRN   Or  ondansetron, 4 mg, Q6H PRN  polyethylene glycol, 17 g, Daily PRN  acetaminophen, 650 mg, Q6H PRN   Or  acetaminophen, 650 mg, Q6H PRN        Diagnostic Labs:  CBC:   Recent Labs     10/06/21  0522 10/07/21  0605   WBC 9.6 12.7*   RBC 5.06 4.90   HGB 15.0 14.7   HCT 46.4 45.4   MCV 91.7 92.7   RDW 13.3 13.4    261     BMP:   Recent Labs     10/06/21  0522 10/07/21  0605    139   K 4.5 4.2    107   CO2 22 21   BUN 10 10   CREATININE 0.67* 0.63*     BNP: No results for input(s): BNP in the last 72 hours. PT/INR: No results for input(s): PROTIME, INR in the last 72 hours.   APTT: No results for input(s): APTT in the last 72 hours. CARDIAC ENZYMES: No results for input(s): CKMB, CKMBINDEX, TROPONINI in the last 72 hours. Invalid input(s): CKTOTAL;3  FASTING LIPID PANEL:  Lab Results   Component Value Date    CHOL 167 10/03/2021    HDL 28 (L) 10/03/2021    TRIG 290 (H) 10/03/2021     LIVER PROFILE: No results for input(s): AST, ALT, ALB, BILIDIR, BILITOT, ALKPHOS in the last 72 hours. MICROBIOLOGY:   Lab Results   Component Value Date/Time    CULTURE NO GROWTH 01/18/2016 10:00 PM    CULTURE  01/18/2016 10:00 PM     [facility] [id number] [place] [address], [address] [place] [address] [phone]       Imaging:    CT HEAD WO CONTRAST    Result Date: 10/3/2021  No acute intracranial abnormality. RECOMMENDATIONS: The findings were sent to the Radiology Results [address] at 7:36 pm on 10/3/2021to be communicated to a licensed caregiver. MRI LUMBAR SPINE WO CONTRAST    Result Date: 10/5/2021  1. Grade 1 anterolisthesis of L5 on S1 secondary to bilateral L5 spondylolysis with resultant severe bilateral L5 neural foraminal narrowing. 2. Severe L5-S1 disc height loss and desiccation. 3. Mild L4-5 spinal canal stenosis and mild right lateral recess narrowing secondary to a posterior disc protrusion. XR CHEST PORTABLE    Result Date: 10/3/2021  Lateral left basilar airspace disease may represent small/early pneumonia and or atelectasis. CTA HEAD NECK W CONTRAST    Result Date: 10/3/2021  Pansinusitis, possible COVID pulmonary infiltrates, otherwise unremarkable CTA of the head and neck. RECOMMENDATIONS: Recommend CT chest.     MRI BRAIN WO CONTRAST    Result Date: 10/5/2021  1. No acute intracranial abnormality. 2. Paranasal sinusitis. ASSESSMENT & PLAN     1. Left lower extremity weakness-Neuro work up negative except, Severe bilateral L5 neural foraminal narrowing on MRI, Neurology: possible lumber radiculopathy, EMG OP. Follow-up in 4 to 6 weeks . PT /OT . no Neurosurgical intervention at this point. Patient currently on aspirin Plavix.  Echo: No significant abnormality  2. COVID : NC 3 L, [id number], ID following. CRP trending down, 12. 8. WBC 12. 7. patient started on remdesivir for 5 days, Decadron 6 mg for 10 days, baricitinib 4 mg for 14 days. Lovenox 30 twice daily  3. Essential hypertension-stable  4. Hyperlipidemia-LDL within target range, continue pravastatin 20  5. Dizziness-monitor, walk with support,  IV bolus. DVT ppx: Lovenox 30 BID  GI ppx: None  Home O2 evaluation, discharge after ID clearance     PT/OT/SW: Ongoing  Discharge Planning:   consulted. Melinda Dawkins MD  Internal Medicine Resident, PGY-1  [address] [place], [place]  10/8/2021, 6:33 AM

## 2021-10-09 ASSESSMENT — ENCOUNTER SYMPTOMS
EYE ITCHING: 0
EYE DISCHARGE: 0
COLOR CHANGE: 0
EYE REDNESS: 0
SHORTNESS OF BREATH: 0
ABDOMINAL DISTENTION: 0
COUGH: 0

## 2021-10-09 NOTE — PROGRESS NOTES
negative but MRI of the lumbar spine as below, canal stenosis    10/6  He looks great doing physical therapy, CRP 21 improved, continues on baricitinib 3 L of nasal cannula,     10/7  W 12 - creat 0.63  CRP improved to-12  Patient still does not feel good he feels pretty rough today, 3 days of nasal cannula,  Will defer discharge and give him another day, with repeat CRP tomorrow    10/8  Alert and less SOB and feels much better - on RA  - CRP 12  Tolerating meds      Summary of relevant labs:  Labs:  Procalcitonin0. 09High   [id number]   CRP60.8High -12  [id number]   ALT25U/L AST32     CREATININE0.85     Micro:  COVID +9/28  And 10/4    Imaging:  MRI brain neg     MRI LS spine  1. Grade 1 anterolisthesis of L5 on S1 secondary to bilateral L5   spondylolysis with resultant severe bilateral L5 neural foraminal narrowing. 2. Severe L5-S1 disc height loss and desiccation. 3. Mild L4-5 spinal canal stenosis and mild right lateral recess narrowing   secondary to a posterior disc protrusion. CT heard  Pansinusitis, possible COVID pulmonary infiltrates, otherwise unremarkable   CTA of the head and neck. CXR  Lateral left basilar airspace disease may represent small/early pneumonia and or atelectasis. I have personally reviewed the past medical history, past surgical history, medications, social history, and family history, and I haveupdated the database accordingly. Allergies:   Fish-derived products, Other, Shellfish allergy, Shrimp (diagnostic), and Ace inhibitors     Review of Systems:     Review of Systems   Constitutional: Negative for activity change, chills and fatigue. HENT: Negative for congestion. Eyes: Negative for discharge, redness and itching. Respiratory: Negative for cough and shortness of breath. Cardiovascular: Negative for chest pain. Gastrointestinal: Negative for abdominal distention. Endocrine: Negative for polydipsia and polyphagia.    Genitourinary: Negative for dysuria, flank pain and hematuria. Musculoskeletal: Negative for arthralgias. Skin: Negative for color change. Allergic/Immunologic: Negative for immunocompromised state. Neurological: Negative for dizziness, speech difficulty, weakness, light-headedness and headaches. Hematological: Negative for adenopathy. Psychiatric/Behavioral: Negative for agitation. Physical Examination :       Physical Exam  Constitutional:       Appearance: Normal appearance. He is not ill-appearing. HENT:      Head: Normocephalic and atraumatic. Nose: Nose normal.      Mouth/Throat:      Mouth: Mucous membranes are moist.   Eyes:      General: No scleral icterus. Conjunctiva/sclera: Conjunctivae normal.   Cardiovascular:      Rate and Rhythm: Normal rate and regular rhythm. Heart sounds: Normal heart sounds. No murmur heard. No friction rub. Pulmonary:      Effort: No respiratory distress. Breath sounds: Normal breath sounds. Abdominal:      General: There is no distension. Palpations: Abdomen is soft. Tenderness: There is no abdominal tenderness. Genitourinary:     Comments: No louis  Musculoskeletal:         General: No tenderness or signs of injury. Cervical back: Neck supple. No rigidity. Right lower leg: No edema. Left lower leg: No edema. Skin:     General: Skin is dry. Coloration: Skin is not jaundiced. Neurological:      General: No focal deficit present. Mental Status: He is alert. Cranial Nerves: No cranial nerve deficit. Psychiatric:         Mood and Affect: Mood normal.         Thought Content:  Thought content normal.         Past Medical History:     Past Medical History:   Diagnosis Date    Asthma     [facility]    Depression     Erectile dysfunction 9/29/2017    Essential hypertension 1/2/2017    GERD (gastroesophageal reflux disease) 11/19/2018    Heart palpitations 9/29/2017  Hyperlipidemia with target LDL less than 100 3/23/2017    Left lower lobe pneumonia 11/9/2012    Mitral valve prolapse     Nonspecific reactive hepatitis 3/22/2017    Obesity (BMI 30-39.9) 3/23/2017    NOEMÍ (obstructive sleep apnea) 10/16/2019    Osteoarthritis     Seronegative polyarthritis 10/10/2016    followed w/ rheumatology in [address] [place] [address] anxiety disorder     Uvulitis 8/15/2019       Past Surgical  History:     Past Surgical History:   Procedure Laterality Date    COLONOSCOPY      CYST REMOVAL Right     Armpit     ENDOSCOPY, COLON, DIAGNOSTIC      HAND SURGERY Left     KNEE ARTHROSCOPY      right knee    NOSE SURGERY      TONSILLECTOMY      UPPER GASTROINTESTINAL ENDOSCOPY N/A 10/9/2019    EGD POLYP SNARE, HOT. ESOPHAGEAL DILATATION WITH MALONIES 50F performed by Nani Coto MD at [facility]         Medications:         Social History:     Social History     Socioeconomic History    Marital status:      Spouse name: Not on file    Number of children: Not on file    Years of education: Not on file    Highest education level: Not on file   Occupational History    Not on file   Tobacco Use    Smoking status: Never Smoker    Smokeless tobacco: Never Used   Vaping Use    Vaping Use: Never used   Substance and Sexual Activity    Alcohol use: Yes     Comment: occasional  few x year    Drug use: No    Sexual activity: Yes     Partners: Male   Other Topics Concern    Not on file   Social History Narrative    Not on file     Social Determinants of Health     Financial Resource Strain: Low Risk     Difficulty of Paying Living Expenses: Not hard at all   Food Insecurity: No Food Insecurity    Worried About [address] in the Last Year: Never true    [address] in the Last Year: Never true   Transportation Needs:     Lack of Transportation (Medical):      Lack of Transportation (Non-Medical):    Physical Activity:     Days of Exercise per Week:     Minutes of Exercise per Session:    Stress:     Feeling of Stress :    Social Connections:     Frequency of Communication with Friends and Family:     Frequency of Social Gatherings with Friends and Family:     Attends Jehov[employer]'s witness Services:     Active Member of Clubs or Organizations:     Attends Club or Organization Meetings:     Marital Status:    Intimate Partner Violence:     Fear of Current or Ex-Partner:     Emotionally Abused:     Physically Abused:     Sexually Abused:        Family History:     Family History   Problem Relation Age of Onset    Diabetes Father     High Cholesterol Father     Other Mother         autoimmune hepatitis     High Blood Pressure Brother     Heart Disease Brother     Heart Attack Brother 40        cabg age 40     Rheum Arthritis Other 7    Colon Cancer Neg Hx     Cancer Neg Hx       Medical Decision Making:   I have independently reviewed/ordered the following labs:    CBC with Differential:   Recent Labs     10/06/21  0522 10/07/21  0605   WBC 9.6 12.7*   HGB 15.0 14.7   HCT 46.4 45.4    261   LYMPHOPCT 15* 14*   MONOPCT 6 6     BMP:  Recent Labs     10/06/21  0522 10/07/21  0605    139   K 4.5 4.2    107   CO2 22 21   BUN 10 10   CREATININE 0.67* 0.63*     Hepatic Function Panel:   No results for input(s): PROT, LABALBU, BILIDIR, IBILI, BILITOT, ALKPHOS, ALT, AST in the last 72 hours. No results for input(s): RPR in the last 72 hours. No results for input(s): HIV in the last 72 hours. No results for input(s): BC in the last 72 hours. Lab Results   Component Value Date    CREATININE 0.63 10/07/2021    GLUCOSE 120 10/07/2021    GLUCOSE 96 04/30/2012       Detailed results: Thank you for allowing us to participate in the care of this patient. Please call with questions.     This note is created with the assistance of a speech recognition program.  While intending to generate adocument that actually reflects the content of the visit, the document can still have some errors including those of syntax and sound a like substitutions which may escape proof reading. It such instances, actual meaningcan be extrapolated by contextual diversion.     Garfield Mcknight MD  Office: [phone]  Perfect serve / office [phone]

## 2021-10-10 NOTE — DISCHARGE SUMMARY
[facility]     Department of Internal Medicine - Staff Internal Medicine Teaching Service    INPATIENT DISCHARGE SUMMARY      Patient Identification:  Ana Dexter is a 38 y.o. male. :  1980  MRN: [id number]     Acct: [de-identified]   PCP: Oralia Palma MD  Admit Date:  10/3/2021  Discharge date and time: 10/8/2021  5:07 PM   Attending Provider: No att. providers found                                     [address] Problem Lists:  Principal Problem:    COVID-19  Active Problems:    Essential hypertension    Obesity (BMI 30-39. 9)    Hyperlipidemia with target LDL less than 100    Left leg weakness    Degenerative lumbar spinal stenosis  Resolved Problems:    * No resolved hospital problems. *      HOSPITAL STAY     Brief Inpatient course:   Ana Dexter is a 38 y.o. male who was admitted for the management of COVID-19, presented to the emergency department with left lower extremity weakness, shortness of breath and high-grade fever at home. Patient is not vaccinated against COVID and has contact with Covid positive patient. Patient is diagnosed with a case of Covid pneumonia, ID is consulted, treated with remdesivir, Decadron, baricitinib and Lovenox. Neuro work-up shows severe bilateral L5 neural foraminal narrowing on MRI. Neurology recommended outpatient follow-up EMG. NSG was consulted and did not recommend any neurosurgical intervention. He is treated with pravastatin for hyperlipidemia and blood pressure is well controlled. On discharge the patient is able to maintain saturation on NC 3 L and CRP trended down to 12.8. Home O2 eval is done and discharged after ID clearance.     Procedures/ Significant Interventions:    none    Consults:     Consults:     Final Specialist Recommendations/Findings:   IP CONSULT TO STROKE TEAM  IP CONSULT TO HOSPITALIST  IP CONSULT TO INFECTIOUS DISEASES  IP CONSULT TO CASE MANAGEMENT  IP CONSULT TO PHARMACY  IP CONSULT TO NEUROSURGERY      Any Hospital Acquired Infections: none    Discharge Functional Status:  stable    DISCHARGE PLAN     Disposition: home    Patient Instructions:   Discharge Medication List as of 10/8/2021  1:26 PM      START taking these medications    Details   dexamethasone (DECADRON) 6 MG tablet Take 1 tablet by mouth daily (with breakfast) for 4 days, Disp-4 tablet, R-0Normal      baricitinib (OLUMIANT) 2 MG TABS tablet Take 2 tablets by mouth daily for 9 days, Disp-18 tablet, R-0Normal         CONTINUE these medications which have NOT CHANGED    Details   pravastatin (PRAVACHOL) 20 MG tablet take 1 tablet by mouth every evening **STOP ATORVASTATIN**, Disp-90 tablet, R-3Normal      amLODIPine (NORVASC) 10 MG tablet take 1 tablet by mouth every morning, Disp-90 tablet, R-3Normal      buPROPion (WELLBUTRIN XL) 150 MG extended release tablet Take 1 tablet by mouth every morning, Disp-90 tablet, R-3Normal      busPIRone (BUSPAR) 10 MG tablet Take 1 tablet by mouth 3 times daily as needed (anxiety), Disp-270 tablet, R-3Normal      loratadine (CLARITIN) 10 MG tablet Take 1 tablet by mouth daily, Disp-90 tablet, R-3Normal      omeprazole (PRILOSEC) 40 MG delayed release capsule TAKE 1 CAPSULE DAILY, Disp-90 capsule, R-3Normal      celecoxib (CELEBREX) 100 MG capsule Take 1 capsule by mouth daily as needed for Pain, Disp-30 capsule, R-3Normal      fluticasone (FLONASE) 50 MCG/ACT nasal spray 2 sprays by Nasal route daily, Disp-1 Bottle,R-3Normal      mometasone-formoterol (DULERA) 100-5 MCG/ACT inhaler Inhale 2 puffs into the lungs 2 times daily Stop Flovent, Disp-13 g, R-3Normal      albuterol (PROVENTIL) (2.5 MG/3ML) 0.083% nebulizer solution Take 3 mLs by nebulization every 6 hours as needed for Wheezing or Shortness of Breath, Disp-120 vial, R-0Normal      Blood Pressure KIT Disp-1 kit, R-0, PrintDx: HTN. Needs automatic blood pressure machine to monitor his blood pressure.       Respiratory Therapy Supplies (NEBULIZER) MADHAV 2 TIMES DAILY Starting 1/31/2017, Until Discontinued, Disp-1 Device, R-0, PrintDx: Asthma exacerbation J45.901         STOP taking these medications       albuterol sulfate HFA (VENTOLIN HFA) 108 (90 Base) MCG/ACT inhaler Comments:   Reason for Stopping:               Activity: activity as tolerated    Diet: regular diet    Follow-up:    [address], [facility]  [facility]  [phone]    In 3 weeks  [facility]     Je Shelley MD  [facility].  [facility] [address] [place] [address], [address]  [phone]    Schedule an appointment as soon as possible for a visit  Post hospital follow-up left leg weakness      Patient Instructions: Continue taking your medications, wear oxygen at home and follow-up with PCP  Follow up labs: none  Follow up imaging: none    Note that over 30 minutes was spent in preparing discharge papers, discussing discharge with patient, medication review, etc.      Perlita Noble MD, MD  Internal Medicine Resident, PGY-1  [address] [place], [place]  10/10/2021, 1:55 PM

## 2021-10-11 ENCOUNTER — CARE COORDINATION (OUTPATIENT)
Dept: CASE MANAGEMENT | Age: 41
End: 2021-10-11

## 2021-10-11 NOTE — CARE COORDINATION
[facility] Transitions Initial Follow Up Call- 1st attempt COVID risk follow up call       Call within 2 business days of discharge: Yes    Patient: Vivian Yates Patient : 1980   MRN: [id number]  Reason for Admission: COVID-19, left leg weakness  Discharge Date: 10/8/21 RARS: Readmission Risk Score: 11      Last Discharge [facility]       Complaint Diagnosis Description Type Department Provider    10/3/21 Positive For Covid-19; Cough; Chest Pain; Fever COVID-19 . .. ED to Hosp-Admission (Discharged) (ADMITTED) [facility] Galina San MD; Malina Reed. .. Date/Time:  10/11/2021 11:06 AM  Attempted to reach patient by telephone. Call within 2 business days of discharge: Yes Left HIPPA compliant message requesting a return call. Will attempt to reach patient again.     Meds via Meds to Beds  No home care       Follow Up  Future Appointments   Date Time Provider Joe Healy   2021 11:30 AM DO Clary Matias Neuro [facility]   2021  8:30 AM Halle Stewart MD [facility]   2021  2:00 PM Carlos Enrique Girard MD Neuro [facility] RN

## 2021-10-12 ENCOUNTER — CARE COORDINATION (OUTPATIENT)
Dept: CASE MANAGEMENT | Age: 41
End: 2021-10-12

## 2021-10-12 NOTE — CARE COORDINATION
[facility] Transitions Initial Follow Up Call- 2nd attempt COVID risk follow up call       Call within 2 business days of discharge: Yes    Patient: Rosa Thakkar Patient : 1980   MRN: [id number]  Reason for Admission: COVID-19, left leg weakness   Discharge Date: 10/8/21 RARS: Readmission Risk Score: 11      Last Discharge [facility]       Complaint Diagnosis Description Type Department Provider    10/3/21 Positive For Covid-19; Cough; Chest Pain; Fever COVID-19 . .. ED to Hosp-Admission (Discharged) (ADMITTED) [facility] Brad Galdamez MD; Alirio Reed. .. Date/Time:  10/12/2021 12:23 PM  Attempted to reach patient by telephone. Call within 2 business days of discharge: Yes. 2nd attempt. Left HIPPA compliant message informing final call but can call with questions or concerns. Episode closed.       Follow Up  Future Appointments   Date Time Provider Joe Healy   2021 11:30 AM DO Clary Meza [facility]   2021  8:30 AM [facility]   2021  2:00 PM Shruthi Warner MD Neuro  Rick Yañez RN

## 2021-10-13 NOTE — PROGRESS NOTES
Physician Progress Note      Nazanin Schmidt  [facility] #:                  [id number]  :                       1980  ADMIT DATE:       10/3/2021 6:43 PM  [address] [place] DATE:        10/8/2021 5:07 PM  RESPONDING  PROVIDER #:        Souleymane Whitman MD          QUERY TEXT:    Patient admitted with Leg weakness/Covid 19. Noted documentation of suspected   TIA. Please document if the diagnosis of TIA was confirmed or ruled out after   further study. The medical record reflects the following:  Risk Factors: covid pos  Clinical Indicators: Admitted with LLE weakness-suspected TIA. Covid positive. Stroke workup negative. MRI  lumbar spine shows anterolisthesis of L5 on S1   with severe bilateral L5 neuroforaminal narrowing. Negative acute   cerebrovascular pathology. Possible left lumbar radiculopathy. No sensory   deficit. . Plan for outpt EEG. Treatment: Stroke workup. Asa,Plavix. NS consult  Options provided:  -- TIA was confirmed  -- TIA was ruled out  -- Other - I will add my own diagnosis  -- Disagree - Not applicable / Not valid  -- Disagree - Clinically unable to determine / Unknown  -- Refer to Clinical Documentation Reviewer    PROVIDER RESPONSE TEXT:    TIA was ruled out after study.     Query created by: Josafat Langley on 10/12/2021 2:38 PM      Electronically signed by:  Souleymane Whitman MD 10/13/2021 5:07 PM

## 2021-11-17 ENCOUNTER — OFFICE VISIT (OUTPATIENT)
Dept: NEUROSURGERY | Age: 41
End: 2021-11-17
Payer: COMMERCIAL

## 2021-11-17 VITALS
TEMPERATURE: 99.1 F | HEIGHT: 74 IN | DIASTOLIC BLOOD PRESSURE: 61 MMHG | OXYGEN SATURATION: 95 % | WEIGHT: 255 LBS | HEART RATE: 78 BPM | SYSTOLIC BLOOD PRESSURE: 114 MMHG | BODY MASS INDEX: 32.73 KG/M2

## 2021-11-17 DIAGNOSIS — M43.16 SPONDYLOLISTHESIS, LUMBAR REGION: Primary | ICD-10-CM

## 2021-11-17 DIAGNOSIS — M48.062 LUMBAR STENOSIS WITH NEUROGENIC CLAUDICATION: ICD-10-CM

## 2021-11-17 PROCEDURE — G8484 FLU IMMUNIZE NO ADMIN: HCPCS | Performed by: NEUROLOGICAL SURGERY

## 2021-11-17 PROCEDURE — 1036F TOBACCO NON-USER: CPT | Performed by: NEUROLOGICAL SURGERY

## 2021-11-17 PROCEDURE — G8427 DOCREV CUR MEDS BY ELIG CLIN: HCPCS | Performed by: NEUROLOGICAL SURGERY

## 2021-11-17 PROCEDURE — 99204 OFFICE O/P NEW MOD 45 MIN: CPT | Performed by: NEUROLOGICAL SURGERY

## 2021-11-17 PROCEDURE — G8417 CALC BMI ABV UP PARAM F/U: HCPCS | Performed by: NEUROLOGICAL SURGERY

## 2021-11-17 RX ORDER — PREDNISONE 2.5 MG
1 TABLET ORAL DAILY
COMMUNITY
Start: 2021-11-03 | End: 2022-04-08

## 2021-11-17 RX ORDER — MELOXICAM 15 MG/1
15 TABLET ORAL DAILY
Qty: 30 TABLET | Refills: 0 | Status: SHIPPED | OUTPATIENT
Start: 2021-11-17 | End: 2022-04-08

## 2021-11-17 NOTE — PROGRESS NOTES
[facility] [address], [address] [facility] [address]  Dept: [phone]    Patient:  Geraldo Davenport  YOB: 1980  Date: 11/17/21    The patient is a 42 y.o. male who presents today for consult of the following problems:     Chief Complaint   Patient presents with    1 Month Follow-Up             HPI:     Geraldo Davenport is a 42 y.o. male on whom neurosurgical consultation was requested by Dangelo Quiroz MD for management of significant axial back pain 7-10 on 10 in bilateral paraspinal and spasmodic in nature the ongoing for a number of years without any specific inciting event. Associated intermittent numbness of the left lower extremity worse with right lower extremity approximate L5 dermatomal distribution. Symptoms appear to occur a couple 2 times per week or more where he is standing for variable amounts of time for minutes to hours and he has complete numbness mainly in the left great in the right leg. No saddle anesthesia bowel bladder incontinence or retention. Has been working as  and currently does not work at [employer] but does work in [employer] where he is in an electrified environment. He has had this 1 episode at work where he has felt a loss of sensation in his legs while standing and feels as though he may go down without specific loss of tone. Has not been to any type of formal physical therapy prior injections of any kind. Has been to the ER and has had pharmacotherapeutic interventions that have not helped significantly. Rhonda Flores       History:     Past Medical History:   Diagnosis Date    Asthma     COPD (chronic obstructive pulmonary disease) ([facility] 11/19/2018    Depression     Erectile dysfunction 9/29/2017    Essential hypertension 1/2/2017    GERD (gastroesophageal reflux disease) 11/19/2018    Heart palpitations 9/29/2017    Hyperlipidemia with target LDL less than 100 3/23/2017    Left lower lobe pneumonia 11/9/2012    Mitral valve prolapse     Nonspecific reactive hepatitis 3/22/2017    Obesity (BMI 30-39.9) 3/23/2017    NOEMÍ (obstructive sleep apnea) 10/16/2019    Osteoarthritis     Seronegative polyarthritis 10/10/2016    followed w/ rheumatology in [address] [place] [address] anxiety disorder     Uvulitis 8/15/2019     Past Surgical History:   Procedure Laterality Date    COLONOSCOPY      CYST REMOVAL Right     Armpit     ENDOSCOPY, COLON, DIAGNOSTIC      HAND SURGERY Left     KNEE ARTHROSCOPY      right knee    NOSE SURGERY      TONSILLECTOMY      UPPER GASTROINTESTINAL ENDOSCOPY N/A 10/9/2019    EGD POLYP SNARE, HOT.  ESOPHAGEAL DILATATION WITH MALONIES 50F performed by Clinton Avalos MD at [facility]       Family History   Problem Relation Age of Onset    Diabetes Father     High Cholesterol Father     Other Mother         autoimmune hepatitis     High Blood Pressure Brother     Heart Disease Brother     Heart Attack Brother 40        cabg age 40    Delma Blank Rheum Arthritis Other 7    Colon Cancer Neg Hx     Cancer Neg Hx      Current Outpatient Medications on File Prior to Visit   Medication Sig Dispense Refill    pravastatin (PRAVACHOL) 20 MG tablet take 1 tablet by mouth every evening **STOP ATORVASTATIN** 90 tablet 3    amLODIPine (NORVASC) 10 MG tablet take 1 tablet by mouth every morning 90 tablet 3    buPROPion (WELLBUTRIN XL) 150 MG extended release tablet Take 1 tablet by mouth every morning 90 tablet 3    busPIRone (BUSPAR) 10 MG tablet Take 1 tablet by mouth 3 times daily as needed (anxiety) 270 tablet 3    loratadine (CLARITIN) 10 MG tablet Take 1 tablet by mouth daily 90 tablet 3    omeprazole (PRILOSEC) 40 MG delayed release capsule TAKE 1 CAPSULE DAILY 90 capsule 3    fluticasone (FLONASE) 50 MCG/ACT nasal spray 2 sprays by Nasal route daily 1 Bottle 3    mometasone-formoterol (DULERA) 100-5 MCG/ACT inhaler Inhale 2 puffs into the lungs 2 times daily Stop Flovent 13 g 3    albuterol (PROVENTIL) (2.5 MG/3ML) 0.083% nebulizer solution Take 3 mLs by nebulization every 6 hours as needed for Wheezing or Shortness of Breath 120 vial 0    Respiratory Therapy Supplies (NEBULIZER) MADHAV 1 Units by Does not apply route 2 times daily Dx: Asthma exacerbation J45.901 1 Device 0    predniSONE (DELTASONE) 2.5 MG tablet Take 1 tablet by mouth daily      celecoxib (CELEBREX) 100 MG capsule Take 1 capsule by mouth daily as needed for Pain (Patient not taking: Reported on 11/17/2021) 30 capsule 3    Blood Pressure KIT Dx: HTN. Needs automatic blood pressure machine to monitor his blood pressure. (Patient not taking: Reported on 6/23/2021) 1 kit 0     No current facility-administered medications on file prior to visit. Social History     Tobacco Use    Smoking status: Never Smoker    Smokeless tobacco: Never Used   Vaping Use    Vaping Use: Never used   Substance Use Topics    Alcohol use: Yes     Comment: occasional  few x year    Drug use: No       Allergies   Allergen Reactions    Fish-Derived Products Anaphylaxis    Other Anaphylaxis     Any type of seafood    Shellfish Allergy Anaphylaxis    Shrimp (Diagnostic) Anaphylaxis    Ace Inhibitors Swelling     UVULITIS ON 8/15/19       Review of Systems  Constitutional: Negative for activity change and appetite change. HENT: Negative for ear pain and facial swelling. Eyes: Negative for discharge and itching. Respiratory: Negative for choking and chest tightness. Cardiovascular: Negative for chest pain and leg swelling. Gastrointestinal: Negative for nausea and abdominal pain. Endocrine: Negative for cold intolerance and heat intolerance. Genitourinary: Negative for frequency and flank pain. Musculoskeletal: Negative for myalgias and joint swelling. Skin: Negative for rash and wound.    Allergic/Immunologic: Negative for environmental allergies and food

## 2021-11-23 DIAGNOSIS — J45.40 MODERATE PERSISTENT ASTHMA WITHOUT COMPLICATION: ICD-10-CM

## 2021-12-01 ENCOUNTER — TELEPHONE (OUTPATIENT)
Dept: PAIN MANAGEMENT | Age: 41
End: 2021-12-01

## 2021-12-07 ENCOUNTER — TELEPHONE (OUTPATIENT)
Dept: PAIN MANAGEMENT | Age: 41
End: 2021-12-07

## 2021-12-10 ENCOUNTER — HOSPITAL ENCOUNTER (OUTPATIENT)
Dept: GENERAL RADIOLOGY | Facility: CLINIC | Age: 41
Discharge: HOME | End: 2021-12-12
Payer: COMMERCIAL

## 2021-12-10 ENCOUNTER — HOSPITAL ENCOUNTER (OUTPATIENT)
Dept: PHYSICAL THERAPY | Age: 41
Setting detail: THERAPIES SERIES
Discharge: HOME | End: 2021-12-10
Payer: COMMERCIAL

## 2021-12-10 ENCOUNTER — HOSPITAL ENCOUNTER (OUTPATIENT)
Facility: CLINIC | Age: 41
Discharge: HOME | End: 2021-12-12
Payer: COMMERCIAL

## 2021-12-10 DIAGNOSIS — M25.50 MULTIPLE JOINT PAIN: ICD-10-CM

## 2021-12-10 PROCEDURE — 73130 X-RAY EXAM OF HAND: CPT

## 2021-12-10 PROCEDURE — 97162 PT EVAL MOD COMPLEX 30 MIN: CPT

## 2021-12-10 PROCEDURE — 97110 THERAPEUTIC EXERCISES: CPT

## 2021-12-13 ENCOUNTER — HOSPITAL ENCOUNTER (OUTPATIENT)
Age: 41
Discharge: HOME | End: 2021-12-15
Payer: COMMERCIAL

## 2021-12-13 ENCOUNTER — HOSPITAL ENCOUNTER (OUTPATIENT)
Dept: PAIN MANAGEMENT | Age: 41
Discharge: HOME | End: 2021-12-13
Payer: COMMERCIAL

## 2021-12-13 ENCOUNTER — HOSPITAL ENCOUNTER (OUTPATIENT)
Dept: GENERAL RADIOLOGY | Age: 41
Discharge: HOME | End: 2021-12-15
Payer: COMMERCIAL

## 2021-12-13 VITALS
TEMPERATURE: 98.2 F | SYSTOLIC BLOOD PRESSURE: 129 MMHG | HEART RATE: 80 BPM | RESPIRATION RATE: 16 BRPM | OXYGEN SATURATION: 97 % | HEIGHT: 74 IN | WEIGHT: 260 LBS | BODY MASS INDEX: 33.37 KG/M2 | DIASTOLIC BLOOD PRESSURE: 74 MMHG

## 2021-12-13 DIAGNOSIS — M54.51 VERTEBROGENIC LOW BACK PAIN: Chronic | ICD-10-CM

## 2021-12-13 DIAGNOSIS — M54.41 CHRONIC BILATERAL LOW BACK PAIN WITH BILATERAL SCIATICA: ICD-10-CM

## 2021-12-13 DIAGNOSIS — G89.29 CHRONIC BILATERAL LOW BACK PAIN WITH BILATERAL SCIATICA: ICD-10-CM

## 2021-12-13 DIAGNOSIS — M48.061 DEGENERATIVE LUMBAR SPINAL STENOSIS: ICD-10-CM

## 2021-12-13 DIAGNOSIS — M54.42 CHRONIC BILATERAL LOW BACK PAIN WITH BILATERAL SCIATICA: ICD-10-CM

## 2021-12-13 DIAGNOSIS — M48.061 DEGENERATIVE LUMBAR SPINAL STENOSIS: Primary | ICD-10-CM

## 2021-12-13 PROCEDURE — 99204 OFFICE O/P NEW MOD 45 MIN: CPT | Performed by: ANESTHESIOLOGY

## 2021-12-13 PROCEDURE — 72120 X-RAY BEND ONLY L-S SPINE: CPT

## 2021-12-13 PROCEDURE — 99203 OFFICE O/P NEW LOW 30 MIN: CPT

## 2021-12-13 ASSESSMENT — ENCOUNTER SYMPTOMS
BACK PAIN: 1
EYES NEGATIVE: 1
GASTROINTESTINAL NEGATIVE: 1
SHORTNESS OF BREATH: 1
ALLERGIC/IMMUNOLOGIC NEGATIVE: 1

## 2021-12-13 NOTE — PROGRESS NOTES
[facility] Outpatient Physical Therapy  [address]         Phone: [phone]       Fax: [phone]     Physical Therapy Spine Evaluation    Date:  2021  Patient: Tamika Mariscal  : 1980  MRN: [id number]  Physician: Ni Contreras DO      Insurance: [id number] [facility]  Medical Diagnosis: Spondylolisthesis, lumbar region (M43.16), Lumbar stenosis with neurogenic claudication (U44.562)         Clinical Diagnosis: Low back pain (M54)  Onset Date: 2021  Next 's appt.: TBD  Visit Count:   Cancel/No Show: 0/0    Subjective:   CC: Low back pain   HPI: (2021) History of low back pain since 2017. No trauma or isolated incident was reported. However, he was diagnosed with Covid -19 and was admitted to [facility] on 10/03/2021. He was discharged to home a week later. He has noticed that his symptoms have increased since that time. He is able to relieve his pain with sitting overtime (beyond 10 minutes). Transferring sit to stand and walking causes his symptoms to increase within his low back along with radicular symptoms done both legs into the feet/toes (prolonged standing/walking beyond 10-20 minutes). He has had pharmacotherapeutic interventions thru the ER without success. No physical therapy or pain injections in the past. He was recently referred to neurosurgery per his PCP. Oral pain medication provided/Mobic. Outpt physical therapy was ordered along with a referral for pain management. PMHx: [] Unremarkable [] Diabetes [x] HTN  [] Pacemaker   [] MI/Heart Problems [] Cancer [x] Arthritis [x] Other: asthma, COPD, hyperlipidemia, Depression; Left lower lobe pneumonia; Nonspecific reactive hepatitis; Obesity (BMI 30-39.9); Erectile dysfunction; Social anxiety disorder; GERD (gastroesophageal reflux disease); Mitral valve prolapse; Heart palpitations; NOEMÍ (obstructive sleep apnea);  Uvulitis; Vertebrogenic low back pain;                [] Refer to full medical chart  In EPIC     Comorbidities:   [x] Obesity [] Dialysis  [] Other:   [x] Asthma/COPD [] Dementia [] Other:   [] Stroke [x] Sleep apnea [] Other:   [] Vascular disease [] Rheumatic disease [] Other:     Tests: [] X-Ray: [x] MRI:   1. Grade 1 anterolisthesis of L5 on S1 secondary to bilateral L5   spondylolysis with resultant severe bilateral L5 neural foraminal narrowing. 2. Severe L5-S1 disc height loss and desiccation. 3. Mild L4-5 spinal canal stenosis and mild right lateral recess narrowing   secondary to a posterior disc protrusion.          [] Other:    Medications: [x] Refer to full medical record [] None [] Other:  Allergies:      [x] Refer to full medical record [] None [] Other:    Function:  Hand Dominance  [x] Right  [] Left  Working:  [x] Normal Duty  [] Light Duty  [] Off D/T Condition  [] Retired  [] Not Employed                  []  Disability  [] Other:           Return to work:   Job/ADL Description:  - working power lines, climbing, walking, lifting/carrying, reaching and digging     ADL/IADL Previous level of function Current level of function Who currently assists the patient with task   Bathing  [x] Independent  [] Assist [x] Independent  [] Assist    Dress/grooming [x] Independent  [] Assist [x] Independent  [] Assist    Transfer/mobility [x] Independent  [] Assist [x] Independent  [] Assist    Feeding [x] Independent  [] Assist [x] Independent  [] Assist    Toileting [x] Independent  [] Assist [x] Independent  [] Assist    Driving [x] Independent  [] Assist [x] Independent  [] Assist    Housekeeping [x] Independent  [] Assist [x] Independent  [] Assist    Grocery shop/meal prep [x] Independent  [] Assist [x] Independent  [] Assist      Gait Prior level of function Current level of function    [x] Independent  [] Assist [x] Independent  [] Assist   Device: [x] Independent [x] Independent    [] Straight Cane [] Quad cane [] Straight Cane [] Quad cane    [] Standard walker [] Rolling walker   [] 4 wheeled walker [] Standard walker [] Rolling walker   [] 4 wheeled walker    [] Wheelchair [] Wheelchair     Pain:  [x] Yes  [] No Location: (R) and (L) lumbar region with intermittent \"numbness\" within the LEs (L) worse than (R). Numbness tends to occur a couple of times a week and appears more with prolonged standing. Pain Rating: (0-10 scale) 3/10 currently, last 24 hours highest - 9/10, lowest - 1/10  Pain altered Tx:  [] Yes  [x] No  Action:    Symptoms:  [] Improving [x] Worsening [] Same  Better:  [x] AM    [] PM    [x] Sit    [] Rise/Sit    []Stand    [] Walk    [x] Lying    [x] Other: Heat  Worse: [] AM    [x] PM    [] Sit    [x] Rise/Sit    [x]Stand    [x] Walk    [] Lying    [x] Bend - No change with repeated flexion, extension or side bends to either side                            [] Valsalva    [] Other:  Sleep: [] OK    [x] Disturbed    Objective:      STRENGTH  STRENGTH  ROM    Left Right  Left Right Cervical    C5 Shld Abd   L1-2 Hip Flex 5/5 MMT 5/5 MMT Flexion    Shld Flexion   Hip Abd 5/5 MMT 5/5 MMT Extension    Shld IR   L3-4 Knee Ext 5/5 MMT 5/5 MMT Rotation L R   Shld ER   L4 Ankle DF 5/5 MMT 5/5 MMT Sidebend L R   C6 Elb Flex   L5 EHL 5/5 MMT 5/5 MMT Retraction    C7 Elb Ext   S1 Plant. Flex 5/5 MMT 5/5 MMT Lumbar    C8 EPL   Abdominals 3-/5 MMT 3-/5 MMT Flexion WNL   T1 Fing Abd   Erector Spinae 3-/5 MMT 3-/5 MMT Extension WNL         Rotation L WNL R  WNL         Sidebend L WNL R WNL         UE/LE WNL WNL                                                                TESTS (+/-) LEFT RIGHT Not Tested   SLR [] sit [] supine   [x]   Hamstring (SLR)   [x]   SKTC   [x]   DKTC   [x]   Slump/Dural   [x]   SI JT   [x]   EL   [x]   Joint Mobility   [x]   Cerv. Comp   [x]   Cerv. Distraction   [x]   Cerv. Alar/Transverse   [x]   Vertebral Artery   f[x]   Adsons   [x]   Kb Cutter   [x]   Samuel Tests ? Pain ?  Pain No Change Not Tested   RFIS [] [] [x] []   KALA [] [] [x] []   RFIL [] [] [x] []   REIL [] [] [x] []   Rep Prot [] [] [] []   Rep Retract [] [] [] []       OBSERVATION No Deficit Deficit Not Tested Comments   Posture       Forward Head [] [x] []    Rounded Shoulders [] [x] []    Kyphosis [] [x] []    Lordosis [] [] []    Lateral Shift [x] [] []    Scoliosis [x] [] []    Iliac Crest [x] [] []    PSIS [x] [] []    ASIS [x] [] []    Genu Valgus [x] [] []    Genu Varus [x] [] []    Genu Recurvatum [x] [] []    Pronation [x] [] []    Supination [x] [] []    Leg Length Discrp [x] [] []    Slumped Sitting [x] [] []    Palpation [x] [] [] No tenderness to the touch was noted throughout the lumbar region. Sensation [] [x] [] L2-S1 with light touch (R) = (L)    Edema [] [] []    Neurological [] [] []      FUNCTION Normal Difficult Unable   Sitting [x] [] []   Standing [] [x] []   Ambulation [] [x] []   Groom/Dress [x] [] []   Lift/Carry [] [x] []   Stairs [] [x] []   Bending [] [x] []   OH reach [] [x] []   Sit to Stand [] [x] []     Comments: Quad tightness was apparent @ 110 degrees of knee flexion on the (R) and 100 degrees of knee flexion on the (L). Assessment:   Patient presented with moderate/severe irritability within the lumbar region with \"intermittent\" numbness and tingling within the LEs to the toes. Lumbar motion(s) intact along with (B) LE strength. Core weakness was apparent within the anterior and posterior chain(s). Patient would continue to benefit from skilled physical therapy services in order to further address this issues. Patient works out of town and comes back into town each Friday. Therefore, the patient was provided a written home exercise program that included low back/LE stretches along with core strengthening exercises. Problems  [x] ? Back Pain: 1-8/10       [x] ? Strength: Core weakness within the anterior and posterior chain(s)  [x] ?  Function: Modified Oswestry Disability Index = Instruction in HEP  [] Lumbar/Cervical Traction  [id number]   [] Aquatic Therapy   [id number] [] Cold/hotpack    [] Massage   [id number]      [] Dry Needling, 1 or 2 muscles  70023   [] Biofeedback, first 15 minutes   15786  [] Biofeedback, additional 15 minutes   64402 [] Dry Needling, 3 or more muscles  23855       []  Medication allergies reviewed for use of    Dexamethasone Sodium Phosphate 4mg/ml     with iontophoresis treatments. Pt is not allergic. Frequency: Every 3-4 weeks for 6 visits (Patient works out of town all week. Plan to perform periodic \"check-ins\" with patient regarding his home exercise program)     Todays Treatment:  Modalities:   Precautions:  Exercises:  Exercise Reps/ Time Weight/ Level Comments                                 Other:    Specific Instructions for next treatment: Review home exercise program.    Treatment Charges: Mins Units   [x] Evaluation       []  Low       [x]  Moderate       []  High 45 1   []  Modalities     [x]  Ther Exercise 15 1   []  Manual Therapy     []  Ther Activities     []  Aquatics     []  Neuromuscular     []  Gait Training     []  Dry Needling           1-2 muscles     []  Dry Needling           3 or more muscles     [] Vasocompression     []  Other       TOTAL TREATMENT TIME: 60    Time in: 1600    Time out: 1700    Electronically signed by: Ivy Serrano PT DPT    Physician Signature:________________________________Date:__________________  By signing above or cosigning this note, I have reviewed this plan of care and certify a need for medically necessary rehabilitation services.      *PLEASE SIGN ABOVE AND FAX BACK ALL PAGES*

## 2021-12-13 NOTE — PROGRESS NOTES
[facility]  Patient Pain Assessment  Dr. Johanna Jaeger Consultation/ Follow Up     Primary Care Physician: Sara Siddiqi MD    Chief complaint:   Chief Complaint   Patient presents with    Lower Back Pain     down both legs   . HISTORY OF PRESENT ILLNESS:  Prince Flowers is 42 y.o. male with    HPI  This is a 76-year-old man with history of chronic low back pain  Onset of symptom more than 10 years ago  Pain is located in the lower lumbar area in the midline  Pain aggravates with the excessive activity particularly forward bending lifting  He report intermittent radiation of pain down both legs with intermittent leg numbness particularly right side  Denies any loss of bladder or bowel control  Had therapy evaluation and did home exercise therapy program without any improvement in symptoms  Patient had a recent MRI lumbar spine  Report is available in epic  He was recently evaluated by the neurosurgery and is referred for consideration of spine intervention and is not advised for any surgery at this time    Describes the pain as aching throbbing sensation in the back that becomes sharp with excessive activity  Burning pain numbness and tingling in legs  Pain intensity fluctuates between 1-10  Patient of tried NSAIDs without any relief  No previous lumbar spine injection history  No previous lumbar spine surgical history     RX Monitoring 11/4/2016   Attestation The Prescription Monitoring Report for this patient was reviewed today. Periodic Controlled Substance Monitoring No signs of potential drug abuse or diversion identified.      Vitals:    12/13/21 1358   BP: 129/74   Pulse: 80   Resp: 16   Temp: 98.2 °F (36.8 °C)   SpO2: 97%     Current Pain Assessment        ADVERSE MEDICATION EFFECTS:   Constipation: no  Bowel Regimen: No:   Diet: common adult  Appetite:  ok  Sedation:  not applicable  Urinary Retention: not applicable    FOCUSED PAIN SCALE:  Highest : 10  Lowest :1  Average: Range-8-10  When and What  was your last procedure:      Was your procedureeffective:  not applicable    ACTIVITY/SOCIAL/EMOTIONAL:  Sleep Pattern: 8 hours per night. generally restful sleep  Energy Level: Normal  Currently attending Physical Therapy:  Physical therapist gave exercises to do  Chiropractic Treatments: Yes over the summer and helped moderately  Home Exercises: daily stretches  Mobility: Increased walking, standing, and physical activity  Currently seeing a Psychiatrist or Psychologist:  No  Emotional Issues: normal   Mood: appropriate     ABERRANT BEHAVIORS SINCE LAST VISIT:  Have you ever been treated in another Pain Clinic no  Refills for prescriptions appropriate: not applicable  Lost rx/pills: not applicable  Taking more medication than prescribed:  not applicable  Are you receiving PAIN medications from  other doctors: no  Last Urine/Serum Drug Screen :  Was Serum/UDS as anticipated? Will collect if needed  Brought pill bottles in :not applicable   Was Pill count appropriate? :not applicable   Are currently pregnant? not applicable  Recent ER visits: No      Previous management history:   Previous diagnostic workup: MRI  10/5/21   Previous Medications tried:  - NSAID's: Yes just finished prednisone and takes Aleve and Mobic  - Neurontin: No  - Lyrica :No  - Trycyclic antidepressant:(Ellavil / Pamelor):  No   - Cymbalta:No  - Opioids (Ultram / Vicodin / Rudell Rickers / Morphine / Dilaudid / Oramorph/ Fentanyl etc.):Had an addiction 2 years ago to opioids from rheumatologist.           Past Medical History      Diagnosis Date    Asthma     COPD (chronic obstructive pulmonary disease) ([facility] [address] 11/19/2018    Depression     Erectile dysfunction 9/29/2017    Essential hypertension 1/2/2017    GERD (gastroesophageal reflux disease) 11/19/2018    Heart palpitations 9/29/2017    Hyperlipidemia with target LDL less than 100 3/23/2017    Left lower lobe pneumonia 11/9/2012    Mitral valve prolapse     Nonspecific reactive hepatitis 3/22/2017    Obesity (BMI 30-39.9) 3/23/2017    NOEMÍ (obstructive sleep apnea) 10/16/2019    Osteoarthritis     Seronegative polyarthritis 10/10/2016    followed w/ rheumatology in [address] [place] [address] anxiety disorder     Uvulitis 8/15/2019    Vertebrogenic low back pain 12/13/2021       Surgical History  Past Surgical History:   Procedure Laterality Date    COLONOSCOPY      CYST REMOVAL Right     Armpit     ENDOSCOPY, COLON, DIAGNOSTIC      HAND SURGERY Left     KNEE ARTHROSCOPY      right knee    NOSE SURGERY      TONSILLECTOMY      UPPER GASTROINTESTINAL ENDOSCOPY N/A 10/9/2019    EGD POLYP SNARE, HOT.  ESOPHAGEAL DILATATION WITH MALONIES 50F performed by Nani Coto MD at [facility]         Medications  Current Outpatient Medications   Medication Sig Dispense Refill    DULERA 100-5 MCG/ACT inhaler inhale 2 puffs by mouth and INTO THE LUNGS twice a day STOP FLOVENT 13 g 3    predniSONE (DELTASONE) 2.5 MG tablet Take 1 tablet by mouth daily      meloxicam (MOBIC) 15 MG tablet Take 1 tablet by mouth daily 30 tablet 0    pravastatin (PRAVACHOL) 20 MG tablet take 1 tablet by mouth every evening **STOP ATORVASTATIN** 90 tablet 3    amLODIPine (NORVASC) 10 MG tablet take 1 tablet by mouth every morning 90 tablet 3    buPROPion (WELLBUTRIN XL) 150 MG extended release tablet Take 1 tablet by mouth every morning 90 tablet 3    busPIRone (BUSPAR) 10 MG tablet Take 1 tablet by mouth 3 times daily as needed (anxiety) 270 tablet 3    loratadine (CLARITIN) 10 MG tablet Take 1 tablet by mouth daily 90 tablet 3    omeprazole (PRILOSEC) 40 MG delayed release capsule TAKE 1 CAPSULE DAILY 90 capsule 3    albuterol (PROVENTIL) (2.5 MG/3ML) 0.083% nebulizer solution Take 3 mLs by nebulization every 6 hours as needed for Wheezing or Shortness of Breath 120 vial 0    Respiratory Therapy Supplies (NEBULIZER) MADHAV 1 Units by Does not apply route 2 per Week: Not on file    Minutes of Exercise per Session: Not on file   Stress:     Feeling of Stress : Not on file   Social Connections:     Frequency of Communication with Friends and Family: Not on file    Frequency of Social Gatherings with Friends and Family: Not on file    Attends Roman [employer] Services: Not on file    Active Member of [address] [place] [address] [employer] Control or Organizations: Not on file    Attends Club or Organization Meetings: Not on file    Marital Status: Not on file   Intimate Partner Violence:     Fear of Current or Ex-Partner: Not on file    Emotionally Abused: Not on file    Physically Abused: Not on file    Sexually Abused: Not on file   Housing Stability:     Unable to Pay for Housing in the Last Year: Not on file    Number of [place] in the Last Year: Not on file    Unstable Housing in the Last Year: Not on file      reports no history of drug use. REVIEW OF SYSTEMS:  Review of Systems   Constitutional: Negative. Negative for fever. HENT: Negative. Eyes: Negative. Respiratory: Positive for shortness of breath. Cardiovascular: Positive for leg swelling. Gastrointestinal: Negative. Endocrine: Negative. Genitourinary: Negative. Musculoskeletal: Positive for back pain. Skin: Negative. Allergic/Immunologic: Negative. Neurological: Positive for weakness and numbness. Both legs   Hematological: Negative. Psychiatric/Behavioral: Negative. Objective:  General Appearance:  Uncomfortable and in pain. Vital signs: (most recent): Blood pressure 129/74, pulse 80, temperature 98.2 °F (36.8 °C), temperature source Oral, resp. rate 16, height 6' 2\" (1.88 m), weight 260 lb (117.9 kg), SpO2 97 %. Vital signs are normal.  No fever. Output: Producing urine and producing stool. HEENT: Normal HEENT exam.    Lungs:  Normal effort and normal respiratory rate. Breath sounds clear to auscultation. He is not in respiratory distress.   No decreased breath sounds. Heart: Normal rate. Regular rhythm. Abdomen: Abdomen is soft. There is no abdominal tenderness. Extremities: Normal range of motion. There is no deformity. Neurological: Patient is alert and oriented to person, place and time. Normal strength. Patient has normal reflexes, normal muscle tone and normal coordination. Pupils:  Pupils are equal, round, and reactive to light. Pupils are equal.   Skin:  Warm and dry. No rash or cyanosis. Lumbar spine examination  No apparent deformity on inspection  Range of motion is preserved  Tenderness to palpation in the midline in the lower lumbar area  Gait is stable        Assessment & Plan     This is a 58-year-old man with history of chronic low back pain  Onset of symptom more than 10 years ago  Pain is located in the lower lumbar area in the midline  Pain aggravates with the excessive activity particularly forward bending lifting  He report intermittent radiation of pain down both legs with intermittent leg numbness particularly right side  Denies any loss of bladder or bowel control  Had therapy evaluation and did home exercise therapy program without any improvement in symptoms  Patient had a recent MRI lumbar spine  Report is available in epic  He was recently evaluated by the neurosurgery and is referred for consideration of spine intervention and is not advised for any surgery at this time    Describes the pain as aching throbbing sensation in the back that becomes sharp with excessive activity  Burning pain numbness and tingling in legs  Pain intensity fluctuates between 1-10  Patient of tried NSAIDs without any relief  No previous lumbar spine injection history  No previous lumbar spine surgical history  EXAMINATION: MRI OF THE LUMBAR SPINE WITHOUT CONTRAST, 10/5/2021 6:02 pm    Impression 1. Grade 1 anterolisthesis of L5 on S1 secondary to bilateral L5 spondylolysis with resultant severe bilateral L5 neural foraminal narrowing.  2. Severe L5-S1 disc height loss and desiccation. 3. Mild L4-5 spinal canal stenosis and mild right lateral recess narrowing secondary to a posterior disc protrusion. 1. Degenerative lumbar spinal stenosis    2. Vertebrogenic low back pain    3. Chronic bilateral low back pain with bilateral sciatica      MRI lumbar spine  Report is reviewed  Images reviewed independently  Images were shown to the patient  Pertinent finding discussed in detail with patient  Severe degenerative disc changes at L5-S1 with severe loss of disc height  Modic changes involving endplates of L5 and S1 vertebrae  Grade 1 spondylolisthesis  Facet arthropathy and edema      Patient is presenting with worsening chronic low back pain associated with intermittent leg pain and numbness  MRI showed spinal stenosis with bilateral foraminal narrowing at L5-S1 and grade 1 spondylolisthesis    Pain is severe affecting quality of life    I will obtain plain x-ray lumbar spine flexion-extension film  We will schedule for lumbar epidural steroid injection as advised by neurosurgeon    If dynamic instability is ruled out then for his chronic midline axial back pain that has been refractory to NSAIDs and therapy and affecting quality of life he will be a great candidate for basivertebral nerve ablation    Plan discussed with patient  He voiced understanding and wished to proceed    Consultation note sent to the referring physician  Orders Placed This Encounter   Procedures    XR LUMBAR SPINE FLEXION AND EXTENSION ONLY     Standing Status:   Future     Standing Expiration Date:   12/13/2022    UT NJX DX/THER SBST INTRLMNR CRV/THRC W/IMG GDN     LUMBAR CHERI L5/S1 INTERLMAINAR     Standing Status:   Future     Standing Expiration Date:   3/13/2022      No orders of the defined types were placed in this encounter. This note was created using voice recognition software.  There may be inaccuracies of transcription  that are inadvertently overlooked prior to the signature. There is anyquestions about the transcription please contact me.

## 2021-12-22 ENCOUNTER — OFFICE VISIT (OUTPATIENT)
Dept: NEUROLOGY | Age: 41
End: 2021-12-22
Payer: COMMERCIAL

## 2021-12-22 ENCOUNTER — HOSPITAL ENCOUNTER (OUTPATIENT)
Dept: PAIN MANAGEMENT | Age: 41
Discharge: HOME | End: 2021-12-22
Payer: COMMERCIAL

## 2021-12-22 ENCOUNTER — HOSPITAL ENCOUNTER (OUTPATIENT)
Dept: GENERAL RADIOLOGY | Age: 41
Discharge: HOME | End: 2021-12-24
Payer: COMMERCIAL

## 2021-12-22 VITALS
DIASTOLIC BLOOD PRESSURE: 74 MMHG | OXYGEN SATURATION: 97 % | BODY MASS INDEX: 33.37 KG/M2 | HEIGHT: 74 IN | WEIGHT: 260 LBS | HEART RATE: 76 BPM | SYSTOLIC BLOOD PRESSURE: 132 MMHG

## 2021-12-22 VITALS
RESPIRATION RATE: 20 BRPM | TEMPERATURE: 97.8 F | SYSTOLIC BLOOD PRESSURE: 137 MMHG | OXYGEN SATURATION: 98 % | DIASTOLIC BLOOD PRESSURE: 88 MMHG | HEART RATE: 74 BPM

## 2021-12-22 DIAGNOSIS — M51.36 DDD (DEGENERATIVE DISC DISEASE), LUMBAR: Chronic | ICD-10-CM

## 2021-12-22 DIAGNOSIS — R52 PAIN: ICD-10-CM

## 2021-12-22 DIAGNOSIS — M54.40 LOW BACK PAIN WITH SCIATICA, SCIATICA LATERALITY UNSPECIFIED, UNSPECIFIED BACK PAIN LATERALITY, UNSPECIFIED CHRONICITY: Primary | ICD-10-CM

## 2021-12-22 PROBLEM — M51.369 DDD (DEGENERATIVE DISC DISEASE), LUMBAR: Chronic | Status: ACTIVE | Noted: 2021-12-22

## 2021-12-22 PROCEDURE — 3209999900 FLUORO FOR SURGICAL PROCEDURES

## 2021-12-22 PROCEDURE — 6360000002 HC RX W HCPCS: Performed by: ANESTHESIOLOGY

## 2021-12-22 PROCEDURE — 62323 NJX INTERLAMINAR LMBR/SAC: CPT

## 2021-12-22 PROCEDURE — 99213 OFFICE O/P EST LOW 20 MIN: CPT | Performed by: STUDENT IN AN ORGANIZED HEALTH CARE EDUCATION/TRAINING PROGRAM

## 2021-12-22 PROCEDURE — 62323 NJX INTERLAMINAR LMBR/SAC: CPT | Performed by: ANESTHESIOLOGY

## 2021-12-22 PROCEDURE — G8417 CALC BMI ABV UP PARAM F/U: HCPCS | Performed by: STUDENT IN AN ORGANIZED HEALTH CARE EDUCATION/TRAINING PROGRAM

## 2021-12-22 PROCEDURE — G8427 DOCREV CUR MEDS BY ELIG CLIN: HCPCS | Performed by: STUDENT IN AN ORGANIZED HEALTH CARE EDUCATION/TRAINING PROGRAM

## 2021-12-22 PROCEDURE — 1036F TOBACCO NON-USER: CPT | Performed by: STUDENT IN AN ORGANIZED HEALTH CARE EDUCATION/TRAINING PROGRAM

## 2021-12-22 PROCEDURE — G8484 FLU IMMUNIZE NO ADMIN: HCPCS | Performed by: STUDENT IN AN ORGANIZED HEALTH CARE EDUCATION/TRAINING PROGRAM

## 2021-12-22 RX ORDER — DEXAMETHASONE SODIUM PHOSPHATE 10 MG/ML
INJECTION, SOLUTION INTRAMUSCULAR; INTRAVENOUS
Status: COMPLETED | OUTPATIENT
Start: 2021-12-22 | End: 2021-12-22

## 2021-12-22 RX ORDER — MIDAZOLAM HYDROCHLORIDE 1 MG/ML
INJECTION INTRAMUSCULAR; INTRAVENOUS
Status: COMPLETED | OUTPATIENT
Start: 2021-12-22 | End: 2021-12-22

## 2021-12-22 RX ORDER — FENTANYL CITRATE 50 UG/ML
INJECTION, SOLUTION INTRAMUSCULAR; INTRAVENOUS
Status: COMPLETED | OUTPATIENT
Start: 2021-12-22 | End: 2021-12-22

## 2021-12-22 RX ADMIN — DEXAMETHASONE SODIUM PHOSPHATE 10 MG: 10 INJECTION, SOLUTION INTRAMUSCULAR; INTRAVENOUS at 09:47

## 2021-12-22 RX ADMIN — Medication 50 MCG: at 09:45

## 2021-12-22 RX ADMIN — MIDAZOLAM 2 MG: 1 INJECTION INTRAMUSCULAR; INTRAVENOUS at 09:44

## 2021-12-22 ASSESSMENT — PAIN DESCRIPTION - PROGRESSION: CLINICAL_PROGRESSION: GRADUALLY WORSENING

## 2021-12-22 ASSESSMENT — PAIN DESCRIPTION - DIRECTION: RADIATING_TOWARDS: LEGS BILATERAL

## 2021-12-22 ASSESSMENT — PAIN DESCRIPTION - FREQUENCY: FREQUENCY: INTERMITTENT

## 2021-12-22 ASSESSMENT — PAIN - FUNCTIONAL ASSESSMENT
PAIN_FUNCTIONAL_ASSESSMENT: PREVENTS OR INTERFERES SOME ACTIVE ACTIVITIES AND ADLS
PAIN_FUNCTIONAL_ASSESSMENT: 0-10

## 2021-12-22 ASSESSMENT — PAIN DESCRIPTION - LOCATION: LOCATION: BACK

## 2021-12-22 ASSESSMENT — PAIN DESCRIPTION - ONSET: ONSET: ON-GOING

## 2021-12-22 ASSESSMENT — PAIN DESCRIPTION - ORIENTATION: ORIENTATION: LOWER;RIGHT;LEFT

## 2021-12-22 ASSESSMENT — PAIN DESCRIPTION - PAIN TYPE: TYPE: CHRONIC PAIN

## 2021-12-22 NOTE — H&P
UPDATE:  Office visit pain clinic in [facility] with all required elements of H&P dated 12/16/2021  Patient seen preop, chart reviewed, AAO x 3, in NAD, VSS,. No changes in medical history, physical exam and health assessment since last evaluation. Risk / Benefits explained to patient, patient agree to proceed with plan.   ASA 2  MP 3

## 2021-12-22 NOTE — PROGRESS NOTES
Discharge criteria met. Post procedure dressing dry and intact. Sensory and motor function intact as per pre-procedure. Patient alert and oriented x3  Instructions and follow up reviewed with pt at patient at discharge. Discharged home . Taken to car via wheelchair with family driving  Discharged @2442

## 2021-12-22 NOTE — PATIENT INSTRUCTIONS
-Keep up with physical therapy  -Keep following the pain management clinic  -Losing weight is important for back pain  -Avoid carrying heavy objects  -Follow-up as needed

## 2021-12-22 NOTE — PROGRESS NOTES
[address] [place] [address], [address] [id number]  Dept: [phone]  Dept Fax: [phone]    NEUROLOGY FOLLOW UP PATIENT NOTE       PATIENT NAME: Zion Barros  PATIENT MRN: [id number]  PRIMARY CARE PHYSICIAN: Omar Barkley MD      HPI:      Zion Barros is a 42 y.o. male presents on 10/3/2021 with a left lower extremity weakness, Covid positive, NIH of 1 mild left lower extremity drift, noted to have 4/5 strength of left lower extremity, denies any history of seizure stroke or TIA, patient has history of hypertension hyperlipidemia, on pravastatin 20 mg, A1c: 5.5, LDL: 81,  -CT head no acute or chronic normality, CTA: No LVO,    -Patient was outside of the TPA window, however loaded with aspirin and Plavix, admitted to internal medicine due to Covid positive, treat with remdesivir Decadron, for MRI brain W0 contrast: No acute abnormality,  MRI lumbar spine W0: Grade 1 anterolisthesis on L5 on S1 secondary to bilateral L5 spondylolysis with episodes severe bilateral L5 neuroforaminal narrowing, L4-L5 spinal canal stenosis and mild right lateral narrowing secondary to posterior disc protrusion    -Patient has been recommended outpatient EMG, per neurosurgery note: No neurosurgical intervention needed at this time,        PREVIOUS WORKUP:     Lab Results   Component Value Date    WBC 12.7 (H) 10/07/2021    HGB 14.7 10/07/2021    HCT 45.4 10/07/2021    MCV 92.7 10/07/2021     10/07/2021       Past Medical History:   Diagnosis Date    Asthma     COPD (chronic obstructive pulmonary disease) ([facility] [address] 11/19/2018    Depression     Erectile dysfunction 9/29/2017    Essential hypertension 1/2/2017    GERD (gastroesophageal reflux disease) 11/19/2018    Heart palpitations 9/29/2017    Hyperlipidemia with target LDL less than 100 3/23/2017    Left lower lobe pneumonia 11/9/2012    Mitral valve prolapse     Nonspecific reactive hepatitis 3/22/2017  Obesity (BMI 30-39.9) 3/23/2017    NOEMÍ (obstructive sleep apnea) 10/16/2019    Osteoarthritis     Seronegative polyarthritis 10/10/2016    followed w/ rheumatology in [address] [place] [address] anxiety disorder     Uvulitis 8/15/2019    Vertebrogenic low back pain 12/13/2021        Past Surgical History:   Procedure Laterality Date    COLONOSCOPY      CYST REMOVAL Right     Armpit     ENDOSCOPY, COLON, DIAGNOSTIC      HAND SURGERY Left     KNEE ARTHROSCOPY      right knee    NOSE SURGERY      TONSILLECTOMY      UPPER GASTROINTESTINAL ENDOSCOPY N/A 10/9/2019    EGD POLYP SNARE, HOT. ESOPHAGEAL DILATATION WITH Denia How 49F performed by Deyvi Rodney MD at [facility] History     Socioeconomic History    Marital status:      Spouse name: Not on file    Number of children: Not on file    Years of education: Not on file    Highest education level: Not on file   Occupational History    Not on file   Tobacco Use    Smoking status: Never Smoker    Smokeless tobacco: Never Used   Vaping Use    Vaping Use: Never used   Substance and Sexual Activity    Alcohol use: Yes     Comment: occasional  few x year    Drug use: No    Sexual activity: Yes     Partners: Male   Other Topics Concern    Not on file   Social History Narrative    Not on file     Social Determinants of Health     Financial Resource Strain: Low Risk     Difficulty of Paying Living Expenses: Not hard at all   Food Insecurity: No Food Insecurity    Worried About [address] in the Last Year: Never true    [address] in the Last Year: Never true   Transportation Needs:     Lack of Transportation (Medical): Not on file    Lack of Transportation (Non-Medical):  Not on file   Physical Activity:     Days of Exercise per Week: Not on file    Minutes of Exercise per Session: Not on file   Stress:     Feeling of Stress : Not on file   Social Connections:     Frequency of Communication with Friends and Family: Not on file    Frequency of Social Gatherings with Friends and Family: Not on file    Attends Episcopal Services: Not on file    Active Member of Clubs or Organizations: Not on file    Attends Club or Organization Meetings: Not on file    Marital Status: Not on file   Intimate Partner Violence:     Fear of Current or Ex-Partner: Not on file    Emotionally Abused: Not on file    Physically Abused: Not on file    Sexually Abused: Not on file   Housing Stability:     Unable to Pay for Housing in the Last Year: Not on file    Number of [place] in the Last Year: Not on file    Unstable Housing in the Last Year: Not on file        Current Outpatient Medications   Medication Sig Dispense Refill    DULERA 100-5 MCG/ACT inhaler inhale 2 puffs by mouth and INTO THE LUNGS twice a day STOP FLOVENT 13 g 3    predniSONE (DELTASONE) 2.5 MG tablet Take 1 tablet by mouth daily      meloxicam (MOBIC) 15 MG tablet Take 1 tablet by mouth daily 30 tablet 0    pravastatin (PRAVACHOL) 20 MG tablet take 1 tablet by mouth every evening **STOP ATORVASTATIN** 90 tablet 3    amLODIPine (NORVASC) 10 MG tablet take 1 tablet by mouth every morning 90 tablet 3    buPROPion (WELLBUTRIN XL) 150 MG extended release tablet Take 1 tablet by mouth every morning 90 tablet 3    busPIRone (BUSPAR) 10 MG tablet Take 1 tablet by mouth 3 times daily as needed (anxiety) 270 tablet 3    loratadine (CLARITIN) 10 MG tablet Take 1 tablet by mouth daily 90 tablet 3    omeprazole (PRILOSEC) 40 MG delayed release capsule TAKE 1 CAPSULE DAILY 90 capsule 3    fluticasone (FLONASE) 50 MCG/ACT nasal spray 2 sprays by Nasal route daily 1 Bottle 3    albuterol (PROVENTIL) (2.5 MG/3ML) 0.083% nebulizer solution Take 3 mLs by nebulization every 6 hours as needed for Wheezing or Shortness of Breath 120 vial 0    Respiratory Therapy Supplies (NEBULIZER) MADHAV 1 Units by Does not apply route 2 times daily Dx: Asthma exacerbation J45.901 1 Device 0    celecoxib (CELEBREX) 100 MG capsule Take 1 capsule by mouth daily as needed for Pain (Patient not taking: Reported on 11/17/2021) 30 capsule 3    Blood Pressure KIT Dx: HTN. Needs automatic blood pressure machine to monitor his blood pressure. (Patient not taking: Reported on 6/23/2021) 1 kit 0     No current facility-administered medications for this visit. Allergies   Allergen Reactions    Fish-Derived Products Anaphylaxis    Other Anaphylaxis     Any type of seafood    Shellfish Allergy Anaphylaxis    Shrimp (Diagnostic) Anaphylaxis    Ace Inhibitors Swelling     UVULITIS ON 8/15/19        REVIEW OF SYSTEMS:     Review of Systems     VITALS  /74   Pulse 76   Ht 6' 2\" (1.88 m)   Wt 260 lb (117.9 kg)   SpO2 97%   BMI 33.38 kg/m²      PHYSICAL EXAMINATION:     Physical Exam   General appearance: cooperative  Skin: no rash or skin lesions. HEENT: normocephalic  Optic Fundi: deferred  Neck: supple, no cervcical adenopathy or carotid bruit  Lungs: clear to auscultation  Heart: Regular rate and rhythm, normal S1, S2. No murmurs, clicks or gallops.   Peripheral pulses: radial pulses palpable  Abdominal: BS present, soft, NT, ND  Extremities: no edema    NEUROLOGICAL EXAMINATION:     GENERAL  Appears comfortable and in no distress   HEENT  NC/ AT   HEART  S1 and S2 heard; palpation of pulses: radial pulse    NECK  Supple and no bruits heard   MENTAL STATUS:  Alert, oriented, intact memory, no confusion, normal speech, normal language, no hallucination or delusion   CRANIAL NERVES: II     -      Visual fields intact to confrontation  III,IV,VI -  PERR, EOMs full, no ptosis  V     -     Normal facial sensation   VII    -     Normal facial symmetry  VIII   -     Intact hearing   IX,X -     Symmetrical palate  XI    -     Symmetrical shoulder shrug  XII   -     Midline tongue, no atrophy    MOTOR FUNCTION: RUE: Significant for good strength of grade 5/5 in proximal and distal muscle groups   LUE: Significant for good strength of grade 5/5 in proximal and distal muscle groups   RLE: Significant for good strength of grade 5/5 in proximal and distal muscle groups   LLE: Significant for good strength of grade 5/5 in proximal and distal muscle groups      Normal bulk, normal tone and no involuntary movements, no tremor   SENSORY FUNCTION:  Normal touch, normal pin, normal vibration, normal proprioception   CEREBELLAR FUNCTION:  Intact fine motor control over upper limbs and lower limbs   REFLEX FUNCTION:  Symmetric in upper and lower extremities, no Babinski sign   STATION and GAIT  Normal gait and tandem station, normal tip toes and heel walking       ASSESSMENT:     MRI lumbar spine W0: Grade 1 anterolisthesis on L5 on S1 secondary to bilateral L5 spondylolysis with episodes severe bilateral L5 neuroforaminal narrowing, L4-L5 spinal canal stenosis and mild right lateral narrowing secondary to posterior disc protrusion  Followed up with neurosurgery: No intervention needed    PLAN:     -Keep up with physical therapy  -Keep following the pain management clinic  -Losing weight is important for back pain  -Avoid carrying heavy objects  -Follow-up as needed      Mr. Suni Montero received counseling on the following healthy behaviors: medical compliance, smoking cessation, blood pressure control, regular follow up with primary doctor.         Electronically signed by Ban Valdovinos MD on 12/22/2021 at 2:02 PM

## 2021-12-29 ENCOUNTER — TELEPHONE (OUTPATIENT)
Dept: PAIN MANAGEMENT | Age: 41
End: 2021-12-29

## 2021-12-29 DIAGNOSIS — M54.51 VERTEBROGENIC LOW BACK PAIN: ICD-10-CM

## 2021-12-29 DIAGNOSIS — M51.36 DDD (DEGENERATIVE DISC DISEASE), LUMBAR: ICD-10-CM

## 2022-01-07 ENCOUNTER — HOSPITAL ENCOUNTER (OUTPATIENT)
Dept: PHYSICAL THERAPY | Age: 42
Setting detail: THERAPIES SERIES
Discharge: HOME | End: 2022-01-07
Payer: COMMERCIAL

## 2022-01-07 ENCOUNTER — HOSPITAL ENCOUNTER (OUTPATIENT)
Dept: PAIN MANAGEMENT | Age: 42
Discharge: HOME | End: 2022-01-07
Payer: COMMERCIAL

## 2022-01-07 VITALS
TEMPERATURE: 97.8 F | SYSTOLIC BLOOD PRESSURE: 152 MMHG | RESPIRATION RATE: 20 BRPM | HEIGHT: 74 IN | WEIGHT: 260 LBS | OXYGEN SATURATION: 98 % | DIASTOLIC BLOOD PRESSURE: 92 MMHG | HEART RATE: 93 BPM | BODY MASS INDEX: 33.37 KG/M2

## 2022-01-07 DIAGNOSIS — M54.16 LUMBAR RADICULOPATHY: Primary | ICD-10-CM

## 2022-01-07 DIAGNOSIS — M51.36 DDD (DEGENERATIVE DISC DISEASE), LUMBAR: ICD-10-CM

## 2022-01-07 DIAGNOSIS — M54.51 VERTEBROGENIC LOW BACK PAIN: ICD-10-CM

## 2022-01-07 PROCEDURE — 97110 THERAPEUTIC EXERCISES: CPT

## 2022-01-07 PROCEDURE — 99213 OFFICE O/P EST LOW 20 MIN: CPT | Performed by: NURSE PRACTITIONER

## 2022-01-07 PROCEDURE — 99213 OFFICE O/P EST LOW 20 MIN: CPT

## 2022-01-07 ASSESSMENT — ENCOUNTER SYMPTOMS
BACK PAIN: 1
CONSTIPATION: 0
COUGH: 0
SHORTNESS OF BREATH: 0

## 2022-01-07 NOTE — PROGRESS NOTES
Chief Complaint: back pain    [facility] Pt complains of back pain. MRI with Grade 1 anterolisthesis of L5 on S1 secondary to bilateral L5 spondylolysis with resultant severe bilateral L5 neural foraminal narrowing. Severe L5-S1 disc height loss and desiccation. He has seen a surgeon who recommended PT, mobic and pain management. He has had PT consult and given HEP - has not noticed much improvement. He reports episodes of loss of sensation in his legs about twice a month while at work. She is an  and works in electrified environment. He did mention this to the neurosurgeon as well. He has tried gabapentin in the past and this made his stomach upset so he d/c. He had LESI 12/22/21 and reports about 20% relief. Discussed options and he would like to try another LESI - he states the surgeon told him it may take more than one injection for him to notice relief. Back Pain  This is a chronic problem. The current episode started more than 1 year ago. The problem occurs constantly. The problem is unchanged. The pain is present in the lumbar spine. The quality of the pain is described as aching and shooting (throbbing). Radiates to: down both legs - L>R. The pain is at a severity of 7/10. The pain is moderate. The symptoms are aggravated by position and standing (walking). Associated symptoms include numbness, paresthesias and tingling. Pertinent negatives include no chest pain or fever. He has tried analgesics, bed rest, home exercises and heat for the symptoms. The treatment provided mild relief. Patient denies any new neurological symptoms. No bowel or bladder incontinence, no weakness, and no falling.     Past Medical History:   Diagnosis Date    Asthma     COPD (chronic obstructive pulmonary disease) ([facility] [address] 11/19/2018    Depression     Erectile dysfunction 9/29/2017    Essential hypertension 1/2/2017    GERD (gastroesophageal reflux disease) 11/19/2018    Heart palpitations 9/29/2017  Hyperlipidemia with target LDL less than 100 3/23/2017    Left lower lobe pneumonia 11/9/2012    Mitral valve prolapse     Nonspecific reactive hepatitis 3/22/2017    Obesity (BMI 30-39.9) 3/23/2017    NOEMÍ (obstructive sleep apnea) 10/16/2019    Osteoarthritis     Seronegative polyarthritis 10/10/2016    followed w/ rheumatology in [address] [place] [address] anxiety disorder     Uvulitis 8/15/2019    Vertebrogenic low back pain 12/13/2021       Past Surgical History:   Procedure Laterality Date    COLONOSCOPY      CYST REMOVAL Right     Armpit     ENDOSCOPY, COLON, DIAGNOSTIC      HAND SURGERY Left     KNEE ARTHROSCOPY      right knee    NOSE SURGERY      TONSILLECTOMY      UPPER GASTROINTESTINAL ENDOSCOPY N/A 10/9/2019    EGD POLYP SNARE, HOT.  ESOPHAGEAL DILATATION WITH MALONIES 49F performed by Lauren Cha MD at [facility]         Allergies   Allergen Reactions    Fish-Derived Products Anaphylaxis    Other Anaphylaxis     Any type of seafood    Shellfish Allergy Anaphylaxis    Shrimp (Diagnostic) Anaphylaxis    Ace Inhibitors Swelling     UVULITIS ON 8/15/19         Current Outpatient Medications:     DULERA 100-5 MCG/ACT inhaler, inhale 2 puffs by mouth and INTO THE LUNGS twice a day STOP FLOVENT, Disp: 13 g, Rfl: 3    predniSONE (DELTASONE) 2.5 MG tablet, Take 1 tablet by mouth daily, Disp: , Rfl:     meloxicam (MOBIC) 15 MG tablet, Take 1 tablet by mouth daily, Disp: 30 tablet, Rfl: 0    pravastatin (PRAVACHOL) 20 MG tablet, take 1 tablet by mouth every evening **STOP ATORVASTATIN**, Disp: 90 tablet, Rfl: 3    amLODIPine (NORVASC) 10 MG tablet, take 1 tablet by mouth every morning, Disp: 90 tablet, Rfl: 3    buPROPion (WELLBUTRIN XL) 150 MG extended release tablet, Take 1 tablet by mouth every morning, Disp: 90 tablet, Rfl: 3    busPIRone (BUSPAR) 10 MG tablet, Take 1 tablet by mouth 3 times daily as needed (anxiety), Disp: 270 tablet, Rfl: 3   loratadine (CLARITIN) 10 MG tablet, Take 1 tablet by mouth daily, Disp: 90 tablet, Rfl: 3    omeprazole (PRILOSEC) 40 MG delayed release capsule, TAKE 1 CAPSULE DAILY, Disp: 90 capsule, Rfl: 3    celecoxib (CELEBREX) 100 MG capsule, Take 1 capsule by mouth daily as needed for Pain, Disp: 30 capsule, Rfl: 3    fluticasone (FLONASE) 50 MCG/ACT nasal spray, 2 sprays by Nasal route daily, Disp: 1 Bottle, Rfl: 3    albuterol (PROVENTIL) (2.5 MG/3ML) 0.083% nebulizer solution, Take 3 mLs by nebulization every 6 hours as needed for Wheezing or Shortness of Breath, Disp: 120 vial, Rfl: 0    Blood Pressure KIT, Dx: HTN.  Needs automatic blood pressure machine to monitor his blood pressure., Disp: 1 kit, Rfl: 0    Respiratory Therapy Supplies (NEBULIZER) MADHAV, 1 Units by Does not apply route 2 times daily Dx: Asthma exacerbation J45.901, Disp: 1 Device, Rfl: 0    Family History   Problem Relation Age of Onset    Diabetes Father     High Cholesterol Father     Other Mother         autoimmune hepatitis     High Blood Pressure Brother     Heart Disease Brother     Heart Attack Brother 40        cabg age 40    Chavez Rheum Arthritis Other 7    Colon Cancer Neg Hx     Cancer Neg Hx        Social History     Socioeconomic History    Marital status:      Spouse name: Not on file    Number of children: Not on file    Years of education: Not on file    Highest education level: Not on file   Occupational History    Not on file   Tobacco Use    Smoking status: Never Smoker    Smokeless tobacco: Never Used   Vaping Use    Vaping Use: Never used   Substance and Sexual Activity    Alcohol use: Yes     Comment: occasional  few x year    Drug use: No    Sexual activity: Yes     Partners: Male   Other Topics Concern    Not on file   Social History Narrative    Not on file     Social Determinants of Health     Financial Resource Strain: Low Risk     Difficulty of Paying Living Expenses: Not hard at all   Food Insecurity: No Food Insecurity    Worried About Running Out of Food in the Last Year: Never true    Ran Out of Food in the Last Year: Never true   Transportation Needs:     Lack of Transportation (Medical): Not on file    Lack of Transportation (Non-Medical): Not on file   Physical Activity:     Days of Exercise per Week: Not on file    Minutes of Exercise per Session: Not on file   Stress:     Feeling of Stress : Not on file   Social Connections:     Frequency of Communication with Friends and Family: Not on file    Frequency of Social Gatherings with Friends and Family: Not on file    Attends Mormon Services: Not on file    Active Member of [address] [place] [address] [employer] or Organizations: Not on file    Attends Club or Organization Meetings: Not on file    Marital Status: Not on file   Intimate Partner Violence:     Fear of Current or Ex-Partner: Not on file    Emotionally Abused: Not on file    Physically Abused: Not on file    Sexually Abused: Not on file   Housing Stability:     Unable to Pay for Housing in the Last Year: Not on file    Number of [place] in the Last Year: Not on file    Unstable Housing in the Last Year: Not on file       Review of Systems:  Review of Systems   Constitutional: Negative for chills and fever. Cardiovascular: Negative for chest pain and palpitations. Respiratory: Negative for cough and shortness of breath. Musculoskeletal: Positive for back pain. Gastrointestinal: Negative for constipation. Neurological: Positive for numbness, paresthesias, sensory change and tingling. Negative for disturbances in coordination and loss of balance. Physical Exam:  BP (!) 152/92   Pulse 93   Temp 97.8 °F (36.6 °C) (Infrared)   Resp 20   Ht 6' 2\" (1.88 m)   Wt 260 lb (117.9 kg)   SpO2 98%   BMI 33.38 kg/m²     Physical Exam    Record/Diagnostics Review:    1.  Grade 1 anterolisthesis of L5 on S1 secondary to bilateral L5   spondylolysis with resultant severe bilateral L5 neural foraminal narrowing. 2. Severe L5-S1 disc height loss and desiccation. 3. Mild L4-5 spinal canal stenosis and mild right lateral recess narrowing   secondary to a posterior disc protrusion.             Assessment:  Problem List Items Addressed This Visit     Vertebrogenic low back pain (Chronic)    Relevant Orders    Lumbar Epidural Steroid Injection/Caudal    Saline lock IV    FLUORO FOR SURGICAL PROCEDURES    DDD (degenerative disc disease), lumbar (Chronic)    Relevant Orders    Lumbar Epidural Steroid Injection/Caudal    Saline lock IV    FLUORO FOR SURGICAL PROCEDURES      Other Visit Diagnoses     Lumbar radiculopathy    -  Primary    Relevant Orders    Lumbar Epidural Steroid Injection/Caudal    Saline lock IV    FLUORO FOR SURGICAL PROCEDURES             Treatment Plan:  20% relief following LESI  Discussed returning to surgeon and patient states he does not want surgery.  He prefers to exhaust all conservative therapies first  Patient would to repeat KARL BARAJAS x1  Follow up after procedure

## 2022-01-07 NOTE — PROGRESS NOTES
[facility]   [address]   Phone: [phone]   Fax: [phone]    Physical Therapy Daily Treatment Note    Date:  2022  Patient: Lakesha Carbajal  : 1980   MRN: [id number]  Physician: Ziyad Mello DO              Medical Diagnosis:  Spondylolisthesis, lumbar region   Clinical Diagnosis: Low back pain (M54)  Onset date: 2021    Next Dr's appt.: 2022  PT Visit Information  PT Insurance Information: [address] [facility], [facility]  Total # of Visits Approved: 6  Total # of Visits to Date: 2  No Show: 0  Canceled Appointment: 0    Subjective  Patient stated that he went to pain management and had an injection since his last treatment session He had a follow up visit with them earlier today. The pain injection did not help as much as they thought it would. He is scheduled for another pain injection on 2022. In addition, his work schedule changed this week. He is now working 16 hrs x 6 days a week (was working 10 hrs x 5 days). Given this change, he stated that he has only been only to perform his home exercise program 3-4 x a week instead of daily. He has tried them before work and after work and prefers the morning because he is quite fatigued and sore following his shift. His pain levels have increased this week even at rest and the prone exercises provided are painful. Pain:  [x] Yes   [] No      Location:R) and (L) lumbar region with intermittent \"numbness\" within the LEs (L) worse than (R). Numbness tends to occur a couple of times a week and appears more with prolonged standing.    Pain Ratin/10   Worst: 9/10   Best: 7/10  Descriptors: Constant, achy   Other:     Objective  Modalities: Hot Pack with Supine Stretches  Precautions:   Exercises:  Exercise Reps/ Time Weight/ Level Comments   Supine Hook lying Posterior Pelvic Tilt  10 reps      Supine Hook lying Posterior Pelvic Tilt/Bridge 10 reps Passive Stretching  Supine Quad Stretch 30 sec hold x 3 reps with each leg   Seated Hamstring Stretch 30 sec hold x 3 reps   Seated Lumbar Flexion Stretch 30 sec hold x 3 reps   Side lying Thoracic Rotation with Open Book 30 sec hold x 3 reps with each side     Pt. Education:  [x] Yes  [] No  [x] Reviewed Prior HEP/Ed  Method of Education: [] Verbal  [] Demo  [] Written  HEP:   Comprehension of Education:  [] Verbalizes understanding. [] Demonstrates understanding. [] Needs review. [x] Demonstrates/verbalizes HEP/Ed previously given. Assessment  Modified and reviewed current home exercise program. Modified the hamstring stretch to a seated position and quad stretch to a supine position. Eliminated the strengthening exercises expect the posterior pelvic tilt and   posterior pelvic tilt with a bridge given his extended work hours. He felt these two exercises feel like they help while he does them. Goals   STG: (to be met in 6 treatments)  1. Patient will report an average pain level of a 1-4/10 within the lumbar region while at rest/ADLs. 2. Patient will demonstrate improved strength within all involved areas to further assist with (I) function without limitation. 3. Modified Oswestry Disability Index improved by 10% ([address])  4. Patient will demonstrate independence with his home exercise program.     Patient goals: To be able to work without pain and prolong surgery. Plan: [x] Continue per plan of care.    [] Other:      Treatment Charges: Mins Units   []  Modalities     [x]  Ther Exercise 45 3   []  Manual Therapy     []  Ther Activities     []  Aquatics     []  Neuromuscular     [] Vasocompression     [] Gait Training     [] Dry needling        [] 1 or 2 muscles        [] 3 or more muscles     []  Other     Total Treatment time 45 3     Time In: 9760            Time Out: 0246    Electronically signed by:  Ramírez Pinon PT  DPT

## 2022-01-20 ENCOUNTER — TELEPHONE (OUTPATIENT)
Dept: PAIN MANAGEMENT | Age: 42
End: 2022-01-20

## 2022-01-20 NOTE — TELEPHONE ENCOUNTER
[facility]. Attempted to review pre-procedure instructions. Voicemail message left reminding patient of appointment.

## 2022-01-24 ENCOUNTER — HOSPITAL ENCOUNTER (OUTPATIENT)
Dept: GENERAL RADIOLOGY | Age: 42
Discharge: HOME | End: 2022-01-26
Payer: COMMERCIAL

## 2022-01-24 ENCOUNTER — HOSPITAL ENCOUNTER (OUTPATIENT)
Dept: PAIN MANAGEMENT | Age: 42
Discharge: HOME | End: 2022-01-24
Payer: COMMERCIAL

## 2022-01-24 VITALS
WEIGHT: 260 LBS | RESPIRATION RATE: 20 BRPM | DIASTOLIC BLOOD PRESSURE: 70 MMHG | TEMPERATURE: 98.2 F | SYSTOLIC BLOOD PRESSURE: 132 MMHG | OXYGEN SATURATION: 98 % | BODY MASS INDEX: 33.37 KG/M2 | HEIGHT: 74 IN | HEART RATE: 82 BPM

## 2022-01-24 DIAGNOSIS — M51.36 DDD (DEGENERATIVE DISC DISEASE), LUMBAR: ICD-10-CM

## 2022-01-24 DIAGNOSIS — K21.00 GASTROESOPHAGEAL REFLUX DISEASE WITH ESOPHAGITIS WITHOUT HEMORRHAGE: ICD-10-CM

## 2022-01-24 DIAGNOSIS — M48.061 DEGENERATIVE LUMBAR SPINAL STENOSIS: ICD-10-CM

## 2022-01-24 DIAGNOSIS — M54.51 VERTEBROGENIC LOW BACK PAIN: ICD-10-CM

## 2022-01-24 DIAGNOSIS — M54.16 LUMBAR RADICULOPATHY: ICD-10-CM

## 2022-01-24 DIAGNOSIS — M54.16 LUMBAR RADICULOPATHY: Primary | ICD-10-CM

## 2022-01-24 PROCEDURE — 62323 NJX INTERLAMINAR LMBR/SAC: CPT

## 2022-01-24 PROCEDURE — 6360000002 HC RX W HCPCS: Performed by: PAIN MEDICINE

## 2022-01-24 PROCEDURE — 3209999900 FLUORO FOR SURGICAL PROCEDURES

## 2022-01-24 PROCEDURE — 62323 NJX INTERLAMINAR LMBR/SAC: CPT | Performed by: PAIN MEDICINE

## 2022-01-24 PROCEDURE — 6360000004 HC RX CONTRAST MEDICATION: Performed by: PAIN MEDICINE

## 2022-01-24 RX ORDER — MIDAZOLAM HYDROCHLORIDE 1 MG/ML
INJECTION INTRAMUSCULAR; INTRAVENOUS
Status: COMPLETED | OUTPATIENT
Start: 2022-01-24 | End: 2022-01-24

## 2022-01-24 RX ORDER — OMEPRAZOLE 40 MG/1
CAPSULE, DELAYED RELEASE ORAL
Qty: 90 CAPSULE | Refills: 3 | Status: SHIPPED | OUTPATIENT
Start: 2022-01-24 | End: 2022-01-25 | Stop reason: SDUPTHER

## 2022-01-24 RX ORDER — TRIAMCINOLONE ACETONIDE 40 MG/ML
INJECTION, SUSPENSION INTRA-ARTICULAR; INTRAMUSCULAR
Status: COMPLETED | OUTPATIENT
Start: 2022-01-24 | End: 2022-01-24

## 2022-01-24 RX ADMIN — MIDAZOLAM 2 MG: 1 INJECTION INTRAMUSCULAR; INTRAVENOUS at 09:01

## 2022-01-24 RX ADMIN — TRIAMCINOLONE ACETONIDE 80 MG: 40 INJECTION, SUSPENSION INTRA-ARTICULAR; INTRAMUSCULAR at 09:08

## 2022-01-24 RX ADMIN — IOHEXOL 2 ML: 180 INJECTION INTRAVENOUS at 09:08

## 2022-01-24 ASSESSMENT — PAIN DESCRIPTION - PAIN TYPE: TYPE: CHRONIC PAIN

## 2022-01-24 ASSESSMENT — PAIN DESCRIPTION - PROGRESSION: CLINICAL_PROGRESSION: GRADUALLY WORSENING

## 2022-01-24 ASSESSMENT — PAIN DESCRIPTION - ONSET: ONSET: ON-GOING

## 2022-01-24 ASSESSMENT — PAIN SCALES - GENERAL: PAINLEVEL_OUTOF10: 6

## 2022-01-24 ASSESSMENT — PAIN DESCRIPTION - DIRECTION: RADIATING_TOWARDS: BILATERAL LEGS TO TOES

## 2022-01-24 ASSESSMENT — PAIN DESCRIPTION - ORIENTATION: ORIENTATION: RIGHT;LEFT;LOWER

## 2022-01-24 ASSESSMENT — PAIN - FUNCTIONAL ASSESSMENT: PAIN_FUNCTIONAL_ASSESSMENT: PREVENTS OR INTERFERES SOME ACTIVE ACTIVITIES AND ADLS

## 2022-01-24 ASSESSMENT — PAIN DESCRIPTION - FREQUENCY: FREQUENCY: INTERMITTENT

## 2022-01-24 ASSESSMENT — PAIN DESCRIPTION - LOCATION: LOCATION: BACK

## 2022-01-24 ASSESSMENT — PAIN DESCRIPTION - DESCRIPTORS: DESCRIPTORS: BURNING;SHARP;NUMBNESS;TINGLING

## 2022-01-24 NOTE — PROCEDURES
Pre-Procedure Note    Patient Name: Waqar Rose   YOB: 1980  Room/Bed: Room/bed info not found  Medical Record Number: [id number]  Date: 1/24/2022       Indication:    1. Lumbar radiculopathy    2. Degenerative lumbar spinal stenosis    3. DDD (degenerative disc disease), lumbar    4. Vertebrogenic low back pain        Consent: On file. Vital Signs:   Vitals:    01/24/22 0909   BP: 113/68   Pulse: 81   Resp: 16   Temp:    SpO2: 95%       Past Medical History:   has a past medical history of Asthma, COPD (chronic obstructive pulmonary disease) ([facility] [address] Depression, Erectile dysfunction, Essential hypertension, GERD (gastroesophageal reflux disease), Heart palpitations, Hyperlipidemia with target LDL less than 100, Left lower lobe pneumonia, Lumbar radiculopathy, Mitral valve prolapse, Nonspecific reactive hepatitis, Obesity (BMI 30-39.9), NOEMÍ (obstructive sleep apnea), Osteoarthritis, Seronegative polyarthritis, Social anxiety disorder, Uvulitis, and Vertebrogenic low back pain. Past Surgical History:   has a past surgical history that includes Knee arthroscopy; Hand surgery (Left); Tonsillectomy; Nose surgery; Warren tooth extraction; Colonoscopy; Endoscopy, colon, diagnostic; cyst removal (Right); and Upper gastrointestinal endoscopy (N/A, 10/9/2019). Pre-Sedation Documentation and Exam:   Vital signs have been reviewed (see flow sheet for vitals). Mallampati Airway Assessment:  normal    ASA Classification:  Class 2 - A normal healthy patient with mild systemic disease and Class 3 - A patient with severe systemic disease that limits activity but is not incapacitating    Sedation/ Anesthesia Plan:   intravenous sedation   as needed. Medications Planned:   midazolam (Versed) / Fentanyl  Intravenously  as needed. Patient is an appropriate candidate for plan of sedation: yes  Patient's History and Physical examination was reviewed and there is no change.   Electronically signed by Durga Ponce Yury Marquez MD on 1/24/2022 at 9:19 AM        Preoperative Diagnosis:    1. Lumbar radiculopathy    2. Degenerative lumbar spinal stenosis    3. DDD (degenerative disc disease), lumbar    4. Vertebrogenic low back pain        Postoperative Diagnosis:    1. Lumbar radiculopathy    2. Degenerative lumbar spinal stenosis    3. DDD (degenerative disc disease), lumbar    4. Vertebrogenic low back pain        Procedure Performed:  Lumbar epidural steroid injection under fluoroscopy guidance with IV sedation. Indication for the Procedure: The patient failed conservative management  for pain in the low back radiating to lower extremities. The patient has undergone lumbar epidural steroid injections and the last procedure gave 25% relief. As the patient is not responding to conservative management and it is interfering with activities of daily living we decided to proceed with lumbar epidural steroid injection. The procedure and risks were discussed with the patient and an informed consent was obtained  Current Pain Assessment  Pain Assessment  Pain Assessment: 0-10  Pain Level: 6  Patient's Stated Pain Goal: 2  Pain Type: Chronic pain  Pain Location: Back  Pain Orientation: Right,Left,Lower  Pain Radiating Towards: bilateral legs to toes  Pain Descriptors: Burning,Sharp,Numbness,Tingling  Pain Frequency: Intermittent  Pain Onset: On-going  Clinical Progression: Gradually worsening  Functional Pain Assessment: Prevents or interferes some active activities and ADLs  Non-Pharmaceutical Pain Intervention(s): Rest     Procedure:    After starting an IV, the patient was sedated with 2 mg of Midazolam and 0 mcg of Fentanyl Intravenously by the RN under my direct supervision. Patient's vital signs including BP, EKG and SaO2 were monitored by the RN and they remained stable during the procedure. A meaningful communication was kept up with the patient throughout  the procedure.     The patient is placed in prone position. Skin over the back was prepped and draped in sterile manner. Then using fluoroscopy the L5, S1 interspace was observed and the skin and deep tissues in the left paramedian area were infiltrated with 4 ml of 1% lidocaine. The #20-gauge, 3-1/2 inch Tuohy needle was inserted through the skin wheal and the epidural space was identified using loss of resistance technique with normal saline. This was confirmed with AP and lateral views using fluoroscopy after injecting about 2 ml of Omnipaque-180 and observing the spread of the contrast in the epidural space. Then after negative aspiration a total of 80 mg of triamcinolone with 8 ml of normal saline was injected into the epidural space. The needle is removed and a Band-Aid was placed over the needle insertion site. Patient's vital signs remained stable and the patient tolerated the procedure well. The patient was discharged home in stable condition and will be followed in the pain clinic in the next few weeks for further planning.     Electronically signed by Alex Dangelo MD on 1/24/2022 at 9:19 AM

## 2022-01-24 NOTE — TELEPHONE ENCOUNTER
Nick Beltre is calling to request a refill on the following medication(s):    Last Visit Date (If Applicable):  2/31/3269    Next Visit Date:    Visit date not found    Medication Request:  Requested Prescriptions     Pending Prescriptions Disp Refills    omeprazole (PRILOSEC) 40 MG delayed release capsule [Pharmacy Med Name: OMEPRAZOLE DR 40 MG CAPSULE] 90 capsule 3     Sig: take 1 capsule by mouth once daily

## 2022-01-24 NOTE — PROGRESS NOTES
Discharge criteria met. Post procedure dressing dry and intact. Sensory and motor function intact as per pre-procedure. Patient alert and oriented x3  Instructions and follow up reviewed with pt at patient at discharge. Discharged home Taken to car via wheelchair pior with wife driving car.   Discharged @ 9697

## 2022-01-25 ENCOUNTER — TELEPHONE (OUTPATIENT)
Dept: PAIN MANAGEMENT | Age: 42
End: 2022-01-25

## 2022-01-25 DIAGNOSIS — M54.51 VERTEBROGENIC LOW BACK PAIN: ICD-10-CM

## 2022-01-25 DIAGNOSIS — M51.36 DDD (DEGENERATIVE DISC DISEASE), LUMBAR: ICD-10-CM

## 2022-01-25 DIAGNOSIS — K21.00 GASTROESOPHAGEAL REFLUX DISEASE WITH ESOPHAGITIS WITHOUT HEMORRHAGE: ICD-10-CM

## 2022-01-25 RX ORDER — OMEPRAZOLE 40 MG/1
CAPSULE, DELAYED RELEASE ORAL
Qty: 90 CAPSULE | Refills: 3 | Status: SHIPPED | OUTPATIENT
Start: 2022-01-25

## 2022-01-25 NOTE — TELEPHONE ENCOUNTER
[facility]. Attempted to outreach pt. For post procedure call. VM obtained, and VM left.

## 2022-02-06 DIAGNOSIS — F40.10 SOCIAL ANXIETY DISORDER: ICD-10-CM

## 2022-02-06 DIAGNOSIS — F33.42 RECURRENT MAJOR DEPRESSIVE DISORDER, IN FULL REMISSION (HCC): ICD-10-CM

## 2022-02-07 ENCOUNTER — HOSPITAL ENCOUNTER (OUTPATIENT)
Dept: PAIN MANAGEMENT | Age: 42
Discharge: HOME | End: 2022-02-07
Payer: COMMERCIAL

## 2022-02-07 VITALS
SYSTOLIC BLOOD PRESSURE: 151 MMHG | RESPIRATION RATE: 18 BRPM | HEART RATE: 81 BPM | BODY MASS INDEX: 33.37 KG/M2 | TEMPERATURE: 98.2 F | WEIGHT: 260 LBS | OXYGEN SATURATION: 95 % | HEIGHT: 74 IN | DIASTOLIC BLOOD PRESSURE: 85 MMHG

## 2022-02-07 DIAGNOSIS — M54.16 LUMBAR RADICULOPATHY: ICD-10-CM

## 2022-02-07 DIAGNOSIS — M51.36 DDD (DEGENERATIVE DISC DISEASE), LUMBAR: Primary | Chronic | ICD-10-CM

## 2022-02-07 PROCEDURE — 99213 OFFICE O/P EST LOW 20 MIN: CPT | Performed by: NURSE PRACTITIONER

## 2022-02-07 PROCEDURE — 99213 OFFICE O/P EST LOW 20 MIN: CPT

## 2022-02-07 RX ORDER — BUPROPION HYDROCHLORIDE 150 MG/1
150 TABLET ORAL EVERY MORNING
Qty: 90 TABLET | Refills: 3 | Status: SHIPPED | OUTPATIENT
Start: 2022-02-07 | End: 2022-02-21

## 2022-02-07 ASSESSMENT — ENCOUNTER SYMPTOMS
CONSTIPATION: 0
SHORTNESS OF BREATH: 0
BACK PAIN: 1
COUGH: 0

## 2022-02-07 ASSESSMENT — PAIN SCALES - GENERAL: PAINLEVEL_OUTOF10: 0

## 2022-02-07 ASSESSMENT — PAIN DESCRIPTION - ONSET: ONSET: ON-GOING

## 2022-02-07 ASSESSMENT — PAIN DESCRIPTION - ORIENTATION: ORIENTATION: LOWER;RIGHT;LEFT

## 2022-02-07 ASSESSMENT — PAIN DESCRIPTION - PAIN TYPE: TYPE: CHRONIC PAIN

## 2022-02-07 ASSESSMENT — PAIN DESCRIPTION - LOCATION: LOCATION: BACK

## 2022-02-07 ASSESSMENT — PAIN DESCRIPTION - PROGRESSION: CLINICAL_PROGRESSION: NOT CHANGED

## 2022-02-07 ASSESSMENT — PAIN DESCRIPTION - FREQUENCY: FREQUENCY: INTERMITTENT

## 2022-02-07 ASSESSMENT — PAIN - FUNCTIONAL ASSESSMENT: PAIN_FUNCTIONAL_ASSESSMENT: PREVENTS OR INTERFERES SOME ACTIVE ACTIVITIES AND ADLS

## 2022-02-07 NOTE — TELEPHONE ENCOUNTER
Please Approve or Refuse.   Send to Pharmacy per Pt's Request:      Next Visit Date:  Visit date not found   Last Visit Date: 6/24/2021    Hemoglobin A1C (%)   Date Value   10/04/2021 5.5   03/22/2017 5.5   07/23/2014 5.3             ( goal A1C is < 7)   BP Readings from Last 3 Encounters:   01/24/22 132/70   01/07/22 (!) 152/92   12/22/21 132/74          (goal 120/80)  BUN   Date Value Ref Range Status   10/07/2021 10 6 - 20 mg/dL Final     CREATININE   Date Value Ref Range Status   10/07/2021 0.63 (L) 0.70 - 1.20 mg/dL Final     Potassium   Date Value Ref Range Status   10/07/2021 4.2 3.7 - 5.3 mmol/L Final

## 2022-02-07 NOTE — PROGRESS NOTES
Chief Complaint: back pain    [facility]  Pt complains of back pain. MRI with Grade 1 anterolisthesis of L5 on S1 secondary to bilateral L5 spondylolysis with resultant severe bilateral L5 neural foraminal narrowing. Severe L5-S1 disc height loss and desiccation. He has seen a surgeon who recommended PT, mobic and pain management. He has had PT consult and given HEP - has not noticed much improvement. He reports episodes of loss of sensation in his legs about twice a month while at work. She is an  and works in electrified environment. He did mention this to the neurosurgeon as well. He has tried gabapentin in the past and this made his stomach upset so he d/c. He had LESI 12/22/21 and reports about 20% relief. Discussed options and he would like to try another LESI - he states the surgeon told him it may take more than one injection for him to notice relief. He had LESI 1/24/22 and had 85-90% relief for about two weeks. He now has about 75% relief but it does flare up when he is on his feet for prolonged periods of time. He states he was able to go to the gym and exercise without much pain. He will see NS 2/16     Back Pain  This is a chronic problem. The problem has been gradually improving since onset. The pain is present in the lumbar spine. The pain is at a severity of 0/10. The patient is experiencing no pain. The symptoms are aggravated by position and standing. Pertinent negatives include no chest pain or fever. Treatments tried: LESI. The treatment provided significant relief. Patient denies any new neurological symptoms. No bowel or bladder incontinence, no weakness, and no falling.       Past Medical History:   Diagnosis Date    Asthma     COPD (chronic obstructive pulmonary disease) ([facility] [address] 11/19/2018    Depression     Erectile dysfunction 9/29/2017    Essential hypertension 1/2/2017    GERD (gastroesophageal reflux disease) 11/19/2018    Heart palpitations 9/29/2017  Hyperlipidemia with target LDL less than 100 3/23/2017    Left lower lobe pneumonia 11/9/2012    Lumbar radiculopathy     Mitral valve prolapse     Nonspecific reactive hepatitis 3/22/2017    Obesity (BMI 30-39.9) 3/23/2017    NOEMÍ (obstructive sleep apnea) 10/16/2019    Osteoarthritis     Seronegative polyarthritis 10/10/2016    followed w/ rheumatology in [address] [place] [address] anxiety disorder     Uvulitis 8/15/2019    Vertebrogenic low back pain 12/13/2021       Past Surgical History:   Procedure Laterality Date    COLONOSCOPY      CYST REMOVAL Right     Armpit     ENDOSCOPY, COLON, DIAGNOSTIC      HAND SURGERY Left     KNEE ARTHROSCOPY      right knee    NOSE SURGERY      TONSILLECTOMY      UPPER GASTROINTESTINAL ENDOSCOPY N/A 10/9/2019    EGD POLYP SNARE, HOT.  ESOPHAGEAL DILATATION WITH MALONIES 50F performed by Erna Corona MD at [facility]         Allergies   Allergen Reactions    Fish-Derived Products Anaphylaxis    Other Anaphylaxis     Any type of seafood    Shellfish Allergy Anaphylaxis    Shrimp (Diagnostic) Anaphylaxis    Ace Inhibitors Swelling     UVULITIS ON 8/15/19         Current Outpatient Medications:     buPROPion (WELLBUTRIN XL) 150 MG extended release tablet, take 1 tablet by mouth every morning, Disp: 90 tablet, Rfl: 3    omeprazole (PRILOSEC) 40 MG delayed release capsule, take 1 capsule by mouth once daily, Disp: 90 capsule, Rfl: 3    DULERA 100-5 MCG/ACT inhaler, inhale 2 puffs by mouth and INTO THE LUNGS twice a day STOP FLOVENT, Disp: 13 g, Rfl: 3    predniSONE (DELTASONE) 2.5 MG tablet, Take 1 tablet by mouth daily, Disp: , Rfl:     meloxicam (MOBIC) 15 MG tablet, Take 1 tablet by mouth daily, Disp: 30 tablet, Rfl: 0    pravastatin (PRAVACHOL) 20 MG tablet, take 1 tablet by mouth every evening **STOP ATORVASTATIN**, Disp: 90 tablet, Rfl: 3    amLODIPine (NORVASC) 10 MG tablet, take 1 tablet by mouth every morning, Disp: 90 tablet, Rfl: 3    busPIRone (BUSPAR) 10 MG tablet, Take 1 tablet by mouth 3 times daily as needed (anxiety), Disp: 270 tablet, Rfl: 3    loratadine (CLARITIN) 10 MG tablet, Take 1 tablet by mouth daily, Disp: 90 tablet, Rfl: 3    celecoxib (CELEBREX) 100 MG capsule, Take 1 capsule by mouth daily as needed for Pain, Disp: 30 capsule, Rfl: 3    fluticasone (FLONASE) 50 MCG/ACT nasal spray, 2 sprays by Nasal route daily, Disp: 1 Bottle, Rfl: 3    albuterol (PROVENTIL) (2.5 MG/3ML) 0.083% nebulizer solution, Take 3 mLs by nebulization every 6 hours as needed for Wheezing or Shortness of Breath, Disp: 120 vial, Rfl: 0    Blood Pressure KIT, Dx: HTN.  Needs automatic blood pressure machine to monitor his blood pressure., Disp: 1 kit, Rfl: 0    Respiratory Therapy Supplies (NEBULIZER) MADHAV, 1 Units by Does not apply route 2 times daily Dx: Asthma exacerbation J45.901, Disp: 1 Device, Rfl: 0    Family History   Problem Relation Age of Onset    Diabetes Father     High Cholesterol Father     Other Mother         autoimmune hepatitis     High Blood Pressure Brother     Heart Disease Brother     Heart Attack Brother 40        cabg age 40    Taylor Leiva Rheum Arthritis Other 7    Colon Cancer Neg Hx     Cancer Neg Hx        Social History     Socioeconomic History    Marital status:      Spouse name: Not on file    Number of children: Not on file    Years of education: Not on file    Highest education level: Not on file   Occupational History    Not on file   Tobacco Use    Smoking status: Never Smoker    Smokeless tobacco: Never Used   Vaping Use    Vaping Use: Never used   Substance and Sexual Activity    Alcohol use: Yes     Comment: occasional  few x year    Drug use: No    Sexual activity: Yes     Partners: Male   Other Topics Concern    Not on file   Social History Narrative    Not on file     Social Determinants of Health     Financial Resource Strain: Low Risk     Difficulty of Paying Living Expenses: Not hard at all   Food Insecurity: No Food Insecurity    Worried About [facility] in the Last Year: Never true    Megan of Food in the Last Year: Never true   Transportation Needs:     Lack of Transportation (Medical): Not on file    Lack of Transportation (Non-Medical): Not on file   Physical Activity:     Days of Exercise per Week: Not on file    Minutes of Exercise per Session: Not on file   Stress:     Feeling of Stress : Not on file   Social Connections:     Frequency of Communication with Friends and Family: Not on file    Frequency of Social Gatherings with Friends and Family: Not on file    Attends Judaism Services: Not on file    Active Member of [address] [place] [address] or Organizations: Not on file    Attends Club or Organization Meetings: Not on file    Marital Status: Not on file   Intimate Partner Violence:     Fear of Current or Ex-Partner: Not on file    Emotionally Abused: Not on file    Physically Abused: Not on file    Sexually Abused: Not on file   Housing Stability:     Unable to Pay for Housing in the Last Year: Not on file    Number of [place] in the Last Year: Not on file    Unstable Housing in the Last Year: Not on file       Review of Systems:  Review of Systems   Constitutional: Negative for chills and fever. Cardiovascular: Negative for chest pain and palpitations. Respiratory: Negative for cough and shortness of breath. Musculoskeletal: Positive for back pain. Gastrointestinal: Negative for constipation. Neurological: Negative for disturbances in coordination and loss of balance. Physical Exam:  BP (!) 151/85   Pulse 81   Temp 98.2 °F (36.8 °C) (Skin)   Resp 18   Ht 6' 2\" (1.88 m)   Wt 260 lb (117.9 kg)   SpO2 95%   BMI 33.38 kg/m²     Physical Exam  HENT:      Head: Normocephalic. Pulmonary:      Effort: Pulmonary effort is normal.   Musculoskeletal:         General: Normal range of motion.       Cervical back: Normal range of motion. Lumbar back: Tenderness present. Skin:     General: Skin is warm and dry. Neurological:      Mental Status: He is alert and oriented to person, place, and time. Record/Diagnostics Review:    Grade 1 anterolisthesis of L5 on S1 secondary to bilateral L5   spondylolysis with resultant severe bilateral L5 neural foraminal narrowing. 2. Severe L5-S1 disc height loss and desiccation. 3. Mild L4-5 spinal canal stenosis and mild right lateral recess narrowing   secondary to a posterior disc protrusion.          Assessment:  Problem List Items Addressed This Visit     DDD (degenerative disc disease), lumbar - Primary (Chronic)    Lumbar radiculopathy             Treatment Plan:  Patient reports moderate relief following LESI - 75% ongoing  He will see neurosurgeon next week  Follow up as needed

## 2022-02-16 ENCOUNTER — OFFICE VISIT (OUTPATIENT)
Dept: NEUROSURGERY | Age: 42
End: 2022-02-16
Payer: COMMERCIAL

## 2022-02-16 VITALS
HEIGHT: 74 IN | HEART RATE: 70 BPM | BODY MASS INDEX: 33.37 KG/M2 | SYSTOLIC BLOOD PRESSURE: 134 MMHG | OXYGEN SATURATION: 94 % | TEMPERATURE: 98.3 F | DIASTOLIC BLOOD PRESSURE: 80 MMHG | WEIGHT: 260 LBS

## 2022-02-16 DIAGNOSIS — M43.16 SPONDYLOLISTHESIS, LUMBAR REGION: Primary | ICD-10-CM

## 2022-02-16 DIAGNOSIS — M48.062 LUMBAR STENOSIS WITH NEUROGENIC CLAUDICATION: ICD-10-CM

## 2022-02-16 PROCEDURE — 99214 OFFICE O/P EST MOD 30 MIN: CPT | Performed by: NEUROLOGICAL SURGERY

## 2022-02-16 PROCEDURE — G8427 DOCREV CUR MEDS BY ELIG CLIN: HCPCS | Performed by: NEUROLOGICAL SURGERY

## 2022-02-16 PROCEDURE — 1036F TOBACCO NON-USER: CPT | Performed by: NEUROLOGICAL SURGERY

## 2022-02-16 PROCEDURE — G8484 FLU IMMUNIZE NO ADMIN: HCPCS | Performed by: NEUROLOGICAL SURGERY

## 2022-02-16 PROCEDURE — G8417 CALC BMI ABV UP PARAM F/U: HCPCS | Performed by: NEUROLOGICAL SURGERY

## 2022-02-16 NOTE — PROGRESS NOTES
[address]  [facility] [address] [facility] [address]  Dept: [phone]    Patient:  Rogelio Dakins  YOB: 1980  Date: 2/16/22    The patient is a 42 y.o. male who presents today for consult of the following problems:     Chief Complaint   Patient presents with    Follow-up     Spondylolisthesis, lumbar region             HPI:     Rogelio Dakins is a 42 y.o. male on whom neurosurgical consultation was requested by Benedicto Mercado MD for management of of back and leg pain at 12/10 constantly which improves with sitting or laying down. Has been doing 3 times per wk home exercise regimen as guided per PT with 3 total visits and pain is still refractory to above. Has had 2 x jaskaran with no lasting relief. Uses heating pad and creams which he keeps in hotel room. Numbness in the big toe bilateral feet. Has had urgency of urination. Tingling in the bilateral LE below waist. Over last 1mo has had numbness in the perineal region x 2 with resolution. Iva Brittle        History:     Past Medical History:   Diagnosis Date    Asthma     COPD (chronic obstructive pulmonary disease) ([address] 11/19/2018    Depression     Erectile dysfunction 9/29/2017    Essential hypertension 1/2/2017    GERD (gastroesophageal reflux disease) 11/19/2018    Heart palpitations 9/29/2017    Hyperlipidemia with target LDL less than 100 3/23/2017    Left lower lobe pneumonia 11/9/2012    Lumbar radiculopathy     Mitral valve prolapse     Nonspecific reactive hepatitis 3/22/2017    Obesity (BMI 30-39.9) 3/23/2017    NOEMÍ (obstructive sleep apnea) 10/16/2019    Osteoarthritis     Seronegative polyarthritis 10/10/2016    followed w/ rheumatology in [address] [place] [address] anxiety disorder     Uvulitis 8/15/2019    Vertebrogenic low back pain 12/13/2021     Past Surgical History:   Procedure Laterality Date    COLONOSCOPY  CYST REMOVAL Right     Armpit     ENDOSCOPY, COLON, DIAGNOSTIC      HAND SURGERY Left     KNEE ARTHROSCOPY      right knee    NOSE SURGERY      TONSILLECTOMY      UPPER GASTROINTESTINAL ENDOSCOPY N/A 10/9/2019    EGD POLYP SNARE, HOT. ESOPHAGEAL DILATATION WITH MALONIES 50F performed by Halle Garibay MD at [facility]       Family History   Problem Relation Age of Onset    Diabetes Father     High Cholesterol Father     Other Mother         autoimmune hepatitis     High Blood Pressure Brother     Heart Disease Brother     Heart Attack Brother 40        cabg age 40    [place] Rheum Arthritis Other 7    Colon Cancer Neg Hx     Cancer Neg Hx      Current Outpatient Medications on File Prior to Visit   Medication Sig Dispense Refill    buPROPion (WELLBUTRIN XL) 150 MG extended release tablet take 1 tablet by mouth every morning 90 tablet 3    omeprazole (PRILOSEC) 40 MG delayed release capsule take 1 capsule by mouth once daily 90 capsule 3    DULERA 100-5 MCG/ACT inhaler inhale 2 puffs by mouth and INTO THE LUNGS twice a day STOP FLOVENT 13 g 3    predniSONE (DELTASONE) 2.5 MG tablet Take 1 tablet by mouth daily      pravastatin (PRAVACHOL) 20 MG tablet take 1 tablet by mouth every evening **STOP ATORVASTATIN** 90 tablet 3    amLODIPine (NORVASC) 10 MG tablet take 1 tablet by mouth every morning 90 tablet 3    busPIRone (BUSPAR) 10 MG tablet Take 1 tablet by mouth 3 times daily as needed (anxiety) 270 tablet 3    loratadine (CLARITIN) 10 MG tablet Take 1 tablet by mouth daily 90 tablet 3    celecoxib (CELEBREX) 100 MG capsule Take 1 capsule by mouth daily as needed for Pain 30 capsule 3    albuterol (PROVENTIL) (2.5 MG/3ML) 0.083% nebulizer solution Take 3 mLs by nebulization every 6 hours as needed for Wheezing or Shortness of Breath 120 vial 0    Blood Pressure KIT Dx: HTN. Needs automatic blood pressure machine to monitor his blood pressure.  1 kit 0    Respiratory Therapy Supplies (NEBULIZER) MADHAV 1 Units by Does not apply route 2 times daily Dx: Asthma exacerbation J45.901 1 Device 0    meloxicam (MOBIC) 15 MG tablet Take 1 tablet by mouth daily 30 tablet 0    fluticasone (FLONASE) 50 MCG/ACT nasal spray 2 sprays by Nasal route daily 1 Bottle 3     No current facility-administered medications on file prior to visit. Social History     Tobacco Use    Smoking status: Never Smoker    Smokeless tobacco: Never Used   Vaping Use    Vaping Use: Never used   Substance Use Topics    Alcohol use: Yes     Comment: occasional  few x year    Drug use: No       Allergies   Allergen Reactions    Fish-Derived Products Anaphylaxis    Other Anaphylaxis     Any type of seafood    Shellfish Allergy Anaphylaxis    Shrimp (Diagnostic) Anaphylaxis    Ace Inhibitors Swelling     UVULITIS ON 8/15/19       Review of Systems  ROS: back pain, leg pain, numbness. Physical Exam:      /80   Pulse 70   Temp 98.3 °F (36.8 °C)   Ht 6' 2\" (1.88 m)   Wt 260 lb (117.9 kg)   SpO2 94%   BMI 33.38 kg/m²   Estimated body mass index is 33.38 kg/m² as calculated from the following:    Height as of this encounter: 6' 2\" (1.88 m). Weight as of this encounter: 260 lb (117.9 kg). General:  Deisy Lopez is a 42y.o. year old male who appears his stated age. HEENT: Normocephalic atraumatic. Neck supple. Chest: regular rate; pulses equal. Equal chest rise and fall  Abdomen: Soft nondistended.    Ext: DP equal with good capillary refill  Neuro    Mentation  Appropriate affect   oriented    Cranial Nerves:   Pupils equal and reactive to light  Extraocular motion intact  Face symmetric  No dysarthria  v1-3 sensation symmetric, masseter tone symmetric  Hearing symmetric and intact to finger rub    Sensation:   Diminished L L5 over R     Motor  L deltoid 5/5; R deltoid 5/5  L biceps 5/5; R biceps 5/5  L triceps 5/5; R triceps 5/5  L wrist extension 5/5; R wrist extension 5/5  L intrinsics 5/5; R intrinsics 5/5     L iliopsoas 5/5 , R iliopsoas 5/5  L quadriceps 5/5; R quadriceps 5/5  L Dorsiflexion 5/5; R dorsiflexion 5/5  L Plantarflexion 5/5; R plantarflexion 5/5  L EHL 5/5; R EHL 5/5    Reflexes  L Brachioradialis 2+/4; R brachioradialis 2+/4  L Biceps 2+/4; R Biceps 2+/4  L Triceps 2+/4; R Triceps 2+/4  L Patellar 2+/4: R Patellar 2+/4  L Achilles 2+/4; R Achilles 2+/4    hoffmans L: neg  hoffmans R: neg  Clonus L: neg  Clonus R: neg  Babinski L: up  Babinski R; up    Studies Review:     Mri L5/S1 anterolisthesis and L5 compression. L4/5 central compression with central stenosis    Flex ext no instability but does listhesis relative to the mri    Assessment and Plan:      1. Spondylolisthesis, lumbar region    2. Lumbar stenosis with neurogenic claudication          Plan:   Pt with no relief with PT x 6wks and CHERI x 2. Rare infrequent episodes of saddle sx and spontaneous resolution. Recommended that he continue with home regimen and keep home diary in addition to formal PT. Avoid tobacco and work on wt loss. F/u 6-8wks    Followup: No follow-ups on file. Prescriptions Ordered:  No orders of the defined types were placed in this encounter. Orders Placed:  No orders of the defined types were placed in this encounter. Electronically signed by Devan Fermin DO on 2/16/2022 at 3:57 PM    Please note that this chart was generated using voice recognition Dragon dictation software. Although every effort was made to ensure the accuracy of this automated transcription, some errors in transcription may have occurred.

## 2022-02-19 DIAGNOSIS — F33.42 RECURRENT MAJOR DEPRESSIVE DISORDER, IN FULL REMISSION (HCC): ICD-10-CM

## 2022-02-19 DIAGNOSIS — F40.10 SOCIAL ANXIETY DISORDER: ICD-10-CM

## 2022-02-21 RX ORDER — BUPROPION HYDROCHLORIDE 150 MG/1
150 TABLET ORAL EVERY MORNING
Qty: 90 TABLET | Refills: 3 | Status: SHIPPED | OUTPATIENT
Start: 2022-02-21 | End: 2022-08-09 | Stop reason: SDUPTHER

## 2022-02-21 NOTE — TELEPHONE ENCOUNTER
Please Approve or Refuse.   Send to Pharmacy per Pt's Request: [facility]     Next Visit Date:  Visit date not found   Last Visit Date: 6/24/2021    Hemoglobin A1C (%)   Date Value   10/04/2021 5.5   03/22/2017 5.5   07/23/2014 5.3             ( goal A1C is < 7)   BP Readings from Last 3 Encounters:   02/16/22 134/80   02/07/22 (!) 151/85   01/24/22 132/70          (goal 120/80)  BUN   Date Value Ref Range Status   10/07/2021 10 6 - 20 mg/dL Final     CREATININE   Date Value Ref Range Status   10/07/2021 0.63 (L) 0.70 - 1.20 mg/dL Final     Potassium   Date Value Ref Range Status   10/07/2021 4.2 3.7 - 5.3 mmol/L Final

## 2022-03-04 ENCOUNTER — HOSPITAL ENCOUNTER (OUTPATIENT)
Dept: PHYSICAL THERAPY | Age: 42
Setting detail: THERAPIES SERIES
Discharge: HOME | End: 2022-03-04
Payer: COMMERCIAL

## 2022-03-04 PROCEDURE — 97110 THERAPEUTIC EXERCISES: CPT

## 2022-03-04 NOTE — PROGRESS NOTES
[facility]   [address] [facility] [address]   Phone: [phone]   Fax: [phone]    Physical Therapy Daily Treatment Note    Date:  3/4/2022  Patient: Yolie Jung  : 1980   MRN: [id number]  Physician: Lizette Mendez DO              Medical Diagnosis:  Spondylolisthesis, lumbar region   Clinical Diagnosis: Low back pain (M54)  Onset date: 2021    Next Dr's appt.: 2022  PT Visit Information  PT Insurance Information: [address] [facility], [address] [place] [address]  Total # of Visits Approved: 6  Total # of Visits to Date: 3  No Show: 0  Canceled Appointment: 1    Subjective  Patient stated that he saw his physician and he felt the issue may be getting worse based on the symptoms that were provided to him. He felt surgery may be indicated/ lumbar \"fusion\" but advised to return/complete his physical therapy treatment sessions. Since his last treatment session, his work load has increased 16 hours x 6 days for the whole of February. He stated that he performs exercises in the morning prior to work but the back is extremely sore at night and has noticed that his calves and hamstrings are tight on a daily basis. Stiffness is still apparent upon waking. Numbness is still \"intermittent\" but it has increased in frequency/3 days a week but the duration appears less. Pain:  [x] Yes   [] No      Location:R) and (L) lumbar region with intermittent \"numbness\" within the LEs (L) worse than (R). Numbness tends to occur a couple of times a week and appears more with prolonged standing.    Pain Ratin-9/10   Worst: 10-12/10 at the end of a work shift  Best: 4-5/10 upon waking with stiffness  Descriptors: Constant, achy, numbness, stiffness   Other:     Objective  Modalities: Hot Pack with Supine Stretches  Precautions:   Exercises:  Exercise Reps/ Time Weight/ Level Comments   Supine Hook lying Posterior Pelvic Tilt  10 reps      Supine Hook lying Posterior Pelvic Tilt/Bridge 10 reps        Passive Stretching  Supine Quad Stretch 30 sec hold x 3 reps with each leg   Seated Hamstring Stretch 30 sec hold x 3 reps   Seated Lumbar Flexion Stretch 30 sec hold x 3 reps   Side lying Thoracic Rotation with Open Book 30 sec hold x 3 reps with each side   Standing Gastroc Stretch 30 sec hold x 3 reps     Pt. Education:  [] Yes  [] No  [] Reviewed Prior HEP/Ed  Method of Education: [] Verbal  [] Demo  [] Written  HEP:   Comprehension of Education:  [] Verbalizes understanding. [] Demonstrates understanding. [] Needs review. [] Demonstrates/verbalizes HEP/Ed previously given. Assessment  Continued with exercise. Reviewed the current exercises provided to him per his last treatment session. Demonstrated well. Added a standing gastroc stretch per his concerns with leg tightness. Goals   STG: (to be met in 6 treatments)  1. Patient will report an average pain level of a 1-4/10 within the lumbar region while at rest/ADLs. 2. Patient will demonstrate improved strength within all involved areas to further assist with (I) function without limitation. 3. Modified Oswestry Disability Index improved by 10% (Monse [employer] 112)  4. Patient will demonstrate independence with his home exercise program.     Patient goals: To be able to work without pain and prolong surgery. Plan: [x] Continue per plan of care.    [] Other:      Treatment Charges: Mins Units   []  Modalities     [x]  Ther Exercise 45 3   []  Manual Therapy     []  Ther Activities     []  Aquatics     []  Neuromuscular     [] Vasocompression     [] Gait Training     [] Dry needling        [] 1 or 2 muscles        [] 3 or more muscles     []  Other     Total Treatment time 45 3     Time In: 1915           Time Out: 1700    Electronically signed by:  Srinath Cardona PT  DPT

## 2022-03-21 ENCOUNTER — TELEPHONE (OUTPATIENT)
Dept: PAIN MANAGEMENT | Age: 42
End: 2022-03-21

## 2022-03-21 NOTE — TELEPHONE ENCOUNTER
I called the patient per Dr. Ashanti Mancilla request, to schedule an OV. Patient states he has an appointment with his neurosurgeon on 4/11 and wants to discuss options with him. He will call to schedule as needed.

## 2022-04-01 ENCOUNTER — HOSPITAL ENCOUNTER (OUTPATIENT)
Dept: PHYSICAL THERAPY | Age: 42
Setting detail: THERAPIES SERIES
Discharge: HOME | End: 2022-04-01
Payer: COMMERCIAL

## 2022-04-01 PROCEDURE — 97110 THERAPEUTIC EXERCISES: CPT

## 2022-04-01 NOTE — PROGRESS NOTES
[facility]   [address]   Phone: [phone]   Fax: [phone]    Physical Therapy Daily Treatment Note    Date:  2022  Patient: Bruce Baird  : 1980   MRN: [id number]  Physician: Jackie Santiago DO              Medical Diagnosis:  Spondylolisthesis, lumbar region   Clinical Diagnosis: Low back pain (M54)  Onset date: 2021    Next Dr's appt.: 2022  PT Visit Information  PT Insurance Information: [address] [facility], [address]  Total # of Visits Approved: 6  Total # of Visits to Date: 4  No Show: 0  Canceled Appointment: 1    Subjective  Patient stated that he was injured at his job site on 2022. He has been off since the incident. The incident involved his (R) leg only. Lumbar region/back was not involved. Pain:  [x] Yes   [] No      Location: (R) and (L) lumbar region  Pain Ratin/10   Worst: 10/10 following his workout at the gym  Best: 5/10 with a supine position   Descriptors: Constant, achy, stiffness   Other:     Objective  Modalities: Hot Pack with Supine Stretches  Precautions:   Exercises:  Exercise Reps/ Time Weight/ Level Comments   Supine Hook lying Posterior Pelvic Tilt  10 reps      Supine Hook lying Posterior Pelvic Tilt/Bridge 10 reps        Passive Stretching  Supine Quad Stretch 30 sec hold x 3 reps with each leg   Seated Hamstring Stretch 30 sec hold x 3 reps   Seated Lumbar Flexion Stretch 30 sec hold x 3 reps   Side lying Thoracic Rotation with Open Book 30 sec hold x 3 reps with each side   Standing Gastroc Stretch 30 sec hold x 3 reps     Pt. Education:  [x] Yes  [] No  [] Reviewed Prior HEP/Ed  Method of Education: [x] Verbal  [x] Demo  [] Written  Discussed modifying the squat, bend over row and dead lift exercise in various ways given the increase pain he has been experiencing   Comprehension of Education:  [] Verbalizes understanding.   [] Demonstrates understanding. [x] Needs review. [] Demonstrates/verbalizes HEP/Ed previously given. Assessment  Continued with exercise. Reviewed current home exercise program. Demonstrated well. Pt is improving as evidence by him self-reporting that his numbness/tingling is no longer present within the LE since he has been off work. The pain has localized to the lumbar region. In addition, discussed the current exercises that he has been performing at the gym and ways to modify them given the severe pain he experiences after his workout. Goals   STG: (to be met in 6 treatments)  1. Patient will report an average pain level of a 1-4/10 within the lumbar region while at rest/ADLs. 2. Patient will demonstrate improved strength within all involved areas to further assist with (I) function without limitation. 3. Modified Oswestry Disability Index improved by 10% (Monse [employer] 112)  4. Patient will demonstrate independence with his home exercise program.     Patient goals: To be able to work without pain and prolong surgery. Plan: [x] Continue per plan of care.    [] Other:      Treatment Charges: Mins Units   []  Modalities     [x]  Ther Exercise 45 3   []  Manual Therapy     []  Ther Activities     []  Aquatics     []  Neuromuscular     [] Vasocompression     [] Gait Training     [] Dry needling        [] 1 or 2 muscles        [] 3 or more muscles     []  Other     Total Treatment time 45 3     Time In: 7015           Time Out: 1700    Electronically signed by:  Frank Heredia PT  DPT

## 2022-04-01 NOTE — RESULT ENCOUNTER NOTE
ABNORMAL. Please notify patient.   Urine shows he is mildly dehydrated to increase fluids and to drink 8 x 8 ounce glasses of water every day  Inflammatory markers are normal, he does not need the prednisone at this time  Lipids and triglycerides are worsening he should take his pravastatin 20 mg daily  Testosterone is normal    All labs within normal limits   Only STAN and one more test pending    Future Appointments  12/14/2021 8:30 AM    MD [facility]
ABNORMAL. Please notify patient.  Demian Stoner shows he is mildly dehydrated to increase fluids and to drink 8 x 8 ounce glasses of water every day  Inflammatory markers are normal, he does not need the prednisone at this time  Lipids and triglycerides are worsening he should take his pravastatin 20 mg daily  Testosterone is normal     All other labs within normal limits.  No reason to get prednisone, all inflammatory tests are normal, follow up with Rheumatology       Future Appointments  12/14/2021 8:30 AM    Federica Owen MD     [facility]
[place]

## 2022-04-04 ENCOUNTER — TELEPHONE (OUTPATIENT)
Dept: FAMILY MEDICINE CLINIC | Age: 42
End: 2022-04-04

## 2022-04-04 NOTE — TELEPHONE ENCOUNTER
----- Message from Franc Paris sent at 4/1/2022  9:28 AM EDT -----  Subject: Appointment Request    Reason for Call: Routine Existing Condition Follow Up    QUESTIONS  Type of Appointment? Established Patient  Reason for appointment request? Available appointments did not meet   patient need  Additional Information for Provider? Pt is trying to get in to see his   provider for his shoulder pain. He said it's getting worse and he's been   dealing with is for years. He is only in town for a short period due to   work and is very frustrated that he can't get in to see his PCP. He said   he hasn't been able to see her for probably 3 years and is frustrated to   the point he feels he may need to get a new provider. Please contact pt to   schedule.   ---------------------------------------------------------------------------  --------------  CALL BACK INFO  What is the best way for the office to contact you? OK to leave message on   voicemail  Preferred Call Back Phone Number? [phone]  ---------------------------------------------------------------------------  --------------  SCRIPT ANSWERS  Relationship to Patient? Self  Is this follow up request related to routine Diabetes Management? No  Have you been diagnosed with, awaiting test results for, or told that you   are suspected of having COVID-19 (Coronavirus)? (If patient has tested   negative or was tested as a requirement for work, school, or travel and   not based on symptoms, answer no)? No  Within the past 10 days have you developed any of the following symptoms   (answer no if symptoms have been present longer than 10 days or began   more than 10 days ago)? Fever or Chills, Cough, Shortness of breath or   difficulty breathing, Loss of taste or smell, Sore throat, Nasal   congestion, Sneezing or runny nose, Fatigue or generalized body aches   (answer no if pain is specific to a body part e.g. back pain), Diarrhea,   Headache?  No  Have you had close contact with someone with COVID-19 in the last 7 days? No  (Service Expert  click yes below to proceed with [facility]ual   Scheduling)?  Yes

## 2022-04-08 ENCOUNTER — HOSPITAL ENCOUNTER (OUTPATIENT)
Age: 42
Setting detail: SPECIMEN
Discharge: HOME | End: 2022-04-08
Payer: COMMERCIAL

## 2022-04-08 ENCOUNTER — OFFICE VISIT (OUTPATIENT)
Dept: FAMILY MEDICINE CLINIC | Age: 42
End: 2022-04-08
Payer: COMMERCIAL

## 2022-04-08 VITALS
WEIGHT: 266 LBS | BODY MASS INDEX: 34.14 KG/M2 | SYSTOLIC BLOOD PRESSURE: 136 MMHG | HEIGHT: 74 IN | DIASTOLIC BLOOD PRESSURE: 78 MMHG | OXYGEN SATURATION: 97 % | HEART RATE: 73 BPM

## 2022-04-08 DIAGNOSIS — Z28.21 COVID-19 VACCINATION DECLINED: ICD-10-CM

## 2022-04-08 DIAGNOSIS — E78.5 HYPERLIPIDEMIA WITH TARGET LDL LESS THAN 100: ICD-10-CM

## 2022-04-08 DIAGNOSIS — M25.512 CHRONIC LEFT SHOULDER PAIN: Primary | ICD-10-CM

## 2022-04-08 DIAGNOSIS — G89.29 CHRONIC LEFT SHOULDER PAIN: Primary | ICD-10-CM

## 2022-04-08 DIAGNOSIS — R73.9 HYPERGLYCEMIA: ICD-10-CM

## 2022-04-08 DIAGNOSIS — I10 ESSENTIAL HYPERTENSION: ICD-10-CM

## 2022-04-08 DIAGNOSIS — M13.0 SERONEGATIVE POLYARTHRITIS: ICD-10-CM

## 2022-04-08 DIAGNOSIS — Z99.89 OSA ON CPAP: ICD-10-CM

## 2022-04-08 DIAGNOSIS — J45.40 MODERATE PERSISTENT ASTHMA WITHOUT COMPLICATION: ICD-10-CM

## 2022-04-08 DIAGNOSIS — G47.33 OSA ON CPAP: ICD-10-CM

## 2022-04-08 DIAGNOSIS — F33.42 RECURRENT MAJOR DEPRESSIVE DISORDER, IN FULL REMISSION (HCC): ICD-10-CM

## 2022-04-08 DIAGNOSIS — Z28.21 PNEUMOCOCCAL VACCINATION DECLINED: ICD-10-CM

## 2022-04-08 LAB
ALBUMIN SERPL-MCNC: 4.7 G/DL (ref 3.5–5.2)
ALP BLD-CCNC: 104 U/L (ref 40–129)
ALT SERPL-CCNC: 31 U/L (ref 5–41)
ANION GAP SERPL CALCULATED.3IONS-SCNC: 15 MMOL/L (ref 9–17)
AST SERPL-CCNC: 24 U/L
BILIRUB SERPL-MCNC: 0.49 MG/DL (ref 0.3–1.2)
BILIRUBIN URINE: NEGATIVE
BUN BLDV-MCNC: 14 MG/DL (ref 6–20)
CALCIUM SERPL-MCNC: 9.6 MG/DL (ref 8.6–10.4)
CHLORIDE BLD-SCNC: 104 MMOL/L (ref 98–107)
CHOLESTEROL/HDL RATIO: 5.8
CHOLESTEROL: 279 MG/DL
CO2: 22 MMOL/L (ref 20–31)
COLOR: YELLOW
COMMENT UA: NORMAL
CREAT SERPL-MCNC: 0.81 MG/DL (ref 0.7–1.2)
ESTIMATED AVERAGE GLUCOSE: 111 MG/DL
GFR AFRICAN AMERICAN: >60 ML/MIN
GFR NON-AFRICAN AMERICAN: >60 ML/MIN
GFR SERPL CREATININE-BSD FRML MDRD: NORMAL ML/MIN/{1.73_M2}
GLUCOSE BLD-MCNC: 88 MG/DL (ref 70–99)
GLUCOSE URINE: NEGATIVE
HBA1C MFR BLD: 5.5 % (ref 4–6)
HCT VFR BLD CALC: 47.3 % (ref 41–53)
HDLC SERPL-MCNC: 48 MG/DL
HEMOGLOBIN: 16 G/DL (ref 13.5–17.5)
KETONES, URINE: NEGATIVE
LDL CHOLESTEROL: 196 MG/DL (ref 0–130)
LEUKOCYTE ESTERASE, URINE: NEGATIVE
MAGNESIUM: 2.3 MG/DL (ref 1.6–2.6)
MCH RBC QN AUTO: 30.4 PG (ref 26–34)
MCHC RBC AUTO-ENTMCNC: 33.7 G/DL (ref 31–37)
MCV RBC AUTO: 90.1 FL (ref 80–100)
NITRITE, URINE: NEGATIVE
PDW BLD-RTO: 14.1 % (ref 11.5–14.9)
PH UA: 5.5 (ref 5–8)
PLATELET # BLD: 322 K/UL (ref 150–450)
PMV BLD AUTO: 9 FL (ref 6–12)
POTASSIUM SERPL-SCNC: 4.5 MMOL/L (ref 3.7–5.3)
PROTEIN UA: NEGATIVE
RBC # BLD: 5.25 M/UL (ref 4.5–5.9)
SODIUM BLD-SCNC: 141 MMOL/L (ref 135–144)
SPECIFIC GRAVITY UA: 1.01 (ref 1–1.03)
TOTAL PROTEIN: 7.5 G/DL (ref 6.4–8.3)
TRIGL SERPL-MCNC: 177 MG/DL
TSH SERPL DL<=0.05 MIU/L-ACNC: 2.84 UIU/ML (ref 0.3–5)
TURBIDITY: CLEAR
URIC ACID: 4.8 MG/DL (ref 3.4–7)
URINE HGB: NEGATIVE
UROBILINOGEN, URINE: NORMAL
VITAMIN D 25-HYDROXY: 49.8 NG/ML
WBC # BLD: 7.7 K/UL (ref 3.5–11)

## 2022-04-08 PROCEDURE — 81003 URINALYSIS AUTO W/O SCOPE: CPT

## 2022-04-08 PROCEDURE — 82306 VITAMIN D 25 HYDROXY: CPT

## 2022-04-08 PROCEDURE — G8417 CALC BMI ABV UP PARAM F/U: HCPCS | Performed by: FAMILY MEDICINE

## 2022-04-08 PROCEDURE — 85027 COMPLETE CBC AUTOMATED: CPT

## 2022-04-08 PROCEDURE — 80061 LIPID PANEL: CPT

## 2022-04-08 PROCEDURE — 83735 ASSAY OF MAGNESIUM: CPT

## 2022-04-08 PROCEDURE — 1036F TOBACCO NON-USER: CPT | Performed by: FAMILY MEDICINE

## 2022-04-08 PROCEDURE — 80053 COMPREHEN METABOLIC PANEL: CPT

## 2022-04-08 PROCEDURE — 99214 OFFICE O/P EST MOD 30 MIN: CPT | Performed by: FAMILY MEDICINE

## 2022-04-08 PROCEDURE — 84550 ASSAY OF BLOOD/URIC ACID: CPT

## 2022-04-08 PROCEDURE — 84443 ASSAY THYROID STIM HORMONE: CPT

## 2022-04-08 PROCEDURE — 83036 HEMOGLOBIN GLYCOSYLATED A1C: CPT

## 2022-04-08 PROCEDURE — G8427 DOCREV CUR MEDS BY ELIG CLIN: HCPCS | Performed by: FAMILY MEDICINE

## 2022-04-08 PROCEDURE — 36415 COLL VENOUS BLD VENIPUNCTURE: CPT

## 2022-04-08 RX ORDER — FOLIC ACID 1 MG/1
TABLET ORAL
COMMUNITY
Start: 2022-02-23

## 2022-04-08 RX ORDER — MONTELUKAST SODIUM 10 MG/1
10 TABLET ORAL NIGHTLY
Qty: 90 TABLET | Refills: 3 | Status: SHIPPED | OUTPATIENT
Start: 2022-04-08

## 2022-04-08 ASSESSMENT — ENCOUNTER SYMPTOMS
CONSTIPATION: 0
COUGH: 1
ABDOMINAL DISTENTION: 0
VOMITING: 0
DIARRHEA: 0
WHEEZING: 1
ABDOMINAL PAIN: 0
CHEST TIGHTNESS: 0
NAUSEA: 0

## 2022-04-08 ASSESSMENT — PATIENT HEALTH QUESTIONNAIRE - PHQ9
SUM OF ALL RESPONSES TO PHQ9 QUESTIONS 1 & 2: 0
2. FEELING DOWN, DEPRESSED OR HOPELESS: 0
1. LITTLE INTEREST OR PLEASURE IN DOING THINGS: 0
SUM OF ALL RESPONSES TO PHQ QUESTIONS 1-9: 0

## 2022-04-08 NOTE — PROGRESS NOTES
Pa Cordova (:  1980) is a 42 y.o. male,Established patient, here for evaluation of the following chief complaint(s): Shoulder Pain (LEFT/ PAIN SCORE: 4/10 NOW ), Shortness of Breath (STARTED THIS PAST 2021/ WOULD LIKE REFERRAL FOR PULMONOLOGY ), and Fatigue (NOTICED THE PAST YEAR/ THE PAST 2-3 MONTHS GETTING WORSE)      ASSESSMENT/PLAN:    1. Chronic left shoulder pain  Failing to resolve  Has completed physical therapy and not improving  Advised to avoid lifting up weights and if possible to avoid repetitive lifting weight at work  Tylenol as needed, IcyHot from over-the-counter advised  -     MRI SHOULDER LEFT WO CONTRAST; Future  -     [address], MD, Orthopedic Surgery, [place]    2. Asthma, Moderate persistent asthma without complication  Worsening  To continue Dulera 2 puffs twice a day, albuterol as needed  We will check chest x-ray, pulmonary function test, refer to pulmonologist, start Singulair  He does not smoke and he does not have exposure to toxic inhalants  -     Ernesto Sullivan MD, Pulmonology, [place]  -   To start montelukast (SINGULAIR) 10 MG tablet; Take 1 tablet by mouth nightly, Disp-90 tablet, R-3Normal  -     XR CHEST (2 VW); Future  -     Full PFT Study With Bronchodilator; Future  3. Seronegative polyarthritis  Improving  On methotrexate and folic acid per rheumatologist  -     Vitamin D 25 Hydroxy; Future    4. Essential hypertension  Well controlled. Continue current treatment. Amlodipine 10 mg,  Will recheck labs. -     CBC; Future  -     Comprehensive Metabolic Panel; Future  -     Magnesium; Future  -     TSH; Future  -     Uric Acid; Future  -     Urinalysis with Reflex to Culture; Future  5. Hyperlipidemia with target LDL less than 100  Unsure if improving or not  Continue pravastatin 40 mg daily, recheck lipid panel, low-carb, low-fat diet, exercise and attempt to lose weight    -     Lipid Panel; Future  6.  Hyperglycemia mild, 120 on 10/7/2021  Low-carb diet advised, exercise and attempt to lose weight  We need to rule out prediabetes  -     Hemoglobin A1C; Future  7. COVID-19 vaccination declined  Patient absolutely declines despite counseling  8. Pneumococcal vaccination declined  Patient declines despite counseling    9. NOEMÍ on CPAP  Improved with CPAP  He benefits from using the CPAP order for CPAP already signed recently by my partner  Referred to new pulmonologist  Previously evaluated by Dr. Victor Hugo Baltazar for sleep apnea, most likely he failed to follow-up    10. Recurrent major depressive disorder, in full remission ([facility] [address]  Stable  We will continue to monitor  To continue with Wellbutrin  mg daily, BuSpar as needed for anxiety      Cordero received counseling on the following healthy behaviors: nutrition, exercise, medication adherence and weight loss    Reviewed prior labs and health maintenance  Discussed use, benefit, and side effects of prescribed medications. Barriers to medication compliance addressed. Patient given educational materials - see patient instructions  All patient questions answered. Patient voiced understanding. The patient's past medical,surgical, social, and family history as well as his current medications and allergies were reviewed as documented in today's encounter. Medications, labs, diagnostic studies, consultations and follow-up as documented in this encounter. Return in about 4 months (around 8/8/2022) for Face-2F-30mins PHYSICAL, VISION screen, PHQ9. Elis Spencer    Future Appointments   Date Time Provider Joe Healy   4/11/2022  3:30 PM Poppy Srivastava Neuro [facility]   4/22/2022  4:15 PM Kary Bolaños, PT [facility]   8/9/2022 10:30 AM [facility]       SUBJECTIVE/OBJECTIVE:    Cordero complains of persistent left shoulder pain for 3 years, without apparent reason, he denies any injury.   Patient reports it hurts in the front of the left shoulder and deep, radiates towards the collar bone. Patient reports the pain is worse when lifting weights at the G[employer] and he reports he has been lifting up to 405 lbs. His job also involves a lot of  lifting heavy stuff at work. Patient reports it feels like forcing his left arm when lifting to do things above the head  Intensity of pain is 4/10 at this time but it will go up to \"14/10\". He says he is very rarely pain free  Doesn't wake him up, but cannot fall asleep due to pain sometimes. Tried  Ibuprofen and tylenol, ice and heat and has done physical therapy  Has done physical therapy. He is left-handed. He denies numbness and tingling. Saw Dr José Miguel Leach, orthopedic surgeon  1/18/21 x-rays normal    4 x-ray views of the left shoulder completed on 1/18/2021 were reviewed    demonstrating no obvious fracture, dislocation, or subluxation.  Normal    glenohumeral and acromioclavicular joint spaces with a type II acromion       Asthma:  Current treatment includes beta agonist inhalers, nebulized beta agonists, combination beta agonists/steroid inhalers, which has been somewhat effective. Using preventive medication(s) consistently: yes. Residual symptoms: dyspnea and non-productive cough. Sometimes wheezing. Patient reports he has been having dyspnea on exertion worse since November  Stopping more often to rest and catch his breath after climbing up 1 flight of stairs, or after walking at a fast pace 2-3 miles. Patient says before, he could walk for 5 miles without getting short of breath. Patient denies chest pain/tightness. Patient says he has not been using the albuterol when getting short of breath. Allergies are flaring up. Reports congestion, postnasal drip, sneezing. He denies sinus pain or sinus pressure. Nicotine dependence. No, he does not smoke  Counseling given: Yes      He says he had COVID in October 5, 2021-flu shot pneumonia left lower lobe.   Counseling given for Pneumovax and COVID-19 vaccine, he declines both despite counseling. 10/3/2021  Lateral left basilar airspace disease may represent small/early pneumonia and   or atelectasis.           Sleep Apnea:  Current treatment: cPAP. Compliance: compliant all of the time. Residual symptoms include: daytime fatigue. He was previously following with Dr. Litzy Roque for sleep apnea apparently last seen by Dr. Litzy Roque per records on 10/3/2019. Follows with rheumatologist, he had multiple episodes of joint pain flareup, resolved with prednisone. Rheumatologist diagnosed him with seronegative arthtritis, started him on methotrexate, seeing Dr. Jessica Heck  He reports worsening fatigue for the past 2-3 months, progressively getting worse. Denies fever, chills, night sweats. He reports pain in his left shoulder, wrists, fingers, knees, ankles. Reports associated intermittent swelling in her wrists fingers and wrists. Hypertension:    he  is exercising and is adherent to low salt diet. Blood pressure is well controlled at home. Cardiac symptoms dyspnea and fatigue. Patient denies chest pain, chest pressure/discomfort, claudication, exertional chest pressure/discomfort, irregular heart beat, lower extremity edema, near-syncope, orthopnea, palpitations, paroxysmal nocturnal dyspnea, syncope and tachypnea. Cardiovascular risk factors: dyslipidemia, family history of premature cardiovascular disease, hypertension, male gender and obesity (BMI >= 30 kg/m2). Use of agents associated with hypertension: none. History of target organ damage: none.   EKG was abnormal on 10/3/2021, we did refer him to cardiologist is unclear if he has seen cardiologist      blood pressure is Normal.    BP Readings from Last 3 Encounters:   04/08/22 136/78   02/16/22 134/80   02/07/22 (!) 151/85        Pulse is Normal.    Pulse Readings from Last 3 Encounters:   04/08/22 73   02/16/22 70   02/07/22 81       Hyperlipidemia:  No new myalgias or GI upset on pravastatin (Pravachol). Medication compliance: compliant all of the time. Patient is  following a low fat, low cholesterol diet. LDL is normal but high triglycerides  Lab Results   Component Value Date    LDLCHOLESTEROL 81 10/03/2021     Lab Results   Component Value Date    TRIG 290 (H) 10/03/2021    TRIG 180 (H) 06/25/2021    TRIG 138 05/08/2020     Prior mild hyperglycemia  Denies increased appetite, thirst or polyuria. Weight is stable  Wt Readings from Last 3 Encounters:   04/08/22 266 lb (120.7 kg)   02/16/22 260 lb (117.9 kg)   02/07/22 260 lb (117.9 kg)       01/08/21 265 lb 12.8 oz (120.6 kg)            [x]Negative depression screening. PHQ Scores 4/8/2022 6/23/2021 1/8/2021 8/12/2020 5/8/2020 1/2/2020 5/24/2019   PHQ2 Score 0 0 0 0 2 0 0   PHQ9 Score 0 0 0 0 2 0 0         Prior to Visit Medications    Medication Sig Taking?  Authorizing Provider   folic acid (FOLVITE) 1 MG tablet take 1 tablet by mouth once daily Yes Historical Provider, MD   methotrexate (RHEUMATREX) 2.5 MG chemo tablet take 7 tablets by mouth every week Yes Historical Provider, MD   buPROPion (WELLBUTRIN XL) 150 MG extended release tablet take 1 tablet by mouth every morning Yes Jose Elias Coleman MD   omeprazole (PRILOSEC) 40 MG delayed release capsule take 1 capsule by mouth once daily Yes Jose Elias Coleman MD   DULERA 100-5 MCG/ACT inhaler inhale 2 puffs by mouth and INTO THE LUNGS twice a day STOP FLOVENT Yes Jose Elias Coleman MD   pravastatin (PRAVACHOL) 20 MG tablet take 1 tablet by mouth every evening **STOP ATORVASTATIN** Yes Jose Elias Coleman MD   amLODIPine (NORVASC) 10 MG tablet take 1 tablet by mouth every morning Yes Jose Elias Coleman MD   busPIRone (BUSPAR) 10 MG tablet Take 1 tablet by mouth 3 times daily as needed (anxiety) Yes Jose Elias Coleman MD   loratadine (CLARITIN) 10 MG tablet Take 1 tablet by mouth daily Yes Jose Elias Coleman MD   albuterol (PROVENTIL) (2.5 MG/3ML) 0.083% nebulizer solution Take 3 mLs by nebulization every 6 hours as needed for Wheezing or Shortness of Breath Yes Vasquez Ely MD   Blood Pressure KIT Dx: HTN. Needs automatic blood pressure machine to monitor his blood pressure. Yes Vasquez Ely MD   Respiratory Therapy Supplies (NEBULIZER) MADHAV 1 Units by Does not apply route 2 times daily Dx: Asthma exacerbation J45.901 Yes Ankur Jama MD   fluticasone (FLONASE) 50 MCG/ACT nasal spray 2 sprays by Nasal route daily  Cheli Booker, APRN - CNP       Social History     Tobacco Use    Smoking status: Never Smoker    Smokeless tobacco: Never Used   Vaping Use    Vaping Use: Never used   Substance Use Topics    Alcohol use: Yes     Comment: occasional  few x year    Drug use: No       Review of Systems   Constitutional: Positive for fatigue. Negative for activity change, appetite change, chills, diaphoresis, fever and unexpected weight change. HENT: Positive for congestion, postnasal drip, rhinorrhea and sneezing. Negative for sinus pressure and sinus pain. Respiratory: Positive for apnea (on CPAP), cough, shortness of breath (BURTON) and wheezing. Negative for chest tightness. Cardiovascular: Negative for chest pain, palpitations and leg swelling. Gastrointestinal: Negative for abdominal distention, abdominal pain, constipation, diarrhea, nausea and vomiting. Endocrine: Negative for cold intolerance, heat intolerance, polydipsia, polyphagia and polyuria. Musculoskeletal: Positive for arthralgias (left shoulder, knees, ankles, fingers, wrists) and joint swelling (fingers and wrists). Allergic/Immunologic: Positive for environmental allergies and immunocompromised state (on methotrexate). Neurological: Negative for numbness. Psychiatric/Behavioral: Positive for sleep disturbance. Negative for dysphoric mood, self-injury and suicidal ideas.  The patient is nervous/anxious.          -vital signs stable and within normal limits except obesity per BMI    /78   Pulse 73   Ht 6' 2\" (1.88 m)   Wt 266 lb (120.7 kg)   SpO2 97%   BMI 34.15 kg/m²        Physical Exam  Vitals and nursing note reviewed. Constitutional:       General: He is not in acute distress. Appearance: Normal appearance. He is well-developed. He is obese. He is not diaphoretic. HENT:      Head: Normocephalic and atraumatic. Right Ear: External ear normal.      Left Ear: External ear normal.      Mouth/Throat:      Comments: I did not examine the mouth due to coronavirus pandemic and wearing masks. Eyes:      General: Lids are normal. No scleral icterus. Right eye: No discharge. Left eye: No discharge. Extraocular Movements: Extraocular movements intact. Conjunctiva/sclera: Conjunctivae normal.   Neck:      Thyroid: No thyromegaly. Comments: Thick neck  Cardiovascular:      Rate and Rhythm: Normal rate and regular rhythm. Heart sounds: Normal heart sounds. No murmur heard. Pulmonary:      Effort: Pulmonary effort is normal. No respiratory distress. Breath sounds: Normal breath sounds. No wheezing or rales. Chest:      Chest wall: No tenderness. Abdominal:      General: Bowel sounds are normal. There is no distension. Palpations: Abdomen is soft. There is no hepatomegaly or splenomegaly. Tenderness: There is no abdominal tenderness. Comments: Obese abdomen. Musculoskeletal:      Left shoulder: Tenderness and bony tenderness present. No swelling, deformity or crepitus. Decreased range of motion. Decreased strength. Normal pulse. Cervical back: Normal range of motion and neck supple. Right lower leg: No edema. Left lower leg: No edema. Comments: Mild decreased range of motion in the left shoulder, but able to lift up above the head at the same level with the right upper extremity, and and able to put his left arm on the back, at the same level with the right arm.   There is left shoulder anterior tenderness and tenderness on the external one third of the left clavicle. Lymphadenopathy:      Cervical: No cervical adenopathy. Skin:     General: Skin is warm and dry. Capillary Refill: Capillary refill takes less than 2 seconds. Findings: No rash. Neurological:      Mental Status: He is alert and oriented to person, place, and time. Deep Tendon Reflexes: Reflexes are normal and symmetric. Psychiatric:         Mood and Affect: Mood is anxious. Behavior: Behavior normal.         Thought Content: Thought content normal.         Judgment: Judgment normal.             I personally reviewed testing with patient.   Mild leukocytosis  Hyperglycemia  Hyper triglyceridemia    Otherwise labs within normal limits    Lab Results   Component Value Date    WBC 12.7 (H) 10/07/2021    HGB 14.7 10/07/2021    HCT 45.4 10/07/2021    MCV 92.7 10/07/2021     10/07/2021       Lab Results   Component Value Date     10/07/2021    K 4.2 10/07/2021     10/07/2021    CO2 21 10/07/2021    BUN 10 10/07/2021    CREATININE 0.63 10/07/2021    GLUCOSE 120 10/07/2021    GLUCOSE 96 04/30/2012    CALCIUM 8.2 10/07/2021      Lab Results   Component Value Date    LABA1C 5.5 10/04/2021    LABA1C 5.5 03/22/2017    LABA1C 5.3 07/23/2014       Lab Results   Component Value Date    ALT 25 10/03/2021    AST 32 10/03/2021    ALKPHOS 106 10/03/2021    BILITOT 0.16 (L) 10/03/2021       Lab Results   Component Value Date    TSH 4.07 06/25/2021       Lab Results   Component Value Date    CHOL 167 10/03/2021    CHOL 247 (H) 06/25/2021    CHOL 211 (H) 05/08/2020     Lab Results   Component Value Date    TRIG 290 (H) 10/03/2021    TRIG 180 (H) 06/25/2021    TRIG 138 05/08/2020     Lab Results   Component Value Date    HDL 28 (L) 10/03/2021    HDL 44 06/25/2021    HDL 50 05/08/2020     Lab Results   Component Value Date    LDLCHOLESTEROL 81 10/03/2021    LDLCHOLESTEROL 167 (H) 06/25/2021    LDLCHOLESTEROL 133 (H) 05/08/2020     Lab Results   Component Value Date    CHOLHDLRATIO 6.0 (H) 10/03/2021    CHOLHDLRATIO 5.6 (H) 06/25/2021    CHOLHDLRATIO 4.2 05/08/2020       Lab Results   Component Value Date    LABA1C 5.5 10/04/2021       Lab Results   Component Value Date    NIHPWAZC51 570 07/23/2014       No results found for: FOLATE    Lab Results   Component Value Date    VITD25 39.8 06/25/2021         Orders Placed This Encounter   Procedures    MRI SHOULDER LEFT WO CONTRAST     Standing Status:   Future     Standing Expiration Date:   4/8/2023    XR CHEST (2 VW)     Standing Status:   Future     Standing Expiration Date:   4/8/2023     Order Specific Question:   Reason for exam:     Answer:   Cough& BURTON, h/o pneumonia and COVID in October    CBC     Standing Status:   Future     Standing Expiration Date:   4/8/2023    Comprehensive Metabolic Panel     Standing Status:   Future     Standing Expiration Date:   6/5/2022    Hemoglobin A1C     Standing Status:   Future     Standing Expiration Date:   4/8/2023    Lipid Panel     Standing Status:   Future     Standing Expiration Date:   4/8/2023     Order Specific Question:   Is Patient Fasting?/# of Hours     Answer:   8-10 Hours, water ok to drink    Magnesium     Standing Status:   Future     Standing Expiration Date:   4/8/2023    TSH     Standing Status:   Future     Standing Expiration Date:   4/8/2023    Uric Acid     Standing Status:   Future     Standing Expiration Date:   4/8/2023    Urinalysis with Reflex to Culture     Standing Status:   Future     Standing Expiration Date:   4/8/2023     Order Specific Question:   SPECIFY(EX-CATH,MIDSTREAM,CYSTO,ETC)?      Answer:   MIDSTREAM    Vitamin D 25 Hydroxy     Standing Status:   Future     Standing Expiration Date:   4/8/2023   German Jordan MD, Orthopedic Surgery, [place]     Referral Priority:   Routine     Referral Type:   Eval and Treat     Referral Reason:   Specialty Services Required Referred to Provider:   Mallory Porter MD     Requested Specialty:   Orthopedic Surgery     Number of Visits Requested:   Ryan Elder MD, Pulmonology, [place]     Referral Priority:   Routine     Referral Type:   Eval and Treat     Referral Reason:   Specialty Services Required     Referred to Provider:   Lisa Cali MD     Requested Specialty:   Pulmonary Disease     Number of Visits Requested:   1    Full PFT Study With Bronchodilator     Standing Status:   Future     Standing Expiration Date:   4/8/2023     Scheduling Instructions:      Pre- and post bronchodilator       Orders Placed This Encounter   Medications    montelukast (SINGULAIR) 10 MG tablet     Sig: Take 1 tablet by mouth nightly     Dispense:  90 tablet     Refill:  3       Medications Discontinued During This Encounter   Medication Reason    predniSONE (DELTASONE) 2.5 MG tablet LIST CLEANUP    celecoxib (CELEBREX) 100 MG capsule LIST CLEANUP    meloxicam (MOBIC) 15 MG tablet LIST CLEANUP           On this date 4/8/2022 I have spent 35 minutes reviewing previous notes, test results and face to face with the patient discussing the diagnosis and importance of compliance with the treatment plan as well as documenting on the day of the visit. This note was completed by using the assistance of a speech-recognition program. However, inadvertent computerized transcription errors may be present. Although every effort was made to ensure accuracy, no guarantees can be provided that every mistake has been identified and corrected by editing. An electronic signature was used to authenticate this note.   Electronically signed by Jean Pierre Coughlin MD on 4/10/2022 at 2:29 PM

## 2022-04-08 NOTE — PROGRESS NOTES
Visit Information    Have you changed or started any medications since your last visit including any over-the-counter medicines, vitamins, or herbal medicines? no   Have you stopped taking any of your medications? Is so, why? -  no  Are you having any side effects from any of your medications? - no    Have you seen any other physician or provider since your last visit? yes -    Have you had any other diagnostic tests since your last visit? yes -    Have you been seen in the emergency room and/or had an admission in a hospital since we last saw you?  yes -    Have you had your routine dental cleaning in the past 6 months?  no     Do you have an active MyChart account? If no, what is the barrier?   Yes    Patient Care Team:  Sri Diaz MD as PCP - General (Family Medicine)  Sri Diaz MD as PCP - [facility]  Merline Garcia MD as Surgeon (Cardiology)  Chiara Garcia MD as Consulting Physician (Gastroenterology)  Bob Fonseca MD as Consulting Physician (Internal Medicine)    Medical History Review  Past Medical, Family, and Social History reviewed and does contribute to the patient presenting condition    Health Maintenance   Topic Date Due    COVID-19 Vaccine (1) Never done    Pneumococcal 0-64 years Vaccine (1 of 2 - PPSV23) Never done    DTaP/Tdap/Td vaccine (1 - Tdap) Never done    Depression Monitoring  06/23/2022    Flu vaccine (Season Ended) 09/01/2022    Lipid screen  10/03/2022    Potassium monitoring  10/07/2022    Creatinine monitoring  10/07/2022    Diabetes screen  10/04/2024    Hepatitis C screen  Completed    HIV screen  Completed    Hepatitis A vaccine  Aged Out    Hepatitis B vaccine  Aged Out    Hib vaccine  Aged Out    Meningococcal (ACWY) vaccine  Aged Out

## 2022-04-09 DIAGNOSIS — E78.5 HYPERLIPIDEMIA WITH TARGET LDL LESS THAN 100: ICD-10-CM

## 2022-04-09 RX ORDER — PRAVASTATIN SODIUM 40 MG
40 TABLET ORAL EVERY EVENING
Qty: 90 TABLET | Refills: 3 | Status: SHIPPED | OUTPATIENT
Start: 2022-04-09 | End: 2022-06-24

## 2022-04-10 PROBLEM — Z99.89 OSA ON CPAP: Status: ACTIVE | Noted: 2019-10-16

## 2022-04-10 ASSESSMENT — ENCOUNTER SYMPTOMS
SHORTNESS OF BREATH: 1
RHINORRHEA: 1
SINUS PAIN: 0
APNEA: 1
SINUS PRESSURE: 0

## 2022-04-11 ENCOUNTER — OFFICE VISIT (OUTPATIENT)
Dept: NEUROSURGERY | Age: 42
End: 2022-04-11
Payer: COMMERCIAL

## 2022-04-11 VITALS
BODY MASS INDEX: 34.5 KG/M2 | WEIGHT: 268.8 LBS | HEART RATE: 85 BPM | SYSTOLIC BLOOD PRESSURE: 121 MMHG | DIASTOLIC BLOOD PRESSURE: 47 MMHG | OXYGEN SATURATION: 100 % | HEIGHT: 74 IN

## 2022-04-11 DIAGNOSIS — M48.062 LUMBAR STENOSIS WITH NEUROGENIC CLAUDICATION: ICD-10-CM

## 2022-04-11 DIAGNOSIS — M43.16 SPONDYLOLISTHESIS, LUMBAR REGION: Primary | ICD-10-CM

## 2022-04-11 PROCEDURE — G8417 CALC BMI ABV UP PARAM F/U: HCPCS | Performed by: NEUROLOGICAL SURGERY

## 2022-04-11 PROCEDURE — 1036F TOBACCO NON-USER: CPT | Performed by: NEUROLOGICAL SURGERY

## 2022-04-11 PROCEDURE — G8427 DOCREV CUR MEDS BY ELIG CLIN: HCPCS | Performed by: NEUROLOGICAL SURGERY

## 2022-04-11 PROCEDURE — 99214 OFFICE O/P EST MOD 30 MIN: CPT | Performed by: NEUROLOGICAL SURGERY

## 2022-04-11 NOTE — PROGRESS NOTES
[address]  [facility] [address] [facility] [address]  Dept: [phone]    Patient:  Al Stubbs  YOB: 1980  Date: 4/11/22    The patient is a 42 y.o. male who presents today for consult of the following problems:     Chief Complaint   Patient presents with    Follow-up     Spondylolisthesis, lumbar region             HPI:     Al Stubbs is a 42 y.o. male on whom neurosurgical consultation was requested by Jean Pierre Coughlin MD for management of axial back pain in the setting of spinal listhesis. The patient has been followed by me for some time now and has completed a full course of 6 to 8 weeks of physical therapy with multiple epidural injections. He also appears to have lost some weight in the interim. He has been actively working as an  and has a very physical job on his feet frequently. The time. He states that when he is been standing for long periods of time he does get numbness throughout his lower torso without a dermatomal distribution. He also gets significant change in position that cause and trigger significant bilateral paraspinal axial back back pain. Denies any bowel bladder incontinence or saddle symptoms but does have significant numbness to the lower extremities that appears to come and go. Has been evaluated by pain management and appears to have been offered an ablation but was not completely versed on exactly what this entails. .      History:     Past Medical History:   Diagnosis Date    Asthma     COPD (chronic obstructive pulmonary disease) ([facility] [address] 11/19/2018    Depression     Erectile dysfunction 9/29/2017    Essential hypertension 1/2/2017    GERD (gastroesophageal reflux disease) 11/19/2018    Heart palpitations 9/29/2017    Hyperlipidemia with target LDL less than 100 3/23/2017    Left lower lobe pneumonia 11/9/2012  Lumbar radiculopathy     Mitral valve prolapse     Nonspecific reactive hepatitis 3/22/2017    Obesity (BMI 30-39.9) 3/23/2017    NOEMÍ (obstructive sleep apnea) 10/16/2019    Osteoarthritis     Seronegative polyarthritis 10/10/2016    followed w/ rheumatology in [address] [place] [address] anxiety disorder     Uvulitis 8/15/2019    Vertebrogenic low back pain 12/13/2021     Past Surgical History:   Procedure Laterality Date    COLONOSCOPY      CYST REMOVAL Right     Armpit     ENDOSCOPY, COLON, DIAGNOSTIC      HAND SURGERY Left     KNEE ARTHROSCOPY      right knee    NOSE SURGERY      TONSILLECTOMY      UPPER GASTROINTESTINAL ENDOSCOPY N/A 10/9/2019    EGD POLYP SNARE, HOT.  ESOPHAGEAL DILATATION WITH MALONIES 50F performed by Michael Mello MD at [facility]       Family History   Problem Relation Age of Onset    Diabetes Father     High Cholesterol Father     Other Mother         autoimmune hepatitis     High Blood Pressure Brother     Heart Disease Brother     Heart Attack Brother 40        cabg age 40    [facility] Rheum Arthritis Other 7    Colon Cancer Neg Hx     Cancer Neg Hx      Current Outpatient Medications on File Prior to Visit   Medication Sig Dispense Refill    pravastatin (PRAVACHOL) 40 MG tablet Take 1 tablet by mouth every evening Take with food 90 tablet 3    folic acid (FOLVITE) 1 MG tablet take 1 tablet by mouth once daily      methotrexate (RHEUMATREX) 2.5 MG chemo tablet take 7 tablets by mouth every week      montelukast (SINGULAIR) 10 MG tablet Take 1 tablet by mouth nightly 90 tablet 3    buPROPion (WELLBUTRIN XL) 150 MG extended release tablet take 1 tablet by mouth every morning 90 tablet 3    omeprazole (PRILOSEC) 40 MG delayed release capsule take 1 capsule by mouth once daily 90 capsule 3    DULERA 100-5 MCG/ACT inhaler inhale 2 puffs by mouth and INTO THE LUNGS twice a day STOP FLOVENT 13 g 3    amLODIPine (NORVASC) 10 MG tablet take 1 tablet by mouth every morning 90 tablet 3    busPIRone (BUSPAR) 10 MG tablet Take 1 tablet by mouth 3 times daily as needed (anxiety) 270 tablet 3    loratadine (CLARITIN) 10 MG tablet Take 1 tablet by mouth daily 90 tablet 3    albuterol (PROVENTIL) (2.5 MG/3ML) 0.083% nebulizer solution Take 3 mLs by nebulization every 6 hours as needed for Wheezing or Shortness of Breath 120 vial 0    Blood Pressure KIT Dx: HTN. Needs automatic blood pressure machine to monitor his blood pressure. 1 kit 0    Respiratory Therapy Supplies (NEBULIZER) MADHAV 1 Units by Does not apply route 2 times daily Dx: Asthma exacerbation J45.901 1 Device 0    fluticasone (FLONASE) 50 MCG/ACT nasal spray 2 sprays by Nasal route daily 1 Bottle 3     No current facility-administered medications on file prior to visit. Social History     Tobacco Use    Smoking status: Never Smoker    Smokeless tobacco: Never Used   Vaping Use    Vaping Use: Never used   Substance Use Topics    Alcohol use: Yes     Comment: occasional  few x year    Drug use: No       Allergies   Allergen Reactions    Fish-Derived Products Anaphylaxis    Other Anaphylaxis     Any type of seafood    Shellfish Allergy Anaphylaxis    Shrimp (Diagnostic) Anaphylaxis    Ace Inhibitors Swelling     UVULITIS ON 8/15/19       Review of Systems  ROS: Back pain lower extremity numbness. Physical Exam:      BP (!) 121/47   Pulse 85   Ht 6' 2\" (1.88 m)   Wt 266 lb (120.7 kg)   SpO2 100%   BMI 34.15 kg/m²   Estimated body mass index is 34.15 kg/m² as calculated from the following:    Height as of this encounter: 6' 2\" (1.88 m). Weight as of this encounter: 266 lb (120.7 kg). General:  Vivien Escobedo is a 42y.o. year old male who appears his stated age. HEENT: Normocephalic atraumatic. Neck supple. Chest: regular rate; pulses equal. Equal chest rise and fall  Abdomen: Soft nondistended.    Ext: DP equal with good capillary refill  Neuro    Mentation  Appropriate affect   oriented    Cranial Nerves:   Pupils equal and reactive to light  Extraocular motion intact  Face symmetric  No dysarthria  v1-3 sensation symmetric, masseter tone symmetric  Hearing symmetric and intact to finger rub    Sensation:   Abnormal sensation of bilateral lower extremities    Motor  L deltoid 5/5; R deltoid 5/5  L biceps 5/5; R biceps 5/5  L triceps 5/5; R triceps 5/5  L wrist extension 5/5; R wrist extension 5/5  L intrinsics 5/5; R intrinsics 5/5     L iliopsoas 5/5 , R iliopsoas 5/5  L quadriceps 5/5; R quadriceps 5/5  L Dorsiflexion 5/5; R dorsiflexion 5/5  L Plantarflexion 5/5; R plantarflexion 5/5  L EHL 5/5; R EHL 5/5    Reflexes  L Brachioradialis 2+/4; R brachioradialis 2+/4  L Biceps 2+/4; R Biceps 2+/4  L Triceps 2+/4; R Triceps 2+/4  L Patellar 2+/4: R Patellar 2+/4  L Achilles 2+/4; R Achilles 2+/4    hoffmans L: neg  hoffmans R: neg  Clonus L: neg  Clonus R: neg  Babinski L: up  Babinski R; up    Negative Prudence Libel. No tenderness over the SI joints. Significant pain with extension versus flexion    Studies Review:     MRI L - L5/ S1 grade 2 spondylolisthesis and L4/5 disc protrusion    Assessment and Plan:      1. Spondylolisthesis, lumbar region    2. Lumbar stenosis with neurogenic claudication          Plan: Extensive discussion regarding the findings on the MRI as was the patient symptomology. I reviewed the imaging including the x-rays and the MRI lumbar spine with him including findings of lumbar spinal listhesis with significant foraminal disease at L5-S1 along with this protrusion at L4-5. I discussed with the patient that surgical intervention in the form of a anterior posterior fixation fusion at L4-S1 would be an extensive surgery amounting to approximately 6 hours.   I relayed to him that I believe that a medial branch block with subsequent ablation from L4-S1 would not be a bad consideration considering it would be far less invasive than any surgical intervention available from my end. I recommended that he proceed with evaluation by pain management for consideration of this option. In the interim I have also reiterated to him that he should continue to lose weight as it will increase the success rate of the surgery and decrease the complexity and risk profile. I did discuss with him that there are conservative options including the way he sleeps in terms of cushioning and the firmness of the mattress that can weigh in on the significant lot of pain that he has. Followup: No follow-ups on file. Prescriptions Ordered:  No orders of the defined types were placed in this encounter. Orders Placed:  No orders of the defined types were placed in this encounter. Electronically signed by Ortiz Frey DO on 4/11/2022 at 2:06 PM    Please note that this chart was generated using voice recognition Dragon dictation software. Although every effort was made to ensure the accuracy of this automated transcription, some errors in transcription may have occurred.

## 2022-04-22 ENCOUNTER — HOSPITAL ENCOUNTER (OUTPATIENT)
Dept: PHYSICAL THERAPY | Age: 42
Setting detail: THERAPIES SERIES
Discharge: HOME | End: 2022-04-22
Payer: COMMERCIAL

## 2022-04-22 NOTE — PROGRESS NOTES
[x] [facility]) - [facility]  [address]  [place]: [phone]   F: [phone]     Physical Therapy Cancel/No Show note    Date: 2022  Patient: Johanna Dean  : 1980  MRN: [id number]    Visit Count:   Cancels/No Shows to date:     For today's appointment patient:    []  Cancelled    [] Rescheduled appointment    [x] No-show     Reason given by patient:    []  Patient ill    []  Conflicting appointment    [] No transportation      [] Conflict with work    [] No reason given    [] Weather related    [] [id number]    [] Other:      Comments:        [] Next appointment was confirmed    Electronically signed by: Amie Gaucher, PT DPT

## 2022-04-29 ENCOUNTER — HOSPITAL ENCOUNTER (OUTPATIENT)
Dept: MRI IMAGING | Age: 42
Discharge: HOME | End: 2022-05-01
Payer: COMMERCIAL

## 2022-04-29 ENCOUNTER — HOSPITAL ENCOUNTER (OUTPATIENT)
Dept: PULMONOLOGY | Age: 42
Discharge: HOME | End: 2022-04-29
Payer: COMMERCIAL

## 2022-04-29 DIAGNOSIS — G89.29 CHRONIC LEFT SHOULDER PAIN: ICD-10-CM

## 2022-04-29 DIAGNOSIS — J45.40 MODERATE PERSISTENT ASTHMA WITHOUT COMPLICATION: ICD-10-CM

## 2022-04-29 DIAGNOSIS — M25.512 CHRONIC LEFT SHOULDER PAIN: ICD-10-CM

## 2022-04-29 PROCEDURE — 94060 EVALUATION OF WHEEZING: CPT

## 2022-04-29 PROCEDURE — 94729 DIFFUSING CAPACITY: CPT

## 2022-04-29 PROCEDURE — 94664 DEMO&/EVAL PT USE INHALER: CPT

## 2022-04-29 PROCEDURE — 73221 MRI JOINT UPR EXTREM W/O DYE: CPT

## 2022-04-29 PROCEDURE — 94375 RESPIRATORY FLOW VOLUME LOOP: CPT

## 2022-04-29 PROCEDURE — 94726 PLETHYSMOGRAPHY LUNG VOLUMES: CPT

## 2022-04-29 NOTE — RESULT ENCOUNTER NOTE
Please notify patient: Abnormal MRI follow-up with orthopedics      IMPRESSION:  1. Low-grade partial-thickness interstitial and articular-surface tearing of  supraspinatus and infraspinatus between critical zone and footplate. Mild  underlying supraspinatus and infraspinatus tendinosis. 2. Tearing at the posterosuperior labrum. Mild underlying diffuse labral  degeneration. 3. Mild glenohumeral chondromalacia.   4. Mild degenerative change of the left RUSTR [facility] joint    Future Appointments  5/2/2022   11:00 AM   Alia Goff MD          [facility]            [facility]  5/4/2022   12:00 PM   Ciro Pyle MD          [facility]  6/1/2022   3:00 PM    Analy Jacobsen MD        [facility]         [facility]  6/1/2022   3:30 PM    DO Cl[facility]           [facility]  8/9/2022   10:30 AM   Shanika Garay MD     [facility]               [facility]

## 2022-05-02 ENCOUNTER — OFFICE VISIT (OUTPATIENT)
Dept: ORTHOPEDIC SURGERY | Age: 42
End: 2022-05-02
Payer: COMMERCIAL

## 2022-05-02 VITALS — HEIGHT: 74 IN | WEIGHT: 262 LBS | BODY MASS INDEX: 33.62 KG/M2

## 2022-05-02 DIAGNOSIS — M25.512 LEFT SHOULDER PAIN, UNSPECIFIED CHRONICITY: ICD-10-CM

## 2022-05-02 DIAGNOSIS — M75.22 BICEPS TENDINITIS OF LEFT UPPER EXTREMITY: Primary | ICD-10-CM

## 2022-05-02 PROCEDURE — G8427 DOCREV CUR MEDS BY ELIG CLIN: HCPCS | Performed by: ORTHOPAEDIC SURGERY

## 2022-05-02 PROCEDURE — 99213 OFFICE O/P EST LOW 20 MIN: CPT | Performed by: ORTHOPAEDIC SURGERY

## 2022-05-02 PROCEDURE — 1036F TOBACCO NON-USER: CPT | Performed by: ORTHOPAEDIC SURGERY

## 2022-05-02 PROCEDURE — G8417 CALC BMI ABV UP PARAM F/U: HCPCS | Performed by: ORTHOPAEDIC SURGERY

## 2022-05-02 RX ORDER — DICLOFENAC SODIUM 75 MG/1
75 TABLET, DELAYED RELEASE ORAL 2 TIMES DAILY WITH MEALS
Qty: 28 TABLET | Refills: 0 | Status: SHIPPED | OUTPATIENT
Start: 2022-05-02 | End: 2022-05-16

## 2022-05-02 NOTE — PROGRESS NOTES
Orthopedic Shoulder Encounter Note     Chief complaint: left shoulder pain    HPI: Gavin Bone is a 42 y.o.  left-hand dominant male who presents for evaluation of his left shoulder. Indicates that he has been having pain for over 3 years now. He denies any precipitating trauma or injury. His pain is primarily localized to the anterior aspect of shoulder. It is fairly constant and radiates down his arm to the hand. He denies any weakness but does report stiffness. Previous treatment:    NSAIDs: Ibuprofen, naproxen, Celebrex, Mobic    Physical Therapy: No    Injections: None    Surgeries: None    Review of Systems:   Constitutional: Negative for fever, chills, sweats. Pain Score:  10 - Worst pain ever  Neurological: Negative for headache, numbness, or weakness. Musculoskeletal: As noted in HPI     Past [address] [facility]  has a past medical history of Asthma, COPD (chronic obstructive pulmonary disease) ([facility] [address] Depression, Erectile dysfunction, Essential hypertension, GERD (gastroesophageal reflux disease), Heart palpitations, Hyperlipidemia with target LDL less than 100, Left lower lobe pneumonia, Lumbar radiculopathy, Mitral valve prolapse, Nonspecific reactive hepatitis, Obesity (BMI 30-39.9), NOEMÍ (obstructive sleep apnea), Osteoarthritis, Seronegative polyarthritis, Social anxiety disorder, Uvulitis, and Vertebrogenic low back pain. Past Surgical History  Davy Gusman  has a past surgical history that includes Knee arthroscopy; Hand surgery (Left); Tonsillectomy; Nose surgery; [place] tooth extraction; Colonoscopy; Endoscopy, colon, diagnostic; cyst removal (Right); and Upper gastrointestinal endoscopy (N/A, 10/9/2019).     Current Medications  Current Outpatient Medications   Medication Sig Dispense Refill    pravastatin (PRAVACHOL) 40 MG tablet Take 1 tablet by mouth every evening Take with food 90 tablet 3    folic acid (FOLVITE) 1 MG tablet take 1 tablet by mouth once daily      methotrexate (RHEUMATREX) 2.5 MG chemo tablet take 7 tablets by mouth every week      montelukast (SINGULAIR) 10 MG tablet Take 1 tablet by mouth nightly 90 tablet 3    buPROPion (WELLBUTRIN XL) 150 MG extended release tablet take 1 tablet by mouth every morning 90 tablet 3    omeprazole (PRILOSEC) 40 MG delayed release capsule take 1 capsule by mouth once daily 90 capsule 3    DULERA 100-5 MCG/ACT inhaler inhale 2 puffs by mouth and INTO THE LUNGS twice a day STOP FLOVENT 13 g 3    amLODIPine (NORVASC) 10 MG tablet take 1 tablet by mouth every morning 90 tablet 3    busPIRone (BUSPAR) 10 MG tablet Take 1 tablet by mouth 3 times daily as needed (anxiety) 270 tablet 3    loratadine (CLARITIN) 10 MG tablet Take 1 tablet by mouth daily 90 tablet 3    fluticasone (FLONASE) 50 MCG/ACT nasal spray 2 sprays by Nasal route daily 1 Bottle 3    albuterol (PROVENTIL) (2.5 MG/3ML) 0.083% nebulizer solution Take 3 mLs by nebulization every 6 hours as needed for Wheezing or Shortness of Breath 120 vial 0    Blood Pressure KIT Dx: HTN. Needs automatic blood pressure machine to monitor his blood pressure. 1 kit 0    Respiratory Therapy Supplies (NEBULIZER) MADHAV 1 Units by Does not apply route 2 times daily Dx: Asthma exacerbation J45.901 1 Device 0     No current facility-administered medications for this visit. Allergies  Allergies have been reviewed. Soco Gilman is allergic to fish-derived products, other, shellfish allergy, shrimp (diagnostic), and ace inhibitors. Social History  Soco Gilman  reports that he has never smoked. He has never used smokeless tobacco. He reports current alcohol use. He reports that he does not use drugs. Family History  Pa's family history includes Diabetes in his father; Heart Attack (age of onset: 40) in his brother; Heart Disease in his brother; High Blood Pressure in his brother; High Cholesterol in his father; Other in his mother; Rheum Arthritis (age of onset: 9) in an other family member. Physical Exam:     Ht 6' 2\" (1.88 m) Comment: per pt  Wt 262 lb (118.8 kg) Comment: per pt  BMI 33.64 kg/m²    Constitutional: Patient is oriented to person, place, and time. Patient appears well-developed and well nourished. Mental Status/psychiatric: Behavior is normal. Thought content normal.  HENT: Negative otherwise noted  Head: Normocephalic and Atraumatic  Nose: Normal  Respiratory/Cardio: Effort normal. No respiratory distress. Shoulder:    Skin: Skin is warm and dry; no swelling or obvious muscular atrophy.    Vasculature: 2+ radial pulses bilaterally  Neuro: Sensation grossly intact to light touch diffusely  Tenderness: Tender to palpation over the anterior aspect of the left shoulder    ROM: (Degrees)    Right   A P   Left   A P    Elevation  150    Elevation  150   Abduction  145    Abduction  145    ER   80    ER   75   IR   T12    IR   T12   90 abd/ER      90 abd/ER     90 abd/IR      90 abd/IR     Crepitation  No    Crepitation No  Dyskenesia  No    Dyskenesia No      Muscle strength:    Right       Left    Deltoid   5    Deltoid   5  Supraspinatus  5    Supraspinatus  5  ER   5    ER   5  IR   5    IR   5    Special tests    Right   Rotator Cuff    Left    n   Painful arc    y   n   Pain with ER    n    n   Neer's     n    n   Hawkin's    y    n   Drop Arm    n  n   Lift off/Belly Press   n  n   ER Lag    n          AC Joint  n   AC tenderness   n  n   Cross-chest adduction  n       Labrum/biceps    n   Amelia's    n   n   Biceps sheer    y      jh   Speed's/Yergason's   y    n   Tenderness Biceps Groove  y    n   Ethan's    n         Instability  n   Ant Apprehension   n    n   Post Apprehension   n    n   Ant Load shift    n    n   Post Load shift   n   n   Sulcus     n  n   Generalized Laxity   n  n   Relocation test   n  n   Crank test     n  n   Wallace-superior escape  n       Imaging:  Xrays: 4 views of the left shoulder obtained on 5/2/2022 were independently reviewed  Indications: Left shoulder pain  Findings: Normal glenohumeral and acromioclavicular joint spaces. Mild cystic changes involving the distal clavicle at the acromioclavicular joint. Type II acromion. No obvious fracture, dislocation or subluxation. Impression: Left shoulder radiographs with mild degenerative changes as outlined above involving the acromioclavicular joint. MRI: MRI of the left shoulder completed on 4/29/2022 was reviewed independently demonstrating intact rotator cuff. Mild signal involving a small area of the insertion of the supraspinatus suggestive of mild tendinitis. Biceps tendon appears to be intact. Mild to moderate degenerative changes noted at the acromioclavicular joint. Impression/Plan:     Velma Cheng is a 42 y.o. old male with left shoulder pain that appears to be consistent with biceps tendinitis on clinical examination. I had a discussion with the patient today educating him about this and discussed treatment options available to him. I did recommend proceeding conservatively at this time with physical therapy in addition to a cortisone injection under ultrasound guidance into the biceps tendon sheath. He was amenable to this and a prescription for therapy was provided and he was referred to Dr. Shaquille Larkin for the injection. I will see him back in my clinic as needed but he may return or call at anytime with persistent or worsening symptoms and with any questions or concerns. This note is created with the assistance of a speech recognition program.  While intending to generate adocument that actually reflects the content of the visit, the document can still have some errors including those of syntax and sound a like substitutions which may escape proof reading. It such instances, actual meaningcan be extrapolated by contextual diversion.     NA = Not assessed  RTC = Rotator cuff  RCT = Rotator cuff tear  ER = External rotation  IR = Internal rotation  AC = Acromioclavicular  GH = Glenohumeral  n = No  y = Yes

## 2022-05-03 LAB
DLCO %PRED: NORMAL
DLCO PRED: NORMAL
DLCO/VA %PRED: NORMAL
DLCO/VA PRED: NORMAL
DLCO/VA: NORMAL
DLCO: NORMAL
EXPIRATORY TIME: NORMAL
FEF 25-75% %PRED-PRE: NORMAL
FEF 25-75% PRED: NORMAL
FEF 25-75%-PRE: NORMAL
FEV1 %PRED-PRE: NORMAL
FEV1 PRED: NORMAL
FEV1/FVC %PRED-PRE: NORMAL
FEV1/FVC PRED: NORMAL
FEV1/FVC: NORMAL
FEV1: NORMAL
FVC %PRED-PRE: NORMAL
FVC PRED: NORMAL
FVC: NORMAL
GAW %PRED: NORMAL
GAW PRED: NORMAL
GAW: NORMAL
IC %PRED: NORMAL
IC PRED: NORMAL
IC: NORMAL
MVV %PRED-PRE: NORMAL
MVV PRED: NORMAL
MVV-PRE: NORMAL
PEF %PRED-PRE: NORMAL
PEF PRED: NORMAL
PEF-PRE: NORMAL
RAW %PRED: NORMAL
RAW PRED: NORMAL
RAW: NORMAL
RV %PRED: NORMAL
RV PRED: NORMAL
RV: NORMAL
SVC %PRED: NORMAL
SVC PRED: NORMAL
SVC: NORMAL
TLC %PRED: NORMAL
TLC PRED: NORMAL
TLC: NORMAL
VA %PRED: NORMAL
VA PRED: NORMAL
VA: NORMAL
VTG %PRED: NORMAL
VTG PRED: NORMAL
VTG: NORMAL

## 2022-05-03 NOTE — PROCEDURES
Results: The FVC, the FEV1, and the FEV1 titration was normal. There is no response to bronchodilators. Lung volumes are normal. Diffusing capacity is normal. Airways resistance is normal.    Impression: Normal pulmonary function tests.

## 2022-05-04 ENCOUNTER — HOSPITAL ENCOUNTER (OUTPATIENT)
Dept: PAIN MANAGEMENT | Facility: CLINIC | Age: 42
Discharge: HOME | End: 2022-05-04

## 2022-05-07 NOTE — PROGRESS NOTES
[x] [facility]) @ [facility]  [address]  Phone [phone]  Fax [phone]    Physical Therapy Discharge Note    Date: 2022      Patient: Vivien Escobedo  : 1980  MRN: [id number]    Physician: Madan Desai DO               Diagnosis: Spondylolisthesis, lumbar region (M43.16), Lumbar stenosis with neurogenic claudication (A33.449)   Onset Date: 2021    Rehab Diagnosis: Low back pain (M54)   Total visits attended: 4  Cancels/No shows:   Date of initial visit: 2021             [] Patient recovered from conditions. Treatment goals were met. [] Patient received maximum benefit. No further therapy indicated at this time. [] Patient demonstrated improvement from condition with  ** Of  ** Short term goals met. []Patient demonstrated improvement from condition with **   Of **  Long term goals met. [] Patient to continue exercise/home instructions independently. [x] Therapy interrupted due to the patient not wishing to schedule future appointments. [] Patient has 2 or more no shows/cancels, is discontinued per our policy. [] Patient has completed prescribed number of treatment sessions. [] Other:      Pain level at evaluation was   3    /10 and at discharge was   unknown    /10    It Is My Understanding That The:  [] Patient returned to work. [] Patient demonstrated improved level of function. [] Patient returned to previous functional level. [] Patient's current functional status is unknown due to no-shows  [] Other:     Recommendations/Comments: Patient was contacted at the number provided regarding future appointments being scheduled. A message was left. However, he did not return our call.      Treatment Included:     [x] Therapeutic Exercise   06246  [] Iontophoresis: 4 mg/mL Dexamethasone Sodium Phosphate  mAmin  82626   [] Therapeutic Activity  51424 [] Vasopneumatic cold with compression  [id number]    [] Gait Training   [id number] [] Ultrasound   [id number]   [] Neuromuscular Re-education  [id number] [] Electrical Stimulation Unattended  03643   [] Manual Therapy  80531 [] Electrical Stimulation Attended  [id number]   [x] Instruction in HEP  [] Lumbar/Cervical Traction  [id number]   [] Aquatic Therapy   [id number] [] Cold/hotpack    [] Massage   [id number]      [] Dry Needling, 1 or 2 muscles  56017   [] Biofeedback, first 15 minutes   16573  [] Biofeedback, additional 15 minutes   73175 [] Dry Needling, 3 or more muscles  28619                If you have any questions or concerns regarding this patient's care, please contact us.    Thank you for your referral.      Electronically signed by: Rojas Garcia PT DPT

## 2022-05-09 ENCOUNTER — OFFICE VISIT (OUTPATIENT)
Dept: ORTHOPEDIC SURGERY | Age: 42
End: 2022-05-09
Payer: COMMERCIAL

## 2022-05-09 VITALS
WEIGHT: 270 LBS | HEART RATE: 84 BPM | DIASTOLIC BLOOD PRESSURE: 85 MMHG | RESPIRATION RATE: 10 BRPM | SYSTOLIC BLOOD PRESSURE: 144 MMHG | BODY MASS INDEX: 34.65 KG/M2 | HEIGHT: 74 IN

## 2022-05-09 DIAGNOSIS — M75.22 BICEPS TENDINITIS OF LEFT UPPER EXTREMITY: Primary | ICD-10-CM

## 2022-05-09 PROCEDURE — G8417 CALC BMI ABV UP PARAM F/U: HCPCS | Performed by: FAMILY MEDICINE

## 2022-05-09 PROCEDURE — G8427 DOCREV CUR MEDS BY ELIG CLIN: HCPCS | Performed by: FAMILY MEDICINE

## 2022-05-09 PROCEDURE — 1036F TOBACCO NON-USER: CPT | Performed by: FAMILY MEDICINE

## 2022-05-09 PROCEDURE — 20550 NJX 1 TENDON SHEATH/LIGAMENT: CPT | Performed by: FAMILY MEDICINE

## 2022-05-09 PROCEDURE — 99203 OFFICE O/P NEW LOW 30 MIN: CPT | Performed by: FAMILY MEDICINE

## 2022-05-09 RX ORDER — BUPIVACAINE HYDROCHLORIDE 5 MG/ML
1 INJECTION, SOLUTION PERINEURAL ONCE
Status: COMPLETED | OUTPATIENT
Start: 2022-05-09 | End: 2022-05-09

## 2022-05-09 RX ORDER — BETAMETHASONE SODIUM PHOSPHATE AND BETAMETHASONE ACETATE 3; 3 MG/ML; MG/ML
6 INJECTION, SUSPENSION INTRA-ARTICULAR; INTRALESIONAL; INTRAMUSCULAR; SOFT TISSUE ONCE
Status: COMPLETED | OUTPATIENT
Start: 2022-05-09 | End: 2022-05-09

## 2022-05-09 RX ADMIN — BUPIVACAINE HYDROCHLORIDE 5 MG: 5 INJECTION, SOLUTION PERINEURAL at 13:10

## 2022-05-09 RX ADMIN — BETAMETHASONE SODIUM PHOSPHATE AND BETAMETHASONE ACETATE 6 MG: 3; 3 INJECTION, SUSPENSION INTRA-ARTICULAR; INTRALESIONAL; INTRAMUSCULAR; SOFT TISSUE at 13:09

## 2022-05-09 NOTE — PROGRESS NOTES
Sports Medicine Consultation    CHIEF COMPLAINT:  Shoulder Pain (Left [place] Pain)        HPI:   The patient is a 42 y.o. male who is being seen as a new patient being seen for regarding new problem left shoulder pain. The patient is a left hand dominant male who has had shoulder pain for 3+ years. As far as trauma to the shoulder, the patient indicates no specific. The pain is  worse at night and when doing overhead activities. Weakness of the shoulder has  been noted. The pain restricts activities such as reaching over head. The pain does not seem to improve with time. The following medications and interventions have been tried: ibu, naproxen, celebrex, mobic, without benefit. Physical Therapy has not been tried. Corticosteroid injection has not been done. Neck pain has been present. Numbness and/or tingling has been present. he has a past medical history of Asthma, COPD (chronic obstructive pulmonary disease) ([facility] [address] Depression, Erectile dysfunction, Essential hypertension, GERD (gastroesophageal reflux disease), Heart palpitations, Hyperlipidemia with target LDL less than 100, Left lower lobe pneumonia, Lumbar radiculopathy, Mitral valve prolapse, Nonspecific reactive hepatitis, Obesity (BMI 30-39.9), NOEMÍ (obstructive sleep apnea), Osteoarthritis, Seronegative polyarthritis, Social anxiety disorder, Uvulitis, and Vertebrogenic low back pain. he has a past surgical history that includes Knee arthroscopy; Hand surgery (Left); Tonsillectomy; Nose surgery; [place] tooth extraction; Colonoscopy; Endoscopy, colon, diagnostic; cyst removal (Right); and Upper gastrointestinal endoscopy (N/A, 10/9/2019).     Past Medical History:   Diagnosis Date    Asthma     COPD (chronic obstructive pulmonary disease) ([facility] [address] 11/19/2018    Depression     Erectile dysfunction 9/29/2017    Essential hypertension 1/2/2017    GERD (gastroesophageal reflux disease) 11/19/2018    Heart palpitations 9/29/2017  Hyperlipidemia with target LDL less than 100 3/23/2017    Left lower lobe pneumonia 11/9/2012    Lumbar radiculopathy     Mitral valve prolapse     Nonspecific reactive hepatitis 3/22/2017    Obesity (BMI 30-39.9) 3/23/2017    NOEMÍ (obstructive sleep apnea) 10/16/2019    Osteoarthritis     Seronegative polyarthritis 10/10/2016    followed w/ rheumatology in [address] [place] [address] anxiety disorder     Uvulitis 8/15/2019    Vertebrogenic low back pain 12/13/2021       Past Surgical History:   Procedure Laterality Date    COLONOSCOPY      CYST REMOVAL Right     Armpit     ENDOSCOPY, COLON, DIAGNOSTIC      HAND SURGERY Left     KNEE ARTHROSCOPY      right knee    NOSE SURGERY      TONSILLECTOMY      UPPER GASTROINTESTINAL ENDOSCOPY N/A 10/9/2019    EGD POLYP SNARE, HOT. ESOPHAGEAL DILATATION WITH MALONIES 50F performed by Haylie Boone MD at [address]         family history includes Diabetes in his father; Heart Attack (age of onset: 40) in his brother; Heart Disease in his brother; High Blood Pressure in his brother; High Cholesterol in his father; Other in his mother; Rheum Arthritis (age of onset: 9) in an other family member.     Social History     Socioeconomic History    Marital status:      Spouse name: Not on file    Number of children: Not on file    Years of education: Not on file    Highest education level: Not on file   Occupational History    Not on file   Tobacco Use    Smoking status: Never Smoker    Smokeless tobacco: Never Used   Vaping Use    Vaping Use: Never used   Substance and Sexual Activity    Alcohol use: Yes     Comment: occasional  few x year    Drug use: No    Sexual activity: Yes     Partners: Male   Other Topics Concern    Not on file   Social History Narrative    Not on file     Social Determinants of Health     Financial Resource Strain: Low Risk     Difficulty of Paying Living Expenses: Not hard at all   Food Insecurity: No Food Insecurity    Worried About Running Out of Food in the Last Year: Never true    Ran Out of Food in the Last Year: Never true   Transportation Needs:     Lack of Transportation (Medical): Not on file    Lack of Transportation (Non-Medical):  Not on file   Physical Activity:     Days of Exercise per Week: Not on file    Minutes of Exercise per Session: Not on file   Stress:     Feeling of Stress : Not on file   Social Connections:     Frequency of Communication with Friends and Family: Not on file    Frequency of Social Gatherings with Friends and Family: Not on file    Attends Adventism Services: Not on file    Active Member of [address] [place] [address] [employer] or Organizations: Not on file    Attends Club or Organization Meetings: Not on file    Marital Status: Not on file   Intimate Partner Violence:     Fear of Current or Ex-Partner: Not on file    Emotionally Abused: Not on file    Physically Abused: Not on file    Sexually Abused: Not on file   Housing Stability:     Unable to Pay for Housing in the Last Year: Not on file    Number of [place] in the Last Year: Not on file    Unstable Housing in the Last Year: Not on file       Current Outpatient Medications   Medication Sig Dispense Refill    diclofenac (VOLTAREN) 75 MG EC tablet Take 1 tablet by mouth 2 times daily (with meals) 28 tablet 0    pravastatin (PRAVACHOL) 40 MG tablet Take 1 tablet by mouth every evening Take with food 90 tablet 3    folic acid (FOLVITE) 1 MG tablet take 1 tablet by mouth once daily      methotrexate (RHEUMATREX) 2.5 MG chemo tablet take 7 tablets by mouth every week      montelukast (SINGULAIR) 10 MG tablet Take 1 tablet by mouth nightly 90 tablet 3    buPROPion (WELLBUTRIN XL) 150 MG extended release tablet take 1 tablet by mouth every morning 90 tablet 3    omeprazole (PRILOSEC) 40 MG delayed release capsule take 1 capsule by mouth once daily 90 capsule 3    DULERA 100-5 MCG/ACT inhaler inhale 2 puffs by mouth and INTO THE LUNGS twice a day STOP FLOVENT 13 g 3    amLODIPine (NORVASC) 10 MG tablet take 1 tablet by mouth every morning 90 tablet 3    busPIRone (BUSPAR) 10 MG tablet Take 1 tablet by mouth 3 times daily as needed (anxiety) 270 tablet 3    loratadine (CLARITIN) 10 MG tablet Take 1 tablet by mouth daily 90 tablet 3    fluticasone (FLONASE) 50 MCG/ACT nasal spray 2 sprays by Nasal route daily 1 Bottle 3    albuterol (PROVENTIL) (2.5 MG/3ML) 0.083% nebulizer solution Take 3 mLs by nebulization every 6 hours as needed for Wheezing or Shortness of Breath 120 vial 0    Blood Pressure KIT Dx: HTN. Needs automatic blood pressure machine to monitor his blood pressure. 1 kit 0    Respiratory Therapy Supplies (NEBULIZER) MADHAV 1 Units by Does not apply route 2 times daily Dx: Asthma exacerbation J45.901 1 Device 0     Current Facility-Administered Medications   Medication Dose Route Frequency Provider Last Rate Last Admin    betamethasone acetate-betamethasone sodium phosphate (CELESTONE) injection 6 mg  6 mg Other Once [place], DO        bupivacaine (MARCAINE) 0.5 % injection 5 mg  1 mL Intratendinous Once [place], DO             Allergies:  heis allergic to fish-derived products, other, shellfish allergy, shrimp (diagnostic), and ace inhibitors. ROS:  CV:  Denies chest pain; palpitations; shortness of breath; swelling of feet, ankles; and loss of consciousness. CON: Denies fever and dizziness. ENT:  Denies hearing loss / ringing, ear infections hoarseness, and swallowing problems. RESP:  Denies chronic cough, spitting up blood, and asthma/wheezing. GI: Denies abdominal pain, change in bowel habits, nausea or vomiting, and blood in stools. :  Denies frequent urination, burning or painful urination, blood in the urine, and bladder incontinence. NEURO:  Denies headache, memory loss, sleep disturbance, and tremor or movement disorder.     PHYSICAL EXAM:   BP (!) 144/85   Pulse 84   Resp 10   Ht 6' 2\" (1.88 m)   Wt 270 lb (122.5 kg)   BMI 34.67 kg/m²   GENERAL: Rigo Mora is a 42 y.o. male who is alert and oriented and sitting comfortably in our office. SKIN:  Intact without rashes, lesions or ulcerations. No obvious deformity or swelling. NEURO: Musculoskeletal and axillary nerves intact to sensory and motor testing. EYES:  Extraocular muscles intact. MOUTH: Oral mucosa moist.  No perioral lesions. PULM:  Respirations unlabored and regular. VASC:  Capillary refill less than 3 seconds. Cervical spine ROM WNL  Spurlings: negative,     MSK:  Forward elevation 180 degrees, external rotation in neutral 90 degrees, abduction 180 degrees, internal rotation to T8. Supraspinatus 5/5   External rotators 5/5  Internal 5/5  Full Can negative   Empty Can negative   Neer's test negative   Mcbride-Tyrel test. negative. Pain with cross body adduction positive. Anterior Labral Stress test positive. Speed's test positive   Pain over AC joint negative. Pain over traps/rhomboids negative. PSYCH:  Patient has good fund of knowledge and displays understanding of exam.    RADIOLOGY: No results found. X-rays and MRIs were both reviewed in the office prior to treatment    IMPRESSION:     1. Biceps tendinitis of left upper extremity        PLAN:   We discussed some of the etiologies and natural histories of     ICD-10-CM    1. Biceps tendinitis of left upper extremity  M75.22 betamethasone acetate-betamethasone sodium phosphate (CELESTONE) injection 6 mg     bupivacaine (MARCAINE) 0.5 % injection 5 mg     25297 - [place] INJECT TENDON SHEATH/LIGAMENT     We discussed the various treatment alternatives including anti-inflammatory medications, physical therapy, injections, further imaging studies and as a last resort surgery. This point I do think the patient would benefit from biceps tendon sheath injection was administered in the manner staff the chart. Patient tolerated procedure well. Felt immediate pain relief. He will follow-up with me otherwise as needed. He will follow-up with Dr. Isabell Christianson as scheduled. At the same time if he fails to improve we may consider additional injections and he will participate in PT physical therapy    Return to clinic Return if symptoms worsen or fail to improve. .    Please be aware portions of this note were completed using voice recognition software and unforeseen errors may have occurred    Electronically signed by Hermila Capone DO, FAOASM on 5/9/22 at 8:15 AM EDT    After the risks, benefits, alternatives and procedure of a  left bicep tendon sheath injection were discussed. Consent was obtained and a time out was performed. Structures of the biceps tendon sheath were visualized under ultrasound with image capture to be uploaded into the electronic medical record and notation of vascular structures to be avoided. Area was prepped in a sterile manner with Betadine. The skin was then cooled with cold spray and re-cleaned with alcohol prep. Then under ultrasound guidance I injected 1 mL of 6 mg of betamethasone and 1 mL of 0.5% Marcaine into the biceps tendon sheath with image capture of the injection to be uploaded into the electronic medical record. Patient tolerated the procedure well.

## 2022-05-15 DIAGNOSIS — M25.512 LEFT SHOULDER PAIN, UNSPECIFIED CHRONICITY: ICD-10-CM

## 2022-05-15 DIAGNOSIS — M75.22 BICEPS TENDINITIS OF LEFT UPPER EXTREMITY: ICD-10-CM

## 2022-05-16 DIAGNOSIS — I10 ESSENTIAL HYPERTENSION: ICD-10-CM

## 2022-05-16 RX ORDER — DICLOFENAC SODIUM 75 MG/1
TABLET, DELAYED RELEASE ORAL
Qty: 28 TABLET | Refills: 0 | Status: SHIPPED | OUTPATIENT
Start: 2022-05-16 | End: 2022-05-16

## 2022-05-17 ENCOUNTER — OFFICE VISIT (OUTPATIENT)
Dept: ORTHOPEDIC SURGERY | Age: 42
End: 2022-05-17
Payer: COMMERCIAL

## 2022-05-17 DIAGNOSIS — T78.40XA ALLERGIC REACTION, INITIAL ENCOUNTER: ICD-10-CM

## 2022-05-17 DIAGNOSIS — M75.22 BICEPS TENDINITIS OF LEFT UPPER EXTREMITY: ICD-10-CM

## 2022-05-17 DIAGNOSIS — M54.12 CERVICAL RADICULITIS: Primary | ICD-10-CM

## 2022-05-17 PROCEDURE — 1036F TOBACCO NON-USER: CPT | Performed by: FAMILY MEDICINE

## 2022-05-17 PROCEDURE — G8417 CALC BMI ABV UP PARAM F/U: HCPCS | Performed by: FAMILY MEDICINE

## 2022-05-17 PROCEDURE — G8427 DOCREV CUR MEDS BY ELIG CLIN: HCPCS | Performed by: FAMILY MEDICINE

## 2022-05-17 PROCEDURE — 99213 OFFICE O/P EST LOW 20 MIN: CPT | Performed by: FAMILY MEDICINE

## 2022-05-17 RX ORDER — AMLODIPINE BESYLATE 10 MG/1
10 TABLET ORAL DAILY
Qty: 90 TABLET | Refills: 3 | Status: SHIPPED | OUTPATIENT
Start: 2022-05-17

## 2022-05-17 RX ORDER — METHYLPREDNISOLONE 4 MG/1
TABLET ORAL
Qty: 1 KIT | Refills: 0 | Status: SHIPPED | OUTPATIENT
Start: 2022-05-17 | End: 2022-05-23

## 2022-05-17 NOTE — PROGRESS NOTES
Sports Medicine Consultation    CHIEF COMPLAINT:  Shoulder Pain (Left f/u. still having pain from injection last week. had rxn to injection, erythema and itching at injection site. )        HPI:   The patient is a 42 y.o. male who is being seen as a  established patient being seen for regarding follow up of a pre-existing problem left shoulder. The patient is a left hand dominant male who has had shoulder pain for  years. As far as trauma to the shoulder, the patient indicates no new. The pain is  worse at night and when doing overhead activities. Weakness of the shoulder has not  been noted. The pain restricts activities such as sleep. The pain somewhat seem to improve with time. The following medications and interventions have been tried: nsaids, pt (starting again tomorrow), biceps injection with benefit. Physical Therapy has been tried. Corticosteroid injection has been done. Neck pain has been present. Numbness and/or tingling has been present. he has a past medical history of Asthma, COPD (chronic obstructive pulmonary disease) ([facility] [address] Depression, Erectile dysfunction, Essential hypertension, GERD (gastroesophageal reflux disease), Heart palpitations, Hyperlipidemia with target LDL less than 100, Left lower lobe pneumonia, Lumbar radiculopathy, Mitral valve prolapse, Nonspecific reactive hepatitis, Obesity (BMI 30-39.9), NOEMÍ (obstructive sleep apnea), Osteoarthritis, Seronegative polyarthritis, Social anxiety disorder, Uvulitis, and Vertebrogenic low back pain. he has a past surgical history that includes Knee arthroscopy; Hand surgery (Left); Tonsillectomy; Nose surgery; [place] tooth extraction; Colonoscopy; Endoscopy, colon, diagnostic; cyst removal (Right); and Upper gastrointestinal endoscopy (N/A, 10/9/2019).     Past Medical History:   Diagnosis Date    Asthma     COPD (chronic obstructive pulmonary disease) ([facility] [address] 11/19/2018    Depression     Erectile dysfunction 9/29/2017    Essential hypertension 1/2/2017    GERD (gastroesophageal reflux disease) 11/19/2018    Heart palpitations 9/29/2017    Hyperlipidemia with target LDL less than 100 3/23/2017    Left lower lobe pneumonia 11/9/2012    Lumbar radiculopathy     Mitral valve prolapse     Nonspecific reactive hepatitis 3/22/2017    Obesity (BMI 30-39.9) 3/23/2017    NOEMÍ (obstructive sleep apnea) 10/16/2019    Osteoarthritis     Seronegative polyarthritis 10/10/2016    followed w/ rheumatology in [address] [place] [address] anxiety disorder     Uvulitis 8/15/2019    Vertebrogenic low back pain 12/13/2021       Past Surgical History:   Procedure Laterality Date    COLONOSCOPY      CYST REMOVAL Right     Armpit     ENDOSCOPY, COLON, DIAGNOSTIC      HAND SURGERY Left     KNEE ARTHROSCOPY      right knee    NOSE SURGERY      TONSILLECTOMY      UPPER GASTROINTESTINAL ENDOSCOPY N/A 10/9/2019    EGD POLYP SNARE, HOT. ESOPHAGEAL DILATATION WITH MALONIES 49F performed by Jeffrey Hunt MD at [address]         family history includes Diabetes in his father; Heart Attack (age of onset: 40) in his brother; Heart Disease in his brother; High Blood Pressure in his brother; High Cholesterol in his father; Other in his mother; Rheum Arthritis (age of onset: 9) in an other family member.     Social History     Socioeconomic History    Marital status:      Spouse name: Not on file    Number of children: Not on file    Years of education: Not on file    Highest education level: Not on file   Occupational History    Not on file   Tobacco Use    Smoking status: Never Smoker    Smokeless tobacco: Never Used   Vaping Use    Vaping Use: Never used   Substance and Sexual Activity    Alcohol use: Yes     Comment: occasional  few x year    Drug use: No    Sexual activity: Yes     Partners: Male   Other Topics Concern    Not on file   Social History Narrative    Not on file     Social Determinants of Health     Financial Resource Strain: Low Risk     Difficulty of Paying Living Expenses: Not hard at all   Food Insecurity: No Food Insecurity    Worried About Running Out of Food in the Last Year: Never true    Megan of Food in the Last Year: Never true   Transportation Needs:     Lack of Transportation (Medical): Not on file    Lack of Transportation (Non-Medical): Not on file   Physical Activity:     Days of Exercise per Week: Not on file    Minutes of Exercise per Session: Not on file   Stress:     Feeling of Stress : Not on file   Social Connections:     Frequency of Communication with Friends and Family: Not on file    Frequency of Social Gatherings with Friends and Family: Not on file    Attends Buddhism Services: Not on file    Active Member of [address] [place] [address] [employer] or Organizations: Not on file    Attends Club or Organization Meetings: Not on file    Marital Status: Not on file   Intimate Partner Violence:     Fear of Current or Ex-Partner: Not on file    Emotionally Abused: Not on file    Physically Abused: Not on file    Sexually Abused: Not on file   Housing Stability:     Unable to Pay for Housing in the Last Year: Not on file    Number of [place] in the Last Year: Not on file    Unstable Housing in the Last Year: Not on file       Current Outpatient Medications   Medication Sig Dispense Refill    methylPREDNISolone (MEDROL DOSEPACK) 4 MG tablet Take by mouth.  1 kit 0    amLODIPine (NORVASC) 10 MG tablet Take 1 tablet by mouth daily 90 tablet 3    pravastatin (PRAVACHOL) 40 MG tablet Take 1 tablet by mouth every evening Take with food 90 tablet 3    folic acid (FOLVITE) 1 MG tablet take 1 tablet by mouth once daily      methotrexate (RHEUMATREX) 2.5 MG chemo tablet take 7 tablets by mouth every week      montelukast (SINGULAIR) 10 MG tablet Take 1 tablet by mouth nightly 90 tablet 3    buPROPion (WELLBUTRIN XL) 150 MG extended release tablet take 1 tablet by mouth every morning 90 tablet 3    omeprazole (PRILOSEC) 40 MG delayed release capsule take 1 capsule by mouth once daily 90 capsule 3    DULERA 100-5 MCG/ACT inhaler inhale 2 puffs by mouth and INTO THE LUNGS twice a day STOP FLOVENT 13 g 3    busPIRone (BUSPAR) 10 MG tablet Take 1 tablet by mouth 3 times daily as needed (anxiety) 270 tablet 3    loratadine (CLARITIN) 10 MG tablet Take 1 tablet by mouth daily 90 tablet 3    fluticasone (FLONASE) 50 MCG/ACT nasal spray 2 sprays by Nasal route daily 1 Bottle 3    albuterol (PROVENTIL) (2.5 MG/3ML) 0.083% nebulizer solution Take 3 mLs by nebulization every 6 hours as needed for Wheezing or Shortness of Breath 120 vial 0    Blood Pressure KIT Dx: HTN. Needs automatic blood pressure machine to monitor his blood pressure. 1 kit 0    Respiratory Therapy Supplies (NEBULIZER) MADHAV 1 Units by Does not apply route 2 times daily Dx: Asthma exacerbation J45.901 1 Device 0     No current facility-administered medications for this visit. Allergies:  heis allergic to fish-derived products, other, shellfish allergy, shrimp (diagnostic), ace inhibitors, and betadine [povidone-iodine]. ROS:  CV:  Denies chest pain; palpitations; shortness of breath; swelling of feet, ankles; and loss of consciousness. CON: Denies fever and dizziness. ENT:  Denies hearing loss / ringing, ear infections hoarseness, and swallowing problems. RESP:  Denies chronic cough, spitting up blood, and asthma/wheezing. GI: Denies abdominal pain, change in bowel habits, nausea or vomiting, and blood in stools. :  Denies frequent urination, burning or painful urination, blood in the urine, and bladder incontinence. NEURO:  Denies headache, memory loss, sleep disturbance, and tremor or movement disorder. PHYSICAL EXAM:   There were no vitals taken for this visit. GENERAL: Zuleyma Cha is a 42 y.o. male who is alert and oriented and sitting comfortably in our office.   SKIN:  Intact without rashes, lesions or ulcerations. No obvious deformity or swelling. NEURO: Musculoskeletal and axillary nerves intact to sensory and motor testing. EYES:  Extraocular muscles intact. MOUTH: Oral mucosa moist.  No perioral lesions. PULM:  Respirations unlabored and regular. VASC:  Capillary refill less than 3 seconds. Cervical spine ROM WNL  Spurlings: positive,     MSK:  Forward elevation 170 degrees, external rotation in neutral 60 degrees, abduction 170 degrees, internal rotation to L5. Supraspinatus 5/5   External rotators 5/5  Internal 5/5  Full Can negative   Empty Can negative   Neer's test positive   Mcbride-Tyrel test. positive. Pain with cross body adduction negative. Anterior Labral Stress test negative. Speed's test negative   Frederick's test negative. Pain over anterolateral acromion positive. Subscap liftoff positive. Belly press test negative. Pain over AC joint negative. Pain over traps/rhomboids positive. PSYCH:  Patient has good fund of knowledge and displays understanding of exam.    RADIOLOGY: No results found. IMPRESSION:     1. Cervical radiculitis    2. Allergic reaction, initial encounter    3. Biceps tendinitis of left upper extremity        PLAN:   We discussed some of the etiologies and natural histories of     ICD-10-CM    1. Cervical radiculitis  M54.12    2. Allergic reaction, initial encounter  T78.40XA    3. Biceps tendinitis of left upper extremity  M75.22      We discussed the various treatment alternatives including anti-inflammatory medications, physical therapy, injections, further imaging studies and as a last resort surgery.   At this point patient has improved from a biceps tendon standpoint however he is still struggling with the fact that he appears to have more cervical radiculitis than anything he did have a topical reaction to the Betadine it appears and we will treat both his cervical radiculitis and allergy with Medrol Dosepak we will see him back otherwise as needed I do not think his biceps tendon sheath is problematic anymore he voiced understanding and agreement with this plan    Return to clinic No follow-ups on file. Shameka Burrell     Please be aware portions of this note were completed using voice recognition software and unforeseen errors may have occurred    Electronically signed by Matilde Infante DO, FAOASM on 5/17/22 at 10:11 AM EDT

## 2022-06-01 ENCOUNTER — OFFICE VISIT (OUTPATIENT)
Dept: NEUROSURGERY | Age: 42
End: 2022-06-01
Payer: COMMERCIAL

## 2022-06-01 VITALS
HEIGHT: 74 IN | BODY MASS INDEX: 34.65 KG/M2 | DIASTOLIC BLOOD PRESSURE: 73 MMHG | HEART RATE: 94 BPM | OXYGEN SATURATION: 96 % | WEIGHT: 270 LBS | TEMPERATURE: 98.5 F | SYSTOLIC BLOOD PRESSURE: 114 MMHG

## 2022-06-01 DIAGNOSIS — M43.16 SPONDYLOLISTHESIS, LUMBAR REGION: Primary | ICD-10-CM

## 2022-06-01 DIAGNOSIS — M48.062 LUMBAR STENOSIS WITH NEUROGENIC CLAUDICATION: ICD-10-CM

## 2022-06-01 PROCEDURE — 1036F TOBACCO NON-USER: CPT | Performed by: NEUROLOGICAL SURGERY

## 2022-06-01 PROCEDURE — 99214 OFFICE O/P EST MOD 30 MIN: CPT | Performed by: NEUROLOGICAL SURGERY

## 2022-06-01 PROCEDURE — G8427 DOCREV CUR MEDS BY ELIG CLIN: HCPCS | Performed by: NEUROLOGICAL SURGERY

## 2022-06-01 PROCEDURE — G8417 CALC BMI ABV UP PARAM F/U: HCPCS | Performed by: NEUROLOGICAL SURGERY

## 2022-06-01 NOTE — PROGRESS NOTES
[address]  [facility] [address] [facility] [address]  Dept: [phone]    Patient:  Bruce Baird  YOB: 1980  Date: 6/1/22    The patient is a 42 y.o. male who presents today for consult of the following problems:     Chief Complaint   Patient presents with    Follow-up             HPI:     Bruce Baird is a 42 y.o. male on whom neurosurgical consultation was requested by Shanika Garay MD for management of lumbar spondylolisthesis with radiculopathy. The patient has been dealing with significant bilateral lower extremity neuropathy and axial pain for some time now. He has been through multiple rounds of physical therapy as well as multiple epidural injections with no significant relief. Patient describes severe disabling bilateral paraspinal back pain ranging anywhere from 8-10 out of 10 that is spasmodic in nature worse with standing upright and ambulating. Also has numbness that appears to radiate down bilateral lower extremities approximate L5 distribution left greater than the right side with numbness of the big toe that appears to be persistent when he is standing. He does work on the [employer] and has a very labor-intensive profession that does offer him significant risk of progression of this. He has had multiple issues where he is at the step down as well as take breaks but overall has been able to function for the most part with his profession. Jorge Luis Weiss       History:     Past Medical History:   Diagnosis Date    Asthma     COPD (chronic obstructive pulmonary disease) ([facility] [address] 11/19/2018    Depression     Erectile dysfunction 9/29/2017    Essential hypertension 1/2/2017    GERD (gastroesophageal reflux disease) 11/19/2018    Heart palpitations 9/29/2017    Hyperlipidemia with target LDL less than 100 3/23/2017    Left lower lobe pneumonia 11/9/2012    Lumbar radiculopathy     Mitral valve prolapse     Nonspecific reactive hepatitis 3/22/2017    Obesity (BMI 30-39.9) 3/23/2017    NOEMÍ (obstructive sleep apnea) 10/16/2019    Osteoarthritis     Seronegative polyarthritis 10/10/2016    followed w/ rheumatology in [address] [place] [address] anxiety disorder     Uvulitis 8/15/2019    Vertebrogenic low back pain 12/13/2021     Past Surgical History:   Procedure Laterality Date    COLONOSCOPY      CYST REMOVAL Right     Armpit     ENDOSCOPY, COLON, DIAGNOSTIC      HAND SURGERY Left     KNEE ARTHROSCOPY      right knee    NOSE SURGERY      TONSILLECTOMY      UPPER GASTROINTESTINAL ENDOSCOPY N/A 10/9/2019    EGD POLYP SNARE, HOT.  ESOPHAGEAL DILATATION WITH MALONIES 50F performed by Michael Mello MD at [facility]       Family History   Problem Relation Age of Onset    Diabetes Father     High Cholesterol Father     Other Mother         autoimmune hepatitis     High Blood Pressure Brother     Heart Disease Brother     Heart Attack Brother 40        cabg age 40    [facility] Other 7    Colon Cancer Neg Hx     Cancer Neg Hx      Current Outpatient Medications on File Prior to Visit   Medication Sig Dispense Refill    amLODIPine (NORVASC) 10 MG tablet Take 1 tablet by mouth daily 90 tablet 3    pravastatin (PRAVACHOL) 40 MG tablet Take 1 tablet by mouth every evening Take with food 90 tablet 3    folic acid (FOLVITE) 1 MG tablet take 1 tablet by mouth once daily      methotrexate (RHEUMATREX) 2.5 MG chemo tablet take 7 tablets by mouth every week      montelukast (SINGULAIR) 10 MG tablet Take 1 tablet by mouth nightly 90 tablet 3    buPROPion (WELLBUTRIN XL) 150 MG extended release tablet take 1 tablet by mouth every morning 90 tablet 3    omeprazole (PRILOSEC) 40 MG delayed release capsule take 1 capsule by mouth once daily 90 capsule 3    DULERA 100-5 MCG/ACT inhaler inhale 2 puffs by mouth and INTO THE LUNGS twice a day STOP FLOVENT 13 g 3    busPIRone (BUSPAR) 10 MG tablet Take 1 tablet by mouth 3 times daily as needed (anxiety) 270 tablet 3    loratadine (CLARITIN) 10 MG tablet Take 1 tablet by mouth daily 90 tablet 3    albuterol (PROVENTIL) (2.5 MG/3ML) 0.083% nebulizer solution Take 3 mLs by nebulization every 6 hours as needed for Wheezing or Shortness of Breath 120 vial 0    Blood Pressure KIT Dx: HTN. Needs automatic blood pressure machine to monitor his blood pressure. 1 kit 0    Respiratory Therapy Supplies (NEBULIZER) MADHAV 1 Units by Does not apply route 2 times daily Dx: Asthma exacerbation J45.901 1 Device 0    fluticasone (FLONASE) 50 MCG/ACT nasal spray 2 sprays by Nasal route daily 1 Bottle 3     No current facility-administered medications on file prior to visit. Social History     Tobacco Use    Smoking status: Never Smoker    Smokeless tobacco: Never Used   Vaping Use    Vaping Use: Never used   Substance Use Topics    Alcohol use: Yes     Comment: occasional  few x year    Drug use: No       Allergies   Allergen Reactions    Fish-Derived Products Anaphylaxis    Other Anaphylaxis     Any type of seafood    Shellfish Allergy Anaphylaxis    Shrimp (Diagnostic) Anaphylaxis    Ace Inhibitors Swelling     UVULITIS ON 8/15/19    Betadine [Povidone-Iodine] Rash     Allergic reaction topically to betadine from a shot       Review of Systems  ROS: back pain and leg numbness    Physical Exam:      /73   Pulse 94   Temp 98.5 °F (36.9 °C) (Temporal)   Ht 6' 2\" (1.88 m)   Wt 270 lb (122.5 kg)   SpO2 96%   BMI 34.67 kg/m²   Estimated body mass index is 34.67 kg/m² as calculated from the following:    Height as of this encounter: 6' 2\" (1.88 m). Weight as of this encounter: 270 lb (122.5 kg). General:  Sandra Chau is a 42y.o. year old male who appears his stated age. HEENT: Normocephalic atraumatic. Neck supple.   Chest: regular rate; pulses equal. Equal chest rise and fall  Abdomen: Soft nondistended. Ext: DP equal with good capillary refill  Neuro    Mentation  Appropriate affect   oriented    Cranial Nerves:   Pupils equal and reactive to light  Extraocular motion intact  Face symmetric  No dysarthria  v1-3 sensation symmetric, masseter tone symmetric  Hearing symmetric and intact to finger rub    Sensation:   Diminished in station left side greater than right side L5 and S1. Motor  L deltoid 5/5; R deltoid 5/5  L biceps 5/5; R biceps 5/5  L triceps 5/5; R triceps 5/5  L wrist extension 5/5; R wrist extension 5/5  L intrinsics 5/5; R intrinsics 5/5     L iliopsoas 5/5 , R iliopsoas 5/5  L quadriceps 5/5; R quadriceps 5/5  L Dorsiflexion 5/5; R dorsiflexion 5/5  L Plantarflexion 5/5; R plantarflexion 5/5  L EHL 5/5; R EHL 5/5    Reflexes  L Brachioradialis 2+/4; R brachioradialis 2+/4  L Biceps 2+/4; R Biceps 2+/4  L Triceps 2+/4; R Triceps 2+/4  L Patellar 2+/4: R Patellar 2+/4  L Achilles 2+/4; R Achilles 2+/4    hoffmans L: neg  hoffmans R: neg  Clonus L: neg  Clonus R: neg  Babinski L: up  Babinski R; up    Studies Review:     MRI lumbar spine with significant anterolisthesis grade 2 L5-S1 along with severe foraminal disease left greater than the right side. Also with broad-based protrusion at L4-5 along with some desiccation of the disc and loss of height    Assessment and Plan:      1. Spondylolisthesis, lumbar region    2. Lumbar stenosis with neurogenic claudication          Plan: Patient has a clearly dynamic anterolisthesis at L5-S1 with severe foraminal disease. He has been through multiple conservative measures including extensive physical therapy, steroid injections that have been refractory. In the interim he has also been able to lose weight despite his BMI still being close to 35. Extensive discussion was had again with the patient regarding the findings on examination at correlate with his neuropathy and his radiculopathy.   I explained to him that focal decompression and fixation from L4-S1 with likely be necessary. I explained him that L4-5 there is central disc protrusion with desiccation of the disc and considering fusion at L5-S1 we would likely need to fixate up to L4 considering sterilization L5-S1 what progressed the instability and desiccation of the L4-5 level. I proposed an anterior posterior approach involving an L4-S1 oblique lumbar interbody fusion followed by posterior fixation and possible foraminotomy bilaterally at L5.    6hr surgery with risks including bowel injury bleeding nerve injury spinal injury. CSF leak infection hardware malfunction. Followup: No follow-ups on file. Prescriptions Ordered:  No orders of the defined types were placed in this encounter. Orders Placed:  No orders of the defined types were placed in this encounter. Electronically signed by Teresita Carmona DO on 6/1/2022 at 4:29 PM    Please note that this chart was generated using voice recognition Dragon dictation software. Although every effort was made to ensure the accuracy of this automated transcription, some errors in transcription may have occurred.

## 2022-06-24 ENCOUNTER — INITIAL CONSULT (OUTPATIENT)
Dept: PAIN MANAGEMENT | Age: 42
End: 2022-06-24
Payer: COMMERCIAL

## 2022-06-24 VITALS — BODY MASS INDEX: 35.32 KG/M2 | HEIGHT: 74 IN | WEIGHT: 275.2 LBS

## 2022-06-24 DIAGNOSIS — G89.29 CHRONIC BILATERAL LOW BACK PAIN, UNSPECIFIED WHETHER SCIATICA PRESENT: ICD-10-CM

## 2022-06-24 DIAGNOSIS — M47.817 LUMBOSACRAL SPONDYLOSIS WITHOUT MYELOPATHY: Primary | ICD-10-CM

## 2022-06-24 DIAGNOSIS — M54.50 CHRONIC BILATERAL LOW BACK PAIN, UNSPECIFIED WHETHER SCIATICA PRESENT: ICD-10-CM

## 2022-06-24 PROCEDURE — G8417 CALC BMI ABV UP PARAM F/U: HCPCS | Performed by: PAIN MEDICINE

## 2022-06-24 PROCEDURE — G8427 DOCREV CUR MEDS BY ELIG CLIN: HCPCS | Performed by: PAIN MEDICINE

## 2022-06-24 PROCEDURE — 1036F TOBACCO NON-USER: CPT | Performed by: PAIN MEDICINE

## 2022-06-24 PROCEDURE — 99214 OFFICE O/P EST MOD 30 MIN: CPT | Performed by: PAIN MEDICINE

## 2022-06-24 RX ORDER — PRAVASTATIN SODIUM 20 MG
TABLET ORAL
COMMUNITY
Start: 2022-05-29 | End: 2022-06-28

## 2022-06-24 ASSESSMENT — ENCOUNTER SYMPTOMS
BACK PAIN: 1
BOWEL INCONTINENCE: 0

## 2022-06-28 RX ORDER — PRAVASTATIN SODIUM 20 MG
TABLET ORAL
Qty: 90 TABLET | Refills: 1 | Status: SHIPPED | OUTPATIENT
Start: 2022-06-28

## 2022-06-28 NOTE — TELEPHONE ENCOUNTER
Please Approve or Refuse.   Send to Pharmacy per Pt's Request:      Next Visit Date:  8/9/2022   Last Visit Date: 4/8/2022    Hemoglobin A1C (%)   Date Value   04/08/2022 5.5   10/04/2021 5.5   03/22/2017 5.5             ( goal A1C is < 7)   BP Readings from Last 3 Encounters:   06/01/22 114/73   05/09/22 (!) 144/85   04/11/22 (!) 121/47          (goal 120/80)  BUN   Date Value Ref Range Status   04/08/2022 14 6 - 20 mg/dL Final     CREATININE   Date Value Ref Range Status   04/08/2022 0.81 0.70 - 1.20 mg/dL Final     Potassium   Date Value Ref Range Status   04/08/2022 4.5 3.7 - 5.3 mmol/L Final

## 2022-07-08 DIAGNOSIS — J30.89 SEASONAL ALLERGIC RHINITIS DUE TO OTHER ALLERGIC TRIGGER: Primary | ICD-10-CM

## 2022-07-08 RX ORDER — FLUTICASONE PROPIONATE 50 MCG
2 SPRAY, SUSPENSION (ML) NASAL DAILY
Qty: 16 G | Refills: 3 | Status: SHIPPED | OUTPATIENT
Start: 2022-07-08

## 2022-07-28 ENCOUNTER — NURSE TRIAGE (OUTPATIENT)
Dept: OTHER | Facility: CLINIC | Age: 42
End: 2022-07-28

## 2022-07-28 NOTE — TELEPHONE ENCOUNTER
Please advise the patient to either go to urgent care today or submit an E-visit to PCP only otherwise I would not get it     Future Appointments   Date Time Provider Joe Healy   8/9/2022 10:30 AM [facility]   8/10/2022  1:45 PM [facility]   8/18/2022  8:30 AM [facility]   9/1/2022  8:30 AM MD ROBIN Boyd [facility]

## 2022-08-09 ENCOUNTER — OFFICE VISIT (OUTPATIENT)
Dept: FAMILY MEDICINE CLINIC | Age: 42
End: 2022-08-09
Payer: COMMERCIAL

## 2022-08-09 DIAGNOSIS — R73.9 HYPERGLYCEMIA: ICD-10-CM

## 2022-08-09 DIAGNOSIS — F33.42 RECURRENT MAJOR DEPRESSIVE DISORDER, IN FULL REMISSION (HCC): ICD-10-CM

## 2022-08-09 DIAGNOSIS — J45.40 MODERATE PERSISTENT ASTHMA WITHOUT COMPLICATION: ICD-10-CM

## 2022-08-09 DIAGNOSIS — G89.29 CHRONIC MIDLINE LOW BACK PAIN WITH BILATERAL SCIATICA: ICD-10-CM

## 2022-08-09 DIAGNOSIS — M54.42 CHRONIC MIDLINE LOW BACK PAIN WITH BILATERAL SCIATICA: ICD-10-CM

## 2022-08-09 DIAGNOSIS — Z82.49 FAMILY HISTORY OF PREMATURE CAD: ICD-10-CM

## 2022-08-09 DIAGNOSIS — Z23 ENCOUNTER FOR IMMUNIZATION: ICD-10-CM

## 2022-08-09 DIAGNOSIS — M54.41 CHRONIC MIDLINE LOW BACK PAIN WITH BILATERAL SCIATICA: ICD-10-CM

## 2022-08-09 DIAGNOSIS — R63.5 UNINTENDED WEIGHT GAIN: ICD-10-CM

## 2022-08-09 DIAGNOSIS — I10 ESSENTIAL HYPERTENSION: ICD-10-CM

## 2022-08-09 DIAGNOSIS — E78.5 HYPERLIPIDEMIA WITH TARGET LDL LESS THAN 100: ICD-10-CM

## 2022-08-09 DIAGNOSIS — F40.10 SOCIAL ANXIETY DISORDER: ICD-10-CM

## 2022-08-09 DIAGNOSIS — R94.31 ABNORMAL EKG: ICD-10-CM

## 2022-08-09 DIAGNOSIS — M48.061 DEGENERATIVE LUMBAR SPINAL STENOSIS: ICD-10-CM

## 2022-08-09 DIAGNOSIS — Z00.01 ENCOUNTER FOR WELL ADULT EXAM WITH ABNORMAL FINDINGS: Primary | ICD-10-CM

## 2022-08-09 PROCEDURE — 90471 IMMUNIZATION ADMIN: CPT | Performed by: FAMILY MEDICINE

## 2022-08-09 PROCEDURE — 1036F TOBACCO NON-USER: CPT | Performed by: FAMILY MEDICINE

## 2022-08-09 PROCEDURE — G8417 CALC BMI ABV UP PARAM F/U: HCPCS | Performed by: FAMILY MEDICINE

## 2022-08-09 PROCEDURE — G8427 DOCREV CUR MEDS BY ELIG CLIN: HCPCS | Performed by: FAMILY MEDICINE

## 2022-08-09 PROCEDURE — 99396 PREV VISIT EST AGE 40-64: CPT | Performed by: FAMILY MEDICINE

## 2022-08-09 PROCEDURE — 90715 TDAP VACCINE 7 YRS/> IM: CPT | Performed by: FAMILY MEDICINE

## 2022-08-09 PROCEDURE — 99213 OFFICE O/P EST LOW 20 MIN: CPT | Performed by: FAMILY MEDICINE

## 2022-08-09 RX ORDER — ALBUTEROL SULFATE 2.5 MG/3ML
2.5 SOLUTION RESPIRATORY (INHALATION) EVERY 6 HOURS PRN
Qty: 120 EACH | Refills: 0 | Status: SHIPPED | OUTPATIENT
Start: 2022-08-09

## 2022-08-09 RX ORDER — METFORMIN HYDROCHLORIDE 500 MG/1
500 TABLET, EXTENDED RELEASE ORAL
Qty: 90 TABLET | Refills: 3 | Status: SHIPPED | OUTPATIENT
Start: 2022-08-09

## 2022-08-09 RX ORDER — AMPICILLIN TRIHYDRATE 250 MG
500 CAPSULE ORAL DAILY
Qty: 90 CAPSULE | Refills: 0 | Status: SHIPPED | OUTPATIENT
Start: 2022-08-09

## 2022-08-09 RX ORDER — BUPROPION HYDROCHLORIDE 150 MG/1
150 TABLET ORAL EVERY MORNING
Qty: 90 TABLET | Refills: 3 | Status: SHIPPED | OUTPATIENT
Start: 2022-08-09

## 2022-08-09 SDOH — ECONOMIC STABILITY: FOOD INSECURITY: WITHIN THE PAST 12 MONTHS, YOU WORRIED THAT YOUR FOOD WOULD RUN OUT BEFORE YOU GOT MONEY TO BUY MORE.: NEVER TRUE

## 2022-08-09 SDOH — ECONOMIC STABILITY: TRANSPORTATION INSECURITY
IN THE PAST 12 MONTHS, HAS THE LACK OF TRANSPORTATION KEPT YOU FROM MEDICAL APPOINTMENTS OR FROM GETTING MEDICATIONS?: NO

## 2022-08-09 SDOH — ECONOMIC STABILITY: INCOME INSECURITY: IN THE LAST 12 MONTHS, WAS THERE A TIME WHEN YOU WERE NOT ABLE TO PAY THE MORTGAGE OR RENT ON TIME?: NO

## 2022-08-09 SDOH — ECONOMIC STABILITY: TRANSPORTATION INSECURITY
IN THE PAST 12 MONTHS, HAS LACK OF TRANSPORTATION KEPT YOU FROM MEETINGS, WORK, OR FROM GETTING THINGS NEEDED FOR DAILY LIVING?: NO

## 2022-08-09 SDOH — ECONOMIC STABILITY: HOUSING INSECURITY
IN THE LAST 12 MONTHS, WAS THERE A TIME WHEN YOU DID NOT HAVE A STEADY PLACE TO SLEEP OR SLEPT IN A SHELTER (INCLUDING NOW)?: NO

## 2022-08-09 SDOH — ECONOMIC STABILITY: FOOD INSECURITY: WITHIN THE PAST 12 MONTHS, THE FOOD YOU BOUGHT JUST DIDN'T LAST AND YOU DIDN'T HAVE MONEY TO GET MORE.: NEVER TRUE

## 2022-08-09 ASSESSMENT — SOCIAL DETERMINANTS OF HEALTH (SDOH): HOW HARD IS IT FOR YOU TO PAY FOR THE VERY BASICS LIKE FOOD, HOUSING, MEDICAL CARE, AND HEATING?: NOT HARD AT ALL

## 2022-08-09 ASSESSMENT — ENCOUNTER SYMPTOMS
ABDOMINAL PAIN: 0
WHEEZING: 0
CONSTIPATION: 0
ABDOMINAL DISTENTION: 0
BACK PAIN: 1
SHORTNESS OF BREATH: 1
NAUSEA: 0
COUGH: 1
VOMITING: 0
DIARRHEA: 0
CHEST TIGHTNESS: 0

## 2022-08-09 ASSESSMENT — PATIENT HEALTH QUESTIONNAIRE - PHQ9
4. FEELING TIRED OR HAVING LITTLE ENERGY: 0
2. FEELING DOWN, DEPRESSED OR HOPELESS: 0
3. TROUBLE FALLING OR STAYING ASLEEP: 0
SUM OF ALL RESPONSES TO PHQ QUESTIONS 1-9: 0
1. LITTLE INTEREST OR PLEASURE IN DOING THINGS: 0
SUM OF ALL RESPONSES TO PHQ9 QUESTIONS 1 & 2: 0
SUM OF ALL RESPONSES TO PHQ QUESTIONS 1-9: 0
SUM OF ALL RESPONSES TO PHQ QUESTIONS 1-9: 0
7. TROUBLE CONCENTRATING ON THINGS, SUCH AS READING THE NEWSPAPER OR WATCHING TELEVISION: 0
9. THOUGHTS THAT YOU WOULD BE BETTER OFF DEAD, OR OF HURTING YOURSELF: 0
8. MOVING OR SPEAKING SO SLOWLY THAT OTHER PEOPLE COULD HAVE NOTICED. OR THE OPPOSITE, BEING SO FIGETY OR RESTLESS THAT YOU HAVE BEEN MOVING AROUND A LOT MORE THAN USUAL: 0
SUM OF ALL RESPONSES TO PHQ QUESTIONS 1-9: 0
5. POOR APPETITE OR OVEREATING: 0
6. FEELING BAD ABOUT YOURSELF - OR THAT YOU ARE A FAILURE OR HAVE LET YOURSELF OR YOUR FAMILY DOWN: 0
10. IF YOU CHECKED OFF ANY PROBLEMS, HOW DIFFICULT HAVE THESE PROBLEMS MADE IT FOR YOU TO DO YOUR WORK, TAKE CARE OF THINGS AT HOME, OR GET ALONG WITH OTHER PEOPLE: 0

## 2022-08-09 ASSESSMENT — ASTHMA QUESTIONNAIRES
QUESTION_5 LAST FOUR WEEKS HOW WOULD YOU RATE YOUR ASTHMA CONTROL: 3
QUESTION_1 LAST FOUR WEEKS HOW MUCH OF THE TIME DID YOUR ASTHMA KEEP YOU FROM GETTING AS MUCH DONE AT WORK, SCHOOL OR AT HOME: 3
QUESTION_4 LAST FOUR WEEKS HOW OFTEN HAVE YOU USED YOUR RESCUE INHALER OR NEBULIZER MEDICATION (SUCH AS ALBUTEROL): 2
QUESTION_3 LAST FOUR WEEKS HOW OFTEN DID YOUR ASTHMA SYMPTOMS (WHEEZING, COUGHING, SHORTNESS OF BREATH, CHEST TIGHTNESS OR PAIN) WAKE YOU UP AT NIGHT OR EARLIER THAN USUAL IN THE MORNING: 2
ACT_TOTALSCORE: 11
QUESTION_2 LAST FOUR WEEKS HOW OFTEN HAVE YOU HAD SHORTNESS OF BREATH: 1

## 2022-08-09 NOTE — PROGRESS NOTES
release tablet; Take 1 tablet by mouth daily (with breakfast), Disp-90 tablet, R-3Normal    Low carb, low fat diet, increase fruits and vegetables, and exercise 4-5 times a week 30-40 minutes a day, or walk 1-2 hours per day, or wear a pedometer and get at least 10,000 steps per day. 7. Essential hypertension   [place] OFFICE/OUTPATIENT ESTABLISHED LOW MDM 20-29 MIN     Well controlled. Continue current treatment. Will recheck labs. -     Comprehensive Metabolic Panel; Future  -     TSH; Future  -     CBC; Future  8. Hyperlipidemia with target LDL less than 100   [place] OFFICE/OUTPATIENT ESTABLISHED LOW MDM 20-29 MIN     Inadequately controlled  He did not want to start statin at that time, he has changed his diet, recheck lipid panel  Lab Results   Component Value Date    LDLCHOLESTEROL 196 (H) 04/08/2022       -     Lipid Panel; Future  9. Abnormal EKG   [place] OFFICE/OUTPATIENT ESTABLISHED LOW MDM 20-29 MIN     Strongly advised to keep appointment with cardiologist as referred, 8/26/2022  10. Family history of premature CAD   [place] OFFICE/OUTPATIENT ESTABLISHED LOW MDM 20-29 MIN     Keep appointment with cardiologist on 8/26/2022, she will need clearance from cardiology to start Adipex    11. Unintended weight gain   [place] OFFICE/OUTPATIENT ESTABLISHED LOW MDM 20-29 MIN     Has been unable to lose weight despite lifestyle changes  He wants Adipex however we do not have the clearance from cardiologist  Wt Readings from Last 3 Encounters:   08/09/22 277 lb (125.6 kg)   06/24/22 275 lb 3.2 oz (124.8 kg)   06/01/22 270 lb (122.5 kg)     04/08/22 266 lb (120.7 kg)     To start metformin, cinnamon, and Wellbutrin  Declines referral to [facility] weight loss, due to poor coverage      12. Degenerative lumbar spinal stenosis  worsening  Follow-up with pain management for RONEN, weight loss discussed, might need surgical intervention  Careful review of urgent symptoms and need for immediate medical attention if condition worsens.  Patient expressed understanding. Advised to go to ED if severe symptoms develop. Patient expressed understanding. 13. Chronic midline low back pain with bilateral sciatica  Worsening  Follow-up with pain management, RONEN pending, weight loss, might need surgical intervention    Pa received counseling on the following healthy behaviors: nutrition, exercise, medication adherence, and weight loss  Reviewed prior labs and health maintenance  Discussed use, benefit, and side effects of prescribed medications. Barriers to medication compliance addressed. Patient given educational materials - see patient instructions  All patient questions answered. Patient voiced understanding. The patient's past medical, surgical, social, and family history as well as his  current medications and allergies were reviewed as documented in today's encounter. Medications, labs, diagnostic studies, consultations and follow-up as documented in thisencounter. Return in 5 months (on 1/9/2023) for **Do EXERCISE FLOWSHEET, ADIPEX. Adipex 3 appointments , 4 weeks apart with me    Future Appointments   Date Time Provider Joe Healy   8/18/2022  8:30 AM Bryn Muñoz MD [facility] MA PN [facility]   9/1/2022  8:30 AM MD ROBIN Mott PN [facility]   9/6/2022  3:15 [facility]   10/4/2022  4:00 PM [facility]   11/1/2022  4:00 PM [facility]         Patient Active Problem List   Diagnosis    Seronegative polyarthritis    Fatigue    Essential hypertension    Social anxiety disorder    Nonspecific reactive hepatitis    Hyperglycemia    Obesity (BMI 30-39. 9)    Abnormal EKG    LVH (left ventricular hypertrophy) due to hypertensive disease    Hyperlipidemia with target LDL less than 100    Asthma, Moderate persistent asthma without complication    Hypertriglyceridemia    Erectile dysfunction    Family history of premature CAD Gastroesophageal reflux disease with esophagitis without hemorrhage    Refused pneumococcal vaccination    Refuses tetanus, diphtheria, and acellular pertussis (Tdap) vaccination    Family history of thyroid nodule    Gastric polyp    NOEMÍ on CPAP    Eosinophilic esophagitis    Mild episode of recurrent major depressive disorder (HCC)    Allergic rhinitis due to allergen    Influenza vaccination declined    Left elbow tendinitis    Biceps tendinitis of left upper extremity    COVID-19    Left leg weakness    Degenerative lumbar spinal stenosis    Chronic midline low back pain with bilateral sciatica    DDD (degenerative disc disease), lumbar    Lumbar radiculopathy    COVID-19 vaccination declined    Pneumococcal vaccination declined    Unintended weight gain       Prior to Visit Medications    Medication Sig Taking? Authorizing Provider   albuterol (PROVENTIL) (2.5 MG/3ML) 0.083% nebulizer solution Take 3 mLs by nebulization every 6 hours as needed for Wheezing or Shortness of Breath Yes Je Shelley MD   pravastatin (PRAVACHOL) 20 MG tablet take 1 tablet by mouth every evening **STOP ATORVASTATIN** Yes Cheli Booker, APRN - CNP   amLODIPine (NORVASC) 10 MG tablet Take 1 tablet by mouth daily Yes Je Shelley MD   folic acid (FOLVITE) 1 MG tablet take 1 tablet by mouth once daily Yes Historical Provider, MD   methotrexate (RHEUMATREX) 2.5 MG chemo tablet take 7 tablets by mouth every week Yes Historical Provider, MD   montelukast (SINGULAIR) 10 MG tablet Take 1 tablet by mouth nightly Yes Je Shelley MD   omeprazole (PRILOSEC) 40 MG delayed release capsule take 1 capsule by mouth once daily Yes Je Shelley MD   busPIRone (BUSPAR) 10 MG tablet Take 1 tablet by mouth 3 times daily as needed (anxiety) Yes Je Shelley MD   loratadine (CLARITIN) 10 MG tablet Take 1 tablet by mouth daily Yes Je Shelley MD   Blood Pressure KIT Dx: HTN.  Needs automatic blood pressure machine to monitor his blood pressure.  Yes Lida Murray MD   Respiratory Therapy Supplies (NEBULIZER) MADHAV 1 Units by Does not apply route 2 times daily Dx: Asthma exacerbation J45.901 Yes Quincy Dutton MD   fluticasone (FLONASE) 50 MCG/ACT nasal spray 2 sprays by Nasal route daily  Lida Murray MD   buPROPion (WELLBUTRIN XL) 150 MG extended release tablet take 1 tablet by mouth every morning  Patient not taking: Reported on 8/9/2022  Lida Murray MD   DULERA 100-5 MCG/ACT inhaler inhale 2 puffs by mouth and INTO THE LUNGS twice a day STOP FLOVENT  Patient not taking: Reported on 8/9/2022  Lida Murray MD        Allergies   Allergen Reactions    Fish-Derived Products Anaphylaxis    Other Anaphylaxis     Any type of seafood    Shellfish Allergy Anaphylaxis    Shrimp (Diagnostic) Anaphylaxis    Ace Inhibitors Swelling     UVULITIS ON 8/15/19    Betadine [Povidone-Iodine] Rash     Allergic reaction topically to betadine from a shot       Past Medical History:   Diagnosis Date    Asthma     COPD (chronic obstructive pulmonary disease) ([facility] [address] 11/19/2018    Depression     Erectile dysfunction 9/29/2017    Essential hypertension 1/2/2017    GERD (gastroesophageal reflux disease) 11/19/2018    Heart palpitations 9/29/2017    Hyperlipidemia with target LDL less than 100 3/23/2017    Left lower lobe pneumonia 11/9/2012    Lumbar radiculopathy     Mitral valve prolapse     Nonspecific reactive hepatitis 3/22/2017    Obesity (BMI 30-39.9) 3/23/2017    NOEMÍ (obstructive sleep apnea) 10/16/2019    Osteoarthritis     Seronegative polyarthritis 10/10/2016    followed w/ rheumatology in [facility] anxiety disorder     Uvulitis 8/15/2019    Vertebrogenic low back pain 12/13/2021       Past Surgical History:   Procedure Laterality Date    COLONOSCOPY      CYST REMOVAL Right     Armpit     ENDOSCOPY, COLON, DIAGNOSTIC      HAND SURGERY Left     KNEE ARTHROSCOPY      right knee    NOSE SURGERY TONSILLECTOMY      UPPER GASTROINTESTINAL ENDOSCOPY N/A 10/9/2019    EGD POLYP SNARE, HOT. ESOPHAGEAL DILATATION WITH MALONIES 49F performed by Ernie Johns MD at [facility]         . Social History     Tobacco Use    Smoking status: Never    Smokeless tobacco: Never   Vaping Use    Vaping Use: Never used   Substance Use Topics    Alcohol use: Yes     Comment: occasional  few x year    Drug use: No       Family History   Problem Relation Age of Onset    Diabetes Father     High Cholesterol Father     Other Mother         autoimmune hepatitis     High Blood Pressure Brother     Heart Disease Brother     Heart Attack Brother 40        cabg age 40     Rheum Arthritis Other 7    Colon Cancer Neg Hx     Cancer Neg Hx        ADVANCE DIRECTIVE: N, <no information>    SUBJECTIVE / Kb Davison is here for Annual Exam, Asthma (Waking him up at night with shortness of breath ), Obesity, and Back Pain       Current concerns: Wants to lose weight. Has been having chronic low back pain , shooting down both legs, spinal stenosis  Patient reports \"below waist all the way down I don't feel\"  Intensity of pain is 6/10, worse with activities  Epidural x2 with very short lived relief  Will do RONEN with pain management    Wants to lose weight to improve his pain and healing  Will need to have surgical intervention per pain management    Asthma  Patient admits to coughing more and dyspnea on exertion worsening  Ran out of [facility]  Was sick a few weeks ago, and flared up of allergies. Denies purulent mucus or wheezing. Regarding the weight loss, [facility], but [facility] would cover only food portion   Wellbutrin ran out . His social anxiety has been worsening. Denies depression. Feels uncomfortable around people.     Has appointment with cardiology on 8/26     Urinary symptoms: no    Sexually active: Yes     Wears seatbelts: yes     Regular exercise: yes  Ever been transfused or tattooed?: yes old tattoo    Last eye exam: up to date: No    Abnormal visual screening. I advised Linette Kim to make appointment with ophthalmologist. The patient verbalizes understanding and agrees with the plan. Vision Screening    Right eye Left eye Both eyes   Without correction 20/10 20/10 20/10   With correction          Hearing:normal :Yes    Last dental exam and preventative dental cleaning: up to date : No  Abnormal visual screening. I advised Linette Kim to make appointment with ophthalmologist. The patient verbalizes understanding and agrees with the plan. Nutritional assessment: Body mass index is 35.56 kg/m². Elevated. Weight is worsening, there is unintentional weight gain of 11 pounds in 4 months  Denies increased appetite, thirst or polyuria. He really wants to lose weight    Ran out wellbutrin  Walking a lot and keeping a low-carb diet    Had [employer] loss food program, but did not help  He did have nutrition counseling, a few months ago did not help    Wt Readings from Last 3 Encounters:   08/09/22 277 lb (125.6 kg)   06/24/22 275 lb 3.2 oz (124.8 kg)   06/01/22 270 lb (122.5 kg)     04/08/22 266 lb (120.7 kg)       Healthful diet and Physical activity counseling to prevent CVD- low carb, low fat diet, increase fruits and vegetables, and exercise 4-5 times a day 30-40minutes a day discussed    Nicotine dependence.  no  Counseling given: Yes      Alcohol use: yes - rarely     Immunization History   Administered Date(s) Administered    Tdap (Boostrix, Adacel) 08/09/2022         COVID-19 vaccine: No:  will consider    Tdap one time, then Td every 10 years-up to date:  No: received today    Influenza annually-up to date:  No    PPSV 23 in all adults 19-63 yo with chronic conditions,smokers, alcoholism,  nursing home residents; then PCV 13 at 70 yo-up to date: No: will get at the pharmacy today or tomorrow    Colon cancer screening recommended at 39 years old  The patient has NO family history of colon cancer      The patient has family history of cancer. No results found for: PSA, PSADIA    Hypertension  He reports fatigue, dyspnea on exertion which he thinks is related to asthma. He denies chest pain, leg swelling or palpitations. He has strong family history of coronary artery disease, with prior abnormal EKG. EKG showed prior anterior infarct age undetermined, which is new from 2018. Blood pressure is normal.  He reports compliance with blood pressure medications, denies side effects. Blood pressure is well controlled today      BP Readings from Last 3 Encounters:   08/09/22 110/70   06/01/22 114/73   05/09/22 (!) 144/85         Pulse is normal.  Pulse Readings from Last 3 Encounters:   08/09/22 74   06/01/22 94   05/09/22 84       A1c is normal    Lab Results   Component Value Date    LABA1C 5.5 04/08/2022    LABA1C 5.5 10/04/2021    LABA1C 5.5 03/22/2017       Lipid screening -hyperlipidemia and high triglycerides is worsening, severe. He reports at that time he was keeping high-protein diet low in carbs and fats. He declined statins  He says he has never Keto diet.     Lab Results   Component Value Date    CHOL 279 (H) 04/08/2022    CHOL 167 10/03/2021    CHOL 247 (H) 06/25/2021     Lab Results   Component Value Date    TRIG 177 (H) 04/08/2022    TRIG 290 (H) 10/03/2021    TRIG 180 (H) 06/25/2021     Lab Results   Component Value Date    HDL 48 04/08/2022    HDL 28 (L) 10/03/2021    HDL 44 06/25/2021     Lab Results   Component Value Date    LDLCHOLESTEROL 196 (H) 04/08/2022    LDLCHOLESTEROL 81 10/03/2021    LDLCHOLESTEROL 167 (H) 06/25/2021     Lab Results   Component Value Date    CHOLHDLRATIO 5.8 (H) 04/08/2022    CHOLHDLRATIO 6.0 (H) 10/03/2021    CHOLHDLRATIO 5.6 (H) 06/25/2021       Cardiovascular risk is: low    The 10-year ASCVD risk score (Yani Mckeon, et al., 2013) is: 2.5%    Values used to calculate the score:      Age: 42 years Sex: Male      Is Non- : No      Diabetic: No      Tobacco smoker: No      Systolic Blood Pressure: 862 mmHg      Is BP treated: Yes      HDL Cholesterol: 48 mg/dL      Total Cholesterol: 279 mg/dL    Hepatitis C screening- nonreactive    Lab Results   Component Value Date    HEPAIGM NONREACTIVE 05/26/2017    HEPBIGM NONREACTIVE 05/26/2017    HEPBCAB NONREACTIVE 07/23/2014    HEPCAB NONREACTIVE 05/26/2017       Depression is better, however the anxiety is worse especially around people  Ran out of Wellbutrin  PHQ-2 Over the past 2 weeks, how often have you been bothered by any of the following problems? Little interest or pleasure in doing things: Not at all  Feeling down, depressed, or hopeless: Not at all  PHQ-2 Score: 0  PHQ-9 Over the past 2 weeks, how often have you been bothered by any of the following problems? Trouble falling or staying asleep, or sleeping too much: Not at all  Feeling tired or having little energy: Not at all  Poor appetite or overeating: Not at all  Feeling bad about yourself - or that you are a failure or have let yourself or your family down: Not at all  Trouble concentrating on things, such as reading the newspaper or watching television: Not at all  Moving or speaking so slowly that other people could have noticed.  Or the opposite - being so fidgety or restless that you have been moving around a lot more than usual: Not at all  Thoughts that you would be better off dead, or of hurting yourself in some way: Not at all  If you checked off any problems, how difficult have these problems made it for you to do your work, take care of things at home, or get along with other people?: Not difficult at all  PHQ-9 Total Score: 0  PHQ-9 Total Score: 0    PHQ Scores 8/9/2022 4/8/2022 6/23/2021 1/8/2021 8/12/2020 5/8/2020 1/2/2020   PHQ2 Score 0 0 0 0 0 2 0   PHQ9 Score 0 0 0 0 0 2 0       Health Maintenance   Topic Date Due    COVID-19 Vaccine (1) Never done Pneumococcal 0-64 years Vaccine (1 - PCV) Never done    Flu vaccine (1) 09/01/2022    Lipids  04/08/2023    Depression Monitoring  08/09/2023    DTaP/Tdap/Td vaccine (2 - Td or Tdap) 08/09/2032    Hepatitis C screen  Completed    HIV screen  Completed    Hepatitis A vaccine  Aged Out    Hepatitis B vaccine  Aged Out    Hib vaccine  Aged Out    Meningococcal (ACWY) vaccine  Aged Out       -rest of complaints with corresponding details per ROS    Review of Systems   Constitutional:  Positive for fatigue and unexpected weight change. Negative for activity change, appetite change, chills, diaphoresis and fever. HENT:  Negative for congestion, dental problem and hearing loss. Eyes:  Positive for visual disturbance. Respiratory:  Positive for apnea (on CPAP, reports compliance with CPAP), cough and shortness of breath. Negative for chest tightness and wheezing. Cardiovascular:  Negative for chest pain, palpitations and leg swelling. Gastrointestinal:  Negative for abdominal distention, abdominal pain, constipation, diarrhea, nausea and vomiting. Endocrine: Negative for cold intolerance, heat intolerance, polydipsia, polyphagia and polyuria. Genitourinary:  Negative for decreased urine volume, difficulty urinating, frequency and urgency. Musculoskeletal:  Positive for back pain. Skin:  Negative for rash. Allergic/Immunologic: Positive for environmental allergies and immunocompromised state (on methotrexate). Neurological:  Positive for numbness (under the waist all the way down to the legs). Negative for dizziness, weakness and headaches. Psychiatric/Behavioral:  Positive for sleep disturbance. Negative for decreased concentration and dysphoric mood.  The patient is not nervous/anxious.        -vital signs stable and within normal limits except severe obesity per BMI    /70   Pulse 74   Temp 97 °F (36.1 °C)   Ht 6' 2\" (1.88 m)   Wt 277 lb (125.6 kg)   SpO2 95%   BMI 35.56 kg/m² Vitals:    08/09/22 1034   BP: 110/70   Pulse: 74   Temp: 97 °F (36.1 °C)   SpO2: 95%   Weight: 277 lb (125.6 kg)   Height: 6' 2\" (1.88 m)     Estimated body mass index is 35.56 kg/m² as calculated from the following:    Height as of this encounter: 6' 2\" (1.88 m). Weight as of this encounter: 277 lb (125.6 kg). Physical Exam  Vitals and nursing note reviewed. Constitutional:       General: He is not in acute distress. Appearance: Normal appearance. He is well-developed. He is obese. He is not diaphoretic. HENT:      Head: Normocephalic and atraumatic. Right Ear: Hearing, tympanic membrane, ear canal and external ear normal.      Left Ear: Hearing, tympanic membrane, ear canal and external ear normal.      Mouth/Throat:      Comments: I did not examine the mouth due to coronavirus pandemic and wearing masks. Eyes:      General: Lids are normal. No scleral icterus. Right eye: No discharge. Left eye: No discharge. Extraocular Movements: Extraocular movements intact. Conjunctiva/sclera: Conjunctivae normal.   Neck:      Thyroid: No thyromegaly. Cardiovascular:      Rate and Rhythm: Normal rate and regular rhythm. Heart sounds: Normal heart sounds. No murmur heard. Pulmonary:      Effort: Pulmonary effort is normal. No respiratory distress. Breath sounds: Normal breath sounds. No wheezing or rales. Chest:      Chest wall: No tenderness. Abdominal:      General: Bowel sounds are normal. There is no distension. Palpations: Abdomen is soft. There is no hepatomegaly or splenomegaly. Tenderness: There is no abdominal tenderness. Comments: Obese abdomen. Musculoskeletal:      Cervical back: Normal range of motion and neck supple. Lumbar back: Tenderness and bony tenderness present. Decreased range of motion. Positive right straight leg raise test and positive left straight leg raise test.      Right lower leg: No edema.       Left lower leg: No edema. Skin:     General: Skin is warm and dry. Capillary Refill: Capillary refill takes less than 2 seconds. Findings: No rash. Neurological:      Mental Status: He is alert and oriented to person, place, and time. Cranial Nerves: No cranial nerve deficit. Motor: No abnormal muscle tone. Coordination: Coordination normal.      Deep Tendon Reflexes: Reflexes normal.   Psychiatric:         Mood and Affect: Mood is anxious. Speech: Speech is rapid and pressured. Behavior: Behavior normal.         Thought Content: Thought content normal.         Judgment: Judgment normal.       No flowsheet data found. I personally reviewed testing with patient.   Mild hyperglycemia in the past, blood glucose 121 10/7/2021, 118 on 10/6/2021, 112 on 10/5/2021  Hyperlipidemia and high triglycerides worsening, severe    Otherwise labs within normal limits      Lab Results   Component Value Date    WBC 7.7 04/08/2022    HGB 16.0 04/08/2022    HCT 47.3 04/08/2022    MCV 90.1 04/08/2022     04/08/2022       Lab Results   Component Value Date/Time     04/08/2022 08:30 AM    K 4.5 04/08/2022 08:30 AM     04/08/2022 08:30 AM    CO2 22 04/08/2022 08:30 AM    BUN 14 04/08/2022 08:30 AM    CREATININE 0.81 04/08/2022 08:30 AM    GLUCOSE 88 04/08/2022 08:30 AM    GLUCOSE 96 04/30/2012 01:33 PM    CALCIUM 9.6 04/08/2022 08:30 AM        Lab Results   Component Value Date    ALT 31 04/08/2022    AST 24 04/08/2022    ALKPHOS 104 04/08/2022    BILITOT 0.49 04/08/2022       Lab Results   Component Value Date    TSH 2.84 04/08/2022       Lab Results   Component Value Date    LDLCHOLESTEROL 196 (H) 04/08/2022       Lab Results   Component Value Date    LABA1C 5.5 04/08/2022       Lab Results   Component Value Date    VITD25 49.8 04/08/2022         Orders Placed This Encounter   Medications    albuterol (PROVENTIL) (2.5 MG/3ML) 0.083% nebulizer solution     Sig: Take 3 mLs by nebulization every 6 hours as needed for Wheezing or Shortness of Breath     Dispense:  120 each     Refill:  0    pneumococcal 20-valent conjugat (PREVNAR) 0.5 ML ROBERTO inj     Sig: Inject 0.5 mLs into the muscle once for 1 dose     Dispense:  0.5 mL     Refill:  0    DISCONTD: mometasone-formoterol (DULERA) 100-5 MCG/ACT inhaler     Sig: Inhale 1 puff into the lungs in the morning and 1 puff before bedtime. Dispense:  13 g     Refill:  5    buPROPion (WELLBUTRIN XL) 150 MG extended release tablet     Sig: Take 1 tablet by mouth every morning     Dispense:  90 tablet     Refill:  3    Cinnamon 500 MG CAPS     Sig: Take 1 capsule by mouth in the morning. Dispense:  90 capsule     Refill:  0    metFORMIN (GLUCOPHAGE-XR) 500 MG extended release tablet     Sig: Take 1 tablet by mouth daily (with breakfast)     Dispense:  90 tablet     Refill:  3         Medications Discontinued During This Encounter   Medication Reason    albuterol (PROVENTIL) (2.5 MG/3ML) 0.083% nebulizer solution REORDER    DULERA 100-5 MCG/ACT inhaler REORDER    buPROPion (WELLBUTRIN XL) 150 MG extended release tablet REORDER         Orders Placed This Encounter   Procedures    Tdap, BOOSTRIX, (age 8 yrs+), IM    Lipid Panel     Standing Status:   Future     Standing Expiration Date:   8/9/2023     Order Specific Question:   Is Patient Fasting?/# of Hours     Answer:   8-10 Hours, water ok to drink    Comprehensive Metabolic Panel     Standing Status:   Future     Standing Expiration Date:   8/9/2023    TSH     Standing Status:   Future     Standing Expiration Date:   8/9/2023    CBC     Standing Status:   Future     Standing Expiration Date:   8/9/2023    [place] OFFICE/OUTPATIENT ESTABLISHED LOW MDM 20-29 MIN         This note was completed by using the assistance of a speech-recognition program. However, inadvertent computerized transcription errors may be present.  Although every effort was made to ensure accuracy, no guarantees can be provided that every mistake has been identified and corrected by editing. An electronic signature was used to authenticate this note.     Electronically signed by Gladis Peng MD on 8/10/2022 at 9:05 AM

## 2022-08-09 NOTE — TELEPHONE ENCOUNTER
[facility] called asking for a new script for the dulera to be sent over the one they have the directions are wrong needs to state 2 puffs twice daily

## 2022-08-09 NOTE — PATIENT INSTRUCTIONS
Well Visit, Ages 25 to 48: Care Instructions  Overview     Well visits can help you stay healthy. Your doctor has checked your overall health and may have suggested ways to take good care of yourself. Your doctor also may have recommended tests. At home, you can help prevent illness withhealthy eating, regular exercise, and other steps. Follow-up care is a key part of your treatment and safety. Be sure to make and go to all appointments, and call your doctor if you are having problems. It's also a good idea to know your test results and keep alist of the medicines you take. How can you care for yourself at home? Get screening tests that you and your doctor decide on. Screening helps find diseases before any symptoms appear. Eat healthy foods. Choose fruits, vegetables, whole grains, protein, and low-fat dairy foods. Limit fat, especially saturated fat. Reduce salt in your diet. Limit alcohol. If you are a man, have no more than 2 drinks a day or 14 drinks a week. If you are a woman, have no more than 1 drink a day or 7 drinks a week. Get at least 30 minutes of physical activity on most days of the week. Walking is a good choice. You also may want to do other activities, such as running, swimming, cycling, or playing tennis or team sports. Discuss any changes in your exercise program with your doctor. Reach and stay at a healthy weight. This will lower your risk for many problems, such as obesity, diabetes, heart disease, and high blood pressure. Do not smoke or allow others to smoke around you. If you need help quitting, talk to your doctor about stop-smoking programs and medicines. These can increase your chances of quitting for good. Care for your mental health. It is easy to get weighed down by worry and stress. Learn strategies to manage stress, like deep breathing and mindfulness, and stay connected with your family and community. If you find you often feel sad or hopeless, talk with your doctor. Treatment can help. Talk to your doctor about whether you have any risk factors for sexually transmitted infections (STIs). You can help prevent STIs if you wait to have sex with a new partner (or partners) until you've each been tested for STIs. It also helps if you use condoms (male or female condoms) and if you limit your sex partners to one person who only has sex with you. Vaccines are available for some STIs, such as HPV. Use birth control if it's important to you to prevent pregnancy. Talk with your doctor about the choices available and what might be best for you. If you think you may have a problem with alcohol or drug use, talk to your doctor. This includes prescription medicines (such as amphetamines and opioids) and illegal drugs (such as cocaine and methamphetamine). Your doctor can help you figure out what type of treatment is best for you. Protect your skin from too much sun. When you're outdoors from 10 a.m. to 4 p.m., stay in the shade or cover up with clothing and a hat with a wide brim. Wear sunglasses that block UV rays. Even when it's cloudy, put broad-spectrum sunscreen (SPF 30 or higher) on any exposed skin. See a dentist one or two times a year for checkups and to have your teeth cleaned. Wear a seat belt in the car. When should you call for help? Watch closely for changes in your health, and be sure to contact your doctor if you have any problems or symptoms that concern you. Where can you learn more? Go to [email or web address]://[email or web address]. org and sign in to your [employer] account. Enter P072 in the [facility] box to learn more about \"Well Visit, Ages 25 to 48: Care Instructions. \"     If you do not have an account, please click on the \"Sign Up Now\" link. Current as of: October 6, 2021               Content Version: 13.3  © 2006-2022 Healthwise, Incorporated. Care instructions adapted under license by [facility] [address]  If you have questions about a medical condition or this instruction, always ask your healthcare professional. [place] [address] any warranty or liability for your use of this information.

## 2022-08-09 NOTE — PROGRESS NOTES
Visit Information    Have you changed or started any medications since your last visit including any over-the-counter medicines, vitamins, or herbal medicines? no   Are you having any side effects from any of your medications? -  no  Have you stopped taking any of your medications? Is so, why? -  no    Have you seen any other physician or provider since your last visit? No  Have you had any other diagnostic tests since your last visit? No  Have you been seen in the emergency room and/or had an admission to a hospital since we last saw you? No  Have you had your routine dental cleaning in the past 6 months? no    Have you activated your [employer] account? If not, what are your barriers?  Yes     Patient Care Team:  Chela Carbajal MD as PCP - General (Family Medicine)  Chela Carbajal MD as PCP - [facility]  Marianne Cooper MD as Surgeon (Cardiology)  Rita Lozano MD as Consulting Physician (Gastroenterology)  Aaliyah Tubbs MD as Consulting Physician (Rheumatology)  Margoth Olivas DO as Surgeon (Neurosurgery)  Lucian Edwards MD as Consulting Physician (Pulmonology)  Hermila Kwon MD as Consulting Physician (Pain Management)  [facility] History Review  Past Medical, Family, and Social History reviewed and does contribute to the patient presenting condition    Health Maintenance   Topic Date Due    COVID-19 Vaccine (1) Never done    Pneumococcal 0-64 years Vaccine (1 - PCV) Never done    DTaP/Tdap/Td vaccine (1 - Tdap) Never done    Flu vaccine (1) 09/01/2022    Lipids  04/08/2023    Depression Monitoring  04/08/2023    Hepatitis C screen  Completed    HIV screen  Completed    Hepatitis A vaccine  Aged Out    Hepatitis B vaccine  Aged Out    Hib vaccine  Aged Out    Meningococcal (ACWY) vaccine  Aged Out

## 2022-08-10 ENCOUNTER — TELEPHONE (OUTPATIENT)
Dept: FAMILY MEDICINE CLINIC | Age: 42
End: 2022-08-10

## 2022-08-10 VITALS
BODY MASS INDEX: 35.55 KG/M2 | HEART RATE: 74 BPM | SYSTOLIC BLOOD PRESSURE: 110 MMHG | HEIGHT: 74 IN | WEIGHT: 277 LBS | OXYGEN SATURATION: 95 % | DIASTOLIC BLOOD PRESSURE: 70 MMHG | TEMPERATURE: 97 F

## 2022-08-10 PROBLEM — R63.5 UNINTENDED WEIGHT GAIN: Status: ACTIVE | Noted: 2022-08-10

## 2022-08-10 PROBLEM — G89.29 CHRONIC MIDLINE LOW BACK PAIN WITH BILATERAL SCIATICA: Status: ACTIVE | Noted: 2021-12-13

## 2022-08-10 PROBLEM — M54.42 CHRONIC MIDLINE LOW BACK PAIN WITH BILATERAL SCIATICA: Status: ACTIVE | Noted: 2021-12-13

## 2022-08-10 PROBLEM — M54.41 CHRONIC MIDLINE LOW BACK PAIN WITH BILATERAL SCIATICA: Status: ACTIVE | Noted: 2021-12-13

## 2022-08-10 ASSESSMENT — ENCOUNTER SYMPTOMS: APNEA: 1

## 2022-08-10 NOTE — TELEPHONE ENCOUNTER
Noted  Future Appointments   Date Time Provider Joe Monet   8/18/2022  8:30 AM MD ROBIN Ramirez PN [facility]   9/1/2022  8:30 AM MD ROBIN Ramirez PN [facility]   9/6/2022  3:15 PM Ashish Hayes MD [facility]   10/4/2022  4:00 PM Ashish Hayes MD [facility]   11/1/2022  4:00 PM Ashish Hayes MD [address]

## 2022-08-10 NOTE — TELEPHONE ENCOUNTER
" OFELIA Jurado     Chief Complaint   Patient presents with   • Ear Problem     R sided \"roaring\"        HISTORY OF PRESENT ILLNESS:     Yesica Patel is a  59 y.o.  female who complains of roaring tinnitus. The symptoms are localized to the right ear. The patient has had moderate to severe symptoms. The symptoms have been relatively constant for the last several months. The symptoms are aggravated by  no identifiable factors. The symptoms are improved by no identifiable factors.    The patient reports being evaluated at the [facility] in the past and was diagnosed with Meniere's disease. She reports that she has never had an MRI before of the head. The roaring came on suddenly in the right ear in February. The patient denies vertigo symptoms at this time and states she doesn't feel like her hearing has changed.       Review of Systems   Constitutional: Negative for activity change, appetite change, chills, diaphoresis, fatigue, fever and unexpected weight change.   HENT: Positive for hearing loss and tinnitus. Negative for congestion, dental problem, drooling, ear discharge, ear pain, facial swelling, mouth sores, nosebleeds, postnasal drip, rhinorrhea, sinus pressure, sneezing, sore throat, trouble swallowing and voice change.    Eyes: Negative.    Respiratory: Negative.    Cardiovascular: Negative.    Gastrointestinal: Negative.    Endocrine: Negative.    Skin: Negative.    Allergic/Immunologic: Negative for environmental allergies, food allergies and immunocompromised state.   Neurological: Positive for dizziness.   Hematological: Negative.    Psychiatric/Behavioral: Negative.    :    Past History:  Past Medical History:   Diagnosis Date   • Breast cancer (CMS/HCC) 2010   • Disease of thyroid gland    • Osteoporosis      Past Surgical History:   Procedure Laterality Date   • BREAST SURGERY     • HYSTERECTOMY     • LAPAROSCOPIC TUBAL LIGATION       Family History   Problem Relation Age of Onset   • " Called patient and reminded him to schedule an appointment with his cardiologist to get cleared for adipex. Cancer Paternal Grandfather         Breast     Social History     Tobacco Use   • Smoking status: Never Smoker   • Smokeless tobacco: Never Used   Substance Use Topics   • Alcohol use: Never     Frequency: Never   • Drug use: Never     Outpatient Medications Marked as Taking for the 6/15/20 encounter (Office Visit) with Ino Hair PA   Medication Sig Dispense Refill   • alendronate (FOSAMAX) 70 MG tablet Take 70 mg by mouth 1 (One) Time Per Week.     • levothyroxine (SYNTHROID, LEVOTHROID) 100 MCG tablet Take 100 mcg by mouth Daily.     • RABEprazole (ACIPHEX) 20 MG EC tablet Take 20 mg by mouth Daily.       Allergies:  Patient has no known allergies.          Vital Signs:   Vitals:    06/15/20 1522   BP: 141/82   Pulse: 60   Temp: 97 °F (36.1 °C)         EXAMINATION:   CONSTITUTIONAL: well nourished, alert, oriented, in no acute distress     COMMUNICATION AND VOICE: able to communicate normally, normal voice quality    HEAD: normocephalic, no lesions, atraumatic, no tenderness, no masses     FACE: appearance normal, no lesions, no tenderness, no deformities, facial motion symmetric    SALIVARY GLANDS: parotid glands with no tenderness, no swelling, no masses, submandibular glands with normal size, nontender    EYES: ocular motility normal, eyelids normal, orbits normal, no proptosis, conjunctiva normal , pupils equal, round     EARS:  Hearing: response to conversational voice normal bilaterally   External Ears: auricles without lesions  Otoscopic: tympanic membrane appearance normal, no lesions, no perforation, normal mobility, no fluid    NOSE:  External Nose: structure normal, no tenderness on palpation, no nasal discharge, no lesions, no evidence of trauma, nostrils patent   Intranasal Exam: nasal mucosa normal, vestibule within normal limits, inferior turbinate normal, nasal septum midline     ORAL:  Lips: upper and lower lips without lesion   Teeth: dentition within normal limits for age   Gums:  gingivae healthy   Oral Mucosa: oral mucosa normal, no mucosal lesions   Floor of Mouth: Warthin’s duct patent, mucosa normal  Tongue: lingual mucosa normal without lesions, normal tongue mobility   Palate: soft and hard palates with normal mucosa and structure  Oropharynx: oropharyngeal mucosa normal    NECK: neck appearance normal, no mass,  noted without erythema or tenderness    THYROID: no overt thyromegaly, no tenderness, nodules or mass present on palpation, position midline     LYMPH NODES: no lymphadenopathy    CHEST/RESPIRATORY: respiratory effort normal, normal breath sounds     CARDIOVASCULAR: rate and rhythm normal, extremities without cyanosis or edema      NEUROLOGIC/PSYCHIATRIC: oriented to time, place and person, mood normal, affect appropriate, CN II-XII intact grossly    RESULTS REVIEW:    I have reviewed the patients old records in the chart.  Audiologic testing reviewed.       Assessment    Diagnosis Plan   1. Asymmetrical hearing loss of right ear  MRI Brain With & Without Contrast    MRI internal auditory canal w wo   2. Tinnitus of right ear  MRI Brain With & Without Contrast    MRI internal auditory canal w wo   3. Vertigo         Plan    Patient Instructions   Will get MRI of IAC's to rule out other retrocochlear diseases, will treat with steroids as it could be an atypical meniere's flair up. Recheck in 6 months.    MENIERE’S DISEASE    Description:  Meniere’s disease is a disorder of the inner ear.  The disease appears to be related to a failure of the mechanism regulating the production, circulation, and/or absorption of endolymph.  The cause of Meniere’s disease is unknown.  Proposed theories of cochlear dysfunction in patients with Meniere’s disease include mechanical, metabolic, and/or biochemical alterations.  Clinically, the classic symptoms of Meniere’s disease are recurrent episodes of vertigo, fluctuating sensorineural hearing loss, and roaring tinnitus.    Histopathologic  observations on temporal bones from individuals with Meniere’s disease have consistently shown distention of the endolymphatic system.  Dilatation of the membranous labyrinth may be accompanied by herniations, ruptures, fistulas, and/or total collapse.  The organ of Corti  appears normal in some specimens and abnormal in others (Kimura et al., 1976).  In general, morphologic changes in the organ of Corti have been difficult to ascribe exclusively to Meniere’s disease because similar changes may be found in non-Meniere’s specimens, such as individuals with presbyacusis (Kimura et al., 1976).    Patient Characteristics:  The initial onset of symptoms is between 40 and 60 years of age in about 50% of individuals.  The disease is rate in children.  The hearing loss is unilateral in approximately 80% of individuals.  A hereditary predisposition for Meniere’s disease may be indicated by occasional occurrences of episodic vertigo and hearing loss in families (Terence, 1965).  A sex ration for Meniere’s disease is disputed.    Clinical Course:  The onset of Meniere’s disease is typically characterized by a sudden episode of vertigo accompanied by nausea and vomiting.  The onset of vertigo may be preceded by feelings of pressure or fullness in the ear.  Briefly before or during the vertiginous episode, a loud roaring or ringing tinnitus may become apparent.  Hearing sensitivity in the affected ear may decrease.  However, information about hearing sensitivity during the vertiginous episode may be difficult to obtain because the symptoms of vertigo and loud, roaring tinnitus may be relatively more dramatic.    Following the acute episode, the vertigo may cease or subside into a sensation of unsteadiness.  Feelings of unsteadiness may last for a period of days.  The attack usually involves no neurologic symptoms other than those referable to the end organ.  At the end of the attack, hearing sensitivity may improve.  Tinnitus  may lessen.    The initial attack of vertigo is characteristically followed by a period of remission.  During this time, the patient may notice fluctuation in his hearing ability, in the tinnitus, and in the feelings of pressure or fullness within the ear.  Subsequently, the above symptoms may be interrupted or exacerbated by another episode of spontaneous vertigo.  The characteristic frequency of vertiginous attacks is variable among patients.      Gradually, a longer period of spontaneous or treatment-induced remission occurs.  During remission, no symptoms may be noted other than a loss of hearing in the involved ear. The clinical course is subsequently characterized by active periods of variable length interspersed with remission periods of variable length.  Over time, the symptoms of vertigo, nausea, and vomiting tend to become less severe.  In contrast, the hearing loss and tinnitus may become progressively worse (Marisas and Arnulfo, 1976).    Treatment may involve drug therapy, diet modification, and/or surgery in patients with incapacitating and unsuccessfully treated vertiginous attacks.    Site of Disorder:      [place].    General Audiologic Pattern:  Pure tone sensitivity results characteristically show a unilateral, fluctuating sensorineural hearing loss.  During the initial sates of Meniere’s disease, the degree of loss may vary.  However, over a period of years, the degree of loss usually progresses to a moderate to severe, permanent impairment.  On rare occasions, a total loss of hearing may occur.    The audiometric configuration may vary with the time course of the disease.  In the initial stages of Meniere’s disease, the audiometric contour may be rising, with greater loss in the low frequency region than in the mid and high frequency regions.   Subsequently, the audiometric configuration may change to a relatively flat contour with approximately the same degree of sensitivity loss at all  frequencies.  During a later stage of Meniere’s disease, the audiogram may present a downwardly sloping configuration with greater loss in the high frequency region than in the low frequency region.    Maximum speech intelligibility scores are usually reduced in a manner consistent with the degree and configuration of sensitivity loss.    Impedance results characteristically show normal, type A, tympanograms and normal static compliance measures.  Acoustic reflexes are characteristically present at normal HLs and reduced sensation levels.  No reflex decay is observed at 500 Hz or 1000 Hz.    In some patients with Meniere’s disease, the loudness discomfort level or the intensity level where sounds are first reported as annoying may occur at a reduced hearing level relative to the normal ear.  Patients may report intolerance to loud sounds.  Consequently, auditory tests at loud intensity levels may not e obtainable.  In addition, patients may complain of diplacusis or a difference of pitch sensation in the involved ear relative to the normal ear.             New Medications Ordered This Visit   Medications   • predniSONE (DELTASONE) 10 MG tablet     Sig: Daily dose: 5 tabs for 2 days, then 4 tabs for 2 days, then 3 tabs for 2 days, then 2 tabs for 2 days, then 1 tab for 2 days     Dispense:  30 tablet     Refill:  0     Orders Placed This Encounter   Procedures   • MRI Brain With & Without Contrast   • MRI internal auditory canal w wo          Return in about 6 weeks (around 7/27/2020) for Recheck ears after MRI.    OFELIA Jurado  06/16/20  13:07

## 2022-08-10 NOTE — TELEPHONE ENCOUNTER
Please remind the patient I need him to have clearance from the cardiologist before I start him on Adipex and to make the appointment with cardiologist as soon as possible    Future Appointments   Date Time Provider Joe Healy   8/18/2022  8:30 AM MD ROBIN Yoder PN [facility]   9/1/2022  8:30 AM MD ROBIN Yoder PN [facility]   9/6/2022  3:15 PM [facility]   10/4/2022  4:00 PM [facility]   11/1/2022  4:00 PM Katia Ramos MD [facility]

## 2022-08-18 ENCOUNTER — HOSPITAL ENCOUNTER (OUTPATIENT)
Dept: PAIN MANAGEMENT | Facility: CLINIC | Age: 42
Discharge: HOME | End: 2022-08-18
Payer: COMMERCIAL

## 2022-08-18 ENCOUNTER — HOSPITAL ENCOUNTER (OUTPATIENT)
Age: 42
Setting detail: SPECIMEN
Discharge: HOME | End: 2022-08-18

## 2022-08-18 VITALS
BODY MASS INDEX: 35.55 KG/M2 | TEMPERATURE: 98 F | SYSTOLIC BLOOD PRESSURE: 119 MMHG | WEIGHT: 277 LBS | DIASTOLIC BLOOD PRESSURE: 76 MMHG | HEIGHT: 74 IN | HEART RATE: 83 BPM | RESPIRATION RATE: 12 BRPM | OXYGEN SATURATION: 94 %

## 2022-08-18 DIAGNOSIS — R52 PAIN MANAGEMENT: ICD-10-CM

## 2022-08-18 PROCEDURE — 64493 INJ PARAVERT F JNT L/S 1 LEV: CPT | Performed by: PAIN MEDICINE

## 2022-08-18 PROCEDURE — 6360000002 HC RX W HCPCS: Performed by: PAIN MEDICINE

## 2022-08-18 PROCEDURE — 64494 INJ PARAVERT F JNT L/S 2 LEV: CPT

## 2022-08-18 PROCEDURE — 64493 INJ PARAVERT F JNT L/S 1 LEV: CPT

## 2022-08-18 PROCEDURE — 64495 INJ PARAVERT F JNT L/S 3 LEV: CPT

## 2022-08-18 PROCEDURE — 64494 INJ PARAVERT F JNT L/S 2 LEV: CPT | Performed by: PAIN MEDICINE

## 2022-08-18 PROCEDURE — 2500000003 HC RX 250 WO HCPCS: Performed by: PAIN MEDICINE

## 2022-08-18 RX ORDER — MIDAZOLAM HYDROCHLORIDE 2 MG/2ML
INJECTION, SOLUTION INTRAMUSCULAR; INTRAVENOUS
Status: COMPLETED | OUTPATIENT
Start: 2022-08-18 | End: 2022-08-18

## 2022-08-18 RX ORDER — BUPIVACAINE HYDROCHLORIDE 2.5 MG/ML
INJECTION, SOLUTION EPIDURAL; INFILTRATION; INTRACAUDAL
Status: COMPLETED | OUTPATIENT
Start: 2022-08-18 | End: 2022-08-18

## 2022-08-18 RX ADMIN — MIDAZOLAM HYDROCHLORIDE 2 MG: 1 INJECTION, SOLUTION INTRAMUSCULAR; INTRAVENOUS at 08:46

## 2022-08-18 RX ADMIN — BUPIVACAINE HYDROCHLORIDE 10 ML: 2.5 INJECTION, SOLUTION EPIDURAL; INFILTRATION; INTRACAUDAL; PERINEURAL at 08:54

## 2022-08-18 ASSESSMENT — PAIN - FUNCTIONAL ASSESSMENT
PAIN_FUNCTIONAL_ASSESSMENT: 0-10
PAIN_FUNCTIONAL_ASSESSMENT: 0-10

## 2022-08-18 NOTE — H&P
Pain Pre-Op H&P Note    Lupis Stevenson MD    HPI: Geraldo Davenport  presents with back pain. Past Medical History:   Diagnosis Date    Asthma     COPD (chronic obstructive pulmonary disease) ([address] 11/19/2018    Depression     Erectile dysfunction 9/29/2017    Essential hypertension 1/2/2017    GERD (gastroesophageal reflux disease) 11/19/2018    Heart palpitations 9/29/2017    Hyperlipidemia with target LDL less than 100 3/23/2017    Left lower lobe pneumonia 11/9/2012    Lumbar radiculopathy     Mitral valve prolapse     Nonspecific reactive hepatitis 3/22/2017    Obesity (BMI 30-39.9) 3/23/2017    NOEMÍ (obstructive sleep apnea) 10/16/2019    Osteoarthritis     Seronegative polyarthritis 10/10/2016    followed w/ rheumatology in [facility] anxiety disorder     Uvulitis 8/15/2019    Vertebrogenic low back pain 12/13/2021       Past Surgical History:   Procedure Laterality Date    COLONOSCOPY      CYST REMOVAL Right     Armpit     ENDOSCOPY, COLON, DIAGNOSTIC      HAND SURGERY Left     KNEE ARTHROSCOPY      right knee    NOSE SURGERY      TONSILLECTOMY      UPPER GASTROINTESTINAL ENDOSCOPY N/A 10/9/2019    EGD POLYP SNARE, HOT.  ESOPHAGEAL DILAT[facility] WITH Mark Lot 50F [facility]         Family History   Problem Relation Age of Onset    Diabetes Father     High Cholesterol Father     Other Mother         autoimmune hepatitis     High Blood Pressure Brother     Heart Disease Brother     Heart Attack Brother 40        cabg age 40     Rheum Arthritis Other 7    Colon Cancer Neg Hx     Cancer Neg Hx        Allergies   Allergen Reactions    Fish-Derived Products Anaphylaxis    Other Anaphylaxis     Any type of seafood    Shellfish Allergy Anaphylaxis    Shrimp (Diagnostic) Anaphylaxis    Ace Inhibitors Swelling     UVULITIS ON 8/15/19    Betadine [Povidone-Iodine] Rash     Allergic reaction topically to betadine from a shot         Current Outpatient Medications:     albuterol (PROVENTIL) (2.5 MG/3ML) 0.083% nebulizer solution, Take 3 mLs by nebulization every 6 hours as needed for Wheezing or Shortness of Breath, Disp: 120 each, Rfl: 0    buPROPion (WELLBUTRIN XL) 150 MG extended release tablet, Take 1 tablet by mouth every morning, Disp: 90 tablet, Rfl: 3    Cinnamon 500 MG CAPS, Take 1 capsule by mouth in the morning., Disp: 90 capsule, Rfl: 0    metFORMIN (GLUCOPHAGE-XR) 500 MG extended release tablet, Take 1 tablet by mouth daily (with breakfast), Disp: 90 tablet, Rfl: 3    mometasone-formoterol (DULERA) 100-5 MCG/ACT inhaler, Inhale 2 puffs into the lungs in the morning and 2 puffs before bedtime. , Disp: 13 g, Rfl: 5    fluticasone (FLONASE) 50 MCG/ACT nasal spray, 2 sprays by Nasal route daily, Disp: 16 g, Rfl: 3    pravastatin (PRAVACHOL) 20 MG tablet, take 1 tablet by mouth every evening **STOP ATORVASTATIN**, Disp: 90 tablet, Rfl: 1    amLODIPine (NORVASC) 10 MG tablet, Take 1 tablet by mouth daily, Disp: 90 tablet, Rfl: 3    folic acid (FOLVITE) 1 MG tablet, take 1 tablet by mouth once daily, Disp: , Rfl:     methotrexate (RHEUMATREX) 2.5 MG chemo tablet, take 7 tablets by mouth every week, Disp: , Rfl:     montelukast (SINGULAIR) 10 MG tablet, Take 1 tablet by mouth nightly, Disp: 90 tablet, Rfl: 3    omeprazole (PRILOSEC) 40 MG delayed release capsule, take 1 capsule by mouth once daily, Disp: 90 capsule, Rfl: 3    busPIRone (BUSPAR) 10 MG tablet, Take 1 tablet by mouth 3 times daily as needed (anxiety), Disp: 270 tablet, Rfl: 3    loratadine (CLARITIN) 10 MG tablet, Take 1 tablet by mouth daily, Disp: 90 tablet, Rfl: 3    Blood Pressure KIT, Dx: HTN.  Needs automatic blood pressure machine to monitor his blood pressure., Disp: 1 kit, Rfl: 0    Respiratory Therapy Supplies (NEBULIZER) MADHAV, 1 Units by Does not apply route 2 times daily Dx: Asthma exacerbation J45.901, Disp: 1 Device, Rfl: 0    Social History     Tobacco Use    Smoking status: Never Smokeless tobacco: Never   Substance Use Topics    Alcohol use: Yes     Comment: occasional  few x year       Review of Systems:   Focused review of systems was performed, and negative as pertinent to diagnosis, except as stated in HPI. Physical Exam  Constitutional:       Appearance: Normal appearance. Pulmonary:      Effort: Pulmonary effort is normal.   Neurological:      Mental Status: alert. Psychiatric:         Attention and Perception: Attention and perception normal.         Mood and Affect: Mood and affect normal.   Cardiovascular:      Rate: Normal rate. ASA: 3          Mallampati: 2       Patient's current physical status, medications, medical history, and HPI have been reviewed and updated as appropriate on this date: 08/18/22    Risk/Benefit(s): The risks, benefits, alternatives, and potential complications have been discussed with the patient/family and informed consent has been obtained for the procedure/sedation.     Diagnosis:   spondylosis      Plan: juno Silva MD

## 2022-08-18 NOTE — DISCHARGE INSTRUCTIONS
You have received a sedative/anesthetic therefore you should not consume any alcoholic beverages for 24 hours. Do not drive or operate machinery for 24 hours. Do not take a tub bath for 72 hours after procedure (this includes hot tubs). You may shower, but avoid hot water to injection site. Avoid strenuous activity TODAY especially if you experience dizziness. Remove band-aid the next day. Wash off any residual iodine 24 hours from today. Do not use heat, heating pad, or any other heating device over the injection site for 3 days after the procedure. If you experience pain after your procedure, you may continue with your current pain medication as prescribed. (DO NOT INCREASE YOUR PAIN MEDICATION WITHOUT TALKING TO DOCTOR)  Soreness and pain at injection site is common, may use ice to reduce soreness. Please complete pain diary as instructed.      Call Rigo Cheng at [phone] if you experience:   Fever, chills or temperature over 100    Vomiting, headache, persistent stiff neck, nausea or blurred vision   Difficulty urinating or unable to urinate within 8 hours   Increase in weakness, numbness or loss of function of limbs  Increased redness, swelling or drainage at the injection site

## 2022-08-18 NOTE — OP NOTE
Lumbar Facet Nerve Block Injection:  Surgeon: Chris Correia MD     PRE-OP DIAGNOSIS: M47.817 (lumbosacral spondylosis), M54.5 (low back pain)    POST-OP DIAGNOSIS: Same. PROCEDURE PERFORMED: Lumbar Facet Nerve Block Multiple Levels  Bilateral L4 - 5 and L5 - S1. Physician confirmed and marked the surgical site. EBL: minimal      CONSENT: Patient has undergone the educational process with this procedure, is aware and fully understands the risks involved: potential damage to any and all body organs including possible bleeding, infection and nerve injury, allergic reaction and headache. Patient also understands that the procedure will be undertaken in a safe, controlled, and monitored setting. Patient recognizes that the benefits include relief from pain and reduction in the oral use of medications. Patient agreed to proceed. The patient was counseled at length about the risks of arturo Covid-19 during their perioperative period and any recovery window from their procedure. The patient was made aware that arturo Covid-19  may worsen their prognosis for recovering from their procedure  and lend to a higher morbidity and/or mortality risk. All material risks, benefits, and reasonable alternatives including postponing the procedure were discussed. The patient does wish to proceed with the procedure at this time. PREP: Timeout was performed prior to starting the procedure. The patient's back was prepped with chloroprep and draped appropriately. 5ml of 0.5% lidocaine was used to anesthetize the skin and subcutaneous tissue. PROCEDURE NOTE: A 25 gauge 3.5 inch spinal needle was advanced under  fluoroscopic guidance to the appropriate anatomic location for the medial branches corresponding to the facets at the base of the appropriate superior articular process and/or sacral ala . Aspiration was negative for blood, CSF and producing pain.  1 ml of 0.25% marcaine was then injected at each site to block medial branch nerve innervating the  Bilateral L4 - 5 and L5 - S1 facet joints. Patient tolerated the procedure well, no complications occurred. At the end of the injection the physician withdrew the needle and the nurse applied a sterile bandage to the site. Patient transferred to the recovery room in satisfactory condition. Appropriate written discharge instructions were given to the patient. If good results are obtained, Patient would be a candidate for Radiofrequency Ablation.       Tomasa Stephenson MD

## 2022-08-19 LAB
ANION GAP SERPL CALCULATED.3IONS-SCNC: 12 MMOL/L (ref 9–17)
BUN BLDV-MCNC: 12 MG/DL (ref 6–20)
CALCIUM SERPL-MCNC: 8.8 MG/DL (ref 8.6–10.4)
CHLORIDE BLD-SCNC: 107 MMOL/L (ref 98–107)
CO2: 24 MMOL/L (ref 20–31)
CREAT SERPL-MCNC: 0.89 MG/DL (ref 0.7–1.2)
GFR AFRICAN AMERICAN: >60 ML/MIN
GFR NON-AFRICAN AMERICAN: >60 ML/MIN
GFR SERPL CREATININE-BSD FRML MDRD: ABNORMAL ML/MIN/{1.73_M2}
GLUCOSE BLD-MCNC: 106 MG/DL (ref 70–99)
MAGNESIUM: 2.1 MG/DL (ref 1.6–2.6)
POTASSIUM SERPL-SCNC: 4.1 MMOL/L (ref 3.7–5.3)
SODIUM BLD-SCNC: 143 MMOL/L (ref 135–144)

## 2022-08-24 ENCOUNTER — TELEPHONE (OUTPATIENT)
Dept: PAIN MANAGEMENT | Age: 42
End: 2022-08-24

## 2022-08-24 NOTE — TELEPHONE ENCOUNTER
Procedure: ADDI WILLIAM L4/5 5/S1  DOS 8/18/22  Pain level before procedure with activity: 7  Pain with activity after procedure: 0  What activities done the day of procedure : walked 2 miles, stairs, laundry  What percentage of  pain relief from procedure did you receive: 100%  Success: yes  OR Scheduled  9/1/22

## 2022-09-01 ENCOUNTER — HOSPITAL ENCOUNTER (OUTPATIENT)
Dept: PAIN MANAGEMENT | Facility: CLINIC | Age: 42
Discharge: HOME | End: 2022-09-01
Payer: COMMERCIAL

## 2022-09-01 VITALS
WEIGHT: 277 LBS | SYSTOLIC BLOOD PRESSURE: 143 MMHG | TEMPERATURE: 98.1 F | HEIGHT: 74 IN | OXYGEN SATURATION: 96 % | RESPIRATION RATE: 14 BRPM | DIASTOLIC BLOOD PRESSURE: 86 MMHG | HEART RATE: 78 BPM | BODY MASS INDEX: 35.55 KG/M2

## 2022-09-01 DIAGNOSIS — R52 PAIN MANAGEMENT: ICD-10-CM

## 2022-09-01 LAB — GLUCOSE BLD-MCNC: 100 MG/DL (ref 75–110)

## 2022-09-01 PROCEDURE — 6360000002 HC RX W HCPCS: Performed by: PAIN MEDICINE

## 2022-09-01 PROCEDURE — 64493 INJ PARAVERT F JNT L/S 1 LEV: CPT | Performed by: PAIN MEDICINE

## 2022-09-01 PROCEDURE — 64495 INJ PARAVERT F JNT L/S 3 LEV: CPT

## 2022-09-01 PROCEDURE — 64493 INJ PARAVERT F JNT L/S 1 LEV: CPT

## 2022-09-01 PROCEDURE — 64494 INJ PARAVERT F JNT L/S 2 LEV: CPT | Performed by: PAIN MEDICINE

## 2022-09-01 PROCEDURE — 2500000003 HC RX 250 WO HCPCS: Performed by: PAIN MEDICINE

## 2022-09-01 PROCEDURE — 82947 ASSAY GLUCOSE BLOOD QUANT: CPT

## 2022-09-01 PROCEDURE — 64494 INJ PARAVERT F JNT L/S 2 LEV: CPT

## 2022-09-01 RX ORDER — MIDAZOLAM HYDROCHLORIDE 2 MG/2ML
INJECTION, SOLUTION INTRAMUSCULAR; INTRAVENOUS
Status: COMPLETED | OUTPATIENT
Start: 2022-09-01 | End: 2022-09-01

## 2022-09-01 RX ORDER — LIDOCAINE HYDROCHLORIDE 10 MG/ML
INJECTION, SOLUTION EPIDURAL; INFILTRATION; INTRACAUDAL; PERINEURAL
Status: COMPLETED | OUTPATIENT
Start: 2022-09-01 | End: 2022-09-01

## 2022-09-01 RX ORDER — BUPIVACAINE HYDROCHLORIDE 2.5 MG/ML
INJECTION, SOLUTION EPIDURAL; INFILTRATION; INTRACAUDAL
Status: COMPLETED | OUTPATIENT
Start: 2022-09-01 | End: 2022-09-01

## 2022-09-01 RX ADMIN — MIDAZOLAM HYDROCHLORIDE 2 MG: 1 INJECTION, SOLUTION INTRAMUSCULAR; INTRAVENOUS at 08:13

## 2022-09-01 RX ADMIN — LIDOCAINE HYDROCHLORIDE 5 ML: 10 INJECTION, SOLUTION EPIDURAL; INFILTRATION; INTRACAUDAL at 08:15

## 2022-09-01 RX ADMIN — BUPIVACAINE HYDROCHLORIDE 4 ML: 2.5 INJECTION, SOLUTION EPIDURAL; INFILTRATION; INTRACAUDAL; PERINEURAL at 08:16

## 2022-09-01 ASSESSMENT — PAIN - FUNCTIONAL ASSESSMENT
PAIN_FUNCTIONAL_ASSESSMENT: NONE - DENIES PAIN
PAIN_FUNCTIONAL_ASSESSMENT: 0-10
PAIN_FUNCTIONAL_ASSESSMENT: PREVENTS OR INTERFERES SOME ACTIVE ACTIVITIES AND ADLS

## 2022-09-01 NOTE — OP NOTE
Lumbar Facet Nerve Block Injection:  Surgeon: Allyssa Powell MD     PRE-OP DIAGNOSIS: M47.817 (lumbosacral spondylosis), M54.5 (low back pain)    POST-OP DIAGNOSIS: Same. PROCEDURE PERFORMED: Lumbar Facet Nerve Block Multiple Levels  Bilateral L4 - 5 and L5 - S1. Physician confirmed and marked the surgical site. EBL: minimal      CONSENT: Patient has undergone the educational process with this procedure, is aware and fully understands the risks involved: potential damage to any and all body organs including possible bleeding, infection and nerve injury, allergic reaction and headache. Patient also understands that the procedure will be undertaken in a safe, controlled, and monitored setting. Patient recognizes that the benefits include relief from pain and reduction in the oral use of medications. Patient agreed to proceed. The patient was counseled at length about the risks of arturo Covid-19 during their perioperative period and any recovery window from their procedure. The patient was made aware that arturo Covid-19  may worsen their prognosis for recovering from their procedure  and lend to a higher morbidity and/or mortality risk. All material risks, benefits, and reasonable alternatives including postponing the procedure were discussed. The patient does wish to proceed with the procedure at this time. PREP: Timeout was performed prior to starting the procedure. The patient's back was prepped with chloroprep and draped appropriately. 5ml of 0.5% lidocaine was used to anesthetize the skin and subcutaneous tissue. PROCEDURE NOTE: A 25 gauge 3.5 inch spinal needle was advanced under  fluoroscopic guidance to the appropriate anatomic location for the medial branches corresponding to the facets at the base of the appropriate superior articular process and/or sacral ala . Aspiration was negative for blood, CSF and producing pain.  1 ml of 0.25% marcaine was then injected at each site to block medial branch nerve innervating the  Bilateral L4 - 5 and L5 - S1 facet joints. Patient tolerated the procedure well, no complications occurred. At the end of the injection the physician withdrew the needle and the nurse applied a sterile bandage to the site. Patient transferred to the recovery room in satisfactory condition. Appropriate written discharge instructions were given to the patient. If good results are obtained, Patient would be a candidate for Radiofrequency Ablation.       Kala Bustos MD

## 2022-09-01 NOTE — H&P
Pain Pre-Op H&P Note    Lorraine Sharma MD    HPI: Prince Flowers  presents with back pain. Past Medical History:   Diagnosis Date    Asthma     COPD (chronic obstructive pulmonary disease) ([address] 11/19/2018    Depression     Erectile dysfunction 9/29/2017    Essential hypertension 1/2/2017    GERD (gastroesophageal reflux disease) 11/19/2018    Heart palpitations 9/29/2017    Hyperlipidemia with target LDL less than 100 3/23/2017    Left lower lobe pneumonia 11/9/2012    Lumbar radiculopathy     Mitral valve prolapse     Nonspecific reactive hepatitis 3/22/2017    Obesity (BMI 30-39.9) 3/23/2017    NOEMÍ (obstructive sleep apnea) 10/16/2019    Osteoarthritis     Seronegative polyarthritis 10/10/2016    followed w/ rheumatology in [facility] anxiety disorder     Uvulitis 8/15/2019    Vertebrogenic low back pain 12/13/2021       Past Surgical History:   Procedure Laterality Date    COLONOSCOPY      CYST REMOVAL Right     Armpit     ENDOSCOPY, COLON, DIAGNOSTIC      HAND SURGERY Left     KNEE ARTHROSCOPY      right knee    NOSE SURGERY      TONSILLECTOMY      UPPER GASTROINTESTINAL ENDOSCOPY N/A 10/9/2019    EGD POLYP SNARE, HOT.  ESOPHAGEAL [facility]IL[facility] WITH Delcia Dries 49F [facility]         Family History   Problem Relation Age of Onset    Diabetes Father     High Cholesterol Father     Other Mother         autoimmune hepatitis     High Blood Pressure Brother     Heart Disease Brother     Heart Attack Brother 40        cabg age 40     Rheum Arthritis Other 7    Colon Cancer Neg Hx     Cancer Neg Hx        Allergies   Allergen Reactions    Fish-Derived Products Anaphylaxis    Other Anaphylaxis     Any type of seafood    Shellfish Allergy Anaphylaxis    Shrimp (Diagnostic) Anaphylaxis    Ace Inhibitors Swelling     UVULITIS ON 8/15/19    Betadine [Povidone-Iodine] Rash     Allergic reaction topically to betadine from a shot         Current Outpatient Medications:     albuterol (PROVENTIL) (2.5 MG/3ML) 0.083% nebulizer solution, Take 3 mLs by nebulization every 6 hours as needed for Wheezing or Shortness of Breath, Disp: 120 each, Rfl: 0    buPROPion (WELLBUTRIN XL) 150 MG extended release tablet, Take 1 tablet by mouth every morning, Disp: 90 tablet, Rfl: 3    Cinnamon 500 MG CAPS, Take 1 capsule by mouth in the morning., Disp: 90 capsule, Rfl: 0    metFORMIN (GLUCOPHAGE-XR) 500 MG extended release tablet, Take 1 tablet by mouth daily (with breakfast), Disp: 90 tablet, Rfl: 3    mometasone-formoterol (DULERA) 100-5 MCG/ACT inhaler, Inhale 2 puffs into the lungs in the morning and 2 puffs before bedtime. , Disp: 13 g, Rfl: 5    fluticasone (FLONASE) 50 MCG/ACT nasal spray, 2 sprays by Nasal route daily, Disp: 16 g, Rfl: 3    pravastatin (PRAVACHOL) 20 MG tablet, take 1 tablet by mouth every evening **STOP ATORVASTATIN**, Disp: 90 tablet, Rfl: 1    amLODIPine (NORVASC) 10 MG tablet, Take 1 tablet by mouth daily, Disp: 90 tablet, Rfl: 3    folic acid (FOLVITE) 1 MG tablet, take 1 tablet by mouth once daily, Disp: , Rfl:     methotrexate (RHEUMATREX) 2.5 MG chemo tablet, take 7 tablets by mouth every week, Disp: , Rfl:     montelukast (SINGULAIR) 10 MG tablet, Take 1 tablet by mouth nightly, Disp: 90 tablet, Rfl: 3    omeprazole (PRILOSEC) 40 MG delayed release capsule, take 1 capsule by mouth once daily, Disp: 90 capsule, Rfl: 3    busPIRone (BUSPAR) 10 MG tablet, Take 1 tablet by mouth 3 times daily as needed (anxiety), Disp: 270 tablet, Rfl: 3    loratadine (CLARITIN) 10 MG tablet, Take 1 tablet by mouth daily, Disp: 90 tablet, Rfl: 3    Blood Pressure KIT, Dx: HTN.  Needs automatic blood pressure machine to monitor his blood pressure., Disp: 1 kit, Rfl: 0    Respiratory Therapy Supplies (NEBULIZER) MADHAV, 1 Units by Does not apply route 2 times daily Dx: Asthma exacerbation J45.901, Disp: 1 Device, Rfl: 0    Social History     Tobacco Use    Smoking status: Never

## 2022-09-01 NOTE — DISCHARGE INSTRUCTIONS
You have received a sedative/anesthetic therefore you should not consume any alcoholic beverages for 24 hours. Do not drive or operate machinery for 24 hours. Do not take a tub bath for 72 hours after procedure (this includes hot tubs). You may shower, but avoid hot water to injection site. Avoid strenuous activity TODAY especially if you experience dizziness. Remove band-aid the next day. Wash off any residual iodine 24 hours from today. Do not use heat, heating pad, or any other heating device over the injection site for 3 days after the procedure. If you experience pain after your procedure, you may continue with your current pain medication as prescribed. (DO NOT INCREASE YOUR PAIN MEDICATION WITHOUT TALKING TO DOCTOR)  Soreness and pain at injection site is common, may use ice to reduce soreness. Please complete pain diary as instructed.      Call Rigo Cheng at [phone] if you experience:   Fever, chills or temperature over 100    Vomiting, headache, persistent stiff neck, nausea or blurred vision   Difficulty urinating or unable to urinate within 8 hours   Increase in weakness, numbness or loss of function of limbs  Increased redness, swelling or drainage at the injection site

## 2022-09-06 ENCOUNTER — TELEPHONE (OUTPATIENT)
Dept: FAMILY MEDICINE CLINIC | Age: 42
End: 2022-09-06

## 2022-09-06 ENCOUNTER — TELEPHONE (OUTPATIENT)
Dept: PAIN MANAGEMENT | Age: 42
End: 2022-09-06

## 2022-09-06 NOTE — TELEPHONE ENCOUNTER
To keep the next appointment, did not contact me back at the link sent to him    If unable to keep the next appointment please reschedule or cancel, I did refer him to cardiologist Dr. Gina Doll before, did he see cardiologist?    Please advise that Adipex can be prescribed to him only with clearance from the cardiologist due to abnormal EKG and family history of cardiac disease      Future Appointments   Date Time Provider Joe Healy   10/4/2022  4:00 PM [facility]   10/27/2022  3:40 PM LAMAR Baltazar - [facility]   11/1/2022  4:00 PM Tish Carrion MD [facility]    Mobile Phone  Send Link Via Text  No text has been sent  Last text sent09/06/2022 3:43 PM  [phone]

## 2022-09-06 NOTE — TELEPHONE ENCOUNTER
Procedure: ADDI WILLIAM L4/5 5/S1  DOS: 09/01/22  Pain level before procedure with activity: 9  Pain with activity after procedure: 1  What activities done the day of procedure: went for a walk, stairs, worked out at [employer]  What percentage of  pain relief from procedure did you receive: 100%  Success: yes  OR Scheduled  10/06/2022

## 2022-09-09 NOTE — TELEPHONE ENCOUNTER
Called patient to reschedule routine follow up appointment that was missed with . Patient did not answer so left a detail message. For patient to call office back to schedule follow up appointment as requested. .     Also pt has an upcoming appt scheduled.

## 2022-09-15 ENCOUNTER — TELEPHONE (OUTPATIENT)
Dept: ORTHOPEDIC SURGERY | Age: 42
End: 2022-09-15

## 2022-09-28 ENCOUNTER — TELEPHONE (OUTPATIENT)
Dept: ADMINISTRATIVE | Age: 42
End: 2022-09-28

## 2022-10-03 ENCOUNTER — OFFICE VISIT (OUTPATIENT)
Dept: ORTHOPEDIC SURGERY | Age: 42
End: 2022-10-03
Payer: COMMERCIAL

## 2022-10-03 ENCOUNTER — TELEPHONE (OUTPATIENT)
Dept: ORTHOPEDIC SURGERY | Age: 42
End: 2022-10-03

## 2022-10-03 VITALS — WEIGHT: 277 LBS | RESPIRATION RATE: 16 BRPM | BODY MASS INDEX: 35.55 KG/M2 | HEIGHT: 74 IN

## 2022-10-03 DIAGNOSIS — M77.12 LATERAL EPICONDYLITIS OF LEFT ELBOW: Primary | ICD-10-CM

## 2022-10-03 PROCEDURE — 20550 NJX 1 TENDON SHEATH/LIGAMENT: CPT | Performed by: ORTHOPAEDIC SURGERY

## 2022-10-03 RX ORDER — TRIAMCINOLONE ACETONIDE 40 MG/ML
40 INJECTION, SUSPENSION INTRA-ARTICULAR; INTRAMUSCULAR ONCE
Status: COMPLETED | OUTPATIENT
Start: 2022-10-03 | End: 2022-10-03

## 2022-10-03 RX ORDER — LIDOCAINE HYDROCHLORIDE 10 MG/ML
1 INJECTION, SOLUTION INFILTRATION; PERINEURAL ONCE
Status: COMPLETED | OUTPATIENT
Start: 2022-10-03 | End: 2022-10-03

## 2022-10-03 RX ADMIN — LIDOCAINE HYDROCHLORIDE 1 ML: 10 INJECTION, SOLUTION INFILTRATION; PERINEURAL at 14:41

## 2022-10-03 RX ADMIN — TRIAMCINOLONE ACETONIDE 40 MG: 40 INJECTION, SUSPENSION INTRA-ARTICULAR; INTRAMUSCULAR at 14:42

## 2022-10-03 NOTE — TELEPHONE ENCOUNTER
Spoke with patient and offered an appt today since Dr. Alba Ocasio has 3 slots open. Patient was amendable and was scheduled for 10/3/22 at 1:30pm at [facility].

## 2022-10-03 NOTE — TELEPHONE ENCOUNTER
Patient is having a lot of pain in left arm but leaves out of town for work on Friday this week. He is asking if you could squeeze him in to be seen asap, he is willing to go to either location.      Please call him with advice  Thank you

## 2022-10-04 ENCOUNTER — TELEPHONE (OUTPATIENT)
Dept: FAMILY MEDICINE CLINIC | Age: 42
End: 2022-10-04

## 2022-10-04 NOTE — TELEPHONE ENCOUNTER
No-show today, per office protocol we will send no-show letter    Please check with patient or cancel      Iesha Dillonppdieudonne Robles [address]    Mobile Phone  Send Link Via Text  No text has been sent  Last text sent10/04/2022 4:12 PM    Iesha Robles [address]    Mobile Phone  Send Link Via Text  No text has been sent  Last text sent10/04/2022 4:12 PM  Joel [address]    Mobile Phone  Send Link Via Text  No text has been sent  Last text sent10/04/2022 4:26 PM  [phone]    I called him at 4:27 PM and I left a voice message to click on the link , or call us tomorrow morning and will need to reschedule him    Future Appointments   Date Time Provider Joe Healy   11/1/2022  4:00 PM Isabell Ortega MD [id number]

## 2022-10-06 NOTE — TELEPHONE ENCOUNTER
Noted  Future Appointments   Date Time Provider Joe Healy   11/1/2022  4:00 PM [facility]

## 2022-11-01 ENCOUNTER — TELEPHONE (OUTPATIENT)
Dept: FAMILY MEDICINE CLINIC | Age: 42
End: 2022-11-01

## 2022-11-04 ENCOUNTER — HOSPITAL ENCOUNTER (OUTPATIENT)
Dept: PAIN MANAGEMENT | Facility: CLINIC | Age: 42
Discharge: HOME | End: 2022-11-04
Payer: COMMERCIAL

## 2022-11-04 VITALS
OXYGEN SATURATION: 97 % | TEMPERATURE: 98.6 F | RESPIRATION RATE: 10 BRPM | HEART RATE: 86 BPM | DIASTOLIC BLOOD PRESSURE: 96 MMHG | SYSTOLIC BLOOD PRESSURE: 141 MMHG

## 2022-11-04 DIAGNOSIS — R52 PAIN MANAGEMENT: ICD-10-CM

## 2022-11-04 PROCEDURE — 64635 DESTROY LUMB/SAC FACET JNT: CPT | Performed by: PAIN MEDICINE

## 2022-11-04 PROCEDURE — 2500000003 HC RX 250 WO HCPCS: Performed by: PAIN MEDICINE

## 2022-11-04 PROCEDURE — 64636 DESTROY L/S FACET JNT ADDL: CPT | Performed by: PAIN MEDICINE

## 2022-11-04 PROCEDURE — 64635 DESTROY LUMB/SAC FACET JNT: CPT

## 2022-11-04 PROCEDURE — 64636 DESTROY L/S FACET JNT ADDL: CPT

## 2022-11-04 PROCEDURE — 6360000002 HC RX W HCPCS: Performed by: PAIN MEDICINE

## 2022-11-04 RX ORDER — MIDAZOLAM HYDROCHLORIDE 2 MG/2ML
INJECTION, SOLUTION INTRAMUSCULAR; INTRAVENOUS
Status: COMPLETED | OUTPATIENT
Start: 2022-11-04 | End: 2022-11-04

## 2022-11-04 RX ORDER — BUPIVACAINE HYDROCHLORIDE 2.5 MG/ML
INJECTION, SOLUTION EPIDURAL; INFILTRATION; INTRACAUDAL
Status: COMPLETED | OUTPATIENT
Start: 2022-11-04 | End: 2022-11-04

## 2022-11-04 RX ADMIN — MIDAZOLAM HYDROCHLORIDE 2 MG: 1 INJECTION, SOLUTION INTRAMUSCULAR; INTRAVENOUS at 11:23

## 2022-11-04 RX ADMIN — BUPIVACAINE HYDROCHLORIDE 5 ML: 2.5 INJECTION, SOLUTION EPIDURAL; INFILTRATION; INTRACAUDAL; PERINEURAL at 11:28

## 2022-11-04 NOTE — H&P
Pain Pre-Op H&P Note    Salena Skiff, MD    HPI: Cyndi Jeong  presents with back pain. Past Medical History:   Diagnosis Date    Asthma     COPD (chronic obstructive pulmonary disease) ([address] 11/19/2018    Depression     Erectile dysfunction 9/29/2017    Essential hypertension 1/2/2017    GERD (gastroesophageal reflux disease) 11/19/2018    Heart palpitations 9/29/2017    Hyperlipidemia with target LDL less than 100 3/23/2017    Left lower lobe pneumonia 11/9/2012    Lumbar radiculopathy     Mitral valve prolapse     Nonspecific reactive hepatitis 3/22/2017    Obesity (BMI 30-39.9) 3/23/2017    NOEMÍ (obstructive sleep apnea) 10/16/2019    Osteoarthritis     Seronegative polyarthritis 10/10/2016    followed w/ rheumatology in [facility] anxiety disorder     Uvulitis 8/15/2019    Vertebrogenic low back pain 12/13/2021       Past Surgical History:   Procedure Laterality Date    COLONOSCOPY      CYST REMOVAL Right     Armpit     ENDOSCOPY, COLON, DIAGNOSTIC      HAND SURGERY Left     KNEE ARTHROSCOPY      right knee    NOSE SURGERY      TONSILLECTOMY      UPPER GASTROINTESTINAL ENDOSCOPY N/A 10/9/2019    EGD POLYP SNARE, HOT.  ESOPHAGEAL DILATATION WITH Bobbye Lanes 49F performed by Elfego Mcnair MD at [facility] [address] EXTRACTION         Family History   Problem Relation Age of Onset    Diabetes Father     High Cholesterol Father     Other Mother         autoimmune hepatitis     High Blood Pressure Brother     Heart Disease Brother     Heart Attack Brother 40        cabg age 40     Rheum Arthritis Other 7    Colon Cancer Neg Hx     Cancer Neg Hx        Allergies   Allergen Reactions    Fish-Derived Products Anaphylaxis    Other Anaphylaxis     Any type of seafood    Shellfish Allergy Anaphylaxis    Shrimp (Diagnostic) Anaphylaxis    Ace Inhibitors Swelling     UVULITIS ON 8/15/19    Betadine [Povidone-Iodine] Rash     Allergic reaction topically to betadine from a shot         Current Outpatient Medications:     albuterol (PROVENTIL) (2.5 MG/3ML) 0.083% nebulizer solution, Take 3 mLs by nebulization every 6 hours as needed for Wheezing or Shortness of Breath, Disp: 120 each, Rfl: 0    buPROPion (WELLBUTRIN XL) 150 MG extended release tablet, Take 1 tablet by mouth every morning, Disp: 90 tablet, Rfl: 3    Cinnamon 500 MG CAPS, Take 1 capsule by mouth in the morning., Disp: 90 capsule, Rfl: 0    metFORMIN (GLUCOPHAGE-XR) 500 MG extended release tablet, Take 1 tablet by mouth daily (with breakfast), Disp: 90 tablet, Rfl: 3    mometasone-formoterol (DULERA) 100-5 MCG/ACT inhaler, Inhale 2 puffs into the lungs in the morning and 2 puffs before bedtime. , Disp: 13 g, Rfl: 5    fluticasone (FLONASE) 50 MCG/ACT nasal spray, 2 sprays by Nasal route daily, Disp: 16 g, Rfl: 3    pravastatin (PRAVACHOL) 20 MG tablet, take 1 tablet by mouth every evening **STOP ATORVASTATIN**, Disp: 90 tablet, Rfl: 1    amLODIPine (NORVASC) 10 MG tablet, Take 1 tablet by mouth daily, Disp: 90 tablet, Rfl: 3    folic acid (FOLVITE) 1 MG tablet, take 1 tablet by mouth once daily, Disp: , Rfl:     methotrexate (RHEUMATREX) 2.5 MG chemo tablet, take 7 tablets by mouth every week, Disp: , Rfl:     montelukast (SINGULAIR) 10 MG tablet, Take 1 tablet by mouth nightly, Disp: 90 tablet, Rfl: 3    omeprazole (PRILOSEC) 40 MG delayed release capsule, take 1 capsule by mouth once daily, Disp: 90 capsule, Rfl: 3    busPIRone (BUSPAR) 10 MG tablet, Take 1 tablet by mouth 3 times daily as needed (anxiety), Disp: 270 tablet, Rfl: 3    loratadine (CLARITIN) 10 MG tablet, Take 1 tablet by mouth daily, Disp: 90 tablet, Rfl: 3    Blood Pressure KIT, Dx: HTN.  Needs automatic blood pressure machine to monitor his blood pressure., Disp: 1 kit, Rfl: 0    Respiratory Therapy Supplies (NEBULIZER) MADHAV, 1 Units by Does not apply route 2 times daily Dx: Asthma exacerbation J45.901, Disp: 1 Device, Rfl: 0    Social History     Tobacco Use    Smoking status: Never Smokeless tobacco: Never   Substance Use Topics    Alcohol use: Yes     Comment: occasional  few x year       Review of Systems:   Focused review of systems was performed, and negative as pertinent to diagnosis, except as stated in HPI. Physical Exam  Constitutional:       Appearance: Normal appearance. Pulmonary:      Effort: Pulmonary effort is normal.   Neurological:      Mental Status: alert. Psychiatric:         Attention and Perception: Attention and perception normal.         Mood and Affect: Mood and affect normal.   Cardiovascular:      Rate: Normal rate. ASA: 3          Mallampati: 2       Patient's current physical status, medications, medical history, and HPI have been reviewed and updated as appropriate on this date: 11/04/22    Risk/Benefit(s): The risks, benefits, alternatives, and potential complications have been discussed with the patient/family and informed consent has been obtained for the procedure/sedation.     Diagnosis:   spondylosis      Plan: onesimo Brooke MD

## 2022-11-04 NOTE — OP NOTE
Lumbar Radiofrequency Ablation:  SURGEON: uCba Marroquin MD    PRE-OP DIAGNOSIS: M47.817 (lumbosacral spondylosis), M54.5 (low back pain)    POST-OP DIAGNOSIS: Same. PROCEDURE PERFORMED: Radiofrequency Ablation Medial Branch Nerve at Left L4 - 5 and L5 - S1 facet joint(s). HISTORY AND INDICATIONS: Satisfactory response with previous RFA/ medial branch blocks at the indicated levels. Recurrence of painful symptoms attributed to the above diagnosis. The planned treatment is medically necessary to relieve pain, restore function and or reduce reliance on pain medication. EBL: minimal    CONSENT: Patient has undergone the educational process with this procedure, is aware and fully understands the risks involved: potential damage to any and all body organs including possible bleeding, infection, nerve injury, paralysis, allergic reaction and headache. Patient also understands that the procedure will be undertaken in a safe, controlled and monitored setting. Patient recognizes that the benefits may include relief from pain and reduction in the oral use of medications. Patient agreed to proceed. The patient was counseled at length about the risks of arturo Covid-19 during their perioperative period and any recovery window from their procedure. The patient was made aware that arturo Covid-19  may worsen their prognosis for recovering from their procedure  and lend to a higher morbidity and/or mortality risk. All material risks, benefits, and reasonable alternatives including postponing the procedure were discussed. The patient does wish to proceed with the procedure at this time. PROCEDURE NOTE: The patient was taken to the procedure room and placed prone with the appropriate padding and positioning to assure patient comfort and physician access to the procedure site. Time out was performed prior to starting the procedure.  Fluoroscopic evaluation was utilized to target the appropriate treatment areas. The skin was prepped with antiseptic solution and draped sterilely. Then 0.5% lidocaine was used to anesthetize the skin and subcutaneous tissue. Under fluoroscopic guidance a 22 gauge x 10cm x 5mm active tip was advanced to the medial branch nerve at the indicated levels below to innervate the following facet joints Left L4 - 5 and L5 - S1 . The needles were placed sequentially. Position confirmed radiographically with the fluoroscope. Active tip corresponding at the base of the superior articulating process and/or sacral ala for the lumbar RFA. Motor stimulation checked at 2hz up to 3V and confirmed negative for radicular stimulation. After confirmation of the needle placement the patient received 1 ml of 0.25% marcaine to provide anesthesia. Radiofrequency was delivered to the lumbar region at 80 degrees for a limit of 90 seconds in length with no ill effect. The patient tolerated the procedure well and was transported to the recovery room where the patient was monitored with no complications and with stable vital signs. Patient was discharged with appropriate written discharge instructions. Follow-up discussed.       Cinthia Plummer MD

## 2022-11-04 NOTE — DISCHARGE INSTRUCTIONS
You have received a sedative/anesthetic therefore you should not consume any alcoholic beverages for 24 hours. Do not drive or operate machinery for 24 hours. Do not take a tub bath for 72 hours after procedure (this includes hot tubs). You may shower, but avoid hot water to injection site. Avoid strenuous activity TODAY especially if you experience dizziness. Remove band-aid the next day. Wash off any residual iodine 24 hours from today. Do not use heat, heating pad, or any other heating device over the injection site for 3 days after the procedure. If you experience pain after your procedure, you may continue with your current pain medication as prescribed. (DO NOT INCREASE YOUR PAIN MEDICATION WITHOUT TALKING TO DOCTOR)  Soreness and pain at injection site is common, may use ice to reduce soreness.     Call Rigo Cheng at [phone] if you experience:   Fever, chills or temperature over 100    Vomiting, headache, persistent stiff neck, nausea or blurred vision   Difficulty urinating or unable to urinate within 8 hours   Increase in weakness, numbness or loss of function of limbs  Increased redness, swelling or drainage at the injection site

## 2022-12-08 ENCOUNTER — HOSPITAL ENCOUNTER (OUTPATIENT)
Dept: PAIN MANAGEMENT | Facility: CLINIC | Age: 42
Discharge: HOME | End: 2022-12-08
Payer: COMMERCIAL

## 2022-12-08 VITALS
HEART RATE: 98 BPM | WEIGHT: 270 LBS | SYSTOLIC BLOOD PRESSURE: 148 MMHG | OXYGEN SATURATION: 94 % | HEIGHT: 74 IN | TEMPERATURE: 98.1 F | BODY MASS INDEX: 34.65 KG/M2 | DIASTOLIC BLOOD PRESSURE: 93 MMHG | RESPIRATION RATE: 20 BRPM

## 2022-12-08 DIAGNOSIS — R52 PAIN MANAGEMENT: ICD-10-CM

## 2022-12-08 PROCEDURE — 2500000003 HC RX 250 WO HCPCS: Performed by: PAIN MEDICINE

## 2022-12-08 PROCEDURE — 64636 DESTROY L/S FACET JNT ADDL: CPT

## 2022-12-08 PROCEDURE — 6360000002 HC RX W HCPCS: Performed by: PAIN MEDICINE

## 2022-12-08 PROCEDURE — 64635 DESTROY LUMB/SAC FACET JNT: CPT

## 2022-12-08 RX ORDER — FENTANYL CITRATE 50 UG/ML
INJECTION, SOLUTION INTRAMUSCULAR; INTRAVENOUS
Status: COMPLETED | OUTPATIENT
Start: 2022-12-08 | End: 2022-12-08

## 2022-12-08 RX ORDER — MIDAZOLAM HYDROCHLORIDE 2 MG/2ML
INJECTION, SOLUTION INTRAMUSCULAR; INTRAVENOUS
Status: COMPLETED | OUTPATIENT
Start: 2022-12-08 | End: 2022-12-08

## 2022-12-08 RX ORDER — BUPIVACAINE HYDROCHLORIDE 2.5 MG/ML
INJECTION, SOLUTION EPIDURAL; INFILTRATION; INTRACAUDAL
Status: COMPLETED | OUTPATIENT
Start: 2022-12-08 | End: 2022-12-08

## 2022-12-08 RX ADMIN — MIDAZOLAM HYDROCHLORIDE 2 MG: 1 INJECTION, SOLUTION INTRAMUSCULAR; INTRAVENOUS at 14:12

## 2022-12-08 RX ADMIN — BUPIVACAINE HYDROCHLORIDE 5 ML: 2.5 INJECTION, SOLUTION EPIDURAL; INFILTRATION; INTRACAUDAL; PERINEURAL at 14:18

## 2022-12-08 RX ADMIN — FENTANYL CITRATE 50 MCG: 50 INJECTION, SOLUTION INTRAMUSCULAR; INTRAVENOUS at 14:11

## 2022-12-08 RX ADMIN — FENTANYL CITRATE 50 MCG: 50 INJECTION, SOLUTION INTRAMUSCULAR; INTRAVENOUS at 14:15

## 2022-12-08 ASSESSMENT — PAIN - FUNCTIONAL ASSESSMENT
PAIN_FUNCTIONAL_ASSESSMENT: NONE - DENIES PAIN
PAIN_FUNCTIONAL_ASSESSMENT: 0-10

## 2022-12-08 NOTE — DISCHARGE INSTRUCTIONS
You have received a sedative/anesthetic therefore you should not consume any alcoholic beverages for 24 hours. Do not drive or operate machinery for 24 hours. Do not take a tub bath for 72 hours after procedure (this includes hot tubs). You may shower, but avoid hot water to injection site. Avoid strenuous activity TODAY especially if you experience dizziness. Remove band-aid the next day. Wash off any residual iodine 24 hours from today. Do not use heat, heating pad, or any other heating device over the injection site for 3 days after the procedure. If you experience pain after your procedure, you may continue with your current pain medication as prescribed. (DO NOT INCREASE YOUR PAIN MEDICATION WITHOUT TALKING TO DOCTOR)  Soreness and pain at injection site is common, may use ice to reduce soreness.     Call Rigo Cheng at [phone] if you experience:   Fever, chills or temperature over 100    Vomiting, headache, persistent stiff neck, nausea or blurred vision   Difficulty urinating or unable to urinate within 8 hours   Increase in weakness, numbness or loss of function of limbs  Increased redness, swelling or drainage at the injection site

## 2022-12-08 NOTE — OP NOTE
Lumbar Radiofrequency Ablation:  SURGEON: Davian Whittaker MD    PRE-OP DIAGNOSIS: M47.817 (lumbosacral spondylosis), M54.5 (low back pain)    POST-OP DIAGNOSIS: Same. PROCEDURE PERFORMED: Radiofrequency Ablation Medial Branch Nerve at Right L4 - 5 and L5 - S1 facet joint(s). HISTORY AND INDICATIONS: Satisfactory response with previous RFA/ medial branch blocks at the indicated levels. Recurrence of painful symptoms attributed to the above diagnosis. The planned treatment is medically necessary to relieve pain, restore function and or reduce reliance on pain medication. EBL: minimal    CONSENT: Patient has undergone the educational process with this procedure, is aware and fully understands the risks involved: potential damage to any and all body organs including possible bleeding, infection, nerve injury, paralysis, allergic reaction and headache. Patient also understands that the procedure will be undertaken in a safe, controlled and monitored setting. Patient recognizes that the benefits may include relief from pain and reduction in the oral use of medications. Patient agreed to proceed. The patient was counseled at length about the risks of arturo Covid-19 during their perioperative period and any recovery window from their procedure. The patient was made aware that arturo Covid-19  may worsen their prognosis for recovering from their procedure  and lend to a higher morbidity and/or mortality risk. All material risks, benefits, and reasonable alternatives including postponing the procedure were discussed. The patient does wish to proceed with the procedure at this time. PROCEDURE NOTE: The patient was taken to the procedure room and placed prone with the appropriate padding and positioning to assure patient comfort and physician access to the procedure site. Time out was performed prior to starting the procedure.  Fluoroscopic evaluation was utilized to target the appropriate treatment areas. The skin was prepped with antiseptic solution and draped sterilely. Then 0.5% lidocaine was used to anesthetize the skin and subcutaneous tissue. Under fluoroscopic guidance a 22 gauge x 10cm x 5mm active tip was advanced to the medial branch nerve at the indicated levels below to innervate the following facet joints Right L4 - 5 and L5 - S1 . The needles were placed sequentially. Position confirmed radiographically with the fluoroscope. Active tip corresponding at the base of the superior articulating process and/or sacral ala for the lumbar RFA. Motor stimulation checked at 2hz up to 3V and confirmed negative for radicular stimulation. After confirmation of the needle placement the patient received 1 ml of 0.25% marcaine to provide anesthesia. Radiofrequency was delivered to the lumbar region at 80 degrees for a limit of 90 seconds in length with no ill effect. The patient tolerated the procedure well and was transported to the recovery room where the patient was monitored with no complications and with stable vital signs. Patient was discharged with appropriate written discharge instructions. Follow-up discussed.       Davie Andre MD

## 2022-12-08 NOTE — H&P
Pain Pre-Op H&P Note    Latanya Rodas MD    HPI: Mk Vogel  presents with back pain. Past Medical History:   Diagnosis Date    Asthma     COPD (chronic obstructive pulmonary disease) ([address] 11/19/2018    Depression     Erectile dysfunction 9/29/2017    Essential hypertension 1/2/2017    GERD (gastroesophageal reflux disease) 11/19/2018    Heart palpitations 9/29/2017    Hyperlipidemia with target LDL less than 100 3/23/2017    Left lower lobe pneumonia 11/9/2012    Lumbar radiculopathy     Mitral valve prolapse     Nonspecific reactive hepatitis 3/22/2017    Obesity (BMI 30-39.9) 3/23/2017    NOEMÍ (obstructive sleep apnea) 10/16/2019    Osteoarthritis     Seronegative polyarthritis 10/10/2016    followed w/ rheumatology in [facility] anxiety disorder     Uvulitis 8/15/2019    Vertebrogenic low back pain 12/13/2021       Past Surgical History:   Procedure Laterality Date    COLONOSCOPY      CYST REMOVAL Right     Armpit     ENDOSCOPY, COLON, DIAGNOSTIC      HAND SURGERY Left     KNEE ARTHROSCOPY      right knee    NOSE SURGERY      PAIN MANAGEMENT PROCEDURE      TONSILLECTOMY      UPPER GASTROINTESTINAL ENDOSCOPY N/A 10/09/2019    EGD POLYP SNARE, HOT.  ESOPHAGEAL DILATATION WITH Aurther Oas 49F performed by Lisa Strauss MD at [facility] [address] EXTRACTION         Family History   Problem Relation Age of Onset    Diabetes Father     High Cholesterol Father     Other Mother         autoimmune hepatitis     High Blood Pressure Brother     Heart Disease Brother     Heart Attack Brother 40        cabg age 40     Rheum Arthritis Other 7    Colon Cancer Neg Hx     Cancer Neg Hx        Allergies   Allergen Reactions    Fish-Derived Products Anaphylaxis    Other Anaphylaxis     Any type of seafood    Shellfish Allergy Anaphylaxis    Shrimp (Diagnostic) Anaphylaxis    Ace Inhibitors Swelling     UVULITIS ON 8/15/19    Betadine [Povidone-Iodine] Rash     Allergic reaction topically to betadine from a shot         Current Outpatient Medications:     albuterol (PROVENTIL) (2.5 MG/3ML) 0.083% nebulizer solution, Take 3 mLs by nebulization every 6 hours as needed for Wheezing or Shortness of Breath, Disp: 120 each, Rfl: 0    buPROPion (WELLBUTRIN XL) 150 MG extended release tablet, Take 1 tablet by mouth every morning, Disp: 90 tablet, Rfl: 3    Cinnamon 500 MG CAPS, Take 1 capsule by mouth in the morning., Disp: 90 capsule, Rfl: 0    mometasone-formoterol (DULERA) 100-5 MCG/ACT inhaler, Inhale 2 puffs into the lungs in the morning and 2 puffs before bedtime. , Disp: 13 g, Rfl: 5    fluticasone (FLONASE) 50 MCG/ACT nasal spray, 2 sprays by Nasal route daily, Disp: 16 g, Rfl: 3    pravastatin (PRAVACHOL) 20 MG tablet, take 1 tablet by mouth every evening **STOP ATORVASTATIN**, Disp: 90 tablet, Rfl: 1    amLODIPine (NORVASC) 10 MG tablet, Take 1 tablet by mouth daily, Disp: 90 tablet, Rfl: 3    folic acid (FOLVITE) 1 MG tablet, take 1 tablet by mouth once daily, Disp: , Rfl:     methotrexate (RHEUMATREX) 2.5 MG chemo tablet, take 7 tablets by mouth every week, Disp: , Rfl:     montelukast (SINGULAIR) 10 MG tablet, Take 1 tablet by mouth nightly, Disp: 90 tablet, Rfl: 3    omeprazole (PRILOSEC) 40 MG delayed release capsule, take 1 capsule by mouth once daily, Disp: 90 capsule, Rfl: 3    busPIRone (BUSPAR) 10 MG tablet, Take 1 tablet by mouth 3 times daily as needed (anxiety), Disp: 270 tablet, Rfl: 3    loratadine (CLARITIN) 10 MG tablet, Take 1 tablet by mouth daily, Disp: 90 tablet, Rfl: 3    Blood Pressure KIT, Dx: HTN.  Needs automatic blood pressure machine to monitor his blood pressure., Disp: 1 kit, Rfl: 0    Respiratory Therapy Supplies (NEBULIZER) MADHAV, 1 Units by Does not apply route 2 times daily Dx: Asthma exacerbation J45.901, Disp: 1 Device, Rfl: 0    Social History     Tobacco Use    Smoking status: Never    Smokeless tobacco: Never   Substance Use Topics    Alcohol use: Yes     Comment: occasional  few x year       Review of Systems:   Focused review of systems was performed, and negative as pertinent to diagnosis, except as stated in HPI. Physical Exam  Constitutional:       Appearance: Normal appearance. Pulmonary:      Effort: Pulmonary effort is normal.   Neurological:      Mental Status: alert. Psychiatric:         Attention and Perception: Attention and perception normal.         Mood and Affect: Mood and affect normal.   Cardiovascular:      Rate: Normal rate. ASA: 3          Mallampati: 2       Patient's current physical status, medications, medical history, and HPI have been reviewed and updated as appropriate on this date: 12/08/22    Risk/Benefit(s): The risks, benefits, alternatives, and potential complications have been discussed with the patient/family and informed consent has been obtained for the procedure/sedation.     Diagnosis:   spondylosis      Plan: onesimo Neal MD

## 2022-12-26 DIAGNOSIS — F33.42 RECURRENT MAJOR DEPRESSIVE DISORDER, IN FULL REMISSION (HCC): ICD-10-CM

## 2022-12-26 DIAGNOSIS — F40.10 SOCIAL ANXIETY DISORDER: ICD-10-CM

## 2022-12-27 RX ORDER — BUSPIRONE HYDROCHLORIDE 10 MG/1
TABLET ORAL
Qty: 270 TABLET | Refills: 3 | Status: SHIPPED | OUTPATIENT
Start: 2022-12-27

## 2022-12-27 NOTE — TELEPHONE ENCOUNTER
Please Approve or Refuse.   Send to Pharmacy per Pt's Request:      Next Visit Date:  Visit date not found   Last Visit Date: 8/9/2022    Hemoglobin A1C (%)   Date Value   04/08/2022 5.5   10/04/2021 5.5   03/22/2017 5.5             ( goal A1C is < 7)   BP Readings from Last 3 Encounters:   12/08/22 (!) 148/93   11/04/22 (!) 141/96   09/01/22 (!) 143/86          (goal 120/80)  BUN   Date Value Ref Range Status   08/18/2022 12 6 - 20 mg/dL Final     Creatinine   Date Value Ref Range Status   08/18/2022 0.89 0.70 - 1.20 mg/dL Final     Potassium   Date Value Ref Range Status   08/18/2022 4.1 3.7 - 5.3 mmol/L Final

## 2023-01-09 ENCOUNTER — TELEPHONE (OUTPATIENT)
Dept: FAMILY MEDICINE CLINIC | Age: 43
End: 2023-01-09

## 2023-01-09 NOTE — TELEPHONE ENCOUNTER
PATIENT CALLED IN NEEDING A FOLLOW UP FORM URGENT CARE STATES HE WAS DX WITH URI AND IS STILL HAVING SYMPTOMS I OFFERED HIM A VIRTUAL APPOINTMENT WHICH HE DECLINED STATING HE NEEDED HIS LUNGS CHECKED AND DON'T UNDERSTAND WHY HE CANNOT COME TO THE OFFICE, HE STATES HIS BREATHING HAS BEEN BAD SINCE BEING SICK - PLEASE ADVISE IT LOOKS LIKE HE HAS A PULMONOLOGIST

## 2023-01-09 NOTE — TELEPHONE ENCOUNTER
Due to another wave of pandemic and worsening pandemic, we are still a green side, to go back to an urgent care to have his lungs listened to, and to give him steroids if needed    Another option is to try to see his pulmonologist, but still the fastest to be to go to the urgent care    We do virtual appointments for upper respiratory infections in order to protect our patients who come through our office or wait n the lobby ,and also our short staff    No future appointments.

## 2023-01-25 DIAGNOSIS — K21.00 GASTROESOPHAGEAL REFLUX DISEASE WITH ESOPHAGITIS WITHOUT HEMORRHAGE: ICD-10-CM

## 2023-01-25 RX ORDER — OMEPRAZOLE 40 MG/1
CAPSULE, DELAYED RELEASE ORAL
Qty: 90 CAPSULE | Refills: 3 | Status: SHIPPED | OUTPATIENT
Start: 2023-01-25

## 2023-01-25 NOTE — TELEPHONE ENCOUNTER
Please Approve or Refuse.   Send to Pharmacy per Pt's Request:      Next Visit Date:  2/23/2023   Last Visit Date: 8/9/2022    Hemoglobin A1C (%)   Date Value   04/08/2022 5.5   10/04/2021 5.5   03/22/2017 5.5             ( goal A1C is < 7)   BP Readings from Last 3 Encounters:   12/08/22 (!) 148/93   11/04/22 (!) 141/96   09/01/22 (!) 143/86          (goal 120/80)  BUN   Date Value Ref Range Status   08/18/2022 12 6 - 20 mg/dL Final     Creatinine   Date Value Ref Range Status   08/18/2022 0.89 0.70 - 1.20 mg/dL Final     Potassium   Date Value Ref Range Status   08/18/2022 4.1 3.7 - 5.3 mmol/L Final

## 2023-01-30 ENCOUNTER — TELEPHONE (OUTPATIENT)
Dept: ORTHOPEDIC SURGERY | Age: 43
End: 2023-01-30

## 2023-01-30 DIAGNOSIS — M77.12 LATERAL EPICONDYLITIS OF LEFT ELBOW: Primary | ICD-10-CM

## 2023-01-30 RX ORDER — DICLOFENAC SODIUM 75 MG/1
75 TABLET, DELAYED RELEASE ORAL 2 TIMES DAILY
Qty: 28 TABLET | Refills: 0 | Status: SHIPPED | OUTPATIENT
Start: 2023-01-30 | End: 2023-02-26 | Stop reason: ALTCHOICE

## 2023-01-30 NOTE — TELEPHONE ENCOUNTER
Patient called and stated that he was having a lot of inflammation and pain in the left arm and would like something called in for pain as he will be out of town working for a few weeks. .  if he would be able to get in for an injection would be ideal before he leaves on Tuesday 01/31/2023.  Please reach out to pt at [phone] and he uses [facility] [address] [facility], thank you

## 2023-01-30 NOTE — TELEPHONE ENCOUNTER
Dr. Brenda Contreras,    Please see message below. You will not be able to see patient before he leaves tomorrow on 1/31. You have prescribed Voltaren in the past for the past (5/2/22).

## 2023-02-22 RX ORDER — ATORVASTATIN CALCIUM 40 MG/1
TABLET, FILM COATED ORAL
COMMUNITY
Start: 2022-12-13 | End: 2023-02-23 | Stop reason: SDUPTHER

## 2023-02-22 SDOH — ECONOMIC STABILITY: FOOD INSECURITY: WITHIN THE PAST 12 MONTHS, YOU WORRIED THAT YOUR FOOD WOULD RUN OUT BEFORE YOU GOT MONEY TO BUY MORE.: NEVER TRUE

## 2023-02-22 SDOH — ECONOMIC STABILITY: INCOME INSECURITY: HOW HARD IS IT FOR YOU TO PAY FOR THE VERY BASICS LIKE FOOD, HOUSING, MEDICAL CARE, AND HEATING?: NOT HARD AT ALL

## 2023-02-22 SDOH — ECONOMIC STABILITY: FOOD INSECURITY: WITHIN THE PAST 12 MONTHS, THE FOOD YOU BOUGHT JUST DIDN'T LAST AND YOU DIDN'T HAVE MONEY TO GET MORE.: NEVER TRUE

## 2023-02-22 ASSESSMENT — PATIENT HEALTH QUESTIONNAIRE - PHQ9
7. TROUBLE CONCENTRATING ON THINGS, SUCH AS READING THE NEWSPAPER OR WATCHING TELEVISION: 0
SUM OF ALL RESPONSES TO PHQ QUESTIONS 1-9: 0
3. TROUBLE FALLING OR STAYING ASLEEP: 0
SUM OF ALL RESPONSES TO PHQ9 QUESTIONS 1 & 2: 0
SUM OF ALL RESPONSES TO PHQ QUESTIONS 1-9: 0
4. FEELING TIRED OR HAVING LITTLE ENERGY: 0
10. IF YOU CHECKED OFF ANY PROBLEMS, HOW DIFFICULT HAVE THESE PROBLEMS MADE IT FOR YOU TO DO YOUR WORK, TAKE CARE OF THINGS AT HOME, OR GET ALONG WITH OTHER PEOPLE: 0
SUM OF ALL RESPONSES TO PHQ QUESTIONS 1-9: 0
9. THOUGHTS THAT YOU WOULD BE BETTER OFF DEAD, OR OF HURTING YOURSELF: 0
2. FEELING DOWN, DEPRESSED OR HOPELESS: 0
5. POOR APPETITE OR OVEREATING: 0
8. MOVING OR SPEAKING SO SLOWLY THAT OTHER PEOPLE COULD HAVE NOTICED. OR THE OPPOSITE, BEING SO FIGETY OR RESTLESS THAT YOU HAVE BEEN MOVING AROUND A LOT MORE THAN USUAL: 0
1. LITTLE INTEREST OR PLEASURE IN DOING THINGS: 0
6. FEELING BAD ABOUT YOURSELF - OR THAT YOU ARE A FAILURE OR HAVE LET YOURSELF OR YOUR FAMILY DOWN: 0
SUM OF ALL RESPONSES TO PHQ QUESTIONS 1-9: 0

## 2023-02-22 NOTE — PROGRESS NOTES
Visit Information    Have you changed or started any medications since your last visit including any over-the-counter medicines, vitamins, or herbal medicines? no   Have you stopped taking any of your medications? Is so, why? -  no  Are you having any side effects from any of your medications? - no    Have you seen any other physician or provider since your last visit?  no   Have you had any other diagnostic tests since your last visit?  no   Have you been seen in the emergency room and/or had an admission in a hospital since we last saw you?  no   Have you had your routine dental cleaning in the past 6 months?  no     Do you have an active MyChart account? If no, what is the barrier?   Yes    Patient Care Team:  Lorena Krishnamurthy MD as PCP - General (Family Medicine)  Lorena Krishnamurthy MD as PCP - Empaneled Provider  Sadia Ochoa MD as Surgeon (Cardiology)  Jim Dong MD as Consulting Physician (Gastroenterology)  Sangeeta Roman MD as Consulting Physician (Rheumatology)  Batsheva Joseph DO as Surgeon (Neurosurgery)  Martin Mendoza MD as Consulting Physician (Pulmonology)  Samantha Denney MD as Anesthesiologist (Pain Management)    Medical History Review  Past Medical, Family, and Social History reviewed and does contribute to the patient presenting condition    Health Maintenance   Topic Date Due    COVID-19 Vaccine (1) Never done    Hepatitis A vaccine (1 of 2 - Risk 2-dose series) Never done    Hepatitis B vaccine (1 of 3 - Risk 3-dose series) Never done    Flu vaccine (1) Never done    Lipids  04/08/2023    Depression Monitoring  08/09/2023    Diabetes screen  04/08/2025    DTaP/Tdap/Td vaccine (2 - Td or Tdap) 08/09/2032    Pneumococcal 0-64 years Vaccine  Completed    Hepatitis C screen  Completed    HIV screen  Completed    Hib vaccine  Aged Out    Meningococcal (ACWY) vaccine  Aged Out

## 2023-02-23 ENCOUNTER — TELEMEDICINE (OUTPATIENT)
Dept: FAMILY MEDICINE CLINIC | Age: 43
End: 2023-02-23

## 2023-02-23 ENCOUNTER — TELEPHONE (OUTPATIENT)
Dept: FAMILY MEDICINE CLINIC | Age: 43
End: 2023-02-23

## 2023-02-23 DIAGNOSIS — R19.8 CHANGE IN BOWEL MOVEMENT: ICD-10-CM

## 2023-02-23 DIAGNOSIS — J30.89 SEASONAL ALLERGIC RHINITIS DUE TO OTHER ALLERGIC TRIGGER: ICD-10-CM

## 2023-02-23 DIAGNOSIS — R73.9 HYPERGLYCEMIA: ICD-10-CM

## 2023-02-23 DIAGNOSIS — G47.33 OSA ON CPAP: ICD-10-CM

## 2023-02-23 DIAGNOSIS — I10 ESSENTIAL HYPERTENSION: ICD-10-CM

## 2023-02-23 DIAGNOSIS — J45.40 MODERATE PERSISTENT ASTHMA WITHOUT COMPLICATION: ICD-10-CM

## 2023-02-23 DIAGNOSIS — K29.50 CHRONIC GASTRITIS WITHOUT BLEEDING, UNSPECIFIED GASTRITIS TYPE: Primary | ICD-10-CM

## 2023-02-23 DIAGNOSIS — E78.5 HYPERLIPIDEMIA WITH TARGET LDL LESS THAN 100: ICD-10-CM

## 2023-02-23 DIAGNOSIS — Z99.89 OSA ON CPAP: ICD-10-CM

## 2023-02-23 DIAGNOSIS — M13.0 SERONEGATIVE POLYARTHRITIS: ICD-10-CM

## 2023-02-23 DIAGNOSIS — K21.00 GASTROESOPHAGEAL REFLUX DISEASE WITH ESOPHAGITIS WITHOUT HEMORRHAGE: ICD-10-CM

## 2023-02-23 DIAGNOSIS — K92.1 BLOOD IN THE STOOL: ICD-10-CM

## 2023-02-23 RX ORDER — MONTELUKAST SODIUM 10 MG/1
10 TABLET ORAL NIGHTLY
Qty: 90 TABLET | Refills: 3 | Status: SHIPPED | OUTPATIENT
Start: 2023-02-23

## 2023-02-23 RX ORDER — ATORVASTATIN CALCIUM 40 MG/1
40 TABLET, FILM COATED ORAL
Qty: 90 TABLET | Refills: 3 | Status: SHIPPED | OUTPATIENT
Start: 2023-02-23

## 2023-02-23 RX ORDER — LORATADINE 10 MG/1
10 TABLET ORAL DAILY
Qty: 90 TABLET | Refills: 3 | Status: SHIPPED | OUTPATIENT
Start: 2023-02-23

## 2023-02-23 RX ORDER — AMLODIPINE BESYLATE 10 MG/1
10 TABLET ORAL DAILY
Qty: 90 TABLET | Refills: 3 | Status: SHIPPED | OUTPATIENT
Start: 2023-02-23

## 2023-02-23 RX ORDER — NAPROXEN 500 MG/1
500 TABLET ORAL 2 TIMES DAILY PRN
Qty: 60 TABLET | Refills: 0 | Status: SHIPPED | OUTPATIENT
Start: 2023-02-23

## 2023-02-23 RX ORDER — OMEPRAZOLE 40 MG/1
40 CAPSULE, DELAYED RELEASE ORAL
Qty: 90 CAPSULE | Refills: 3 | Status: SHIPPED | OUTPATIENT
Start: 2023-02-23

## 2023-02-23 ASSESSMENT — ENCOUNTER SYMPTOMS
ABDOMINAL DISTENTION: 1
BLOOD IN STOOL: 1
DIARRHEA: 1
CONSTIPATION: 0
COUGH: 0
CHEST TIGHTNESS: 0
APNEA: 1
ABDOMINAL PAIN: 1
SHORTNESS OF BREATH: 1
VOMITING: 0
NAUSEA: 0
WHEEZING: 0

## 2023-02-23 NOTE — PROGRESS NOTES
[address] Phone  Send Link Via Text  No text has been sent  Last text sent2023 10:59 AM  To:[phone]  Email  Send Link Via Email  No email     I also left him a voice message on the phone number above to click on the link, if not to call to reschedule  I said I will refill his inhaler           Diagnosis Orders   1. Asthma, Moderate persistent asthma without complication  mometasone-formoterol (DULERA) 100-5 MCG/ACT inhaler             2023    TELEHEALTH EVALUATION -- Audio/Visual (During PZAZI-99 public health emergency)      Yousuf Martinez (:  1980) is a 36 y.o. male,Established patient, here for evaluation of the following chief complaint(s): Bloated (THE LAST 30 DAYS FEELS BLOATED/PUT ON 20 LBS), Weight Management, GI Problem, Asthma, Hypertension, Hyperlipidemia, Allergies, and Arthritis (Ran out of methotrexate, unable to see rheumatologist)        ASSESSMENT/PLAN:    1. Chronic gastritis without bleeding, unspecified gastritis type  Worsening  Will rule out H. pylori and celiac disease, will refer to GI for EGD  He does have history of eosinophilic esophagitis  Continue omeprazole 40 Mg and avoid naproxen  -     Jered Mooney MD, Gastroenterology, [place]  -     Celiac Reflex Panel; Future  -     H. pylori antigen; Future  2. Change in bowel movement  Currently improved  We will rule out celiac disease and H. pylori infection, will refer to GI for colonoscopy  Avoid constipation    -     Jered Mooney MD, Gastroenterology, [place]  -     Celiac Reflex Panel; Future  -     H. pylori antigen; Future  3. Blood in the stool  Intermittent, will refer to GI for colonoscopy, rule out celiac disease, H. pylori, avoid naproxen  -     Jered Mooney MD, Gastroenterology, [place]  -     Celiac Reflex Panel; Future  -     H. pylori antigen; Future  4.  Asthma, Moderate persistent asthma without complication  Stable  Continue current medications, counseling given regarding correct use of inhalers  Albuterol as needed  Counseling given regarding immunizations, but he absolutely declines  -     mometasone-formoterol (DULERA) 100-5 MCG/ACT inhaler; Inhale 2 puffs into the lungs 2 times daily, Disp-13 g, R-5Normal  -     montelukast (SINGULAIR) 10 MG tablet; Take 1 tablet by mouth nightly, Disp-90 tablet, R-3Normal  5. Essential hypertension  Previously inadequately controlled   we will schedule him as nurse visit for blood pressure check  Continue for now amlodipine 10 Mg daily  BP Readings from Last 3 Encounters:   12/08/22 (!) 148/93   11/04/22 (!) 141/96   09/01/22 (!) 143/86   Discussed low salt diet and BP and pulse monitoring. We will update his labs    -     CBC with Auto Differential; Future  -     Comprehensive Metabolic Panel; Future  -     Uric Acid; Future  -     TSH; Future  -     Magnesium; Future  -     Urinalysis with Reflex to Culture; Future  -     amLODIPine (NORVASC) 10 MG tablet; Take 1 tablet by mouth daily, Disp-90 tablet, R-3Normal  6. Hyperlipidemia with target LDL less than 100  Inadequately controlled  Recently started on Lipitor 40 Mg daily, recheck lipid panel, low-carb low-fat diet, exercise and attempt to lose weight  Lab Results   Component Value Date    LDLCHOLESTEROL 196 (H) 04/08/2022       -     Lipid Panel; Future  7. Gastroesophageal reflux disease with esophagitis without hemorrhage  Ongoing  Continue omeprazole, avoid NSAIDs and spicy foods, refer to GI for EGD  -     omeprazole (PRILOSEC) 40 MG delayed release capsule; Take 1 capsule by mouth every morning (before breakfast), Disp-90 capsule, R-3Normal  8. Seasonal allergic rhinitis due to other allergic trigger  Improved with medications  Continue Singulair 10 Mg daily, Claritin 10 Mg daily, Flonase nasal spray  -     loratadine (CLARITIN) 10 MG tablet; Take 1 tablet by mouth daily, Disp-90 tablet, R-3Normal  9.  Seronegative polyarthritis  Worsening  He has ran out of methotrexate, but does have appointment with rheumatologist in about 3 weeks to restart methotrexate  -Short-term only   naproxen (NAPROSYN) 500 MG tablet; Take 1 tablet by mouth 2 times daily as needed for Pain Take with food, Disp-60 tablet, R-0Normal  10. Hyperglycemia  Mild  Blood glucose 106 on 2022, 120 on 10/7/2021, 118 on 10/6/2021  -     Hemoglobin A1C; Future    Lab Results   Component Value Date    LABA1C 5.5 2022    LABA1C 5.5 10/04/2021    LABA1C 5.5 2017       11. NOEMÍ on CPAP  Improved with CPAP, he benefits from CPAP, compliance with CPAP discussed      Pa received counseling on the following healthy behaviors: nutrition, exercise, medication adherence, and weight loss  Reviewed prior labs and health maintenance  Discussed use, benefit, and side effects of prescribed medications. Barriers to medication compliance addressed. Patient given educational materials - see patient instructions  All patient questions answered. Patient voiced understanding. The patient's past medical,surgical, social, and family history as well as his current medications and allergies were reviewed as documented in today's encounter. Medications, labs, diagnostic studies, consultations and follow-up as documented in this encounter. Return in about 6 months (around 2023) for Visit type PHYSICAL, VISION screen, PHQ9. .    Needs nurse visit appointment BP check in 4 weeks, when his work permitting. No future appointments.       SUBJECTIVE/OBJECTIVE:  Kattestefany Sinclair (:  1980) has requested an audio/video evaluation for the following concern(s):Bloated (THE LAST 30 DAYS FEELS BLOATED/PUT ON 20 LBS), Weight Management, GI Problem, Asthma, Hypertension, Hyperlipidemia, Allergies, and Arthritis (Ran out of methotrexate, unable to see rheumatologist)      Patient reports GI symptoms  Concern about unintentional weight gain 20 lbs in 2 months  Feels full all the time , has stomach upset and bloated and painful all the time. Symptoms have been present for several months, worsening  Not drinking pop or alcohol  Occasionally taking Aleve but not so much. He says his symptoms are unrelated to foods  Not taking ibuprofen  He says his stomach feels hard as a rock  Having bowel movement  daily, looks \"really oily and some blood\", patient says he did have blood in the stool, which looked like red blood, a few times, then he might get diarrhea, then normal again. Had EGD in 2019 and he did have a gastric polyp and eosinophilic esophagitis     Obesity per BMI, relatively stable weight overall, reassurance given. BMI 34.67 kg/M2  Wt Readings from Last 3 Encounters:   12/08/22 270 lb (122.5 kg)   10/03/22 277 lb (125.6 kg)   09/01/22 277 lb (125.6 kg)     Wt Readings from Last 10 Encounters:   12/08/22 270 lb (122.5 kg)   10/03/22 277 lb (125.6 kg)   09/01/22 277 lb (125.6 kg)   08/18/22 277 lb (125.6 kg)   08/09/22 277 lb (125.6 kg)   06/24/22 275 lb 3.2 oz (124.8 kg)   06/01/22 270 lb (122.5 kg)   05/09/22 270 lb (122.5 kg)   05/02/22 262 lb (118.8 kg)   04/11/22 268 lb 12.8 oz (121.9 kg)       Asthma:  Current treatment includes beta agonist inhalers, combination beta agonists/steroid inhalers, leukotriene antagonists, which has been effective. Using preventive medication(s) consistently: yes. Residual symptoms: dyspnea. Patient denies cough, wheezing or increasing dyspnea on exertion. He requires his rescue inhaler 1 time(s) per week. Patient does have year-round Allergies, which sometimes makes his asthma worse  Currently taking Claritin and Singulair have been helping with allergies. He says the allergies are usually worse in fall and spring. Nicotine dependence. NO  Counseling given: Yes    Hypertension:    he  is exercising and is adherent to low salt diet. Cardiac symptoms dyspnea and fatigue.  Patient denies chest pain, chest pressure/discomfort, claudication, exertional chest pressure/discomfort, irregular heart beat, lower extremity edema, near-syncope, orthopnea, palpitations, paroxysmal nocturnal dyspnea, syncope, and tachypnea. Cardiovascular risk factors: dyslipidemia, family history of premature cardiovascular disease, hypertension, male gender, and obesity (BMI >= 30 kg/m2). Use of agents associated with hypertension: steroids. History of target organ damage: left ventricular hypertrophy. He was previously referred to cardiologist.    Patient has sleep apnea and reports compliance with CPAP    blood pressure is  HIGH  Did not check his blood pressure recently but previously it was high multiple times  BP Readings from Last 3 Encounters:   12/08/22 (!) 148/93   11/04/22 (!) 141/96   09/01/22 (!) 143/86        Pulse is Normal.    Pulse Readings from Last 3 Encounters:   12/08/22 98   11/04/22 86   09/01/22 78         Hyperlipidemia:  No new myalgias or GI upset on atorvastatin (Lipitor). Medication compliance: compliant most of the time. Patient is not following a low fat, low cholesterol diet. He says he travels a lot for work, and he eats what it is available to him. LDL is high and high triglycerides  Lab Results   Component Value Date    LDLCHOLESTEROL 196 (H) 04/08/2022     Lab Results   Component Value Date    TRIG 177 (H) 04/08/2022    TRIG 290 (H) 10/03/2021    TRIG 180 (H) 06/25/2021       Patient has seronegative polyarthritis, he is due for appointment with Rheumatologist in March, they would not refill his methotrexate without being seen, he ran out of methotrexate which was helping with the pain. Has been taking Aleve from over-the-counter, which helps a little, but sometimes does not help  I suggested anti-inflammatory either NSAIDs or steroids, he would like naproxen. Mild hyperglycemia  Denies increased appetite, thirst or polyuria.   Prior A1c was normal  Lab Results   Component Value Date    LABA1C 5.5 04/08/2022    LABA1C 5.5 10/04/2021    LABA1C 5.5 03/22/2017             Negative depression screening  PHQ Scores 2/22/2023 8/9/2022 4/8/2022 6/23/2021 1/8/2021 8/12/2020 5/8/2020   PHQ2 Score 0 0 0 0 0 0 2   PHQ9 Score 0 0 0 0 0 0 2     Review of Systems   Constitutional:  Positive for fatigue. Negative for activity change, appetite change, chills, diaphoresis, fever and unexpected weight change. HENT:  Positive for sneezing. Negative for congestion, postnasal drip, rhinorrhea, sinus pressure and sinus pain. Respiratory:  Positive for apnea (on CPAP) and shortness of breath ([place]TON). Negative for cough, chest tightness and wheezing. Cardiovascular:  Negative for chest pain, palpitations and leg swelling. Gastrointestinal:  Positive for abdominal distention, abdominal pain, blood in stool (not currently) and diarrhea. Negative for constipation, nausea and vomiting. Endocrine: Negative for cold intolerance, heat intolerance, polydipsia, polyphagia and polyuria. Genitourinary:  Negative for difficulty urinating, frequency and urgency. Musculoskeletal:  Positive for arthralgias and back pain (chronic, has seen pain management). Allergic/Immunologic: Positive for environmental allergies. Neurological:  Negative for numbness. Hematological:  Does not bruise/bleed easily. Psychiatric/Behavioral:  Negative for dysphoric mood and sleep disturbance. The patient is nervous/anxious. Prior to Visit Medications    Medication Sig Taking?  Authorizing Provider   mometasone-formoterol (DULERA) 100-5 MCG/ACT inhaler Inhale 2 puffs into the lungs 2 times daily Yes Umberto Becerril MD   atorvastatin (LIPITOR) 40 MG tablet  Yes Historical Provider, MD   omeprazole (PRILOSEC) 40 MG delayed release capsule take 1 capsule by mouth once daily Yes Umberto Becerril MD   busPIRone (BUSPAR) 10 MG tablet take 1 tablet by mouth three times a day if needed for anxiety Yes Umberto Becerril MD   albuterol (PROVENTIL) (2.5 MG/3ML) 0.083% nebulizer solution Take 3 mLs by nebulization every 6 hours as needed for Wheezing or Shortness of Breath Yes Emily Varghese MD   buPROPion (WELLBUTRIN XL) 150 MG extended release tablet Take 1 tablet by mouth every morning Yes Emily Varghese MD   Cinnamon 500 MG CAPS Take 1 capsule by mouth in the morning. Yes Emily Varghese MD   fluticasone (FLONASE) 50 MCG/ACT nasal spray 2 sprays by Nasal route daily Yes Emily Varghese MD   pravastatin (PRAVACHOL) 20 MG tablet take 1 tablet by mouth every evening **STOP ATORVASTATIN**  Cheli Booker, APRN - CNP   amLODIPine (NORVASC) 10 MG tablet Take 1 tablet by mouth daily Yes Emily Varghese MD   folic acid (FOLVITE) 1 MG tablet take 1 tablet by mouth once daily Yes Historical Provider, MD   methotrexate (RHEUMATREX) 2.5 MG chemo tablet take 7 tablets by mouth every week  Historical Provider, MD   montelukast (SINGULAIR) 10 MG tablet Take 1 tablet by mouth nightly Yes Emily Varghese MD   loratadine (CLARITIN) 10 MG tablet Take 1 tablet by mouth daily Yes Emily Varghese MD   Blood Pressure KIT Dx: HTN. Needs automatic blood pressure machine to monitor his blood pressure.  Yes Emily Varghese MD   Respiratory Therapy Supplies (NEBULIZER) MADHAV 1 Units by Does not apply route 2 times daily Dx: Asthma exacerbation J45.901 Yes Toi Damian MD   diclofenac (VOLTAREN) 75 MG EC tablet Take 1 tablet by mouth 2 times daily for 14 days  Kiera Blank MD       Social History     Tobacco Use    Smoking status: Never    Smokeless tobacco: Never   Vaping Use    Vaping Use: Never used   Substance Use Topics    Alcohol use: Yes     Comment: occasional  few x year    Drug use: No          PHYSICAL EXAMINATION:    Vital Signs: (As obtained by patient/caregiver or practitioner observation)  -vital signs stable and within normal limits except obesity BMI 34.67 kg/m2  Patient-Reported Vitals 2/23/2023   Patient-Reported Weight 270 lbs   Patient-Reported Height 6 ft 2 in   Patient-Reported Temperature -           Intensity of pain is:5-6/10       Constitutional: [x] Appears well-developed and well-nourished [x] No apparent distress      [x] Abnormal-obese      Mental status  [x] Alert and awake  [x] Oriented to person/place/time [x]Able to follow commands      Eyes:  EOM    [x]  Normal  [] Abnormal-  Sclera  [x]  Normal  [] Abnormal -         Discharge [x]  None visible  [] Abnormal -    HENT:   [x] Normocephalic, atraumatic. [] Abnormal   [x] Mouth/Throat: Mucous membranes are moist.     External Ears [x] Normal  [] Abnormal-     Neck: [x] No visualized mass     Pulmonary/Chest: [x] Respiratory effort normal.  [x] No visualized signs of difficulty breathing or respiratory distress        [] Abnormal        Musculoskeletal:   [x] Normal gait with no signs of ataxia         [x] Normal range of motion of neck        [] Abnormal-       Neurological:        [x] No Facial Asymmetry (Cranial nerve 7 motor function) (limited exam to video visit)          [x] No gaze palsy        [] Abnormal-            Skin:        [x] No significant exanthematous lesions or discoloration noted on facial skin         [] Abnormal-            Psychiatric:      [x] No Hallucinations       []Mood is normal      [x]Behavior is normal      [x]Judgment is normal      [x]Thought content is normal       [x] Abnormal-anxious    Other pertinent observable physical exam findings- none  Due to this being a TeleHealth encounter, evaluation of the following organ systems is limited: Vitals/Constitutional/EENT/Resp/CV/GI//MS/Neuro/Skin/Heme-Lymph-Imm. I personally reviewed most recent labs reviewed with the patient and all questions fully answered.    Mild hyperglycemia   Hyperlipidemia  High triglycerides   otherwise labs within normal limits        Lab Results   Component Value Date    WBC 7.7 04/08/2022    HGB 16.0 04/08/2022    HCT 47.3 04/08/2022    MCV 90.1 04/08/2022     04/08/2022       Lab Results Component Value Date/Time     08/18/2022 04:05 PM    K 4.1 08/18/2022 04:05 PM     08/18/2022 04:05 PM    CO2 24 08/18/2022 04:05 PM    BUN 12 08/18/2022 04:05 PM    CREATININE 0.89 08/18/2022 04:05 PM    GLUCOSE 106 08/18/2022 04:05 PM    GLUCOSE 96 04/30/2012 01:33 PM    CALCIUM 8.8 08/18/2022 04:05 PM      Lab Results   Component Value Date    LABA1C 5.5 04/08/2022    LABA1C 5.5 10/04/2021    LABA1C 5.5 03/22/2017       Lab Results   Component Value Date    ALT 31 04/08/2022    AST 24 04/08/2022    ALKPHOS 104 04/08/2022    BILITOT 0.49 04/08/2022       Lab Results   Component Value Date    TSH 2.84 04/08/2022       Lab Results   Component Value Date    CHOL 279 (H) 04/08/2022    CHOL 167 10/03/2021    CHOL 247 (H) 06/25/2021     Lab Results   Component Value Date    TRIG 177 (H) 04/08/2022    TRIG 290 (H) 10/03/2021    TRIG 180 (H) 06/25/2021     Lab Results   Component Value Date    HDL 48 04/08/2022    HDL 28 (L) 10/03/2021    HDL 44 06/25/2021     Lab Results   Component Value Date    LDLCHOLESTEROL 196 (H) 04/08/2022    LDLCHOLESTEROL 81 10/03/2021    LDLCHOLESTEROL 167 (H) 06/25/2021       Lab Results   Component Value Date    CHOLHDLRATIO 5.8 (H) 04/08/2022    CHOLHDLRATIO 6.0 (H) 10/03/2021    CHOLHDLRATIO 5.6 (H) 06/25/2021       Lab Results   Component Value Date    LABA1C 5.5 04/08/2022    LABA1C 5.5 10/04/2021    LABA1C 5.5 03/22/2017         Lab Results   Component Value Date    LOHLGVUA63 570 07/23/2014       Lab Results   Component Value Date    VITD25 49.8 04/08/2022       Orders Placed This Encounter   Medications    mometasone-formoterol (DULERA) 100-5 MCG/ACT inhaler     Sig: Inhale 2 puffs into the lungs 2 times daily     Dispense:  13 g     Refill:  5    atorvastatin (LIPITOR) 40 MG tablet     Sig: Take 1 tablet by mouth Daily with supper ** make sure no pravastatin**     Dispense:  90 tablet     Refill:  3    omeprazole (PRILOSEC) 40 MG delayed release capsule     Sig: Take 1 capsule by mouth every morning (before breakfast)     Dispense:  90 capsule     Refill:  3    amLODIPine (NORVASC) 10 MG tablet     Sig: Take 1 tablet by mouth daily     Dispense:  90 tablet     Refill:  3    montelukast (SINGULAIR) 10 MG tablet     Sig: Take 1 tablet by mouth nightly     Dispense:  90 tablet     Refill:  3    loratadine (CLARITIN) 10 MG tablet     Sig: Take 1 tablet by mouth daily     Dispense:  90 tablet     Refill:  3    naproxen (NAPROSYN) 500 MG tablet     Sig: Take 1 tablet by mouth 2 times daily as needed for Pain Take with food     Dispense:  60 tablet     Refill:  0       Orders Placed This Encounter   Procedures    Celiac Reflex Panel     Standing Status:   Future     Standing Expiration Date:   2/23/2024    H. pylori antigen     Standing Status:   Future     Standing Expiration Date:   2/23/2024    CBC with Auto Differential     Standing Status:   Future     Standing Expiration Date:   2/23/2024    Comprehensive Metabolic Panel     Standing Status:   Future     Standing Expiration Date:   2/23/2024    Lipid Panel     Standing Status:   Future     Standing Expiration Date:   2/23/2024     Order Specific Question:   Is Patient Fasting?/# of Hours     Answer:   8-10 Hours, water ok to drink    Uric Acid     Standing Status:   Future     Standing Expiration Date:   2/23/2024    TSH     Standing Status:   Future     Standing Expiration Date:   2/23/2024    Magnesium     Standing Status:   Future     Standing Expiration Date:   2/23/2024    Urinalysis with Reflex to Culture     Standing Status:   Future     Standing Expiration Date:   2/23/2024     Order Specific Question:   SPECIFY(EX-CATH,MIDSTREAM,CYSTO,ETC)?      Answer:   MIDSTREAM    Hemoglobin A1C     Standing Status:   Future     Standing Expiration Date:   2/23/2024    Rita Mena MD, Gastroenterology, [place]     Referral Priority:   Routine     Referral Type:   Eval and Treat     Referral Reason:   Specialty Services Required     Referred to Provider:   Angela Zheng MD     Requested Specialty:   Gastroenterology     Number of Visits Requested:   1       Medications Discontinued During This Encounter   Medication Reason    mometasone-formoterol (DULERA) 100-5 MCG/ACT inhaler REORDER    pravastatin (PRAVACHOL) 20 MG tablet Alternate therapy    loratadine (CLARITIN) 10 MG tablet REORDER    montelukast (SINGULAIR) 10 MG tablet REORDER    amLODIPine (NORVASC) 10 MG tablet REORDER    omeprazole (PRILOSEC) 40 MG delayed release capsule REORDER    atorvastatin (LIPITOR) 40 MG tablet REORDER    diclofenac (VOLTAREN) 75 MG EC tablet Alternate therapy            Patient was alone     [employer], was evaluated through a synchronous (real-time) audio-video encounter. The patient (or guardian if applicable) is aware that this is a billable service, which includes applicable co-pays. This Virtual Visit was conducted with patient's (and/or legal guardian's) consent. The visit was conducted pursuant to the emergency declaration under the [address] [place] [address], [address] [place] [address] authority and the [employer] and [employer] General Act. Patient identification was verified, and a caregiver was present when appropriate. The patient was located at Other: At work in [place]  Provider was located at [place] [address] (Appt Dept): [place] [address]  [address] [place] [address]         Total time spent for this encounter:  35  minutes. --Guilherme Miramontes MD on 2/26/2023 at 6:23 PM    An electronic signature was used to authenticate this note.

## 2023-02-23 NOTE — TELEPHONE ENCOUNTER
[address] Phone  Send Link Via Text  No text has been sent  Last text sent02/23/2023 10:59 AM  To:[phone]  Email  Send Link Via Email  No email     I also left him a voice message on the phone number above to click on the link, if not to call to reschedule  I said I will refill his inhaler           Diagnosis Orders   1. Asthma, Moderate persistent asthma without complication  mometasone-formoterol (DULERA) 100-5 MCG/ACT inhaler           No future appointments.

## 2023-02-26 ENCOUNTER — TELEPHONE (OUTPATIENT)
Dept: FAMILY MEDICINE CLINIC | Age: 43
End: 2023-02-26

## 2023-02-26 PROBLEM — K29.50 CHRONIC GASTRITIS WITHOUT BLEEDING: Status: ACTIVE | Noted: 2023-02-26

## 2023-02-26 PROBLEM — R19.8 CHANGE IN BOWEL MOVEMENT: Status: ACTIVE | Noted: 2023-02-26

## 2023-02-26 PROBLEM — R63.5 UNINTENDED WEIGHT GAIN: Status: RESOLVED | Noted: 2022-08-10 | Resolved: 2023-02-26

## 2023-02-26 PROBLEM — U07.1 COVID-19: Status: RESOLVED | Noted: 2021-10-03 | Resolved: 2023-02-26

## 2023-02-26 ASSESSMENT — ENCOUNTER SYMPTOMS
SINUS PRESSURE: 0
SINUS PAIN: 0
RHINORRHEA: 0
BACK PAIN: 1

## 2023-02-26 NOTE — TELEPHONE ENCOUNTER
Return in about 6 months (around 8/23/2023) for Visit type PHYSICAL, VISION screen, PHQ9. .    Needs nurse visit appointment BP check in 4 weeks, when his work permitting.

## 2023-03-15 ENCOUNTER — TELEPHONE (OUTPATIENT)
Dept: ORTHOPEDIC SURGERY | Age: 43
End: 2023-03-15

## 2023-03-15 NOTE — TELEPHONE ENCOUNTER
ELBAM with patient requesting that he call the office back to clarify what he is being seen for during his upcoming with Dr. Jacob Isidro on 3/17. Dr. Jacob Isidro has seen patient in the past for left forearm pain, left elbow pain, & left shoulder pain.

## 2023-03-17 ENCOUNTER — OFFICE VISIT (OUTPATIENT)
Dept: ORTHOPEDIC SURGERY | Age: 43
End: 2023-03-17
Payer: COMMERCIAL

## 2023-03-17 VITALS — BODY MASS INDEX: 34.65 KG/M2 | RESPIRATION RATE: 16 BRPM | HEIGHT: 74 IN | WEIGHT: 270 LBS

## 2023-03-17 DIAGNOSIS — M77.12 LATERAL EPICONDYLITIS OF LEFT ELBOW: Primary | ICD-10-CM

## 2023-03-17 PROCEDURE — G8417 CALC BMI ABV UP PARAM F/U: HCPCS | Performed by: ORTHOPAEDIC SURGERY

## 2023-03-17 PROCEDURE — G8484 FLU IMMUNIZE NO ADMIN: HCPCS | Performed by: ORTHOPAEDIC SURGERY

## 2023-03-17 PROCEDURE — G8427 DOCREV CUR MEDS BY ELIG CLIN: HCPCS | Performed by: ORTHOPAEDIC SURGERY

## 2023-03-17 PROCEDURE — 1036F TOBACCO NON-USER: CPT | Performed by: ORTHOPAEDIC SURGERY

## 2023-03-17 PROCEDURE — 99214 OFFICE O/P EST MOD 30 MIN: CPT | Performed by: ORTHOPAEDIC SURGERY

## 2023-03-17 RX ORDER — ETODOLAC 400 MG/1
400 TABLET, FILM COATED ORAL 2 TIMES DAILY WITH MEALS
Qty: 28 TABLET | Refills: 0 | Status: SHIPPED | OUTPATIENT
Start: 2023-03-17 | End: 2023-03-31

## 2023-03-17 NOTE — PROGRESS NOTES
HPI: Mr. Timoteo Torres is a 50-year-old here today for reevaluation of his left arm. He has been seen in the past for this issue and diagnosed with lateral epicondylitis. He has been treated thus far with a cortisone injection as well as use of topical and oral anti-inflammatories, home stretching and eccentric strengthening program, and a counterforce brace. He states that the injection did work well for him but his pain has gotten back to baseline. It affects his daily activities and work. We did have a discussion once again about this condition and discussed treatment options moving forward including continued conservative management. I would recommend against a repeat cortisone injection having already received 1 end knowing that it is not going to ultimately treat the underlying problem and could potentially do some damage to the soft tissue structures at this location. Consequently we had an in-depth discussion today about surgical intervention by way of an arthroscopic ECRB debridement/release. We discussed the details of the procedure, risks and benefits of surgery, expected outcome and postoperative recovery course. Risks as discussed included but are not limited to risk of infection, wound healing problems, stiffness, persistent pain, neurovascular injury, instability, and anesthesia. He demonstrated a good understanding of our discussion end is going to take some time to think things over to decide how he is going to proceed especially given the fact that it is going to mean that he will be off of work for couple of months if his job is not able to accommodate him coming back with restrictions. In the meantime I did place him on a 2-week course of Lodine prescription of which was electronically sent to his pharmacy and he was also placed in a wrist brace to be worn during today while active.

## 2023-03-20 ENCOUNTER — TELEPHONE (OUTPATIENT)
Dept: ORTHOPEDIC SURGERY | Age: 43
End: 2023-03-20

## 2023-03-22 ENCOUNTER — TELEPHONE (OUTPATIENT)
Dept: ORTHOPEDIC SURGERY | Age: 43
End: 2023-03-22

## 2023-03-22 NOTE — TELEPHONE ENCOUNTER
Patient is scheduled for surgery on 4/13 at [facility]. He is wanting to know if there is anything else you can recommend for his pain. I let him know you would most likely not prescribe any sort of narcotic but he states the anti-inflam is not really helping. Anything else to offer?

## 2023-03-23 ENCOUNTER — TELEPHONE (OUTPATIENT)
Dept: FAMILY MEDICINE CLINIC | Age: 43
End: 2023-03-23

## 2023-03-23 DIAGNOSIS — R94.31 ABNORMAL EKG: Primary | ICD-10-CM

## 2023-03-23 DIAGNOSIS — Z01.818 PREOPERATIVE CLEARANCE: ICD-10-CM

## 2023-03-23 NOTE — TELEPHONE ENCOUNTER
Incoming fax came from [facility] , in regard to upcoming procedure on 04/13/23 . Will like a medical clearance from provider. Prior to scheduled surgery/Procedure. Future Appointments   Date Time Provider Joe Healy   3/30/2023  2:00 PM [facility] PAT RM 1 [facility] PAT [facility]   4/7/2023  8:15 AM Hair Perez MD [facility]   4/28/2023 11:15 AM Hair Perez MD [facility]   5/22/2023  1:15 PM Hair Perez MD [facility]   7/3/2023  1:15 PM Hair Perez MD [facility]        Please see attachment below. Please advise. Thank you!

## 2023-03-23 NOTE — TELEPHONE ENCOUNTER
Please advise the patient, we received request for clearance    Due to prior abnormal EKG, on 10/7/2021, which showed prior possible anterior infarct, will refer him in the past to cardiologist, Dr. Isai Sandoval, he will need clearance from his cardiologist  I will place a new referral so he has time to make appointment and to obtain the clearance, please give him contact information, please let him know they go to multiple offices and to take what ever they have available for him    Second for his clearance I will need a blood pressure very well controlled, he needs a nurse visit for blood pressure check    Third is the blood work he will do at the pretesting    BP Readings from Last 3 Encounters:   12/08/22 (!) 148/93   11/04/22 (!) 141/96   09/01/22 (!) 143/86          Diagnosis Orders   1. Abnormal EKG  Justin Blunt DO, Cardiology, [place]      2.  Preoperative clearance  Justin Blunt DO, Cardiology, [place]           Future Appointments   Date Time Provider Joe Healy   3/30/2023  2:00 PM [facility] PAT RM 1 [facility] PAT [facility]   4/7/2023  8:15 AM Jun Rosa MD [facility]   4/28/2023 11:15 AM Jun Rosa MD [facility]   5/22/2023  1:15 PM Jun Rosa MD [facility]   7/3/2023  1:15 PM Jun Rosa MD [facility] [place]

## 2023-03-23 NOTE — TELEPHONE ENCOUNTER
LVM with pt notifying him of Dr. Mary Wahl response and advised that he may continue to take NSAIDs up to one week prior to his surgery.

## 2023-03-30 ENCOUNTER — HOSPITAL ENCOUNTER (OUTPATIENT)
Dept: PREADMISSION TESTING | Age: 43
Discharge: HOME | End: 2023-03-30
Attending: ORTHOPAEDIC SURGERY | Admitting: ORTHOPAEDIC SURGERY
Payer: COMMERCIAL

## 2023-03-30 VITALS
SYSTOLIC BLOOD PRESSURE: 149 MMHG | BODY MASS INDEX: 34.01 KG/M2 | OXYGEN SATURATION: 98 % | WEIGHT: 265 LBS | HEART RATE: 100 BPM | DIASTOLIC BLOOD PRESSURE: 87 MMHG | RESPIRATION RATE: 18 BRPM | TEMPERATURE: 98.2 F | HEIGHT: 74 IN

## 2023-03-30 DIAGNOSIS — Z01.818 PREOP EXAMINATION: ICD-10-CM

## 2023-03-30 PROBLEM — K21.9 GERD (GASTROESOPHAGEAL REFLUX DISEASE): Status: ACTIVE | Noted: 2018-11-19

## 2023-03-30 LAB
ABSOLUTE EOS #: 0.3 K/UL (ref 0–0.4)
ABSOLUTE LYMPH #: 2.4 K/UL (ref 1–4.8)
ABSOLUTE MONO #: 0.5 K/UL (ref 0.1–1.3)
ANION GAP SERPL CALCULATED.3IONS-SCNC: 12 MMOL/L (ref 9–17)
BASOPHILS # BLD: 1 % (ref 0–2)
BASOPHILS ABSOLUTE: 0 K/UL (ref 0–0.2)
BUN SERPL-MCNC: 11 MG/DL (ref 6–20)
CALCIUM SERPL-MCNC: 9.1 MG/DL (ref 8.6–10.4)
CHLORIDE SERPL-SCNC: 104 MMOL/L (ref 98–107)
CO2 SERPL-SCNC: 22 MMOL/L (ref 20–31)
CREAT SERPL-MCNC: 1.02 MG/DL (ref 0.7–1.2)
EOSINOPHILS RELATIVE PERCENT: 5 % (ref 0–4)
GFR SERPL CREATININE-BSD FRML MDRD: >60 ML/MIN/1.73M2
GLUCOSE SERPL-MCNC: 101 MG/DL (ref 70–99)
HCT VFR BLD AUTO: 47.9 % (ref 41–53)
HGB BLD-MCNC: 16.1 G/DL (ref 13.5–17.5)
LYMPHOCYTES # BLD: 36 % (ref 24–44)
MCH RBC QN AUTO: 30.5 PG (ref 26–34)
MCHC RBC AUTO-ENTMCNC: 33.6 G/DL (ref 31–37)
MCV RBC AUTO: 90.6 FL (ref 80–100)
MONOCYTES # BLD: 7 % (ref 1–7)
PDW BLD-RTO: 14.2 % (ref 11.5–14.9)
PLATELET # BLD AUTO: 299 K/UL (ref 150–450)
PMV BLD AUTO: 8.6 FL (ref 6–12)
POTASSIUM SERPL-SCNC: 4 MMOL/L (ref 3.7–5.3)
RBC # BLD: 5.28 M/UL (ref 4.5–5.9)
SEG NEUTROPHILS: 51 % (ref 36–66)
SEGMENTED NEUTROPHILS ABSOLUTE COUNT: 3.3 K/UL (ref 1.3–9.1)
SODIUM SERPL-SCNC: 138 MMOL/L (ref 135–144)
WBC # BLD AUTO: 6.6 K/UL (ref 3.5–11)

## 2023-03-30 PROCEDURE — 80048 BASIC METABOLIC PNL TOTAL CA: CPT

## 2023-03-30 PROCEDURE — 93005 ELECTROCARDIOGRAM TRACING: CPT | Performed by: ANESTHESIOLOGY

## 2023-03-30 PROCEDURE — 85025 COMPLETE CBC W/AUTO DIFF WBC: CPT

## 2023-03-30 PROCEDURE — APPSS45 APP SPLIT SHARED TIME 31-45 MINUTES: Performed by: NURSE PRACTITIONER

## 2023-03-30 PROCEDURE — 36415 COLL VENOUS BLD VENIPUNCTURE: CPT

## 2023-03-30 ASSESSMENT — ENCOUNTER SYMPTOMS
BLOOD IN STOOL: 0
DIARRHEA: 0
SHORTNESS OF BREATH: 1
NAUSEA: 0
APNEA: 1
ABDOMINAL PAIN: 0
COUGH: 1
WHEEZING: 0
VOMITING: 0
CONSTIPATION: 0
RHINORRHEA: 0
TROUBLE SWALLOWING: 0
SORE THROAT: 0
ANAL BLEEDING: 0

## 2023-03-30 NOTE — DISCHARGE INSTRUCTIONS
Pre-op Instructions For Out-Patient Surgery    Medication Instructions:  Please stop herbs and any supplements now (includes vitamins and minerals). Please contact your surgeon and prescribing physician for pre-op instructions for any blood thinners. Stop Naproxen & Etodolac as directed    If you have inhalers/aerosol treatments at home, please use them the morning of your surgery and bring the inhalers with you to the hospital.    Please take the following medications the morning of your surgery with a sip of water:    Amlodipine, Buspar, Omeprazole, Inhaler    Surgery Instructions:  After midnight before surgery:  Do not eat or drink anything, including water, mints, gum, and hard candy. You may brush your teeth without swallowing. No smoking, chewing tobacco, or street drugs. Please shower or bathe before surgery. If you were given Surgical Scrub Chlorhexidine Gluconate Liquid (CHG), please shower the night before and the morning of your surgery following the detailed instructions you received during your pre-admission visit. Please do not wear any cologne, lotion, powder, deodorant, jewelry, piercings, perfume, makeup, nail polish, hair accessories, or hair spray on the day of surgery. Wear loose comfortable clothing. Leave your valuables at home. Bring a storage case for any glasses/contacts. An adult who is responsible for you MUST drive you home and should be with you for the first 24 hours after surgery. If having out-patient knee and foot surgeries, please arrange for planned crutches, walker, or wheelchair before arriving to the hospital.    The Day of Surgery:  Arrive at [facility] AT [facility] Surgery Entrance at the time directed by your surgeon and check in at the desk. If you have a living will or healthcare power of , please bring a copy. You will be taken to the pre-op holding area where you will be prepared for surgery.   A physical assessment will be performed by a nurse practitioner or house officer. Your IV will be started and you will meet your anesthesiologist.    When you go to surgery, your family will be directed to the surgical waiting room, where the doctor should speak with them after your surgery. After surgery, you will be taken to the recovery room then when you are awake and stable you will go to the short stay unit for preparation to be discharged. If you use a Bi-PAP or C-PAP machine, please bring it with you and leave it in the car in case it is needed in recovery room.

## 2023-03-30 NOTE — H&P
with chronic left shoulder pain       FINDINGS:   ROTATOR CUFF: Trace fluid in the subacromial/subdeltoid bursa. Low-grade partial-thickness interstitial and articular-surface tearing of   supraspinatus and infraspinatus between critical zone and footplate. Mild   underlying supraspinatus and infraspinatus tendinosis. Subscapularis and teres minor muscle/tendon appear grossly intact without   evidence of tearing. No significant atrophy or fatty degeneration of the visualized rotator cuff   musculature. BICEPS TENDON: Long head of biceps tendon properly located in the bicipital   groove and seen extending to the biceps labral anchor. LABRUM: Tearing at the posterosuperior labrum. Mild underlying diffuse   labral degeneration. No paralabral cyst formation. GLENOHUMERAL JOINT: Mild glenohumeral chondromalacia. No sizable   glenohumeral joint effusion. Inferior glenohumeral ligament appears intact. AC JOINT AND ACROMIOCLAVICULAR ARCH: Mild degenerative changes of the left AC   joint with associated marrow edema. Type 2 acromion. Subcortical cystic   changes about the left AC joint. BONE MARROW: Subcortical cystic changes at the posterolateral and lateral   humeral head. Bone marrow signal intensity within the visualized osseous   structures is within normal limits. No acute fracture or dislocation   involving the osseous components of the shoulder. OUTLET SPACES: Suprascapular notch and quadrilateral space grossly   unremarkable in appearance. No left axillary lymphadenopathy. Impression   1. Low-grade partial-thickness interstitial and articular-surface tearing of   supraspinatus and infraspinatus between critical zone and footplate. Mild   underlying supraspinatus and infraspinatus tendinosis. 2. Tearing at the posterosuperior labrum. Mild underlying diffuse labral   degeneration. 3. Mild glenohumeral chondromalacia.    4. Mild Lack of Transportation (Non-Medical): No   Housing Stability: Unknown    Unable to Pay for Housing in the Last Year: No    Unstable Housing in the Last Year: No       REVIEW OF SYSTEMS      Allergies   Allergen Reactions    Fish-Derived Products Anaphylaxis    Other Anaphylaxis     Any type of seafood    Shellfish Allergy Anaphylaxis    Shrimp (Diagnostic) Anaphylaxis    Ace Inhibitors Swelling     UVULITIS ON 8/15/19    Betadine [Povidone-Iodine] Rash     Allergic reaction topically to betadine from a shot       Current Outpatient Medications on File Prior to Encounter   Medication Sig Dispense Refill    etodolac (LODINE) 400 MG tablet Take 1 tablet by mouth 2 times daily (with meals) for 14 days 28 tablet 0    mometasone-formoterol (DULERA) 100-5 MCG/ACT inhaler Inhale 2 puffs into the lungs 2 times daily 13 g 5    atorvastatin (LIPITOR) 40 MG tablet Take 1 tablet by mouth Daily with supper ** make sure no pravastatin** 90 tablet 3    omeprazole (PRILOSEC) 40 MG delayed release capsule Take 1 capsule by mouth every morning (before breakfast) 90 capsule 3    amLODIPine (NORVASC) 10 MG tablet Take 1 tablet by mouth daily 90 tablet 3    montelukast (SINGULAIR) 10 MG tablet Take 1 tablet by mouth nightly 90 tablet 3    loratadine (CLARITIN) 10 MG tablet Take 1 tablet by mouth daily 90 tablet 3    naproxen (NAPROSYN) 500 MG tablet Take 1 tablet by mouth 2 times daily as needed for Pain Take with food 60 tablet 0    busPIRone (BUSPAR) 10 MG tablet take 1 tablet by mouth three times a day if needed for anxiety 270 tablet 3    albuterol (PROVENTIL) (2.5 MG/3ML) 0.083% nebulizer solution Take 3 mLs by nebulization every 6 hours as needed for Wheezing or Shortness of Breath 120 each 0    buPROPion (WELLBUTRIN XL) 150 MG extended release tablet Take 1 tablet by mouth every morning 90 tablet 3    Cinnamon 500 MG CAPS Take 1 capsule by mouth in the morning.  90 capsule 0    fluticasone (FLONASE) 50 MCG/ACT nasal spray 2 sprays

## 2023-03-31 LAB
EKG ATRIAL RATE: 93 BPM
EKG P AXIS: 47 DEGREES
EKG P-R INTERVAL: 164 MS
EKG Q-T INTERVAL: 354 MS
EKG QRS DURATION: 94 MS
EKG QTC CALCULATION (BAZETT): 440 MS
EKG R AXIS: 84 DEGREES
EKG T AXIS: 64 DEGREES
EKG VENTRICULAR RATE: 93 BPM

## 2023-03-31 PROCEDURE — 93010 ELECTROCARDIOGRAM REPORT: CPT | Performed by: INTERNAL MEDICINE

## 2023-04-05 ENCOUNTER — TELEPHONE (OUTPATIENT)
Dept: ORTHOPEDIC SURGERY | Age: 43
End: 2023-04-05

## 2023-04-05 NOTE — TELEPHONE ENCOUNTER
Patient called back and informed him to contact his PCP about obtaining clearance for his upcoming sx. PCP required cardiac clearance prior to their clearance. Patient has obtained cardiac clearance and now just needs PCP clearance.

## 2023-04-07 ENCOUNTER — OFFICE VISIT (OUTPATIENT)
Dept: ORTHOPEDIC SURGERY | Age: 43
End: 2023-04-07

## 2023-04-07 VITALS — WEIGHT: 265 LBS | RESPIRATION RATE: 14 BRPM | HEIGHT: 74 IN | BODY MASS INDEX: 34.01 KG/M2

## 2023-04-07 DIAGNOSIS — M77.12 LATERAL EPICONDYLITIS OF LEFT ELBOW: Primary | ICD-10-CM

## 2023-04-07 RX ORDER — SODIUM CHLORIDE 9 MG/ML
INJECTION, SOLUTION INTRAVENOUS PRN
OUTPATIENT
Start: 2023-04-07

## 2023-04-07 RX ORDER — SODIUM CHLORIDE 0.9 % (FLUSH) 0.9 %
5-40 SYRINGE (ML) INJECTION EVERY 12 HOURS SCHEDULED
OUTPATIENT
Start: 2023-04-07

## 2023-04-07 RX ORDER — HYDROCODONE BITARTRATE AND ACETAMINOPHEN 5; 325 MG/1; MG/1
1 TABLET ORAL EVERY 4 HOURS PRN
Qty: 42 TABLET | Refills: 0 | Status: SHIPPED | OUTPATIENT
Start: 2023-04-07 | End: 2023-04-14

## 2023-04-07 RX ORDER — ONDANSETRON 4 MG/1
4 TABLET, FILM COATED ORAL DAILY PRN
Qty: 20 TABLET | Refills: 0 | Status: SHIPPED | OUTPATIENT
Start: 2023-04-07

## 2023-04-07 RX ORDER — SODIUM CHLORIDE 0.9 % (FLUSH) 0.9 %
5-40 SYRINGE (ML) INJECTION PRN
OUTPATIENT
Start: 2023-04-07

## 2023-04-07 RX ORDER — ACETAMINOPHEN 325 MG/1
1000 TABLET ORAL ONCE
OUTPATIENT
Start: 2023-04-07 | End: 2023-04-07

## 2023-04-07 NOTE — PROGRESS NOTES
40) in his brother; Heart Disease in his brother; High Blood Pressure in his brother; High Cholesterol in his father; Other in his mother; Rheum Arthritis (age of onset: 9) in an other family member. Review of Systems   History obtained from the patient. REVIEW OF SYSTEMS:   Constitution: negative for fever, chills, weight loss or malaise   Musculoskeletal: As noted in the HPI   Neurologic: As noted in the HPI    Physical Exam  General Appearance: alert, well appearing, and in no distress  Mental Status: alert, oriented to person, place, and time  Evaluation of patient's left upper extremity demonstrates intact skin without warmth erythema or notable swelling. Heart: Regular rate and rhythm  Lungs: Clear to auscultation bilaterally    Assessment and Plan  Kris Purvis is a 36 y.o. old male with left elbow lateral epicondylitis. He is failed extensive treatment conservatively and is elected to proceed with surgery by way of an arthroscopic ECRB debridement and release. We once again had a discussion about the details of the procedure, postoperative recovery course and expected outcome. He was provided his prescriptions for postoperative analgesia and a sling to protect his elbow leading up to and following surgery. All questions were answered. He has obtained appropriate preoperative medical clearance. We will proceed with surgery as currently scheduled. This note is created with the assistance of a speech recognition program.  While intending to generate adocument that actually reflects the content of the visit, the document can still have some errors including those of syntax and sound a like substitutions which may escape proof reading. It such instances, actual meaningcan be extrapolated by contextual diversion.

## 2023-04-12 PROBLEM — R76.8 POSITIVE AUTOANTIBODY SCREENING FOR CELIAC DISEASE: Status: ACTIVE | Noted: 2023-04-12

## 2023-04-12 NOTE — PRE-PROCEDURE INSTRUCTIONS
Nothing to eat after midnight. informed  Are you taking any blood thinners? When was the last day? none  Make sure to use Hibiclens prior to surgery. aware  Remove any jewelry and body piercings. notified  Do you wear glasses? If so, please bring a case to store them in. Are you having any Covid symptoms? none  Do you have any new rashes, infections, etc. that we should be aware of? Do you have a ride home the day of surgery? It cannot be a cab or medical transportation.  Wife   Verify surgery time and what time to arrive at hospital. 0530  No answer, vm left, patient called back

## 2023-04-13 ENCOUNTER — HOSPITAL ENCOUNTER (OUTPATIENT)
Age: 43
Setting detail: OUTPATIENT SURGERY
Discharge: HOME | End: 2023-04-13
Attending: ORTHOPAEDIC SURGERY | Admitting: ORTHOPAEDIC SURGERY
Payer: COMMERCIAL

## 2023-04-13 VITALS
BODY MASS INDEX: 34.01 KG/M2 | DIASTOLIC BLOOD PRESSURE: 70 MMHG | HEART RATE: 94 BPM | SYSTOLIC BLOOD PRESSURE: 132 MMHG | WEIGHT: 265 LBS | TEMPERATURE: 97.1 F | RESPIRATION RATE: 16 BRPM | OXYGEN SATURATION: 93 % | HEIGHT: 74 IN

## 2023-04-13 PROCEDURE — 2580000003 HC RX 258: Performed by: ANESTHESIOLOGY

## 2023-04-13 PROCEDURE — 6370000000 HC RX 637 (ALT 250 FOR IP): Performed by: NURSE PRACTITIONER

## 2023-04-13 PROCEDURE — 3600000013 HC SURGERY LEVEL 3 ADDTL 15MIN: Performed by: ORTHOPAEDIC SURGERY

## 2023-04-13 PROCEDURE — 2709999900 HC NON-CHARGEABLE SUPPLY: Performed by: ORTHOPAEDIC SURGERY

## 2023-04-13 PROCEDURE — 7100000001 HC PACU RECOVERY - ADDTL 15 MIN: Performed by: ORTHOPAEDIC SURGERY

## 2023-04-13 PROCEDURE — 94640 AIRWAY INHALATION TREATMENT: CPT

## 2023-04-13 PROCEDURE — 6370000000 HC RX 637 (ALT 250 FOR IP): Performed by: ORTHOPAEDIC SURGERY

## 2023-04-13 PROCEDURE — 3700000000 HC ANESTHESIA ATTENDED CARE: Performed by: ORTHOPAEDIC SURGERY

## 2023-04-13 PROCEDURE — 7100000031 HC ASPR PHASE II RECOVERY - ADDTL 15 MIN: Performed by: ORTHOPAEDIC SURGERY

## 2023-04-13 PROCEDURE — 2720000010 HC SURG SUPPLY STERILE: Performed by: ORTHOPAEDIC SURGERY

## 2023-04-13 PROCEDURE — 2500000003 HC RX 250 WO HCPCS: Performed by: ORTHOPAEDIC SURGERY

## 2023-04-13 PROCEDURE — 7100000030 HC ASPR PHASE II RECOVERY - FIRST 15 MIN: Performed by: ORTHOPAEDIC SURGERY

## 2023-04-13 PROCEDURE — 6370000000 HC RX 637 (ALT 250 FOR IP): Performed by: ANESTHESIOLOGY

## 2023-04-13 PROCEDURE — 7100000011 HC PHASE II RECOVERY - ADDTL 15 MIN: Performed by: ORTHOPAEDIC SURGERY

## 2023-04-13 PROCEDURE — 3600000003 HC SURGERY LEVEL 3 BASE: Performed by: ORTHOPAEDIC SURGERY

## 2023-04-13 PROCEDURE — 7100000000 HC PACU RECOVERY - FIRST 15 MIN: Performed by: ORTHOPAEDIC SURGERY

## 2023-04-13 PROCEDURE — 3700000001 HC ADD 15 MINUTES (ANESTHESIA): Performed by: ORTHOPAEDIC SURGERY

## 2023-04-13 PROCEDURE — 94761 N-INVAS EAR/PLS OXIMETRY MLT: CPT

## 2023-04-13 PROCEDURE — 7100000010 HC PHASE II RECOVERY - FIRST 15 MIN: Performed by: ORTHOPAEDIC SURGERY

## 2023-04-13 RX ORDER — DEXAMETHASONE SODIUM PHOSPHATE 10 MG/ML
10 INJECTION, SOLUTION INTRAMUSCULAR; INTRAVENOUS ONCE
Status: DISCONTINUED | OUTPATIENT
Start: 2023-04-13 | End: 2023-04-13 | Stop reason: HOSPADM

## 2023-04-13 RX ORDER — SODIUM CHLORIDE 0.9 % (FLUSH) 0.9 %
5-40 SYRINGE (ML) INJECTION PRN
Status: DISCONTINUED | OUTPATIENT
Start: 2023-04-13 | End: 2023-04-13 | Stop reason: HOSPADM

## 2023-04-13 RX ORDER — DIPHENHYDRAMINE HYDROCHLORIDE 50 MG/ML
12.5 INJECTION INTRAMUSCULAR; INTRAVENOUS
Status: DISCONTINUED | OUTPATIENT
Start: 2023-04-13 | End: 2023-04-13 | Stop reason: HOSPADM

## 2023-04-13 RX ORDER — ONDANSETRON 2 MG/ML
4 INJECTION INTRAMUSCULAR; INTRAVENOUS
Status: DISCONTINUED | OUTPATIENT
Start: 2023-04-13 | End: 2023-04-13 | Stop reason: HOSPADM

## 2023-04-13 RX ORDER — LIDOCAINE HYDROCHLORIDE 10 MG/ML
1 INJECTION, SOLUTION EPIDURAL; INFILTRATION; INTRACAUDAL; PERINEURAL
Status: DISCONTINUED | OUTPATIENT
Start: 2023-04-13 | End: 2023-04-13 | Stop reason: HOSPADM

## 2023-04-13 RX ORDER — GABAPENTIN 300 MG/1
300 CAPSULE ORAL ONCE
Status: COMPLETED | OUTPATIENT
Start: 2023-04-13 | End: 2023-04-13

## 2023-04-13 RX ORDER — MIDAZOLAM HYDROCHLORIDE 1 MG/ML
2 INJECTION INTRAMUSCULAR; INTRAVENOUS ONCE
Status: DISCONTINUED | OUTPATIENT
Start: 2023-04-13 | End: 2023-04-13 | Stop reason: HOSPADM

## 2023-04-13 RX ORDER — HYDROCODONE BITARTRATE AND ACETAMINOPHEN 5; 325 MG/1; MG/1
1 TABLET ORAL ONCE AS NEEDED
Status: COMPLETED | OUTPATIENT
Start: 2023-04-13 | End: 2023-04-13

## 2023-04-13 RX ORDER — ACETAMINOPHEN 500 MG
1000 TABLET ORAL ONCE
Status: COMPLETED | OUTPATIENT
Start: 2023-04-13 | End: 2023-04-13

## 2023-04-13 RX ORDER — SODIUM CHLORIDE, SODIUM LACTATE, POTASSIUM CHLORIDE, CALCIUM CHLORIDE 600; 310; 30; 20 MG/100ML; MG/100ML; MG/100ML; MG/100ML
INJECTION, SOLUTION INTRAVENOUS CONTINUOUS
Status: DISCONTINUED | OUTPATIENT
Start: 2023-04-13 | End: 2023-04-13 | Stop reason: HOSPADM

## 2023-04-13 RX ORDER — DEXAMETHASONE SODIUM PHOSPHATE 4 MG/ML
4 INJECTION, SOLUTION INTRA-ARTICULAR; INTRALESIONAL; INTRAMUSCULAR; INTRAVENOUS; SOFT TISSUE ONCE
Status: DISCONTINUED | OUTPATIENT
Start: 2023-04-13 | End: 2023-04-13 | Stop reason: HOSPADM

## 2023-04-13 RX ORDER — IPRATROPIUM BROMIDE AND ALBUTEROL SULFATE 2.5; .5 MG/3ML; MG/3ML
1 SOLUTION RESPIRATORY (INHALATION) ONCE
Status: COMPLETED | OUTPATIENT
Start: 2023-04-13 | End: 2023-04-13

## 2023-04-13 RX ORDER — SODIUM CHLORIDE 0.9 % (FLUSH) 0.9 %
5-40 SYRINGE (ML) INJECTION PRN
Status: DISCONTINUED | OUTPATIENT
Start: 2023-04-13 | End: 2023-04-13 | Stop reason: SDUPTHER

## 2023-04-13 RX ORDER — SODIUM CHLORIDE 0.9 % (FLUSH) 0.9 %
5-40 SYRINGE (ML) INJECTION EVERY 12 HOURS SCHEDULED
Status: DISCONTINUED | OUTPATIENT
Start: 2023-04-13 | End: 2023-04-13 | Stop reason: SDUPTHER

## 2023-04-13 RX ORDER — ROPIVACAINE HYDROCHLORIDE 5 MG/ML
30 INJECTION, SOLUTION EPIDURAL; INFILTRATION; PERINEURAL ONCE
Status: DISCONTINUED | OUTPATIENT
Start: 2023-04-13 | End: 2023-04-13 | Stop reason: HOSPADM

## 2023-04-13 RX ORDER — SODIUM CHLORIDE 9 MG/ML
INJECTION, SOLUTION INTRAVENOUS PRN
Status: DISCONTINUED | OUTPATIENT
Start: 2023-04-13 | End: 2023-04-13 | Stop reason: SDUPTHER

## 2023-04-13 RX ORDER — SODIUM CHLORIDE 9 MG/ML
INJECTION, SOLUTION INTRAVENOUS PRN
Status: DISCONTINUED | OUTPATIENT
Start: 2023-04-13 | End: 2023-04-13 | Stop reason: HOSPADM

## 2023-04-13 RX ORDER — BUPIVACAINE HYDROCHLORIDE AND EPINEPHRINE 5; 5 MG/ML; UG/ML
INJECTION, SOLUTION EPIDURAL; INTRACAUDAL; PERINEURAL PRN
Status: DISCONTINUED | OUTPATIENT
Start: 2023-04-13 | End: 2023-04-13 | Stop reason: ALTCHOICE

## 2023-04-13 RX ORDER — SODIUM CHLORIDE 0.9 % (FLUSH) 0.9 %
5-40 SYRINGE (ML) INJECTION EVERY 12 HOURS SCHEDULED
Status: DISCONTINUED | OUTPATIENT
Start: 2023-04-13 | End: 2023-04-13 | Stop reason: HOSPADM

## 2023-04-13 RX ADMIN — ACETAMINOPHEN 1000 MG: 500 TABLET ORAL at 06:04

## 2023-04-13 RX ADMIN — GABAPENTIN 300 MG: 300 CAPSULE ORAL at 06:04

## 2023-04-13 RX ADMIN — HYDROCODONE BITARTRATE AND ACETAMINOPHEN 1 TABLET: 5; 325 TABLET ORAL at 09:49

## 2023-04-13 RX ADMIN — IPRATROPIUM BROMIDE AND ALBUTEROL SULFATE 1 AMPULE: 2.5; .5 SOLUTION RESPIRATORY (INHALATION) at 06:44

## 2023-04-13 RX ADMIN — SODIUM CHLORIDE, POTASSIUM CHLORIDE, SODIUM LACTATE AND CALCIUM CHLORIDE: 600; 310; 30; 20 INJECTION, SOLUTION INTRAVENOUS at 06:12

## 2023-04-13 ASSESSMENT — PAIN DESCRIPTION - ORIENTATION
ORIENTATION: LEFT

## 2023-04-13 ASSESSMENT — PAIN - FUNCTIONAL ASSESSMENT
PAIN_FUNCTIONAL_ASSESSMENT: 0-10
PAIN_FUNCTIONAL_ASSESSMENT: PREVENTS OR INTERFERES SOME ACTIVE ACTIVITIES AND ADLS

## 2023-04-13 ASSESSMENT — PAIN DESCRIPTION - ONSET: ONSET: AWAKENED FROM SLEEP

## 2023-04-13 ASSESSMENT — PAIN DESCRIPTION - LOCATION
LOCATION: ELBOW

## 2023-04-13 ASSESSMENT — PAIN DESCRIPTION - DESCRIPTORS: DESCRIPTORS: ACHING

## 2023-04-13 ASSESSMENT — PAIN SCALES - GENERAL
PAINLEVEL_OUTOF10: 8
PAINLEVEL_OUTOF10: 10
PAINLEVEL_OUTOF10: 7

## 2023-04-13 ASSESSMENT — PAIN DESCRIPTION - PAIN TYPE: TYPE: SURGICAL PAIN

## 2023-04-13 ASSESSMENT — PAIN DESCRIPTION - FREQUENCY: FREQUENCY: CONTINUOUS

## 2023-04-13 NOTE — OP NOTE
anteromedial portal was established first. A sharp incision was made in the skin and blunt dissection with a hemostat was performed anterior to the medial intermuscular septum down to the anterior joint capsule. A blunt Wissinger elaine was then inserted into the joint and used to guide the insertion of the arthroscope. Diagnostic arthroscopy reveled mild anterior synovitis with intact cartilage surfaces. There were no loose bodies. There was no capsular lesion on the later side. Next the anterolateral portal was established using an outside-in technique by first locating the entry point using a spinal needle starting about 2 cm proximal to the lateral epicondyle, and then making a 1 cm skin incision following by blunt dissection using a hemostat. Next, an extensive debridement of hypertrophic synovium was performed. The lateral capsule was then opened and the ECRB debrided and released using an arthrocare ablator and 4-0 shaver. The lateral wall of the ECRB origin was debrided with a shaver. Next, posterolateral and central posterior incisions were made. Diagnostic arthroscopy demonstrated hypertrophic synovium. Medial and latera gutters were explored. Extensive debridement of hypertrophic synovium was performed using a shaver. Arthroscopic instruments were withdrawn and the portal incisions inflated with 30 mL of 0.5% marcaine. Tourniquet was deflated and portal incisions closed using 3-0 prolene. Sterile dressings were applied using xeroform, 4 x 4 gauze, and Tegaderm. His arm was placed in a sling. He was awoken, extubated and transferred to his bed and recovery in stable condition.          Complications: None     Post-operative Condition: Stable

## 2023-04-13 NOTE — BRIEF OP NOTE
Brief Postoperative Note      Patient: Ede Telles  YOB: 1980  MRN: [id number]    Date of Procedure: 4/13/2023    Pre-Op Diagnosis Codes:     * Lateral epicondylitis of left elbow [M77.12]    Post-Op Diagnosis: Same       Procedure(s):  ARTHROSCOPIC ELBOW EXTENSOR CARPI RADIALIS BREVIS DEBRIDEMENT AND RELEASE    Surgeon(s):  Wing Ravindra MD    Assistant:  * No surgical staff found *    Anesthesia: General    Estimated Blood Loss (mL): 10    Complications: None    Specimens:   * No specimens in log *    Implants:  * No implants in log *      Drains: * No LDAs found *    Findings: See operative report      Electronically signed by Wing Ravindra MD on 4/13/2023 at 8:23 AM

## 2023-04-13 NOTE — DISCHARGE INSTRUCTIONS
medication. If you are not allergic, Ibuprofen 200-600mg (i.e. Advil) may be taken in between the narcotic pain medication to help smooth out the post-operative peaks and valleys, reduce overall amount of pain medication required, and increase the time intervals between narcotic pain medication use. Do not take more than 2400mg in a day. Please resume all home medications, unless instructed otherwise. ACTIVITY  Keep the arm elevated for several days following surgery to decrease swelling. Do not engage in activities which increase pain/swelling such as lifting and carrying  for at least 6 weeks following surgery. ICE THERAPY  Begin immediately after surgery. Do not place ice directly on the skin (place over an Ace wrap or thin clothing). Use icing machine continuously or ice packs (if machine not prescribed) every 2  hours for 20 minutes daily until your first post-operative visit. EXERCISE  You may begin gentle range of motion exercises for the shoulder and elbow 3  times per day. Formal physical therapy (PT) will begin in 2 weeks as scheduled with a prescription that will be provided during your post-operative visit. Jael Dumont at [phone] if any of the following are present: Painful swelling or numbness, Unrelenting pain, Fever (over 101 - it is normal to have a low grade fever for the first day or two following surgery) or chills, Redness around incisions, Color change in hand or fingers, Continuous drainage or bleeding from incision (a small amount of drainage is expected), Difficulty breathing, Excessive nausea/vomiting **If you have an emergency after office hours or on the weekend, contact the same office number ([phone]) and you will be connected to our page service - they will contact Dr. Alyce Dumont or his partner if he is unavailable. **If you have an emergency that requires immediate attention, proceed to the nearest emergency room.     FOLLOW-UP CARE /

## 2023-04-13 NOTE — INTERVAL H&P NOTE
Update History & Physical    The patient's History and Physical of March 30, 2023 was reviewed with the patient and I examined the patient. There was no change. Here today for ARTHROSCOPIC ELBOW EXTENSOR CARPI RADIALIS BREVIS DEBRIDEMENT AND RELEASE- LEFT per Dr. Amanda Lane. Pt AAO x 3 in NAD. HRRR. Mild expiratory wheezing to left lung fields. Mild, dry cough. No respiratory distress. Pt did not use inhalers this am. Duoneb ordered for pre-op. NPO p MN. Took omeprazole this am with sip of water. Denies taking anticoagulants or blood thinning medications. Denies recent or current chest pain/pressure, palpitations, SOB, recent URI, fever or chills. Review vitals per RN flowsheet.        Electronically signed by LAMAR Robertson CNP on 4/13/2023 at 5:36 AM

## 2023-04-14 ENCOUNTER — TELEPHONE (OUTPATIENT)
Dept: ORTHOPEDIC SURGERY | Age: 43
End: 2023-04-14

## 2023-04-17 NOTE — TELEPHONE ENCOUNTER
Called patient to let him know that if he wants us to fax the STD back to his company, we will need a MIGUEL ANGEL signed. He stated his employer faxed over the STD form to our office on Friday. He will stop by the [place] office to sign the MIGUEL ANGEL. Patient also wanted to know if he can remove the water proof bandage after his shower to let air get to his skin, the bandage is irritating his skin. I let him know as long as the stitches stay dry and clean he can leave the bandage off for a while, to watch that clothing doesn't rub on stitches to cause more irritation.

## 2023-04-18 NOTE — TELEPHONE ENCOUNTER
Patria Elkins completed and scanned into chart yesterday. Paperwork completed and faxed along with patient's op note. Paperwork and fax confirmation scanned into chart.

## 2023-04-28 ENCOUNTER — OFFICE VISIT (OUTPATIENT)
Dept: ORTHOPEDIC SURGERY | Age: 43
End: 2023-04-28

## 2023-04-28 VITALS — HEIGHT: 74 IN | RESPIRATION RATE: 16 BRPM | WEIGHT: 265 LBS | BODY MASS INDEX: 34.01 KG/M2

## 2023-04-28 DIAGNOSIS — M77.12 LATERAL EPICONDYLITIS OF LEFT ELBOW: ICD-10-CM

## 2023-04-28 DIAGNOSIS — Z98.890 STATUS POST ARTHROSCOPY: Primary | ICD-10-CM

## 2023-04-28 PROCEDURE — 99024 POSTOP FOLLOW-UP VISIT: CPT | Performed by: ORTHOPAEDIC SURGERY

## 2023-04-28 NOTE — PROGRESS NOTES
Procedure: Left elbow arthroscopic ECRB debridement/release  Date of procedure: 4/13/2023    HPI: Mr. Mindi Ortiz is a 44-year-old who is approximately 2 weeks status post the aforementioned procedure. He indicates that he is doing fairly well but has fair amount of pain still present which she rates as a 9/10 at its worst.  He denies having any fevers chills sweats or constitutional symptoms and denies having any numbness or tingling. Physical examination:  Evaluation patient is left elbow and upper extremity demonstrates his incisions to be clean, dry, intact and healing appropriately. Moderate diffuse swelling is noted. No wound dehiscence or drainage is noted. He has a 2+ radial pulse with brisk cap refill in his fingers and sensation is grossly intact light touch in all dermatomes. Impression and plan: Mr. Mindi Ortiz is a 44-year-old who is approximately 2 weeks status post left elbow arthroscopic ECRB debridement/release. He is doing fairly well at this time. Today sutures were taken out and Steri-Strips and clean dressings were applied. He may now get his incisions wet in the shower but is to avoid submerging them in a bath or any body of water. He can get out of his sling and start wearing his wrist brace during today while active. I prescription for physical therapy was provided. I will see him back for reevaluation in 4 weeks. 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 I explained to him as long as he is not really putting a lot of stress or strain across his wrist as he does so that should be okay.

## 2023-04-29 PROBLEM — Z01.818 PREOP EXAMINATION: Status: RESOLVED | Noted: 2023-03-30 | Resolved: 2023-04-29

## 2023-05-22 ENCOUNTER — OFFICE VISIT (OUTPATIENT)
Dept: ORTHOPEDIC SURGERY | Age: 43
End: 2023-05-22

## 2023-05-22 VITALS — HEIGHT: 74 IN | RESPIRATION RATE: 14 BRPM | WEIGHT: 270 LBS | BODY MASS INDEX: 34.65 KG/M2

## 2023-05-22 DIAGNOSIS — Z98.890 STATUS POST ARTHROSCOPY: ICD-10-CM

## 2023-05-22 DIAGNOSIS — M77.12 LATERAL EPICONDYLITIS OF LEFT ELBOW: Primary | ICD-10-CM

## 2023-05-22 PROCEDURE — 99024 POSTOP FOLLOW-UP VISIT: CPT | Performed by: ORTHOPAEDIC SURGERY

## 2023-06-08 DIAGNOSIS — J30.89 SEASONAL ALLERGIC RHINITIS DUE TO OTHER ALLERGIC TRIGGER: ICD-10-CM

## 2023-06-08 RX ORDER — FLUTICASONE PROPIONATE 50 MCG
SPRAY, SUSPENSION (ML) NASAL
Qty: 16 G | Refills: 3 | Status: SHIPPED | OUTPATIENT
Start: 2023-06-08

## 2023-06-08 NOTE — TELEPHONE ENCOUNTER
Please Approve or Refuse.   Send to Pharmacy per Pt's Request: [facility]     Next Visit Date:  sent [employer] message   Last Visit Date: 2/23/2023    Hemoglobin A1C (%)   Date Value   04/12/2023 5.6   04/08/2022 5.5   10/04/2021 5.5             ( goal A1C is < 7)   BP Readings from Last 3 Encounters:   04/13/23 132/70   04/12/23 130/88   03/30/23 (!) 149/87          (goal 120/80)  BUN   Date Value Ref Range Status   04/12/2023 9 6 - 20 mg/dL Final     Creatinine   Date Value Ref Range Status   04/12/2023 0.83 0.70 - 1.20 mg/dL Final     Potassium   Date Value Ref Range Status   04/12/2023 4.3 3.7 - 5.3 mmol/L Final

## 2023-06-30 ENCOUNTER — TELEPHONE (OUTPATIENT)
Dept: ORTHOPEDIC SURGERY | Age: 43
End: 2023-06-30

## 2023-07-10 NOTE — TELEPHONE ENCOUNTER
Patient called to check the status of his release to work fitness for duty.  Patient can be called back at [phone]

## 2023-07-11 NOTE — TELEPHONE ENCOUNTER
Letter clearing the patient to immediately RTW without restrictions was created and sent to patient via [employer]. Patient can also come into office to  a copy of the letter.

## 2023-07-12 NOTE — TELEPHONE ENCOUNTER
Fitness for Duty form completed and scanned into chart on 7/12. Message with the form attached was sent to the patient via [employer].

## 2023-07-19 ENCOUNTER — TELEPHONE (OUTPATIENT)
Dept: ORTHOPEDIC SURGERY | Age: 43
End: 2023-07-19

## 2023-07-19 NOTE — TELEPHONE ENCOUNTER
ELBAM with patient to call office back. It is likely that the form he needs is scanned into his chart on 7/12/23.  Patient can come into office to  form or he did complete a MIGUEL ANGEL to have it faxed to [address] [place] [address] at fax number [phone]

## 2023-07-19 NOTE — TELEPHONE ENCOUNTER
Received call from patient stating there is an issue with his return to work note. Pt stated hs work told him they cant accept a letter from the doctors office & the only way he can return is if the office fills out the paperwork that was sent over. Patient requesting call back ASAP to discuss.     Call back#: [phone]

## 2023-07-29 DIAGNOSIS — M13.0 SERONEGATIVE POLYARTHRITIS: ICD-10-CM

## 2023-07-30 RX ORDER — FOLIC ACID 1 MG/1
TABLET ORAL
Qty: 90 TABLET | Refills: 3 | Status: SHIPPED | OUTPATIENT
Start: 2023-07-30

## 2023-07-30 NOTE — TELEPHONE ENCOUNTER
Please Approve or Refuse.   Send to Pharmacy per Pt's Request:      Next Visit Date:  Visit date not found   Last Visit Date: 2/23/2023    Hemoglobin A1C (%)   Date Value   04/12/2023 5.6   04/08/2022 5.5   10/04/2021 5.5             ( goal A1C is < 7)   BP Readings from Last 3 Encounters:   04/13/23 132/70   04/12/23 130/88   03/30/23 (!) 149/87          (goal 120/80)  BUN   Date Value Ref Range Status   04/12/2023 9 6 - 20 mg/dL Final     Creatinine   Date Value Ref Range Status   04/12/2023 0.83 0.70 - 1.20 mg/dL Final     Potassium   Date Value Ref Range Status   04/12/2023 4.3 3.7 - 5.3 mmol/L Final

## 2023-07-31 ENCOUNTER — OFFICE VISIT (OUTPATIENT)
Dept: ORTHOPEDIC SURGERY | Age: 43
End: 2023-07-31

## 2023-07-31 VITALS — BODY MASS INDEX: 34.65 KG/M2 | WEIGHT: 270 LBS | HEIGHT: 74 IN | RESPIRATION RATE: 14 BRPM

## 2023-07-31 DIAGNOSIS — M77.12 LATERAL EPICONDYLITIS OF LEFT ELBOW: Primary | ICD-10-CM

## 2023-07-31 PROCEDURE — 99024 POSTOP FOLLOW-UP VISIT: CPT | Performed by: ORTHOPAEDIC SURGERY

## 2023-07-31 NOTE — PROGRESS NOTES
Procedure: Left elbow arthroscopic ECRB debridement/release  Date of procedure: 4/13/2023    HPI: Mr. Tana Lucia is a 44-year-old who is approximately 3 months status post the aforementioned procedure. He has improved since our last visit. He states that he still has some mild residual pain from time to time. Therapy continues to go well for him. Physical examination:  Evaluation patient is left elbow and upper extremity demonstrates his incisions to be appropriately healed. Minimal swelling is noted. No wound dehiscence or drainage is noted. He has a 2+ radial pulse with brisk cap refill in his fingers and sensation is grossly intact light touch in all dermatomes. He is tender to palpation diffusely about the lateral and anterior aspect of the elbow. He has full elbow extension and flexes to approximately 140 degrees. He is nontender to palpation at the lateral epicondyle. He has some mild pain at this location with resisted wrist extension. Impression and plan: Mr. Tana Lucia is a 44-year-old who is approximately 3 months status post left elbow arthroscopic ECRB debridement/release. He is doing fairly well at this time. He was encouraged to keep up with his exercises from therapy and can gradually increase use of this arm as he feels comfortable. I will see him back for reevaluation in 3 months as a final check but he may return or call earlier with questions or concerns.

## 2023-09-24 DIAGNOSIS — F33.42 RECURRENT MAJOR DEPRESSIVE DISORDER, IN FULL REMISSION (HCC): ICD-10-CM

## 2023-09-24 DIAGNOSIS — F40.10 SOCIAL ANXIETY DISORDER: ICD-10-CM

## 2023-09-25 RX ORDER — BUPROPION HYDROCHLORIDE 150 MG/1
150 TABLET ORAL EVERY MORNING
Qty: 90 TABLET | Refills: 0 | Status: SHIPPED | OUTPATIENT
Start: 2023-09-25

## 2023-09-26 NOTE — TELEPHONE ENCOUNTER
9/26/2023  Patient is MY-CHART STATUS ACTIVE. A My-Chart message has been sent out to patient. Per Provider request to deliver message to patient in regard to scheduling appt.   Future Appointments   Date Time Provider [facility] [address]   11/6/2023  4:15 PM Hosea Cerrato MD [facility]

## 2023-10-04 RX ORDER — ATORVASTATIN CALCIUM 40 MG/1
TABLET, FILM COATED ORAL
Qty: 90 TABLET | Refills: 3 | Status: SHIPPED | OUTPATIENT
Start: 2023-10-04

## 2023-10-04 NOTE — TELEPHONE ENCOUNTER
10/4/2023  Patient is MY-CHART STATUS ACTIVE. A My-Chart message has been sent out to patient. Per Provider request to deliver message to patient in regard to scheduling a follow up visit.   Future Appointments   Date Time Provider [address]   11/6/2023  4:15 PM Mindy Ortiz MD [facility]

## 2023-10-12 ENCOUNTER — APPOINTMENT (OUTPATIENT)
Dept: CT IMAGING | Age: 43
End: 2023-10-12
Payer: COMMERCIAL

## 2023-10-12 ENCOUNTER — HOSPITAL ENCOUNTER (EMERGENCY)
Age: 43
Discharge: LEFT BEFORE DISCHARGE | End: 2023-10-12
Attending: EMERGENCY MEDICINE
Payer: COMMERCIAL

## 2023-10-12 VITALS
SYSTOLIC BLOOD PRESSURE: 127 MMHG | HEIGHT: 74 IN | BODY MASS INDEX: 35.16 KG/M2 | OXYGEN SATURATION: 94 % | RESPIRATION RATE: 17 BRPM | DIASTOLIC BLOOD PRESSURE: 75 MMHG | HEART RATE: 87 BPM | WEIGHT: 274 LBS | TEMPERATURE: 98.1 F

## 2023-10-12 DIAGNOSIS — R07.9 CHEST PAIN, UNSPECIFIED TYPE: Primary | ICD-10-CM

## 2023-10-12 LAB
ANION GAP SERPL CALCULATED.3IONS-SCNC: 12 MMOL/L (ref 9–17)
BASOPHILS # BLD: 0.1 K/UL (ref 0–0.2)
BASOPHILS NFR BLD: 1 % (ref 0–2)
BUN SERPL-MCNC: 13 MG/DL (ref 6–20)
CALCIUM SERPL-MCNC: 8.9 MG/DL (ref 8.6–10.4)
CHLORIDE SERPL-SCNC: 107 MMOL/L (ref 98–107)
CO2 SERPL-SCNC: 22 MMOL/L (ref 20–31)
CREAT SERPL-MCNC: 0.9 MG/DL (ref 0.7–1.2)
EOSINOPHIL # BLD: 0.6 K/UL (ref 0–0.4)
EOSINOPHILS RELATIVE PERCENT: 6 % (ref 0–4)
ERYTHROCYTE [DISTWIDTH] IN BLOOD BY AUTOMATED COUNT: 14.1 % (ref 11.5–14.9)
GFR SERPL CREATININE-BSD FRML MDRD: >60 ML/MIN/1.73M2
GLUCOSE SERPL-MCNC: 96 MG/DL (ref 70–99)
HCT VFR BLD AUTO: 48.9 % (ref 41–53)
HGB BLD-MCNC: 16.4 G/DL (ref 13.5–17.5)
LYMPHOCYTES NFR BLD: 2.5 K/UL (ref 1–4.8)
LYMPHOCYTES RELATIVE PERCENT: 27 % (ref 24–44)
MCH RBC QN AUTO: 31.1 PG (ref 26–34)
MCHC RBC AUTO-ENTMCNC: 33.5 G/DL (ref 31–37)
MCV RBC AUTO: 92.8 FL (ref 80–100)
MONOCYTES NFR BLD: 1.1 K/UL (ref 0.1–1.3)
MONOCYTES NFR BLD: 11 % (ref 1–7)
NEUTROPHILS NFR BLD: 55 % (ref 36–66)
NEUTS SEG NFR BLD: 5.3 K/UL (ref 1.3–9.1)
PLATELET # BLD AUTO: 268 K/UL (ref 150–450)
PMV BLD AUTO: 8.5 FL (ref 6–12)
POTASSIUM SERPL-SCNC: 4 MMOL/L (ref 3.7–5.3)
RBC # BLD AUTO: 5.27 M/UL (ref 4.5–5.9)
SODIUM SERPL-SCNC: 141 MMOL/L (ref 135–144)
TROPONIN I SERPL HS-MCNC: 8 NG/L (ref 0–22)
TROPONIN I SERPL HS-MCNC: <6 NG/L (ref 0–22)
WBC OTHER # BLD: 9.5 K/UL (ref 3.5–11)

## 2023-10-12 PROCEDURE — 2580000003 HC RX 258: Performed by: EMERGENCY MEDICINE

## 2023-10-12 PROCEDURE — 6360000004 HC RX CONTRAST MEDICATION: Performed by: EMERGENCY MEDICINE

## 2023-10-12 PROCEDURE — 71275 CT ANGIOGRAPHY CHEST: CPT

## 2023-10-12 PROCEDURE — 84484 ASSAY OF TROPONIN QUANT: CPT

## 2023-10-12 PROCEDURE — 99285 EMERGENCY DEPT VISIT HI MDM: CPT

## 2023-10-12 PROCEDURE — 70450 CT HEAD/BRAIN W/O DYE: CPT

## 2023-10-12 PROCEDURE — 85025 COMPLETE CBC W/AUTO DIFF WBC: CPT

## 2023-10-12 PROCEDURE — 6370000000 HC RX 637 (ALT 250 FOR IP): Performed by: EMERGENCY MEDICINE

## 2023-10-12 PROCEDURE — 70498 CT ANGIOGRAPHY NECK: CPT

## 2023-10-12 PROCEDURE — 6360000002 HC RX W HCPCS: Performed by: EMERGENCY MEDICINE

## 2023-10-12 PROCEDURE — 36415 COLL VENOUS BLD VENIPUNCTURE: CPT

## 2023-10-12 PROCEDURE — 80048 BASIC METABOLIC PNL TOTAL CA: CPT

## 2023-10-12 PROCEDURE — 96374 THER/PROPH/DIAG INJ IV PUSH: CPT

## 2023-10-12 RX ORDER — ASPIRIN 81 MG/1
324 TABLET, CHEWABLE ORAL ONCE
Status: COMPLETED | OUTPATIENT
Start: 2023-10-12 | End: 2023-10-12

## 2023-10-12 RX ORDER — 0.9 % SODIUM CHLORIDE 0.9 %
100 INTRAVENOUS SOLUTION INTRAVENOUS ONCE
Status: COMPLETED | OUTPATIENT
Start: 2023-10-12 | End: 2023-10-12

## 2023-10-12 RX ORDER — ASPIRIN 81 MG/1
324 TABLET, CHEWABLE ORAL ONCE
Status: DISCONTINUED | OUTPATIENT
Start: 2023-10-12 | End: 2023-10-12

## 2023-10-12 RX ORDER — DIPHENHYDRAMINE HYDROCHLORIDE 50 MG/ML
25 INJECTION INTRAMUSCULAR; INTRAVENOUS ONCE
Status: COMPLETED | OUTPATIENT
Start: 2023-10-12 | End: 2023-10-12

## 2023-10-12 RX ORDER — SODIUM CHLORIDE 0.9 % (FLUSH) 0.9 %
10 SYRINGE (ML) INJECTION PRN
Status: DISCONTINUED | OUTPATIENT
Start: 2023-10-12 | End: 2023-10-12 | Stop reason: HOSPADM

## 2023-10-12 RX ADMIN — SODIUM CHLORIDE 100 ML: 9 INJECTION, SOLUTION INTRAVENOUS at 16:51

## 2023-10-12 RX ADMIN — ASPIRIN 324 MG: 81 TABLET, CHEWABLE ORAL at 18:11

## 2023-10-12 RX ADMIN — SODIUM CHLORIDE, PRESERVATIVE FREE 10 ML: 5 INJECTION INTRAVENOUS at 16:19

## 2023-10-12 RX ADMIN — IOPAMIDOL 100 ML: 755 INJECTION, SOLUTION INTRAVENOUS at 16:50

## 2023-10-12 RX ADMIN — SODIUM CHLORIDE, PRESERVATIVE FREE 10 ML: 5 INJECTION INTRAVENOUS at 16:20

## 2023-10-12 RX ADMIN — DIPHENHYDRAMINE HYDROCHLORIDE 25 MG: 50 INJECTION INTRAMUSCULAR; INTRAVENOUS at 16:25

## 2023-10-12 RX ADMIN — SODIUM CHLORIDE 100 ML: 9 INJECTION, SOLUTION INTRAVENOUS at 16:19

## 2023-10-12 RX ADMIN — IOPAMIDOL 75 ML: 755 INJECTION, SOLUTION INTRAVENOUS at 16:19

## 2023-10-12 RX ADMIN — SODIUM CHLORIDE, PRESERVATIVE FREE 10 ML: 5 INJECTION INTRAVENOUS at 16:50

## 2023-10-12 ASSESSMENT — ENCOUNTER SYMPTOMS
ABDOMINAL PAIN: 0
NAUSEA: 0
SHORTNESS OF BREATH: 1
VOMITING: 0

## 2023-10-12 ASSESSMENT — PAIN SCALES - GENERAL: PAINLEVEL_OUTOF10: 8

## 2023-10-12 ASSESSMENT — LIFESTYLE VARIABLES
HOW OFTEN DO YOU HAVE A DRINK CONTAINING ALCOHOL: 2-4 TIMES A MONTH
HOW MANY STANDARD DRINKS CONTAINING ALCOHOL DO YOU HAVE ON A TYPICAL DAY: 3 OR 4

## 2023-10-12 ASSESSMENT — PAIN - FUNCTIONAL ASSESSMENT: PAIN_FUNCTIONAL_ASSESSMENT: 0-10

## 2023-10-12 ASSESSMENT — PAIN DESCRIPTION - LOCATION: LOCATION: CHEST

## 2023-10-12 NOTE — ED TRIAGE NOTES
Mode of arrival (squad #, walk in, police, etc) : walk in        Chief complaint(s): chest pain        Arrival Note (brief scenario, treatment PTA, etc). : Patient c/o chest pain (heaviness) which started around 11am, throbbing headache, R arm numbness and L arm weakness/feels heavy. C= \"Have you ever felt that you should Cut down on your drinking? \"  No  A= \"Have people Annoyed you by criticizing your drinking? \"  No  G= \"Have you ever felt bad or Guilty about your drinking? \"  No  E= \"Have you ever had a drink as an Eye-opener first thing in the morning to steady your nerves or to help a hangover? \"  No      Deferred []      Reason for deferring: N/A    *If yes to two or more: probable alcohol abuse. *

## 2023-10-12 NOTE — ED PROVIDER NOTES
based on my personal performance of the HPI, PE and MDM. I personally evaluated and examined the patient in conjunction with the APC and agree with the assessment, treatment plan, and disposition of the patient as recorded by the APC. Additional findings are as noted.     Bernard Whelan MD  Attending Emergency  Physician              Ryan Urena MD  10/12/23 [id number]
EMERGENCY DEPARTMENT COURSE / MDM     Medical Decision Making  76-year-old male, presents to the emergency department due to chest pain. Patient states that he was driving his son to a dental appointment around 11 AM when he started feeling midsternal chest pain radiating to bilateral arms, back, and jaw. Patient states that his right arm feels numb and his left arm feels weak. Patient also complains of some shortness of breath associated with this pain. Patient states the pain feels like a pressure sensation. On evaluation, patient is nontoxic in appearance. Lung sounds are clear bilaterally, abdomen is soft nontender. Neurological exam revealed decreased sensation to right upper extremity and weakness to the left upper extremity. Patient had left arm drift on exam.  Stroke alert was called, patient is outside of the window for TNK at this time as his symptoms started at 11 AM and it has been 4-1/2 hours. Given his neurological deficits, there is concern for aortic dissection. Will obtain CT head without contrast, CTA head and neck, CTA chest with and without contrast, and CTA abdomen and pelvis with contrast.  Will also obtain EKG, CBC, BMP, magnesium, troponin x2. Amount and/or Complexity of Data Reviewed  Labs: ordered. Radiology: ordered. ECG/medicine tests: ordered. Risk  OTC drugs. Prescription drug management. Decision regarding hospitalization. Heart score for ACS = 3  1. History - 1  Slightly suspicious: 0  Moderately suspicious: 1  Highly suspicious: 2  2. EKG - 1  Normal: 0  Non-specific repolarization disturbance:1  Significant ST depression: 2  3. Age - 0  <45: 0  45-64: 1  >65: 2  4. Risk Factors - 1  None known: 0  1-2: 1  >3: 2  5. Initial Troponin - 0  Normal limit: 0  1-3 x normal limit: 1  >3 x normal limit: 2   INITIAL NIH STROKE SCALE    Time Performed:  16:00 PM    Administer stroke scale items in the order listed.  Record performance in each category after each

## 2023-10-12 NOTE — ED NOTES
Patient itching upon returning from CT. Patient appears to have hives on left arm and behind both ears. Dr. Dada Bates notified. See new orders.      Horacio Stover RN  10/12/23 [id number]

## 2023-10-12 NOTE — PROGRESS NOTES
Writer responded to stroke alert; pt being attended to by staff. No family present. Prayer outside room. Update with RN who said she would page if writer needed.

## 2023-10-17 LAB
EKG ATRIAL RATE: 88 BPM
EKG P AXIS: 37 DEGREES
EKG P-R INTERVAL: 164 MS
EKG Q-T INTERVAL: 378 MS
EKG QRS DURATION: 94 MS
EKG QTC CALCULATION (BAZETT): 457 MS
EKG R AXIS: 9 DEGREES
EKG T AXIS: -23 DEGREES
EKG VENTRICULAR RATE: 88 BPM

## 2023-10-20 ENCOUNTER — TELEPHONE (OUTPATIENT)
Dept: BARIATRICS/WEIGHT MGMT | Age: 43
End: 2023-10-20

## 2023-10-23 ENCOUNTER — TELEPHONE (OUTPATIENT)
Dept: FAMILY MEDICINE CLINIC | Age: 43
End: 2023-10-23

## 2023-10-23 NOTE — TELEPHONE ENCOUNTER
Noted  Future Appointments   Date Time Provider 4600  46[facility]   11/6/2023  4:15 PM Jaylen Ortiz MD [facility]

## 2023-10-23 NOTE — TELEPHONE ENCOUNTER
Needs emergency room follow-up from 10/12/2023, received letter from his insurance that he is noncompliant with treatment    Non-adherent to asthma medication    Action requested: Evaluate drug refill patterns    The Adherence asthma program identifies patients whose proportion of days covered [facility]) is less than 80%. This patient's PDC is 74% over the previous 365 days. Common reasons for non-adherence may include: side effects, affordability, or changes in the drug regimen. Please provide a new prescription if the regimen has changed, and/or discuss the importance of adherence and ways to address barriers with your patient. The chart below details the patient's pharmacy claims used to identify the above drug therapy opportunity. The medication list is current as of [id number].      Non-adherent to asthma medication  Date Filled Drug Name / Merna Colace Days Supply Pharmacy Name Pharmacy Phone Prescriber of Record Prescriber Phone Number   [phone] MONTELUKAST TAB 10MG 30 086 [facility] [phone] [facility] [phone]

## 2023-10-24 NOTE — TELEPHONE ENCOUNTER
[address] [facility] Floor Transitions Initial Follow Up Call    Outreach made within 2 business days of discharge: No    Patient: Mary Grant Patient : 1980   MRN: [id number]  Reason for Admission: There are no discharge diagnoses documented for the most recent discharge. Discharge Date: 10/12/23        [address] [facility] Floor Transitions Initial Follow Up Call            VOICEMAIL DOCUMENTATION - ERASE IF NOT USED  Hi, this message is for  [facility]  This is SYD from [address] [facility] office. Just calling to see how you are doing after your recent visit to the Emergency Room. Elizabeth Zavala wants to make sure you were able to fill any prescriptions and that you understand your discharge instructions. Please return our call if you need to make a follow up appointment with your provider or have any further needs. Our phone number is [phone]. Have a great day.

## 2023-11-06 ENCOUNTER — OFFICE VISIT (OUTPATIENT)
Dept: ORTHOPEDIC SURGERY | Age: 43
End: 2023-11-06
Payer: COMMERCIAL

## 2023-11-06 VITALS — WEIGHT: 266 LBS | HEIGHT: 74 IN | RESPIRATION RATE: 14 BRPM | BODY MASS INDEX: 34.14 KG/M2

## 2023-11-06 DIAGNOSIS — M77.12 LATERAL EPICONDYLITIS OF LEFT ELBOW: Primary | ICD-10-CM

## 2023-11-06 PROCEDURE — G8484 FLU IMMUNIZE NO ADMIN: HCPCS | Performed by: ORTHOPAEDIC SURGERY

## 2023-11-06 PROCEDURE — G8427 DOCREV CUR MEDS BY ELIG CLIN: HCPCS | Performed by: ORTHOPAEDIC SURGERY

## 2023-11-06 PROCEDURE — 1036F TOBACCO NON-USER: CPT | Performed by: ORTHOPAEDIC SURGERY

## 2023-11-06 PROCEDURE — 99212 OFFICE O/P EST SF 10 MIN: CPT | Performed by: ORTHOPAEDIC SURGERY

## 2023-11-06 PROCEDURE — G8417 CALC BMI ABV UP PARAM F/U: HCPCS | Performed by: ORTHOPAEDIC SURGERY

## 2023-11-10 NOTE — PROGRESS NOTES
Procedure: Left elbow arthroscopic ECRB debridement/release  Date of procedure: 4/13/2023    HPI: Mr. Barb Norman is a 69-year-old who is approximately 6 months status post the aforementioned procedure. He states that he was doing well up when developed pain once again over the lateral aspect of the elbow. He has completed physical therapy and states that he has kept up with his home program.    Physical examination:  Evaluation patient is left elbow and upper extremity demonstrates incisional scars consistent with his recent elbow arthroscopy. He has a 2+ radial pulse with brisk cap refill in his fingers and sensation is grossly intact light touch in all dermatomes. He is mildly tender to palpation at the level of the lateral epicondyle. He has no pain with resisted wrist extension. He has full elbow extension and flexes to approximately 140 degrees. No instability with varus and valgus stress applied across the elbow. Impression and plan: Mr. Barb Norman is a 69-year-old who is approximately 6 months status post left elbow arthroscopic ECRB debridement/release. He is having some recurrent pain at the level of the lateral epicondyle. I had a discussion with the patient today about this. I encouraged him to keep up with his home exercise program and didrecommend he resume wearing his wrist brace during today while active for the next 4 to 6 weeks and also recommended use of a topical NSAID i.e. Voltaren which can be obtained over-the-counter 3-4 times a day. We will see how well this works for him. He was encouraged to return or call with persistent or worsening symptoms and with any questions or concerns.

## 2023-11-12 DIAGNOSIS — J45.40 MODERATE PERSISTENT ASTHMA WITHOUT COMPLICATION: Primary | ICD-10-CM

## 2023-11-13 RX ORDER — ALBUTEROL SULFATE 90 UG/1
AEROSOL, METERED RESPIRATORY (INHALATION)
Qty: 8.5 G | Refills: 3 | Status: SHIPPED | OUTPATIENT
Start: 2023-11-13

## 2023-11-13 NOTE — TELEPHONE ENCOUNTER
Please Approve or Refuse.   Send to Pharmacy per Pt's Request:      Next Visit Date:  Visit date not found   Last Visit Date: 2/23/2023    Hemoglobin A1C (%)   Date Value   04/12/2023 5.6   04/08/2022 5.5   10/04/2021 5.5             ( goal A1C is < 7)   BP Readings from Last 3 Encounters:   10/12/23 127/75   04/13/23 132/70   04/12/23 130/88          (goal 120/80)  BUN   Date Value Ref Range Status   10/12/2023 13 6 - 20 mg/dL Final     Creatinine   Date Value Ref Range Status   10/12/2023 0.9 0.7 - 1.2 mg/dL Final     Potassium   Date Value Ref Range Status   10/12/2023 4.0 3.7 - 5.3 mmol/L Final

## 2023-12-07 ENCOUNTER — TELEPHONE (OUTPATIENT)
Dept: GASTROENTEROLOGY | Age: 43
End: 2023-12-07

## 2023-12-22 ENCOUNTER — OFFICE VISIT (OUTPATIENT)
Dept: BARIATRICS/WEIGHT MGMT | Age: 43
End: 2023-12-22
Payer: COMMERCIAL

## 2023-12-22 VITALS
OXYGEN SATURATION: 95 % | WEIGHT: 282 LBS | BODY MASS INDEX: 38.19 KG/M2 | DIASTOLIC BLOOD PRESSURE: 78 MMHG | HEART RATE: 94 BPM | SYSTOLIC BLOOD PRESSURE: 118 MMHG | HEIGHT: 72 IN

## 2023-12-22 DIAGNOSIS — K21.9 GASTROESOPHAGEAL REFLUX DISEASE WITHOUT ESOPHAGITIS: Primary | ICD-10-CM

## 2023-12-22 DIAGNOSIS — R06.09 DYSPNEA ON EXERTION: ICD-10-CM

## 2023-12-22 DIAGNOSIS — G47.33 OSA ON CPAP: ICD-10-CM

## 2023-12-22 DIAGNOSIS — E66.01 MORBID OBESITY DUE TO EXCESS CALORIES (HCC): ICD-10-CM

## 2023-12-22 PROCEDURE — G8427 DOCREV CUR MEDS BY ELIG CLIN: HCPCS | Performed by: SURGERY

## 2023-12-22 PROCEDURE — 1036F TOBACCO NON-USER: CPT | Performed by: SURGERY

## 2023-12-22 PROCEDURE — G8417 CALC BMI ABV UP PARAM F/U: HCPCS | Performed by: SURGERY

## 2023-12-22 PROCEDURE — 3074F SYST BP LT 130 MM HG: CPT | Performed by: SURGERY

## 2023-12-22 PROCEDURE — G8484 FLU IMMUNIZE NO ADMIN: HCPCS | Performed by: SURGERY

## 2023-12-22 PROCEDURE — 99204 OFFICE O/P NEW MOD 45 MIN: CPT | Performed by: SURGERY

## 2023-12-22 PROCEDURE — 3078F DIAST BP <80 MM HG: CPT | Performed by: SURGERY

## 2023-12-22 RX ORDER — MELOXICAM 15 MG/1
TABLET ORAL
COMMUNITY

## 2023-12-22 RX ORDER — HYDROXYCHLOROQUINE SULFATE 200 MG/1
400 TABLET, FILM COATED ORAL EVERY MORNING
COMMUNITY
Start: 2023-10-23

## 2024-01-15 ENCOUNTER — NURSE ONLY (OUTPATIENT)
Dept: BARIATRICS/WEIGHT MGMT | Age: 44
End: 2024-01-15

## 2024-01-15 VITALS — BODY MASS INDEX: 38.52 KG/M2 | WEIGHT: 284 LBS

## 2024-01-15 DIAGNOSIS — E66.9 OBESITY, CLASS II, BMI 35-39.9: Primary | ICD-10-CM

## 2024-01-15 NOTE — PROGRESS NOTES
Medical Nutrition Therapy  Initial Nutrition Assessment for Metabolic/Bariatric Surgery  Required number of insurance visits prior to surgery:  3    Nutrition Assessment:  Pa Cordova is a 43 y.o. male with a date of birth of 1980 being seen regarding weight loss surgery. Patient is working toward bariatric surgery of Gastric Sleeve.    Weight History:   Wt Readings from Last 3 Encounters:   01/15/24 128.8 kg (284 lb)   12/22/23 127.9 kg (282 lb)   11/06/23 120.7 kg (266 lb)      BMI: Body mass index is 38.52 kg/m².     How does your weight affect your daily activities? joint pain, fatigue, back pain, and shortness of breath with activity    Weight History  Pa Cordova's highest adult weight was 278 lbs at age 42.   Patient was at his highest weight for 4 years.   Patient's triggers/known causes to his highest weight are poor diet/unsure.     Pa Cordova's lowest adult weight was 200 lbs at age 19.    Patient was at his lowest weight for 1 years.  The lowest weight was achieved through staying active    Physical Activity  Do you participate in a structured exercise program, step counting, or regular physical activity? No    Shared with patient the importance of documenting exercise and staying at or below start weight during visits. Instructions and exercise logs were provided to patient today see goal sheet and plan.    Previous weight loss attempts  Patient has participated in the following weight loss programs: Atkins, Low-Carb, and Nutrisystem    Nutrition History  Have you ever been diagnosed with an eating disorder? No  Have you ever been diagnosed with a vitamin or mineral deficiency? No  Have you ever had problems tolerating a multivitamin or mineral supplement? No     Patient dines out to a sit down restaurant 1 times per month.   Patient dines out to a fast food restaurant 2 times per week.     Patient sometimes grazes on food throughout the day.   Patient never eats at night time.   Patient never

## 2024-01-19 ENCOUNTER — OFFICE VISIT (OUTPATIENT)
Dept: FAMILY MEDICINE CLINIC | Age: 44
End: 2024-01-19
Payer: COMMERCIAL

## 2024-01-19 ENCOUNTER — PATIENT MESSAGE (OUTPATIENT)
Dept: FAMILY MEDICINE CLINIC | Age: 44
End: 2024-01-19

## 2024-01-19 VITALS
BODY MASS INDEX: 38.39 KG/M2 | TEMPERATURE: 99.9 F | WEIGHT: 283.4 LBS | DIASTOLIC BLOOD PRESSURE: 78 MMHG | OXYGEN SATURATION: 93 % | HEIGHT: 72 IN | SYSTOLIC BLOOD PRESSURE: 120 MMHG | HEART RATE: 81 BPM

## 2024-01-19 DIAGNOSIS — E66.01 SEVERE OBESITY (BMI 35.0-39.9) WITH COMORBIDITY (HCC): ICD-10-CM

## 2024-01-19 DIAGNOSIS — E78.2 MIXED HYPERLIPIDEMIA: ICD-10-CM

## 2024-01-19 DIAGNOSIS — F40.10 SOCIAL ANXIETY DISORDER: ICD-10-CM

## 2024-01-19 DIAGNOSIS — J20.9 ACUTE BRONCHITIS DUE TO INFECTION: ICD-10-CM

## 2024-01-19 DIAGNOSIS — Z11.59 ENCOUNTER FOR SCREENING FOR OTHER VIRAL DISEASES: ICD-10-CM

## 2024-01-19 DIAGNOSIS — G47.33 OSA ON CPAP: ICD-10-CM

## 2024-01-19 DIAGNOSIS — I10 ESSENTIAL HYPERTENSION: ICD-10-CM

## 2024-01-19 DIAGNOSIS — U07.1 COVID-19 VIRUS INFECTION: ICD-10-CM

## 2024-01-19 DIAGNOSIS — J01.80 ACUTE NON-RECURRENT SINUSITIS OF OTHER SINUS: ICD-10-CM

## 2024-01-19 DIAGNOSIS — M13.0 SERONEGATIVE POLYARTHRITIS: ICD-10-CM

## 2024-01-19 DIAGNOSIS — Z91.018 FOOD ALLERGY: ICD-10-CM

## 2024-01-19 DIAGNOSIS — J30.89 SEASONAL ALLERGIC RHINITIS DUE TO OTHER ALLERGIC TRIGGER: ICD-10-CM

## 2024-01-19 DIAGNOSIS — J45.41 MODERATE PERSISTENT ASTHMA WITH ACUTE EXACERBATION: Primary | ICD-10-CM

## 2024-01-19 DIAGNOSIS — F33.42 RECURRENT MAJOR DEPRESSIVE DISORDER, IN FULL REMISSION (HCC): ICD-10-CM

## 2024-01-19 DIAGNOSIS — K21.00 GASTROESOPHAGEAL REFLUX DISEASE WITH ESOPHAGITIS WITHOUT HEMORRHAGE: ICD-10-CM

## 2024-01-19 PROBLEM — J01.90 ACUTE INFECTION OF NASAL SINUS: Status: ACTIVE | Noted: 2024-01-19

## 2024-01-19 PROCEDURE — G8484 FLU IMMUNIZE NO ADMIN: HCPCS | Performed by: FAMILY MEDICINE

## 2024-01-19 PROCEDURE — 99214 OFFICE O/P EST MOD 30 MIN: CPT | Performed by: FAMILY MEDICINE

## 2024-01-19 PROCEDURE — G8427 DOCREV CUR MEDS BY ELIG CLIN: HCPCS | Performed by: FAMILY MEDICINE

## 2024-01-19 PROCEDURE — 3074F SYST BP LT 130 MM HG: CPT | Performed by: FAMILY MEDICINE

## 2024-01-19 PROCEDURE — G8417 CALC BMI ABV UP PARAM F/U: HCPCS | Performed by: FAMILY MEDICINE

## 2024-01-19 PROCEDURE — 1036F TOBACCO NON-USER: CPT | Performed by: FAMILY MEDICINE

## 2024-01-19 PROCEDURE — 3078F DIAST BP <80 MM HG: CPT | Performed by: FAMILY MEDICINE

## 2024-01-19 RX ORDER — AZITHROMYCIN 250 MG/1
TABLET, FILM COATED ORAL
Qty: 6 TABLET | Refills: 0 | Status: SHIPPED | OUTPATIENT
Start: 2024-01-19 | End: 2024-01-24

## 2024-01-19 RX ORDER — MONTELUKAST SODIUM 10 MG/1
10 TABLET ORAL NIGHTLY
Qty: 90 TABLET | Refills: 3 | Status: SHIPPED | OUTPATIENT
Start: 2024-01-19

## 2024-01-19 RX ORDER — BENZONATATE 100 MG/1
100 CAPSULE ORAL 3 TIMES DAILY PRN
Qty: 21 CAPSULE | Refills: 0 | Status: SHIPPED | OUTPATIENT
Start: 2024-01-19 | End: 2024-01-26

## 2024-01-19 RX ORDER — OMEPRAZOLE 40 MG/1
40 CAPSULE, DELAYED RELEASE ORAL
Qty: 90 CAPSULE | Refills: 3 | Status: SHIPPED | OUTPATIENT
Start: 2024-01-19

## 2024-01-19 RX ORDER — EPINEPHRINE 0.3 MG/.3ML
0.3 INJECTION SUBCUTANEOUS
Qty: 1 EACH | Refills: 3 | Status: SHIPPED | OUTPATIENT
Start: 2024-01-19 | End: 2024-01-19

## 2024-01-19 RX ORDER — AMLODIPINE BESYLATE 10 MG/1
10 TABLET ORAL DAILY
Qty: 90 TABLET | Refills: 3 | Status: SHIPPED | OUTPATIENT
Start: 2024-01-19

## 2024-01-19 RX ORDER — LORATADINE 10 MG/1
10 TABLET ORAL DAILY
Qty: 90 TABLET | Refills: 3 | Status: SHIPPED | OUTPATIENT
Start: 2024-01-19

## 2024-01-19 RX ORDER — FLUTICASONE PROPIONATE 50 MCG
2 SPRAY, SUSPENSION (ML) NASAL DAILY
Qty: 16 G | Refills: 5 | Status: SHIPPED | OUTPATIENT
Start: 2024-01-19

## 2024-01-19 RX ORDER — ALBUTEROL SULFATE 2.5 MG/3ML
2.5 SOLUTION RESPIRATORY (INHALATION) EVERY 6 HOURS PRN
Qty: 120 EACH | Refills: 3 | Status: SHIPPED | OUTPATIENT
Start: 2024-01-19

## 2024-01-19 RX ORDER — NEOMYCIN SULFATE, POLYMYXIN B SULFATE AND HYDROCORTISONE 10; 3.5; 1 MG/ML; MG/ML; [USP'U]/ML
3 SUSPENSION/ DROPS AURICULAR (OTIC) 4 TIMES DAILY
Qty: 10 ML | Refills: 1 | Status: SHIPPED | OUTPATIENT
Start: 2024-01-19

## 2024-01-19 RX ORDER — BUPROPION HYDROCHLORIDE 150 MG/1
150 TABLET ORAL EVERY MORNING
Qty: 90 TABLET | Refills: 3 | Status: SHIPPED | OUTPATIENT
Start: 2024-01-19

## 2024-01-19 RX ORDER — BUSPIRONE HYDROCHLORIDE 10 MG/1
10 TABLET ORAL 3 TIMES DAILY PRN
Qty: 270 TABLET | Refills: 3 | Status: SHIPPED | OUTPATIENT
Start: 2024-01-19

## 2024-01-19 RX ORDER — PREDNISONE 10 MG/1
50 TABLET ORAL DAILY
Qty: 35 TABLET | Refills: 0 | Status: SHIPPED | OUTPATIENT
Start: 2024-01-19 | End: 2024-01-26

## 2024-01-19 ASSESSMENT — PATIENT HEALTH QUESTIONNAIRE - PHQ9
SUM OF ALL RESPONSES TO PHQ QUESTIONS 1-9: 0
SUM OF ALL RESPONSES TO PHQ QUESTIONS 1-9: 0
SUM OF ALL RESPONSES TO PHQ9 QUESTIONS 1 & 2: 0
SUM OF ALL RESPONSES TO PHQ QUESTIONS 1-9: 0
1. LITTLE INTEREST OR PLEASURE IN DOING THINGS: 0
2. FEELING DOWN, DEPRESSED OR HOPELESS: 0
SUM OF ALL RESPONSES TO PHQ QUESTIONS 1-9: 0

## 2024-01-19 ASSESSMENT — ENCOUNTER SYMPTOMS
SHORTNESS OF BREATH: 1
ABDOMINAL DISTENTION: 1
WHEEZING: 1
APNEA: 1
SORE THROAT: 1
BACK PAIN: 1
ABDOMINAL PAIN: 1
SINUS PAIN: 1
DIARRHEA: 1
VOICE CHANGE: 0
NAUSEA: 0
BLOOD IN STOOL: 1
TROUBLE SWALLOWING: 0
VOMITING: 0
CHEST TIGHTNESS: 1
CONSTIPATION: 0
SINUS PRESSURE: 1
RHINORRHEA: 1
COUGH: 1

## 2024-01-19 NOTE — PROGRESS NOTES
Patient c/o sore throat, congestion, sob, headache, body ache, fever. Sx started Monday.   Has not tested for covid. States kids were tested yesterday negative.   Taking tylenol and ibuprofen.       
Yes Vandana Novak MD   omeprazole (PRILOSEC) 40 MG delayed release capsule Take 1 capsule by mouth every morning (before breakfast) Yes Vandana Novak MD   amLODIPine (NORVASC) 10 MG tablet Take 1 tablet by mouth daily Yes Vandana Novak MD   montelukast (SINGULAIR) 10 MG tablet Take 1 tablet by mouth nightly Yes Vandana Novak MD   loratadine (CLARITIN) 10 MG tablet Take 1 tablet by mouth daily Yes Vandana Novak MD   naproxen (NAPROSYN) 500 MG tablet Take 1 tablet by mouth 2 times daily as needed for Pain Take with food Yes Vandana Novak MD   busPIRone (BUSPAR) 10 MG tablet take 1 tablet by mouth three times a day if needed for anxiety Yes Vandana Novak MD   albuterol (PROVENTIL) (2.5 MG/3ML) 0.083% nebulizer solution Take 3 mLs by nebulization every 6 hours as needed for Wheezing or Shortness of Breath Yes Vandana Novak MD   Cinnamon 500 MG CAPS Take 1 capsule by mouth in the morning. Yes Vandana Novak MD   methotrexate (RHEUMATREX) 2.5 MG chemo tablet take 7 tablets by mouth every week Yes Eevr Rodriguez MD   Blood Pressure KIT Dx: HTN. Needs automatic blood pressure machine to monitor his blood pressure. Yes Vandana Novak MD   Respiratory Therapy Supplies (NEBULIZER) MADHAV 1 Units by Does not apply route 2 times daily Dx: Asthma exacerbation J45.901 Yes Lyndon Valdez MD       Social History     Tobacco Use    Smoking status: Never    Smokeless tobacco: Never   Vaping Use    Vaping Use: Never used   Substance Use Topics    Alcohol use: Yes     Comment: rare    Drug use: No       Review of Systems   Constitutional:  Positive for chills, diaphoresis, fatigue, fever and unexpected weight change. Negative for activity change and appetite change.   HENT:  Positive for congestion, ear pain, postnasal drip, rhinorrhea, sinus pressure, sinus pain, sneezing and sore throat. Negative for ear discharge, trouble swallowing and voice change.

## 2024-01-20 NOTE — TELEPHONE ENCOUNTER
From: Dr. Vandana Novak  To: Pa Cordova  Sent: 1/19/2024 5:52 PM EST  Subject: Paxlovid to the pharmacy    Pa,    I sent paxlovid to [facility], this is antiviral treatment specific for COVID-19 infection. The instructions will be on the package how to take the pills, take them with food      Please send me a picture of the COVID test, in case any documentation for your work might be needed    Start taking all the other medications including the Z-Toney    Monday, day 0, on January 15, 2024.    You need to isolate for a total of first 5 days, which would be from Tuesday 1/16/2024 through 1/20/2024  Can return back to work if improved the 6-day, with mask, for 5 more days    As I just saw you today, if you do not get better and you do not feel well enough to return back to work on Monday just let us know we will give you another letter          If you have any questions, please let me know.    Vandana Novak MD  [address] [address]  Dept: [phone]  Dept Fax: [phone]

## 2024-02-09 ENCOUNTER — OFFICE VISIT (OUTPATIENT)
Dept: BARIATRICS/WEIGHT MGMT | Age: 44
End: 2024-02-09
Payer: COMMERCIAL

## 2024-02-09 VITALS
HEART RATE: 100 BPM | DIASTOLIC BLOOD PRESSURE: 80 MMHG | OXYGEN SATURATION: 98 % | WEIGHT: 278 LBS | HEIGHT: 72 IN | SYSTOLIC BLOOD PRESSURE: 122 MMHG | BODY MASS INDEX: 37.65 KG/M2

## 2024-02-09 DIAGNOSIS — I10 ESSENTIAL HYPERTENSION: Primary | ICD-10-CM

## 2024-02-09 DIAGNOSIS — J45.40 MODERATE PERSISTENT ASTHMA WITHOUT COMPLICATION: ICD-10-CM

## 2024-02-09 DIAGNOSIS — M13.0 SERONEGATIVE POLYARTHRITIS: ICD-10-CM

## 2024-02-09 DIAGNOSIS — M54.16 LUMBAR RADICULOPATHY: ICD-10-CM

## 2024-02-09 DIAGNOSIS — E66.9 OBESITY (BMI 30-39.9): ICD-10-CM

## 2024-02-09 DIAGNOSIS — E78.2 MIXED HYPERLIPIDEMIA: ICD-10-CM

## 2024-02-09 DIAGNOSIS — I34.1 MITRAL VALVE PROLAPSE: ICD-10-CM

## 2024-02-09 DIAGNOSIS — K21.9 GASTROESOPHAGEAL REFLUX DISEASE WITHOUT ESOPHAGITIS: ICD-10-CM

## 2024-02-09 DIAGNOSIS — G47.33 OSA ON CPAP: ICD-10-CM

## 2024-02-09 PROCEDURE — G8417 CALC BMI ABV UP PARAM F/U: HCPCS | Performed by: NURSE PRACTITIONER

## 2024-02-09 PROCEDURE — 3074F SYST BP LT 130 MM HG: CPT | Performed by: NURSE PRACTITIONER

## 2024-02-09 PROCEDURE — 1036F TOBACCO NON-USER: CPT | Performed by: NURSE PRACTITIONER

## 2024-02-09 PROCEDURE — G8484 FLU IMMUNIZE NO ADMIN: HCPCS | Performed by: NURSE PRACTITIONER

## 2024-02-09 PROCEDURE — 3079F DIAST BP 80-89 MM HG: CPT | Performed by: NURSE PRACTITIONER

## 2024-02-09 PROCEDURE — G8427 DOCREV CUR MEDS BY ELIG CLIN: HCPCS | Performed by: NURSE PRACTITIONER

## 2024-02-09 PROCEDURE — 99213 OFFICE O/P EST LOW 20 MIN: CPT | Performed by: NURSE PRACTITIONER

## 2024-02-09 NOTE — PROGRESS NOTES
Medical Weight Management Progress Note    Subjective     Patient being seen for medically supervised weight loss for the chronic conditions of HTN, HLD, NOEMÍ, GERD, Asthma, Back Pain, MVP, Seronegative Polyarthritis.     He is working on the behavior changes discussed at the initial appointment. Patient continues on diet plan. Physical activity includes walking.  Weight today is 278 lbs.  Using CPAP.  Psych eval scheduled 3/8/24.  Needs to schedule EGD.  Needs cardiac, pulmonary, and rheumatology clearances. No current issues.    Working toward bariatric surgery:    [x] Sleeve Gastrectomy                                                           [] Derian-en-Y Gastric Bypass    Allergies:  Allergies   Allergen Reactions    Fish-Derived Products Anaphylaxis    Other Anaphylaxis     Any type of seafood    Shellfish Allergy Anaphylaxis    Shrimp (Diagnostic) Anaphylaxis    Ace Inhibitors Swelling     UVULITIS ON 8/15/19    Betadine [Povidone-Iodine] Rash     Allergic reaction topically to betadine from a shot    Iodinated Contrast Media Hives, Itching and Rash     Benadryl resolved symptoms       Past Medical History:     Past Medical History:   Diagnosis Date    Abnormal EKG     Acute arthritis     Allergies     Anxiety     Asthma     Chest pain     COPD (chronic obstructive pulmonary disease) (HCC) 11/19/2018    COVID-19 10/03/2021    Admitted to hospital, COVID PNA    Depression     Erectile dysfunction 09/29/2017    Essential hypertension 01/02/2017    Family history of premature CAD 11/19/2018    GERD (gastroesophageal reflux disease) 11/19/2018    Heart attack (HCC)     Heart palpitations 09/29/2017    High blood pressure     Hyperlipidemia with target LDL less than 100 03/23/2017    Lateral epicondylitis of left elbow     Left lower lobe pneumonia 11/09/2012    Liver disease     Lumbar radiculopathy     LVH (left ventricular hypertrophy) due to hypertensive disease 03/23/2017    Mitral valve prolapse     
carbonated beverages.   [] [x]    [] 9 I will eliminate drinking with a straw.   [] [x]    [] 10 I will limit caffeinated beverages to 16oz daily.   [] [x] Uses a packet flavoring with energy   [x] 11 I will log my exercise daily.   [] []    [] 12 I will determine my calcium and mvi plan.   [] []    [x] 13 I will have 1-2 servings of lean protein present at each meal and snack. [] []    [] 14 I will eat every 3-5 hours.   [x] [] Patient is doing well eating around 5 times per day   [] 15 I will drink 64oz of fluid daily.   [] [x]    [] 16 I will eat slowly during meals and snacks.   [] []    [] 17 I will limit fluids to 4oz before, after, and during meals. [] []    [x] 18 I will eat protein first at all meals followed by vegetables, fruit, and lastly whole grains. [] []    [] 19 My weight neutral approach is:   [] []        [x] Consistent goal achievement in the program thus far and further success with goals is expected.       [] Unable to consistently make progress in goal achievement. At this time patient is not moving forward in developing the skills needed for success after surgery.     Monitoring and Evaluation:    [x] Continue to follow monthly and review goals set above.  [] Nutrition visits complete     Brayden Arias, RAY, LD  Clinical Bariatric Dietitian  [facility] Management & Surgical Specialist  [phone]

## 2024-03-08 ENCOUNTER — OFFICE VISIT (OUTPATIENT)
Dept: BARIATRICS/WEIGHT MGMT | Age: 44
End: 2024-03-08
Payer: COMMERCIAL

## 2024-03-08 VITALS
HEART RATE: 80 BPM | SYSTOLIC BLOOD PRESSURE: 110 MMHG | WEIGHT: 274 LBS | DIASTOLIC BLOOD PRESSURE: 70 MMHG | HEIGHT: 72 IN | BODY MASS INDEX: 37.11 KG/M2

## 2024-03-08 DIAGNOSIS — J45.40 MODERATE PERSISTENT ASTHMA WITHOUT COMPLICATION: ICD-10-CM

## 2024-03-08 DIAGNOSIS — I10 ESSENTIAL HYPERTENSION: Primary | ICD-10-CM

## 2024-03-08 DIAGNOSIS — E78.2 MIXED HYPERLIPIDEMIA: ICD-10-CM

## 2024-03-08 DIAGNOSIS — K20.0 EOSINOPHILIC ESOPHAGITIS: ICD-10-CM

## 2024-03-08 DIAGNOSIS — M54.16 LUMBAR RADICULOPATHY: ICD-10-CM

## 2024-03-08 DIAGNOSIS — I11.9 HYPERTENSIVE LEFT VENTRICULAR HYPERTROPHY, WITHOUT HEART FAILURE: ICD-10-CM

## 2024-03-08 DIAGNOSIS — E66.9 OBESITY (BMI 30-39.9): ICD-10-CM

## 2024-03-08 DIAGNOSIS — G47.33 OSA ON CPAP: ICD-10-CM

## 2024-03-08 DIAGNOSIS — K21.9 GASTROESOPHAGEAL REFLUX DISEASE WITHOUT ESOPHAGITIS: ICD-10-CM

## 2024-03-08 PROCEDURE — G8484 FLU IMMUNIZE NO ADMIN: HCPCS | Performed by: NURSE PRACTITIONER

## 2024-03-08 PROCEDURE — 3074F SYST BP LT 130 MM HG: CPT | Performed by: NURSE PRACTITIONER

## 2024-03-08 PROCEDURE — G8427 DOCREV CUR MEDS BY ELIG CLIN: HCPCS | Performed by: NURSE PRACTITIONER

## 2024-03-08 PROCEDURE — G8417 CALC BMI ABV UP PARAM F/U: HCPCS | Performed by: NURSE PRACTITIONER

## 2024-03-08 PROCEDURE — 1036F TOBACCO NON-USER: CPT | Performed by: NURSE PRACTITIONER

## 2024-03-08 PROCEDURE — 3078F DIAST BP <80 MM HG: CPT | Performed by: NURSE PRACTITIONER

## 2024-03-08 PROCEDURE — 99213 OFFICE O/P EST LOW 20 MIN: CPT | Performed by: NURSE PRACTITIONER

## 2024-03-08 RX ORDER — FAMOTIDINE 20 MG/1
20 TABLET, FILM COATED ORAL NIGHTLY
COMMUNITY
Start: 2024-03-07

## 2024-03-08 NOTE — PROGRESS NOTES
eliminate sugary beverages.   [] [x]    [] 8 I will eliminate carbonated beverages.   [] [x]    [] 9 I will eliminate drinking with a straw.   [] [x]    [] 10 I will limit caffeinated beverages to 16oz daily.   [] [x] Uses a packet flavoring with energy   [x] 11 I will log my exercise daily.   [] []    [x] 12 I will determine my calcium and mvi plan.   [] []    [] 13 I will have 1-2 servings of lean protein present at each meal and snack. [x] []    [] 14 I will eat every 3-5 hours.   [x] [] Patient is doing well eating around 5 times per day   [] 15 I will drink 64oz of fluid daily.   [] [x]    [x] 16 I will eat slowly during meals and snacks.   [] []    [x] 17 I will limit fluids to 4oz before, after, and during meals. [] []    [] 18 I will eat protein first at all meals followed by vegetables, fruit, and lastly whole grains. [x] []    [] 19 My weight neutral approach is:   [] []        [x] Consistent goal achievement in the program thus far and further success with goals is expected.       [] Unable to consistently make progress in goal achievement. At this time patient is not moving forward in developing the skills needed for success after surgery.     Monitoring and Evaluation:    [x] Continue to follow monthly and review goals set above.  [] Nutrition visits complete     Brayden Arias, RD, LD  Clinical Bariatric Dietitian  [facility] Weight Management & Surgical Specialist  [phone]  
esophagitis        9. Lumbar radiculopathy            Plan:    Dietitian visit today.  Patient was encouraged to journal all food intake. Keep calorie level at approximately 1929-2995. Protein intake is to be a minimum of 60-80 grams per day. Water drinking was encouraged with a goal of 64oz-128oz daily. Beverages to be calorie free except for milk. Every other beverage should be water. Avoid soda.   Continue to increase level of physical activity.  Encouraged use of exercise log.    Patients will undergo thorough nutritional evaluation and counseling with our registered dietician.  Our program provides long term nutritional counseling with unlimited consults with the dietician. All patients are nicotine tested and require a negative nicotine level prior to surgery.  Patient has been educated about the risks associated with substance use, as well as the risks of using nicotine and alcohol after surgery.  Patient has been educated that theses substances need to be avoided lifelong after surgery to reduce the risk of complications and sub-optimal weight loss.    Follow-up  Return in about 1 month (around 4/8/2024).    Orders this encounter:  No orders of the defined types were placed in this encounter.      Prescriptions this encounter:  No orders of the defined types were placed in this encounter.      Electronically signed by:  Angélica Flores CNP

## 2024-04-05 ENCOUNTER — INITIAL CONSULT (OUTPATIENT)
Dept: BEHAVIORAL/MENTAL HEALTH CLINIC | Age: 44
End: 2024-04-05
Payer: COMMERCIAL

## 2024-04-05 ENCOUNTER — TELEPHONE (OUTPATIENT)
Dept: BARIATRICS/WEIGHT MGMT | Age: 44
End: 2024-04-05

## 2024-04-05 DIAGNOSIS — E66.01 MORBID OBESITY (HCC): ICD-10-CM

## 2024-04-05 DIAGNOSIS — F34.1 PERSISTENT DEPRESSIVE DISORDER WITH ANXIOUS DISTRESS, CURRENTLY MILD: Primary | ICD-10-CM

## 2024-04-05 PROCEDURE — 96137 PSYCL/NRPSYC TST PHY/QHP EA: CPT | Performed by: PSYCHOLOGIST

## 2024-04-05 PROCEDURE — 96130 PSYCL TST EVAL PHYS/QHP 1ST: CPT | Performed by: PSYCHOLOGIST

## 2024-04-05 PROCEDURE — 96136 PSYCL/NRPSYC TST PHY/QHP 1ST: CPT | Performed by: PSYCHOLOGIST

## 2024-04-05 PROCEDURE — 96131 PSYCL TST EVAL PHYS/QHP EA: CPT | Performed by: PSYCHOLOGIST

## 2024-04-05 PROCEDURE — 90791 PSYCH DIAGNOSTIC EVALUATION: CPT | Performed by: PSYCHOLOGIST

## 2024-04-05 PROCEDURE — 1036F TOBACCO NON-USER: CPT | Performed by: PSYCHOLOGIST

## 2024-04-05 ASSESSMENT — PATIENT HEALTH QUESTIONNAIRE - PHQ9
6. FEELING BAD ABOUT YOURSELF - OR THAT YOU ARE A FAILURE OR HAVE LET YOURSELF OR YOUR FAMILY DOWN: NEARLY EVERY DAY
5. POOR APPETITE OR OVEREATING: SEVERAL DAYS
9. THOUGHTS THAT YOU WOULD BE BETTER OFF DEAD, OR OF HURTING YOURSELF: NOT AT ALL
SUM OF ALL RESPONSES TO PHQ QUESTIONS 1-9: 8
8. MOVING OR SPEAKING SO SLOWLY THAT OTHER PEOPLE COULD HAVE NOTICED. OR THE OPPOSITE, BEING SO FIGETY OR RESTLESS THAT YOU HAVE BEEN MOVING AROUND A LOT MORE THAN USUAL: NOT AT ALL
3. TROUBLE FALLING OR STAYING ASLEEP: MORE THAN HALF THE DAYS
2. FEELING DOWN, DEPRESSED OR HOPELESS: NOT AT ALL
7. TROUBLE CONCENTRATING ON THINGS, SUCH AS READING THE NEWSPAPER OR WATCHING TELEVISION: NOT AT ALL
10. IF YOU CHECKED OFF ANY PROBLEMS, HOW DIFFICULT HAVE THESE PROBLEMS MADE IT FOR YOU TO DO YOUR WORK, TAKE CARE OF THINGS AT HOME, OR GET ALONG WITH OTHER PEOPLE: NOT DIFFICULT AT ALL
SUM OF ALL RESPONSES TO PHQ QUESTIONS 1-9: 8
SUM OF ALL RESPONSES TO PHQ9 QUESTIONS 1 & 2: 0
4. FEELING TIRED OR HAVING LITTLE ENERGY: MORE THAN HALF THE DAYS
SUM OF ALL RESPONSES TO PHQ QUESTIONS 1-9: 8
SUM OF ALL RESPONSES TO PHQ QUESTIONS 1-9: 8
1. LITTLE INTEREST OR PLEASURE IN DOING THINGS: NOT AT ALL

## 2024-04-05 ASSESSMENT — ANXIETY QUESTIONNAIRES
IF YOU CHECKED OFF ANY PROBLEMS ON THIS QUESTIONNAIRE, HOW DIFFICULT HAVE THESE PROBLEMS MADE IT FOR YOU TO DO YOUR WORK, TAKE CARE OF THINGS AT HOME, OR GET ALONG WITH OTHER PEOPLE: SOMEWHAT DIFFICULT
2. NOT BEING ABLE TO STOP OR CONTROL WORRYING: SEVERAL DAYS
3. WORRYING TOO MUCH ABOUT DIFFERENT THINGS: NEARLY EVERY DAY
GAD7 TOTAL SCORE: 13
6. BECOMING EASILY ANNOYED OR IRRITABLE: NEARLY EVERY DAY
4. TROUBLE RELAXING: NEARLY EVERY DAY
1. FEELING NERVOUS, ANXIOUS, OR ON EDGE: SEVERAL DAYS
5. BEING SO RESTLESS THAT IT IS HARD TO SIT STILL: SEVERAL DAYS
7. FEELING AFRAID AS IF SOMETHING AWFUL MIGHT HAPPEN: SEVERAL DAYS

## 2024-04-05 NOTE — TELEPHONE ENCOUNTER
Hello,    You are schedule for an EGD on 04/12/24 at 9:30am at the [facility]. Please arrive at 7:30 am.     [facility] :  [id number] [facility] [address] [facility]  [place] [id number]        Patient Instructions: PLEASE READ!!!  The night before your procedure no food or drink after midnight.  NO alcohol 24 hours prior to procedure   Take your medications with sips of water, except those that need to be taken with food.  If you take aspirin, Plavix, Coumadin (Warfarin) or another blood thinner please ask the prescribing provider if you can stop it 7 days prior to the EGD.  If you are having an EGD with [facility] and are taking any PPIs (Prilosec, Prevacid, Protonix or Nexium) please stop it 7 days prior to your procedure.  If you are late you may be asked to reschedule.   You MUST bring a support person with you, they need to drive you home.  You will not be able to drive after the procedure.  If you are a female, you may be asked to take a urine pregnancy test.       Any questions , concerns or need to cancel, please call [phone].    Thank you,    [facility].

## 2024-04-10 ENCOUNTER — PATIENT MESSAGE (OUTPATIENT)
Dept: FAMILY MEDICINE CLINIC | Age: 44
End: 2024-04-10

## 2024-04-10 ENCOUNTER — OFFICE VISIT (OUTPATIENT)
Dept: BARIATRICS/WEIGHT MGMT | Age: 44
End: 2024-04-10
Payer: COMMERCIAL

## 2024-04-10 ENCOUNTER — OFFICE VISIT (OUTPATIENT)
Dept: ORTHOPEDIC SURGERY | Age: 44
End: 2024-04-10
Payer: COMMERCIAL

## 2024-04-10 VITALS — BODY MASS INDEX: 37.11 KG/M2 | RESPIRATION RATE: 14 BRPM | HEIGHT: 72 IN | WEIGHT: 274 LBS

## 2024-04-10 VITALS
HEIGHT: 72 IN | HEART RATE: 80 BPM | BODY MASS INDEX: 37.65 KG/M2 | DIASTOLIC BLOOD PRESSURE: 70 MMHG | WEIGHT: 278 LBS | SYSTOLIC BLOOD PRESSURE: 120 MMHG

## 2024-04-10 DIAGNOSIS — M54.16 LUMBAR RADICULOPATHY: ICD-10-CM

## 2024-04-10 DIAGNOSIS — K20.0 EOSINOPHILIC ESOPHAGITIS: ICD-10-CM

## 2024-04-10 DIAGNOSIS — J45.40 MODERATE PERSISTENT ASTHMA WITHOUT COMPLICATION: ICD-10-CM

## 2024-04-10 DIAGNOSIS — M25.522 LEFT ELBOW PAIN: ICD-10-CM

## 2024-04-10 DIAGNOSIS — I10 ESSENTIAL HYPERTENSION: Primary | ICD-10-CM

## 2024-04-10 DIAGNOSIS — Z01.818 PREOP TESTING: Primary | ICD-10-CM

## 2024-04-10 DIAGNOSIS — G47.33 OSA ON CPAP: ICD-10-CM

## 2024-04-10 DIAGNOSIS — E78.2 MIXED HYPERLIPIDEMIA: ICD-10-CM

## 2024-04-10 DIAGNOSIS — I10 ESSENTIAL HYPERTENSION: ICD-10-CM

## 2024-04-10 DIAGNOSIS — K21.9 GASTROESOPHAGEAL REFLUX DISEASE WITHOUT ESOPHAGITIS: ICD-10-CM

## 2024-04-10 DIAGNOSIS — M75.22 BICEPS TENDONITIS, LEFT: Primary | ICD-10-CM

## 2024-04-10 DIAGNOSIS — E66.01 SEVERE OBESITY (BMI 35.0-39.9) WITH COMORBIDITY (HCC): ICD-10-CM

## 2024-04-10 PROCEDURE — 1036F TOBACCO NON-USER: CPT | Performed by: ORTHOPAEDIC SURGERY

## 2024-04-10 PROCEDURE — 3074F SYST BP LT 130 MM HG: CPT | Performed by: NURSE PRACTITIONER

## 2024-04-10 PROCEDURE — G8427 DOCREV CUR MEDS BY ELIG CLIN: HCPCS | Performed by: NURSE PRACTITIONER

## 2024-04-10 PROCEDURE — G8417 CALC BMI ABV UP PARAM F/U: HCPCS | Performed by: NURSE PRACTITIONER

## 2024-04-10 PROCEDURE — G8417 CALC BMI ABV UP PARAM F/U: HCPCS | Performed by: ORTHOPAEDIC SURGERY

## 2024-04-10 PROCEDURE — 3078F DIAST BP <80 MM HG: CPT | Performed by: NURSE PRACTITIONER

## 2024-04-10 PROCEDURE — 1036F TOBACCO NON-USER: CPT | Performed by: NURSE PRACTITIONER

## 2024-04-10 PROCEDURE — 99213 OFFICE O/P EST LOW 20 MIN: CPT | Performed by: NURSE PRACTITIONER

## 2024-04-10 PROCEDURE — G8427 DOCREV CUR MEDS BY ELIG CLIN: HCPCS | Performed by: ORTHOPAEDIC SURGERY

## 2024-04-10 PROCEDURE — 99213 OFFICE O/P EST LOW 20 MIN: CPT | Performed by: ORTHOPAEDIC SURGERY

## 2024-04-10 RX ORDER — BUDESONIDE 0.5 MG/2ML
2 INHALANT ORAL 2 TIMES DAILY
COMMUNITY
Start: 2024-04-02

## 2024-04-10 NOTE — PROGRESS NOTES
ORTHOPEDIC PATIENT EVALUATION      HPI / Chief Complaint  Pa Cordova is a 43 y.o. male who presents for evaluation of his left elbow and shoulder.  He is approximately 1 year status post left elbow arthroscopic ECRB debridement/release.  This was performed on 4/13/2023.  Postoperatively he developed pain once again at the level of the lateral epicondyle and has failed to respond to treatment with therapy, bracing and use of anti-inflammatories.  At this time he continues to have pain primarily localized to the lateral epicondyle present with any twisting activities or carrying anything of weight.  Since her last visit he is also developed some anterior left shoulder pain that is present with or without activity.  He denies any weakness or stiffness in the shoulder.    Past Medical History  Pa  has a past medical history of Abnormal EKG, Acute arthritis, Allergies, Anxiety, Asthma, Chest pain, COPD (chronic obstructive pulmonary disease) ([facility]), COVID-19, Depression, Erectile dysfunction, Essential hypertension, Family history of premature CAD, GERD (gastroesophageal reflux disease), Heart attack ([facility]), Heart palpitations, High blood pressure, Hyperlipidemia with target LDL less than 100, Lateral epicondylitis of left elbow, Left lower lobe pneumonia, Liver disease, Lumbar radiculopathy, LVH (left ventricular hypertrophy) due to hypertensive disease, Mitral valve prolapse, Nonspecific reactive hepatitis, Obesity (BMI 30-39.9), NOEMÍ (obstructive sleep apnea), NOEMÍ on CPAP, NOEMÍ on CPAP, Osteoarthritis, Seronegative polyarthritis, Snoring, Social anxiety disorder, Unrefreshed by sleep, Uvulitis, and Vertebrogenic low back pain.    Past Surgical History  Pa  has a past surgical history that includes Knee arthroscopy; Hand surgery (Left); Tonsillectomy; Nose surgery; [place] tooth extraction; Colonoscopy; Endoscopy, colon, diagnostic; cyst removal (Right); Upper gastrointestinal endoscopy (N/A, 10/09/2019); Pain  details…

## 2024-04-10 NOTE — PROGRESS NOTES
Medical Nutrition Therapy  Supervised Diet & Exercise Pre-Op   Metabolic and Bariatric Surgery  Visit 3 out of 3    Nutrition Assessment:  Patient's start weight is 278 lbs and current body weight is 278 lbs. Patient is working toward bariatric surgery for Gastric Sleeve.    Vitals:   Wt Readings from Last 3 Encounters:   04/10/24 126.1 kg (278 lb)   04/10/24 124.3 kg (274 lb)   03/08/24 124.3 kg (274 lb)       Nutrition Diagnosis:  Knowledge deficit related to healthy behaviors that support weight management after weight loss surgery as evidenced by Body mass index is 37.7 kg/m².    Nutrition Intervention:  Nutrition Prescription:  Bariatric Pre-op Diet    Nutrition Counseling:  Reviewed patients current behaviors and habits and discussed barriers for change. Utilized motivational interviewing to set patient specific goals that promote bariatric behaviors. Patient displays understanding of the goals discussed.     Nutrition Education:   Reviewed and available in the Bariatric Education Binder.                                                Goals: The patient has a list and explanation of every goal in their \"Bariatric Education Binder\". Changes in eating patterns to promote health are noted below on the goals number 19-22. See below for goals that patient is continuing to work on and completed. All goals were planned with and agreed on by the patient.    Patient completed  5 out of 5 goals.  Treatment goals for upcoming month: Continue all previous goals.        Goal C N/A Notes:   [] 1 I will read the education binder provided to me.    [x] []    [] 2 I will make my psychological evaluation appoinment. [x] []    [] 3 I will bring my binder to every appointment.   [x] []    [] 4 I will eliminate all tobacco/nicotine.   [] [x]    [] 5 I will limit alcoholic beverages to 4-6oz per week.   [] [x]    [] 6 I will limit dining out to 3 times per week or less.   [] [x]    [] 7 I will eliminate sugary beverages.   [] [x]

## 2024-04-10 NOTE — PROGRESS NOTES
Medical Weight Management Progress Note    Subjective     Patient being seen for medically supervised weight loss for the chronic conditions of HTN, HLD, NOEMÍ, GERD, Asthma, Back Pain, MVP, Seronegative Polyarthritis.     He is working on the behavior changes discussed at the initial appointment. Patient continues on diet plan. Physical activity includes walking.  Weight gain of 4 lbs since last visit.  Using CPAP.  Psych eval completed and pending report.  EGD scheduled 4/12/24.  Has cardiac, pulmonary, and rheumatology clearances. Needs to have labs drawn.  No current issues.    Working toward bariatric surgery:    [x] Sleeve Gastrectomy                                                           [] Derian-en-Y Gastric Bypass    Allergies:  Allergies   Allergen Reactions    Fish-Derived Products Anaphylaxis    Other Anaphylaxis     Any type of seafood    Shellfish Allergy Anaphylaxis    Shrimp (Diagnostic) Anaphylaxis    Ace Inhibitors Swelling     UVULITIS ON 8/15/19    Betadine [Povidone-Iodine] Rash     Allergic reaction topically to betadine from a shot    Iodinated Contrast Media Hives, Itching and Rash     Benadryl resolved symptoms       Past Medical History:     Past Medical History:   Diagnosis Date    Abnormal EKG     Acute arthritis     Allergies     Anxiety     Asthma     Chest pain     COPD (chronic obstructive pulmonary disease) (HCC) 11/19/2018    COVID-19 10/03/2021    Admitted to hospital, COVID PNA    Depression     Erectile dysfunction 09/29/2017    Essential hypertension 01/02/2017    Family history of premature CAD 11/19/2018    GERD (gastroesophageal reflux disease) 11/19/2018    Heart attack (HCC)     Heart palpitations 09/29/2017    High blood pressure     Hyperlipidemia with target LDL less than 100 03/23/2017    Lateral epicondylitis of left elbow     Left lower lobe pneumonia 11/09/2012    Liver disease     Lumbar radiculopathy     LVH (left ventricular hypertrophy) due to hypertensive

## 2024-04-10 NOTE — TELEPHONE ENCOUNTER
Chart review, patient needs clearance for EGD and then bariatric surgery  EGD scheduled for 4/12/2024  Dr. Worrell cleared the patient as low to intermediate risk    Blood pressure is controlled  EKG 10/17/2023 T wave abnormality, he has seen cardiologist 1 year ago  BP Readings from Last 3 Encounters:   04/10/24 120/70   03/08/24 110/70   02/09/24 122/80     To do the labs in the computer       Diagnosis Orders   1. Preop testing  EKG 12 Lead    XR CHEST (2 VW)      2. Essential hypertension  EKG 12 Lead      3. Asthma, Moderate persistent asthma without complication  XR CHEST (2 VW)           Future Appointments   Date Time Provider Department Center   5/8/2024  5:30 PM [facility]ED[facility]   5/10/2024  4:15 PM SCHEDULE, [facility] DIETIC[facility]

## 2024-04-10 NOTE — TELEPHONE ENCOUNTER
From: Pa Cordova  To: Dr. Vandana Novak  Sent: 4/10/2024 12:57 PM EDT  Subject: Medical clearance     I need a letter from your office to [facility] weight management saying I'm ok to have it done please and thank you.

## 2024-04-10 NOTE — PRE-PROCEDURE INSTRUCTIONS
PAT phone call for EGD completed with pt.    Date/time/location (entrance C) of surgery/procedure verified with pt.    NPO after MN status verified with pt.    Need for  verified with pt.    Instructed pt to take a shower the AM of surgery/procedure and to avoid lotions, creams, jewelry.

## 2024-04-11 ENCOUNTER — TELEPHONE (OUTPATIENT)
Dept: INTERVENTIONAL RADIOLOGY/VASCULAR | Age: 44
End: 2024-04-11

## 2024-04-11 ENCOUNTER — ANESTHESIA EVENT (OUTPATIENT)
Dept: OPERATING ROOM | Age: 44
End: 2024-04-11
Payer: COMMERCIAL

## 2024-04-12 ENCOUNTER — HOSPITAL ENCOUNTER (OUTPATIENT)
Age: 44
Setting detail: OUTPATIENT SURGERY
Discharge: HOME | End: 2024-04-12
Attending: SURGERY | Admitting: SURGERY
Payer: COMMERCIAL

## 2024-04-12 ENCOUNTER — ANESTHESIA (OUTPATIENT)
Dept: OPERATING ROOM | Age: 44
End: 2024-04-12
Payer: COMMERCIAL

## 2024-04-12 VITALS
OXYGEN SATURATION: 95 % | DIASTOLIC BLOOD PRESSURE: 87 MMHG | HEART RATE: 85 BPM | BODY MASS INDEX: 37.25 KG/M2 | SYSTOLIC BLOOD PRESSURE: 135 MMHG | WEIGHT: 275 LBS | HEIGHT: 72 IN | RESPIRATION RATE: 24 BRPM | TEMPERATURE: 98.4 F

## 2024-04-12 DIAGNOSIS — K21.9 GASTROESOPHAGEAL REFLUX DISEASE, UNSPECIFIED WHETHER ESOPHAGITIS PRESENT: ICD-10-CM

## 2024-04-12 PROCEDURE — 2500000003 HC RX 250 WO HCPCS: Performed by: SPECIALIST

## 2024-04-12 PROCEDURE — 7100000011 HC PHASE II RECOVERY - ADDTL 15 MIN: Performed by: SURGERY

## 2024-04-12 PROCEDURE — 2580000003 HC RX 258: Performed by: ANESTHESIOLOGY

## 2024-04-12 PROCEDURE — 3609012400 HC EGD TRANSORAL BIOPSY SINGLE/MULTIPLE: Performed by: SURGERY

## 2024-04-12 PROCEDURE — 43239 EGD BIOPSY SINGLE/MULTIPLE: CPT | Performed by: SURGERY

## 2024-04-12 PROCEDURE — 6360000002 HC RX W HCPCS: Performed by: SPECIALIST

## 2024-04-12 PROCEDURE — 88305 TISSUE EXAM BY PATHOLOGIST: CPT

## 2024-04-12 PROCEDURE — 3700000000 HC ANESTHESIA ATTENDED CARE: Performed by: SURGERY

## 2024-04-12 PROCEDURE — 7100000010 HC PHASE II RECOVERY - FIRST 15 MIN: Performed by: SURGERY

## 2024-04-12 PROCEDURE — 2709999900 HC NON-CHARGEABLE SUPPLY: Performed by: SURGERY

## 2024-04-12 RX ORDER — SODIUM CHLORIDE 9 MG/ML
INJECTION, SOLUTION INTRAVENOUS PRN
Status: DISCONTINUED | OUTPATIENT
Start: 2024-04-12 | End: 2024-04-12 | Stop reason: HOSPADM

## 2024-04-12 RX ORDER — GLYCOPYRROLATE 1 MG/5 ML
SYRINGE (ML) INTRAVENOUS PRN
Status: DISCONTINUED | OUTPATIENT
Start: 2024-04-12 | End: 2024-04-12 | Stop reason: SDUPTHER

## 2024-04-12 RX ORDER — MEPERIDINE HYDROCHLORIDE 50 MG/ML
12.5 INJECTION INTRAMUSCULAR; INTRAVENOUS; SUBCUTANEOUS ONCE
Status: DISCONTINUED | OUTPATIENT
Start: 2024-04-12 | End: 2024-04-12 | Stop reason: HOSPADM

## 2024-04-12 RX ORDER — SODIUM CHLORIDE, SODIUM LACTATE, POTASSIUM CHLORIDE, CALCIUM CHLORIDE 600; 310; 30; 20 MG/100ML; MG/100ML; MG/100ML; MG/100ML
INJECTION, SOLUTION INTRAVENOUS CONTINUOUS
Status: DISCONTINUED | OUTPATIENT
Start: 2024-04-12 | End: 2024-04-12 | Stop reason: HOSPADM

## 2024-04-12 RX ORDER — NALOXONE HYDROCHLORIDE 0.4 MG/ML
INJECTION, SOLUTION INTRAMUSCULAR; INTRAVENOUS; SUBCUTANEOUS PRN
Status: DISCONTINUED | OUTPATIENT
Start: 2024-04-12 | End: 2024-04-12 | Stop reason: HOSPADM

## 2024-04-12 RX ORDER — MIDAZOLAM HYDROCHLORIDE 2 MG/2ML
2 INJECTION, SOLUTION INTRAMUSCULAR; INTRAVENOUS
Status: DISCONTINUED | OUTPATIENT
Start: 2024-04-12 | End: 2024-04-12 | Stop reason: HOSPADM

## 2024-04-12 RX ORDER — PROPOFOL 10 MG/ML
INJECTION, EMULSION INTRAVENOUS PRN
Status: DISCONTINUED | OUTPATIENT
Start: 2024-04-12 | End: 2024-04-12 | Stop reason: SDUPTHER

## 2024-04-12 RX ORDER — DIPHENHYDRAMINE HYDROCHLORIDE 50 MG/ML
12.5 INJECTION INTRAMUSCULAR; INTRAVENOUS
Status: DISCONTINUED | OUTPATIENT
Start: 2024-04-12 | End: 2024-04-12 | Stop reason: HOSPADM

## 2024-04-12 RX ORDER — OXYCODONE HYDROCHLORIDE 5 MG/1
10 TABLET ORAL PRN
Status: DISCONTINUED | OUTPATIENT
Start: 2024-04-12 | End: 2024-04-12 | Stop reason: HOSPADM

## 2024-04-12 RX ORDER — SODIUM CHLORIDE 0.9 % (FLUSH) 0.9 %
5-40 SYRINGE (ML) INJECTION EVERY 12 HOURS SCHEDULED
Status: DISCONTINUED | OUTPATIENT
Start: 2024-04-12 | End: 2024-04-12 | Stop reason: HOSPADM

## 2024-04-12 RX ORDER — LIDOCAINE HYDROCHLORIDE 10 MG/ML
INJECTION, SOLUTION INFILTRATION; PERINEURAL PRN
Status: DISCONTINUED | OUTPATIENT
Start: 2024-04-12 | End: 2024-04-12 | Stop reason: SDUPTHER

## 2024-04-12 RX ORDER — SODIUM CHLORIDE 0.9 % (FLUSH) 0.9 %
5-40 SYRINGE (ML) INJECTION PRN
Status: DISCONTINUED | OUTPATIENT
Start: 2024-04-12 | End: 2024-04-12 | Stop reason: HOSPADM

## 2024-04-12 RX ORDER — METOCLOPRAMIDE HYDROCHLORIDE 5 MG/ML
10 INJECTION INTRAMUSCULAR; INTRAVENOUS
Status: DISCONTINUED | OUTPATIENT
Start: 2024-04-12 | End: 2024-04-12 | Stop reason: HOSPADM

## 2024-04-12 RX ORDER — HYDRALAZINE HYDROCHLORIDE 20 MG/ML
10 INJECTION INTRAMUSCULAR; INTRAVENOUS
Status: DISCONTINUED | OUTPATIENT
Start: 2024-04-12 | End: 2024-04-12 | Stop reason: HOSPADM

## 2024-04-12 RX ORDER — OXYCODONE HYDROCHLORIDE 5 MG/1
5 TABLET ORAL PRN
Status: DISCONTINUED | OUTPATIENT
Start: 2024-04-12 | End: 2024-04-12 | Stop reason: HOSPADM

## 2024-04-12 RX ORDER — LIDOCAINE HYDROCHLORIDE 10 MG/ML
1 INJECTION, SOLUTION EPIDURAL; INFILTRATION; INTRACAUDAL; PERINEURAL
Status: DISCONTINUED | OUTPATIENT
Start: 2024-04-12 | End: 2024-04-12 | Stop reason: HOSPADM

## 2024-04-12 RX ORDER — ONDANSETRON 2 MG/ML
4 INJECTION INTRAMUSCULAR; INTRAVENOUS
Status: DISCONTINUED | OUTPATIENT
Start: 2024-04-12 | End: 2024-04-12 | Stop reason: HOSPADM

## 2024-04-12 RX ADMIN — Medication 0.4 MG: at 08:53

## 2024-04-12 RX ADMIN — PROPOFOL 100 MG: 10 INJECTION, EMULSION INTRAVENOUS at 08:53

## 2024-04-12 RX ADMIN — LIDOCAINE HYDROCHLORIDE 40 MG: 10 INJECTION, SOLUTION INFILTRATION; PERINEURAL at 08:53

## 2024-04-12 RX ADMIN — PROPOFOL 50 MG: 10 INJECTION, EMULSION INTRAVENOUS at 08:58

## 2024-04-12 RX ADMIN — PROPOFOL 50 MG: 10 INJECTION, EMULSION INTRAVENOUS at 08:55

## 2024-04-12 RX ADMIN — SODIUM CHLORIDE, POTASSIUM CHLORIDE, SODIUM LACTATE AND CALCIUM CHLORIDE: 600; 310; 30; 20 INJECTION, SOLUTION INTRAVENOUS at 08:15

## 2024-04-12 ASSESSMENT — PAIN - FUNCTIONAL ASSESSMENT
PAIN_FUNCTIONAL_ASSESSMENT: NONE - DENIES PAIN
PAIN_FUNCTIONAL_ASSESSMENT: 0-10

## 2024-04-12 ASSESSMENT — COPD QUESTIONNAIRES: CAT_SEVERITY: NO INTERVAL CHANGE

## 2024-04-12 NOTE — OP NOTE
[facility] OR  [address]  [facility] [address]  Dept: [phone]  Loc: [phone]    Preoperative Diagnosis:  GERD, Morbid obesity, BMI of Body mass index is 37.3 kg/m².    Postoperative Diagnosis: GERD, Morbid obesity, BMI of Body mass index is 37.3 kg/m².    Procedure: Esophagogastroduodenoscopy with Biopsy    Surgeon: Nikita Nunez DO    Assistant:    Anesthesia: MAC, see anesthesia records    Specimen:    1) Antrum for H. Pylori      Findings:    Mild antral gastritis, normal esophagus, normal duodenum, no hiatal hernia    EBL: NONE    Operative Narrative:   The risks and benefits were explained in detail to the patient who agreed and consented to the procedure.  The patient was taken to the endoscopic suite and placed in a lateral position.  Oxygen was administered via nasal cannula and a mouth guard was placed.  MAC was administered via the anesthetic team.      The endoscope was then advanced into the oropharynx and down into the esophagus under direct visualization. The scope was further advanced through the esophagus, GE junction and stomach to the pylorus under visualization.  The scope was passed through the pylorus and duodenal sweep performed, advancing and visualizing to the second portion of the duodenum.  The scope was then withdrawn to the antrum and cold forceps were used to take biopsies of the antrum for H. Pylori.  Appropriate hemostasis was noted.  The scope was then retroflexed to visualize the GE junction.  Evidence of a hiatal hernia was not noted.  The scope was then slowly withdrawn through the GE junction.  The Z line was noted. The stomach was then decompressed.  The scope was withdrawn from the esophagus and no further lesions noted.      The scope was withdrawn.  The patient tolerated the procedure well.        He will follow up with the Bariatric Clinic for further assessment.

## 2024-04-12 NOTE — ANESTHESIA POSTPROCEDURE EVALUATION
Department of Anesthesiology  Postprocedure Note    Patient: Pa Cordova  MRN: [id number]  YOB: 1980  Date of evaluation: 4/12/2024    Procedure Summary       Date: 04/12/24 Room / Location: [facility] PROCEDURE ROOM / [facility]    Anesthesia Start: 0851 Anesthesia Stop: 0902    Procedure: ESOPHAGOGASTRODUODENOSCOPY WITH GASTRIC BIOPSY Diagnosis:       Gastroesophageal reflux disease, unspecified whether esophagitis present      BMI 37.0-37.9, adult      (Gastroesophageal reflux disease, unspecified whether esophagitis present [K21.9])      (BMI 37.0-37.9, adult [Z68.37])    Surgeons: Nikita Nunez DO Responsible Provider: Nurys Vaughn MD    Anesthesia Type: MAC ASA Status: 3            Anesthesia Type: No value filed.    Hortencia Phase I: Hortencia Score: 10    Hortencia Phase II: Hortencia Score: 10    Anesthesia Post Evaluation    Patient location during evaluation: PACU  Patient participation: complete - patient participated  Level of consciousness: awake and alert  Airway patency: patent  Nausea & Vomiting: no nausea and no vomiting  Cardiovascular status: blood pressure returned to baseline  Respiratory status: acceptable and room air  Hydration status: euvolemic  Pain management: adequate and satisfactory to patient    No notable events documented.

## 2024-04-12 NOTE — DISCHARGE INSTRUCTIONS
POST-ENDOSCOPY INSTRUCTIONS    1. ACTIVITY   No driving, operating machinery, or making important decisions for 24 hours.    Resume normal activity after 24 hours.  You may return to work after 24 hours.    2. DIET    EGD: Resume your usual diet unless specified below.                Diet Modification: Regular    3. MEDICATIONS  (Do not consume alcohol, tranquilizers, or sleeping medications for 24 hours unless advised by your physician)                 Resume your usual medications    4. PHYSICIAN FOLLOW-UP                 Please continue with your previously scheduled appointments                 See your primary care physician as planned.      6. NORMAL CHANGES YOU MAY EXPERIENCE AFTER ENDOSCOPY:      EGD:  Sore throat, some abdominal pain and cramping.            7. CALL YOUR PHYSICIAN IF YOU EXPERIENCE ANY OF THE FOLLOWING      A.  Passing blood rectally or vomiting blood (color may be red or black)      B.  Severe abdominal pain or tenderness (that is not relieved by passing air)      C.  Fever, chills, or excessive sweating      D.  Persistent nausea or vomiting      E.  Redness or swelling at the IV site    If you have additional questions, PLEASE call your doctor or the [facility] Weight Management center at [phone]

## 2024-04-12 NOTE — H&P
Subjective     The patient is a 43 y.o. male who is here to undergo EGD for GERD.    Body mass index is 37.3 kg/m².  They are considering a bariatric procedure for morbid obesity.       Past Medical History:   Diagnosis Date    Abnormal EKG     Acute arthritis     Allergies     Anxiety     Asthma     Chest pain     COPD (chronic obstructive pulmonary disease) (HCC) 11/19/2018    COVID-19 10/03/2021    Admitted to hospital, COVID PNA    Depression     Erectile dysfunction 09/29/2017    Essential hypertension 01/02/2017    Family history of premature CAD 11/19/2018    GERD (gastroesophageal reflux disease) 11/19/2018    Heart attack (HCC)     Heart palpitations 09/29/2017    High blood pressure     Hyperlipidemia with target LDL less than 100 03/23/2017    Lateral epicondylitis of left elbow     Left lower lobe pneumonia 11/09/2012    Liver disease     Lumbar radiculopathy     LVH (left ventricular hypertrophy) due to hypertensive disease 03/23/2017    Mitral valve prolapse     Nonspecific reactive hepatitis 03/22/2017    Obesity (BMI 30-39.9) 03/23/2017    NOEMÍ (obstructive sleep apnea) 10/16/2019    CPAP nightly    NOEMÍ on CPAP 10/16/2019    NOEMÍ on CPAP 10/16/2019    Osteoarthritis     Seronegative polyarthritis 10/10/2016    followed w/ rheumatology in [place]    Snoring     Social anxiety disorder     Unrefreshed by sleep     Uvulitis 08/15/2019    Vertebrogenic low back pain 12/13/2021   .    Review of Systems - A complete 14 point review of systems was performed.  All was negative unless otherwise documented in HPI.    Allergies:  Allergies   Allergen Reactions    Fish-Derived Products Anaphylaxis    Other Anaphylaxis     Any type of seafood    Shellfish Allergy Anaphylaxis    Shrimp (Diagnostic) Anaphylaxis    Ace Inhibitors Swelling     UVULITIS ON 8/15/19    Betadine [Povidone-Iodine] Rash     Allergic reaction topically to betadine from a shot    Iodinated Contrast Media Hives, Itching and Rash     Benadryl

## 2024-04-12 NOTE — ANESTHESIA PRE PROCEDURE
Seronegative polyarthritis 10/10/2016    followed w/ rheumatology in [place]    Snoring     Social anxiety disorder     Unrefreshed by sleep     Uvulitis 08/15/2019    Vertebrogenic low back pain 12/13/2021       Past Surgical History:        Procedure Laterality Date    COLONOSCOPY      CYST REMOVAL Right     Armpit     ELBOW SURGERY Left 4/13/2023    ARTHROSCOPIC ELBOW EXTENSOR CARPI RADIALIS BREVIS DEBRIDEMENT AND RELEASE performed by Petra Ortiz MD at [facility] OR    ENDOSCOPY, COLON, DIAGNOSTIC      HAND SURGERY Left     KNEE ARTHROSCOPY      right knee    NOSE SURGERY      PAIN MANAGEMENT PROCEDURE      SKIN TAG REMOVAL      In office- neck and face per [facility] chart    TONSILLECTOMY      UPPER GASTROINTESTINAL ENDOSCOPY N/A 10/09/2019    EGD POLYP SNARE, HOT. ESOPHAGEAL DILATATION WITH MALONIES 50F performed by Douglas Herrera MD at [facility] ENDO    WISDOM TOOTH EXTRACTION         Social History:    Social History     Tobacco Use    Smoking status: Never    Smokeless tobacco: Never   Substance Use Topics    Alcohol use: Yes     Comment: rare                                Counseling given: Not Answered      Vital Signs (Current):   Vitals:    04/12/24 0807   BP: (!) 140/88   Pulse: 80   Resp: 17   Temp: 99 °F (37.2 °C)   TempSrc: Infrared   SpO2: 96%   Weight: 124.7 kg (275 lb)   Height: 1.829 m (6')                                              BP Readings from Last 3 Encounters:   04/12/24 (!) 140/88   04/10/24 120/70   03/08/24 110/70       NPO Status: Time of last liquid consumption: 1900                        Time of last solid consumption: 1900                        Date of last liquid consumption: 04/11/24                        Date of last solid food consumption: 04/11/24    BMI:   Wt Readings from Last 3 Encounters:   04/12/24 124.7 kg (275 lb)   04/10/24 126.1 kg (278 lb)   04/10/24 124.3 kg (274 lb)     Body mass index is 37.3 kg/m².    CBC:   Lab Results   Component Value

## 2024-04-16 LAB — SURGICAL PATHOLOGY REPORT: NORMAL

## 2024-04-16 NOTE — RESULT ENCOUNTER NOTE
Noted, benign pathology  Future Appointments  5/3/2024   3:00 PM    [facility] IR ROOM 1              [facility]        [facility]  5/8/2024   5:30 PM    SCHEDULE, P BARIATRIC S* bariatric nagel        [facility]  5/10/2024  4:15 PM    SCHEDULE, P BARIATRIC D* bariatric nagel        [facility]

## 2024-04-17 PROBLEM — K21.9 GERD WITHOUT ESOPHAGITIS: Status: ACTIVE | Noted: 2024-04-17

## 2024-04-22 DIAGNOSIS — K21.00 GASTROESOPHAGEAL REFLUX DISEASE WITH ESOPHAGITIS WITHOUT HEMORRHAGE: ICD-10-CM

## 2024-04-22 RX ORDER — OMEPRAZOLE 40 MG/1
40 CAPSULE, DELAYED RELEASE ORAL
Qty: 90 CAPSULE | Refills: 0 | Status: SHIPPED | OUTPATIENT
Start: 2024-04-22

## 2024-04-22 NOTE — TELEPHONE ENCOUNTER
Please Approve or Refuse.  Send to Pharmacy per Pt's Request:      Next Visit Date:  Visit date not found   Last Visit Date: 1/19/2024    Hemoglobin A1C (%)   Date Value   04/12/2023 5.6   04/08/2022 5.5   10/04/2021 5.5             ( goal A1C is < 7)   BP Readings from Last 3 Encounters:   04/12/24 135/87   04/10/24 120/70   03/08/24 110/70          (goal 120/80)  BUN   Date Value Ref Range Status   10/12/2023 13 6 - 20 mg/dL Final     Creatinine   Date Value Ref Range Status   10/12/2023 0.9 0.7 - 1.2 mg/dL Final     Potassium   Date Value Ref Range Status   10/12/2023 4.0 3.7 - 5.3 mmol/L Final

## 2024-05-03 ENCOUNTER — HOSPITAL ENCOUNTER (OUTPATIENT)
Dept: INTERVENTIONAL RADIOLOGY/VASCULAR | Age: 44
Discharge: HOME | End: 2024-05-03
Payer: COMMERCIAL

## 2024-05-03 DIAGNOSIS — M75.22 BICEPS TENDONITIS, LEFT: ICD-10-CM

## 2024-05-03 PROCEDURE — 20552 NJX 1/MLT TRIGGER POINT 1/2: CPT

## 2024-05-03 PROCEDURE — 2709999900 IR ARTHR/ASP/INJ MAJOR JT/BURSA LEFT WO US

## 2024-05-03 PROCEDURE — 76942 ECHO GUIDE FOR BIOPSY: CPT

## 2024-05-03 RX ORDER — TRIAMCINOLONE ACETONIDE 40 MG/ML
40 INJECTION, SUSPENSION INTRA-ARTICULAR; INTRAMUSCULAR ONCE
Status: DISCONTINUED | OUTPATIENT
Start: 2024-05-03 | End: 2024-05-06 | Stop reason: HOSPADM

## 2024-05-03 RX ORDER — LIDOCAINE HYDROCHLORIDE 10 MG/ML
1 INJECTION, SOLUTION INFILTRATION; PERINEURAL ONCE
Status: DISCONTINUED | OUTPATIENT
Start: 2024-05-03 | End: 2024-05-06 | Stop reason: HOSPADM

## 2024-05-03 NOTE — BRIEF OP NOTE
Brief Postoperative Note    Pa Cordova  YOB: 1980  [id number]    Pre-operative Diagnosis: Left biceps tendonitis    Post-operative Diagnosis: Same    Procedure: Tendon sheath injection    Anesthesia: Local    Surgeons/Assistants: Adalberto Galdamez MD     Estimated Blood Loss: minimal    Complications: none immediate    Specimens: were not obtained    Findings: U/S guided left biceps tendon sheath injection using a 22 G needle with injection of 1 ml Kenalog 40 and 1 ml lidocaine.    Electronically signed by Adalberto Galdamez MD on 5/3/2024 at 4:19 PM

## 2024-05-07 ENCOUNTER — HOSPITAL ENCOUNTER (OUTPATIENT)
Age: 44
Setting detail: SPECIMEN
Discharge: HOME | End: 2024-05-07
Payer: COMMERCIAL

## 2024-05-07 ENCOUNTER — HOSPITAL ENCOUNTER (OUTPATIENT)
Age: 44
Discharge: HOME | End: 2024-05-07

## 2024-05-07 DIAGNOSIS — R74.8 BLOOD ALKALINE PHOSPHATASE INCREASED COMPARED WITH PRIOR MEASUREMENT: Primary | ICD-10-CM

## 2024-05-07 DIAGNOSIS — I34.1 MITRAL VALVE PROLAPSE: ICD-10-CM

## 2024-05-07 DIAGNOSIS — G47.33 OSA ON CPAP: ICD-10-CM

## 2024-05-07 DIAGNOSIS — E78.2 MIXED HYPERLIPIDEMIA: ICD-10-CM

## 2024-05-07 DIAGNOSIS — I10 ESSENTIAL HYPERTENSION: ICD-10-CM

## 2024-05-07 DIAGNOSIS — Z01.818 PREOP TESTING: ICD-10-CM

## 2024-05-07 DIAGNOSIS — M13.0 SERONEGATIVE POLYARTHRITIS: ICD-10-CM

## 2024-05-07 DIAGNOSIS — K21.9 GASTROESOPHAGEAL REFLUX DISEASE WITHOUT ESOPHAGITIS: ICD-10-CM

## 2024-05-07 DIAGNOSIS — J45.40 MODERATE PERSISTENT ASTHMA WITHOUT COMPLICATION: ICD-10-CM

## 2024-05-07 DIAGNOSIS — E66.9 OBESITY (BMI 30-39.9): ICD-10-CM

## 2024-05-07 DIAGNOSIS — Z11.59 ENCOUNTER FOR SCREENING FOR OTHER VIRAL DISEASES: ICD-10-CM

## 2024-05-07 DIAGNOSIS — M54.16 LUMBAR RADICULOPATHY: ICD-10-CM

## 2024-05-07 LAB
25(OH)D3 SERPL-MCNC: 36.3 NG/ML (ref 30–100)
ALBUMIN SERPL-MCNC: 4.4 G/DL (ref 3.5–5.2)
ALBUMIN SERPL-MCNC: 4.4 G/DL (ref 3.5–5.2)
ALP SERPL-CCNC: 133 U/L (ref 40–129)
ALP SERPL-CCNC: 133 U/L (ref 40–129)
ALT SERPL-CCNC: 37 U/L (ref 5–41)
AMPHET UR QL SCN: NEGATIVE
ANION GAP SERPL CALCULATED.3IONS-SCNC: 11 MMOL/L (ref 9–17)
ANION GAP SERPL CALCULATED.3IONS-SCNC: 11 MMOL/L (ref 9–17)
AST SERPL-CCNC: 27 U/L
AST SERPL-CCNC: 27 U/L
BACTERIA URNS QL MICRO: ABNORMAL
BARBITURATES UR QL SCN: NEGATIVE
BASOPHILS # BLD: 0 K/UL (ref 0–0.2)
BASOPHILS # BLD: 0 K/UL (ref 0–0.2)
BASOPHILS NFR BLD: 1 % (ref 0–2)
BASOPHILS NFR BLD: 1 % (ref 0–2)
BENZODIAZ UR QL: NEGATIVE
BILIRUB SERPL-MCNC: 0.6 MG/DL (ref 0.3–1.2)
BILIRUB SERPL-MCNC: 0.6 MG/DL (ref 0.3–1.2)
BILIRUB UR QL STRIP: NEGATIVE
BUN SERPL-MCNC: 14 MG/DL (ref 6–20)
BUN SERPL-MCNC: 14 MG/DL (ref 6–20)
CALCIUM SERPL-MCNC: 9.1 MG/DL (ref 8.6–10.4)
CALCIUM SERPL-MCNC: 9.1 MG/DL (ref 8.6–10.4)
CANNABINOIDS UR QL SCN: NEGATIVE
CASTS #/AREA URNS LPF: ABNORMAL /LPF
CHLORIDE SERPL-SCNC: 105 MMOL/L (ref 98–107)
CHLORIDE SERPL-SCNC: 105 MMOL/L (ref 98–107)
CHOLEST SERPL-MCNC: 185 MG/DL
CHOLEST SERPL-MCNC: 185 MG/DL
CHOLESTEROL/HDL RATIO: 4.4
CHOLESTEROL/HDL RATIO: 4.4
CLARITY UR: CLEAR
CO2 SERPL-SCNC: 24 MMOL/L (ref 20–31)
CO2 SERPL-SCNC: 24 MMOL/L (ref 20–31)
COCAINE UR QL SCN: NEGATIVE
COLOR UR: ABNORMAL
CREAT SERPL-MCNC: 0.9 MG/DL (ref 0.7–1.2)
CREAT SERPL-MCNC: 0.9 MG/DL (ref 0.7–1.2)
EOSINOPHIL # BLD: 0.2 K/UL (ref 0–0.4)
EOSINOPHIL # BLD: 0.2 K/UL (ref 0–0.4)
EOSINOPHILS RELATIVE PERCENT: 3 % (ref 0–4)
EOSINOPHILS RELATIVE PERCENT: 3 % (ref 0–4)
EPI CELLS #/AREA URNS HPF: ABNORMAL /HPF
ERYTHROCYTE [DISTWIDTH] IN BLOOD BY AUTOMATED COUNT: 14.3 % (ref 11.5–14.9)
ERYTHROCYTE [DISTWIDTH] IN BLOOD BY AUTOMATED COUNT: 14.3 % (ref 11.5–14.9)
EST. AVERAGE GLUCOSE BLD GHB EST-MCNC: 108 MG/DL
FENTANYL UR QL: NEGATIVE
FERRITIN SERPL-MCNC: 93 NG/ML (ref 30–400)
FOLATE SERPL-MCNC: 17.6 NG/ML (ref 4.8–24.2)
GFR, ESTIMATED: >90 ML/MIN/1.73M2
GFR, ESTIMATED: >90 ML/MIN/1.73M2
GLUCOSE SERPL-MCNC: 106 MG/DL (ref 70–99)
GLUCOSE SERPL-MCNC: 106 MG/DL (ref 70–99)
GLUCOSE UR STRIP-MCNC: NEGATIVE MG/DL
HAV AB SERPL IA-ACNC: NONREACTIVE
HBA1C MFR BLD: 5.4 % (ref 4–6)
HBV SURFACE AB SERPL IA-ACNC: <3.5 MIU/ML
HCT VFR BLD AUTO: 47.3 % (ref 41–53)
HCT VFR BLD AUTO: 47.3 % (ref 41–53)
HDLC SERPL-MCNC: 42 MG/DL
HDLC SERPL-MCNC: 42 MG/DL
HGB BLD-MCNC: 16.1 G/DL (ref 13.5–17.5)
HGB BLD-MCNC: 16.1 G/DL (ref 13.5–17.5)
HGB UR QL STRIP.AUTO: NEGATIVE
IRON SATN MFR SERPL: 23 % (ref 20–55)
IRON SERPL-MCNC: 76 UG/DL (ref 61–157)
KETONES UR STRIP-MCNC: NEGATIVE MG/DL
LDLC SERPL CALC-MCNC: 128 MG/DL (ref 0–130)
LDLC SERPL CALC-MCNC: 128 MG/DL (ref 0–130)
LEUKOCYTE ESTERASE UR QL STRIP: NEGATIVE
LYMPHOCYTES NFR BLD: 2.4 K/UL (ref 1–4.8)
LYMPHOCYTES NFR BLD: 2.4 K/UL (ref 1–4.8)
LYMPHOCYTES RELATIVE PERCENT: 28 % (ref 24–44)
LYMPHOCYTES RELATIVE PERCENT: 28 % (ref 24–44)
MAGNESIUM SERPL-MCNC: 2.4 MG/DL (ref 1.6–2.6)
MAGNESIUM SERPL-MCNC: 2.4 MG/DL (ref 1.6–2.6)
MCH RBC QN AUTO: 31.2 PG (ref 26–34)
MCH RBC QN AUTO: 31.2 PG (ref 26–34)
MCHC RBC AUTO-ENTMCNC: 34 G/DL (ref 31–37)
MCHC RBC AUTO-ENTMCNC: 34 G/DL (ref 31–37)
MCV RBC AUTO: 91.9 FL (ref 80–100)
MCV RBC AUTO: 91.9 FL (ref 80–100)
METHADONE UR QL: NEGATIVE
MONOCYTES NFR BLD: 0.7 K/UL (ref 0.1–1.3)
MONOCYTES NFR BLD: 0.7 K/UL (ref 0.1–1.3)
MONOCYTES NFR BLD: 8 % (ref 1–7)
MONOCYTES NFR BLD: 8 % (ref 1–7)
NEUTROPHILS NFR BLD: 60 % (ref 36–66)
NEUTROPHILS NFR BLD: 60 % (ref 36–66)
NEUTS SEG NFR BLD: 5.1 K/UL (ref 1.3–9.1)
NEUTS SEG NFR BLD: 5.1 K/UL (ref 1.3–9.1)
NITRITE UR QL STRIP: NEGATIVE
OPIATES UR QL SCN: NEGATIVE
OXYCODONE UR QL SCN: NEGATIVE
PCP UR QL SCN: NEGATIVE
PH UR STRIP: 5.5 [PH] (ref 5–8)
PLATELET # BLD AUTO: 293 K/UL (ref 150–450)
PLATELET # BLD AUTO: 293 K/UL (ref 150–450)
PMV BLD AUTO: 8.2 FL (ref 6–12)
PMV BLD AUTO: 8.2 FL (ref 6–12)
POTASSIUM SERPL-SCNC: 4.2 MMOL/L (ref 3.7–5.3)
POTASSIUM SERPL-SCNC: 4.2 MMOL/L (ref 3.7–5.3)
PROT SERPL-MCNC: 7.6 G/DL (ref 6.4–8.3)
PROT SERPL-MCNC: 7.6 G/DL (ref 6.4–8.3)
PROT UR STRIP-MCNC: NEGATIVE MG/DL
PTH-INTACT SERPL-MCNC: 48 PG/ML (ref 15–65)
RBC # BLD AUTO: 5.15 M/UL (ref 4.5–5.9)
RBC # BLD AUTO: 5.15 M/UL (ref 4.5–5.9)
RBC #/AREA URNS HPF: ABNORMAL /HPF
SODIUM SERPL-SCNC: 140 MMOL/L (ref 135–144)
SODIUM SERPL-SCNC: 140 MMOL/L (ref 135–144)
SP GR UR STRIP: 1.03 (ref 1–1.03)
T4 FREE SERPL-MCNC: 1.3 NG/DL (ref 0.9–1.7)
TEST INFORMATION: NORMAL
TIBC SERPL-MCNC: 337 UG/DL (ref 250–450)
TRIGL SERPL-MCNC: 77 MG/DL
TRIGL SERPL-MCNC: 77 MG/DL
TSH SERPL DL<=0.05 MIU/L-ACNC: 3.22 UIU/ML (ref 0.3–5)
TSH SERPL DL<=0.05 MIU/L-ACNC: 3.22 UIU/ML (ref 0.3–5)
UNSATURATED IRON BINDING CAPACITY: 261 UG/DL (ref 112–347)
URATE SERPL-MCNC: 5 MG/DL (ref 3.4–7)
UROBILINOGEN UR STRIP-ACNC: NORMAL EU/DL (ref 0–1)
VIT B12 SERPL-MCNC: 812 PG/ML (ref 232–1245)
WBC #/AREA URNS HPF: ABNORMAL /HPF
WBC OTHER # BLD: 8.4 K/UL (ref 3.5–11)
WBC OTHER # BLD: 8.4 K/UL (ref 3.5–11)

## 2024-05-07 PROCEDURE — 36415 COLL VENOUS BLD VENIPUNCTURE: CPT

## 2024-05-07 PROCEDURE — 80053 COMPREHEN METABOLIC PANEL: CPT

## 2024-05-07 PROCEDURE — 83540 ASSAY OF IRON: CPT

## 2024-05-07 PROCEDURE — 82607 VITAMIN B-12: CPT

## 2024-05-07 PROCEDURE — 85025 COMPLETE CBC W/AUTO DIFF WBC: CPT

## 2024-05-07 PROCEDURE — 82746 ASSAY OF FOLIC ACID SERUM: CPT

## 2024-05-07 PROCEDURE — 86708 HEPATITIS A ANTIBODY: CPT

## 2024-05-07 PROCEDURE — 83036 HEMOGLOBIN GLYCOSYLATED A1C: CPT

## 2024-05-07 PROCEDURE — 83550 IRON BINDING TEST: CPT

## 2024-05-07 PROCEDURE — 83970 ASSAY OF PARATHORMONE: CPT

## 2024-05-07 PROCEDURE — 84590 ASSAY OF VITAMIN A: CPT

## 2024-05-07 PROCEDURE — 84425 ASSAY OF VITAMIN B-1: CPT

## 2024-05-07 PROCEDURE — 82306 VITAMIN D 25 HYDROXY: CPT

## 2024-05-07 PROCEDURE — 80061 LIPID PANEL: CPT

## 2024-05-07 PROCEDURE — 82728 ASSAY OF FERRITIN: CPT

## 2024-05-07 PROCEDURE — 84630 ASSAY OF ZINC: CPT

## 2024-05-07 PROCEDURE — 83735 ASSAY OF MAGNESIUM: CPT

## 2024-05-07 PROCEDURE — G0480 DRUG TEST DEF 1-7 CLASSES: HCPCS

## 2024-05-07 PROCEDURE — 81001 URINALYSIS AUTO W/SCOPE: CPT

## 2024-05-07 PROCEDURE — 80307 DRUG TEST PRSMV CHEM ANLYZR: CPT

## 2024-05-07 PROCEDURE — 86317 IMMUNOASSAY INFECTIOUS AGENT: CPT

## 2024-05-07 PROCEDURE — 84550 ASSAY OF BLOOD/URIC ACID: CPT

## 2024-05-07 PROCEDURE — 84443 ASSAY THYROID STIM HORMONE: CPT

## 2024-05-07 PROCEDURE — 84439 ASSAY OF FREE THYROXINE: CPT

## 2024-05-07 NOTE — RESULT ENCOUNTER NOTE
Please notify patient: Urine is concentrated he needs to drink 8 x 8 ounce glasses of water every day  Blood glucose mildly high 106, stay away from sweets and pop and starches  Alkaline phosphatase is increased which is new, this is a liver test, I will order an ultrasound of the liver for him  Not immune against hepatitis B, he needs hepatitis B vaccine  Greatly improved lipids    Otherwise labs within normal limits  continue current treatment    Future Appointments  5/8/2024   5:30 PM    SCHEDULE, [facility] BARIATRIC S* bariatric nagel        [facility]  5/10/2024  4:15 PM    SCHEDULE, [facility] BARIATRIC D* bariatric nagel        [facility]  6/5/2024   4:00 PM    Polina Tenorio    [facility] PSY        [facility]

## 2024-05-08 ENCOUNTER — NURSE ONLY (OUTPATIENT)
Dept: BARIATRICS/WEIGHT MGMT | Age: 44
End: 2024-05-08

## 2024-05-08 LAB
EKG ATRIAL RATE: 68 BPM
EKG P AXIS: 49 DEGREES
EKG P-R INTERVAL: 182 MS
EKG Q-T INTERVAL: 392 MS
EKG QRS DURATION: 88 MS
EKG QTC CALCULATION (BAZETT): 416 MS
EKG R AXIS: 65 DEGREES
EKG T AXIS: 93 DEGREES
EKG VENTRICULAR RATE: 68 BPM

## 2024-05-08 NOTE — RESULT ENCOUNTER NOTE
Noted, EKG pending, when EKG available will clear for surgery      Future Appointments  5/8/2024   5:30 PM    SCHEDULE, P BARIATRIC S* bariatric nagel        [facility]  5/10/2024  4:15 PM    SCHEDULE, P BARIATRIC D* bariatric nagel        [facility]  6/5/2024   4:00 PM    Polina TenorioURG PC PSY        [facility]

## 2024-05-09 LAB — ZINC SERPL-MCNC: 106.1 UG/DL (ref 60–120)

## 2024-05-10 ENCOUNTER — OFFICE VISIT (OUTPATIENT)
Dept: BARIATRICS/WEIGHT MGMT | Age: 44
End: 2024-05-10
Payer: COMMERCIAL

## 2024-05-10 VITALS
BODY MASS INDEX: 36.57 KG/M2 | HEART RATE: 88 BPM | SYSTOLIC BLOOD PRESSURE: 124 MMHG | HEIGHT: 72 IN | OXYGEN SATURATION: 96 % | DIASTOLIC BLOOD PRESSURE: 78 MMHG | WEIGHT: 270 LBS

## 2024-05-10 DIAGNOSIS — K21.9 GASTROESOPHAGEAL REFLUX DISEASE WITHOUT ESOPHAGITIS: ICD-10-CM

## 2024-05-10 DIAGNOSIS — I10 ESSENTIAL HYPERTENSION: Primary | ICD-10-CM

## 2024-05-10 DIAGNOSIS — E78.2 MIXED HYPERLIPIDEMIA: ICD-10-CM

## 2024-05-10 DIAGNOSIS — G47.33 OSA ON CPAP: ICD-10-CM

## 2024-05-10 DIAGNOSIS — E66.9 OBESITY (BMI 30-39.9): ICD-10-CM

## 2024-05-10 LAB
RETINYL PALMITATE: <0.02 MG/L (ref 0–0.1)
VITAMIN A LEVEL: 0.68 MG/L (ref 0.3–1.2)
VITAMIN A, INTERP: NORMAL

## 2024-05-10 PROCEDURE — 99213 OFFICE O/P EST LOW 20 MIN: CPT | Performed by: NURSE PRACTITIONER

## 2024-05-10 PROCEDURE — 3078F DIAST BP <80 MM HG: CPT | Performed by: NURSE PRACTITIONER

## 2024-05-10 PROCEDURE — G8417 CALC BMI ABV UP PARAM F/U: HCPCS | Performed by: NURSE PRACTITIONER

## 2024-05-10 PROCEDURE — G8427 DOCREV CUR MEDS BY ELIG CLIN: HCPCS | Performed by: NURSE PRACTITIONER

## 2024-05-10 PROCEDURE — 3074F SYST BP LT 130 MM HG: CPT | Performed by: NURSE PRACTITIONER

## 2024-05-10 PROCEDURE — 1036F TOBACCO NON-USER: CPT | Performed by: NURSE PRACTITIONER

## 2024-05-10 RX ORDER — MULTIVITAMIN WITH IRON
250 TABLET ORAL DAILY
COMMUNITY
Start: 2024-05-07

## 2024-05-10 NOTE — PROGRESS NOTES
Medical Nutrition Therapy   Metabolic and Bariatric Surgery  Supervised Diet and Exercise Preparation  Nutrition Goals Complete - Visit 4 out of 3    Patient's start weight is 278 lbs, patient's current body weight is 270 lbs.  What additional goals would you like to plan today? continue bariatric behaviors.    Vitals:   Wt Readings from Last 3 Encounters:   05/10/24 122.5 kg (270 lb)   04/12/24 124.7 kg (275 lb)   04/10/24 126.1 kg (278 lb)     Nutrition Assessment:   PES: Knowledge deficit related to healthy behaviors that support weight management post weight loss surgery as evidenced by Body mass index is 36.62 kg/m².    Nutrition Assessment of Goal Attainment:  TREATMENT GOALS:  Continue bariatric behaviors.    [x] Continue to follow monthly and review goals.  [x] Nutrition goals complete    Brayden Arias RD, LD  Clinical Bariatric Dietitian  [facility] Management [facility] Surgical Specialist  [phone]  
as needed (anxiety) 270 tablet 3    fluticasone (FLONASE) 50 MCG/ACT nasal spray 2 sprays by Each Nostril route daily 16 g 5    loratadine (CLARITIN) 10 MG tablet Take 1 tablet by mouth daily 90 tablet 3    mometasone-formoterol (DULERA) 100-5 MCG/ACT inhaler Inhale 2 puffs into the lungs 2 times daily Rinse mouth after use 13 g 11    montelukast (SINGULAIR) 10 MG tablet Take 1 tablet by mouth nightly 90 tablet 3    EPINEPHrine (EPIPEN 2-LEXUS) 0.3 MG/0.3ML SOAJ injection Inject 0.3 mLs into the muscle once as needed (Anaphylaxis) And call 911 1 each 3    Magnesium Oxide (MAGNESIUM-OXIDE) 250 MG TABS tablet Take 1 tablet by mouth Daily with supper For asthma attack 90 tablet 0    nirmatrelvir/ritonavir 300/100 (PAXLOVID) 20 x 150 MG & 10 x 100MG TBPK Take 3 tablets (two 150 mg nirmatrelvir and one 100 mg ritonavir tablets) by mouth every 12 hours for 5 days. 30 tablet 0    albuterol sulfate HFA (PROVENTIL;VENTOLIN;PROAIR) 108 (90 Base) MCG/ACT inhaler inhale 2 puffs by mouth and INTO THE LUNGS four times a day if needed for QUIK RELIEF ONLY MAY TAKE 1-2 PUFFS BEFORE EXERCISE 8.5 g 3    atorvastatin (LIPITOR) 40 MG tablet take 1 tablet by mouth once daily with dinner 90 tablet 3    folic acid (FOLVITE) 1 MG tablet take 1 tablet by mouth once daily 90 tablet 3    methotrexate (RHEUMATREX) 2.5 MG chemo tablet take 7 tablets by mouth every week      Blood Pressure KIT Dx: HTN. Needs automatic blood pressure machine to monitor his blood pressure. 1 kit 0    Respiratory Therapy Supplies (NEBULIZER) MADHAV 1 Units by Does not apply route 2 times daily Dx: Asthma exacerbation J45.901 1 Device 0     No current facility-administered medications for this visit.       Vital Signs:  /78 (Site: Left Upper Arm, Position: Sitting, Cuff Size: Large Adult)   Pulse 88   Ht 1.829 m (6')   Wt 122.5 kg (270 lb)   SpO2 96%   BMI 36.62 kg/m²     BMI/Height/Weight:  Body mass index is 36.62 kg/m².      Review of Systems - A review of

## 2024-05-11 LAB
COTININE: <5 NG/ML
NICOTINE: <5 NG/ML

## 2024-05-12 LAB — VIT B1 PYROPHOSHATE BLD-SCNC: 139 NMOL/L (ref 70–180)

## 2024-06-11 ENCOUNTER — OFFICE VISIT (OUTPATIENT)
Dept: BEHAVIORAL/MENTAL HEALTH CLINIC | Age: 44
End: 2024-06-11
Payer: COMMERCIAL

## 2024-06-11 DIAGNOSIS — F33.0 MILD EPISODE OF RECURRENT MAJOR DEPRESSIVE DISORDER (HCC): Primary | ICD-10-CM

## 2024-06-11 DIAGNOSIS — E66.9 OBESITY (BMI 30-39.9): ICD-10-CM

## 2024-06-11 PROCEDURE — 1036F TOBACCO NON-USER: CPT | Performed by: PSYCHOLOGIST

## 2024-06-11 PROCEDURE — 90834 PSYTX W PT 45 MINUTES: CPT | Performed by: PSYCHOLOGIST

## 2024-06-11 ASSESSMENT — ANXIETY QUESTIONNAIRES
1. FEELING NERVOUS, ANXIOUS, OR ON EDGE: NOT AT ALL
4. TROUBLE RELAXING: NOT AT ALL
2. NOT BEING ABLE TO STOP OR CONTROL WORRYING: NOT AT ALL
GAD7 TOTAL SCORE: 0
7. FEELING AFRAID AS IF SOMETHING AWFUL MIGHT HAPPEN: NOT AT ALL
6. BECOMING EASILY ANNOYED OR IRRITABLE: NOT AT ALL
5. BEING SO RESTLESS THAT IT IS HARD TO SIT STILL: NOT AT ALL
3. WORRYING TOO MUCH ABOUT DIFFERENT THINGS: NOT AT ALL

## 2024-06-11 ASSESSMENT — PATIENT HEALTH QUESTIONNAIRE - PHQ9
6. FEELING BAD ABOUT YOURSELF - OR THAT YOU ARE A FAILURE OR HAVE LET YOURSELF OR YOUR FAMILY DOWN: NOT AT ALL
SUM OF ALL RESPONSES TO PHQ QUESTIONS 1-9: 5
1. LITTLE INTEREST OR PLEASURE IN DOING THINGS: NOT AT ALL
2. FEELING DOWN, DEPRESSED OR HOPELESS: NOT AT ALL
SUM OF ALL RESPONSES TO PHQ QUESTIONS 1-9: 5
9. THOUGHTS THAT YOU WOULD BE BETTER OFF DEAD, OR OF HURTING YOURSELF: NOT AT ALL
5. POOR APPETITE OR OVEREATING: NOT AT ALL
8. MOVING OR SPEAKING SO SLOWLY THAT OTHER PEOPLE COULD HAVE NOTICED. OR THE OPPOSITE, BEING SO FIGETY OR RESTLESS THAT YOU HAVE BEEN MOVING AROUND A LOT MORE THAN USUAL: NOT AT ALL
3. TROUBLE FALLING OR STAYING ASLEEP: NEARLY EVERY DAY
SUM OF ALL RESPONSES TO PHQ9 QUESTIONS 1 & 2: 0

## 2024-06-11 NOTE — PROGRESS NOTES
ADULT BEHAVIORAL HEALTH FOLLOW UP  Polina Tenorio M.A.   Psychology Doctoral Trainee  [employer]   Erika Tapia, Ph.D. ([place] [address], [place] [id number])  Carole Velez, Ph.D. ([place] [address])        Visit Date: 6/11/2024   Time of appointment:  4:20pm-5:06pm  Time spent with Patient: 46 minutes.   This is patient's second appointment.    Reason for Consult:  Depression     Referring Provider/PCP:    No ref. provider found  Vandana Novak MD      Pt provided informed consent for the behavioral health program. Discussed with patient model of service to include the limits of confidentiality (i.e. abuse reporting, suicide intervention, etc.) and short-term intervention focused approach. Pt indicated understanding. Also discussed with patient that the service provider is a supervised clinician and in particular is being supervised by Dr. Tapia and/or Dr. Velez. Patient indicated understanding and signed consent form agreeing to be seen by a supervised clinician.     RADHA Winn is a 43 y.o. male who presents for follow up of mild episode of recurrent major depressive disorder. Patient is attending [facility] sessions to obtain psychiatric clearance for bariatric surgery. Patient indicated he had attended one of the support groups since intake and had scheduled another via zoom. Patient indicated he typically consumes the recommended 4-6 meals a day. Clinician and patient discussed what eating habits looked like when he is traveling for work. Patient indicated typically he meal preps his food for the week when he knows he is traveling for work. He stated when he has to leave on a work trip that is not planned he packs his snacks and will eat at sit-down restaurants. Patient indicated his wife is going through the similar process for bariatric surgery. Patient and clinician discussed the stress associated with his employment. Patient indicated when he is at work his stress level is 10 (highest)

## 2024-06-21 ENCOUNTER — OFFICE VISIT (OUTPATIENT)
Dept: BARIATRICS/WEIGHT MGMT | Age: 44
End: 2024-06-21
Payer: COMMERCIAL

## 2024-06-21 VITALS
OXYGEN SATURATION: 94 % | WEIGHT: 273 LBS | DIASTOLIC BLOOD PRESSURE: 74 MMHG | SYSTOLIC BLOOD PRESSURE: 130 MMHG | HEART RATE: 94 BPM | HEIGHT: 72 IN | BODY MASS INDEX: 36.98 KG/M2

## 2024-06-21 DIAGNOSIS — I10 ESSENTIAL HYPERTENSION: Primary | ICD-10-CM

## 2024-06-21 DIAGNOSIS — E78.2 MIXED HYPERLIPIDEMIA: ICD-10-CM

## 2024-06-21 DIAGNOSIS — E66.9 OBESITY (BMI 30-39.9): ICD-10-CM

## 2024-06-21 DIAGNOSIS — K21.9 GASTROESOPHAGEAL REFLUX DISEASE WITHOUT ESOPHAGITIS: ICD-10-CM

## 2024-06-21 DIAGNOSIS — G47.33 OSA ON CPAP: ICD-10-CM

## 2024-06-21 PROCEDURE — 3078F DIAST BP <80 MM HG: CPT | Performed by: NURSE PRACTITIONER

## 2024-06-21 PROCEDURE — 99213 OFFICE O/P EST LOW 20 MIN: CPT | Performed by: NURSE PRACTITIONER

## 2024-06-21 PROCEDURE — 3075F SYST BP GE 130 - 139MM HG: CPT | Performed by: NURSE PRACTITIONER

## 2024-06-21 PROCEDURE — G8417 CALC BMI ABV UP PARAM F/U: HCPCS | Performed by: NURSE PRACTITIONER

## 2024-06-21 PROCEDURE — 1036F TOBACCO NON-USER: CPT | Performed by: NURSE PRACTITIONER

## 2024-06-21 PROCEDURE — G8427 DOCREV CUR MEDS BY ELIG CLIN: HCPCS | Performed by: NURSE PRACTITIONER

## 2024-06-21 NOTE — PROGRESS NOTES
Medical Nutrition Therapy   Metabolic and Bariatric Surgery  Supervised Diet and Exercise Preparation  Nutrition Goals Complete - Visit 5 out of 3    Patient's start weight is 278 lbs, patient's current body weight is 270 lbs.  Patient reports not feeling hungry at all meals and snacks - discussed.  What additional goals would you like to plan today? continue bariatric behaviors.    Vitals:   Wt Readings from Last 3 Encounters:   06/21/24 123.8 kg (273 lb)   05/10/24 122.5 kg (270 lb)   04/12/24 124.7 kg (275 lb)     Nutrition Assessment:   PES: Knowledge deficit related to healthy behaviors that support weight management post weight loss surgery as evidenced by Body mass index is 37.03 kg/m².    Nutrition Assessment of Goal Attainment:  TREATMENT GOALS:  Continue bariatric behaviors.    [x] Continue to follow monthly and review goals.  [x] Nutrition goals complete    Brayden Arias RD, LD  Clinical Bariatric Dietitian  [facility]  [phone]  
systems was performed.  All was negative unless otherwise documented in HPI.    Constitutional: Negative for fever, chills.   HENT: Negative for trouble swallowing.   Eyes: Negative for visual disturbance.   Respiratory: Negative for cough, shortness of breath.   Cardiovascular: Negative for chest pain and palpitations.   Gastrointestinal: Negative for nausea, vomiting, abdominal pain, diarrhea, constipation, blood in stool and abdominal distention.   Endocrine: Negative for polydipsia, polyphagia and polyuria.   Genitourinary: Negative for dysuria, frequency, hematuria and difficulty urinating.   Musculoskeletal: Negative for joint swelling.  Positive for myalgias.   Skin: Negative for pallor and rash.   Neurological: Negative for dizziness, tremors, light-headedness and headaches.   Psychiatric/Behavioral: Negative for sleep disturbance and dysphoric mood.     Objective:      Physical Exam   Vital signs reviewed.  General: Well-developed and well-nourished. No acute distress.   Skin: Warm, dry and intact.   HEENT: Normocephalic. EOMs intact. Conjunctivae normal. Neck supple.  Cardiovascular: Normal rate, regular rhythm.    Pulmonary/Chest: Normal effort. Lungs clear to auscultation. No rales, rhonchi or wheezing.   Abdominal: Positive bowel sounds. Soft, nontender. Nondistended.   Musculoskeletal: Movement x4. No edema.  Neurological: Gait normal. Alert and oriented to person, place, and time.   Psychiatric: Normal mood and affect. Speech and behavior normal. Judgment and thought content normal. Cognition and memory intact.     Assessment:       Diagnosis Orders   1. Essential hypertension        2. Obesity (BMI 30-39.9)        3. Mixed hyperlipidemia        4. NOEMÍ on CPAP        5. Gastroesophageal reflux disease without esophagitis            Plan:    Dietitian visit today.  Patient was encouraged to journal all food intake. Keep calorie level at approximately 7176-2063. Protein intake is to be a minimum of

## 2024-06-25 RX ORDER — MULTIVITAMIN WITH IRON
TABLET ORAL
Qty: 90 TABLET | OUTPATIENT
Start: 2024-06-25

## 2024-06-25 NOTE — TELEPHONE ENCOUNTER
Lab Results   Component Value Date/Time    MG 2.4 05/07/2024 08:43 AM     Could stop magnesium supplement due to magnesium towards the higher side

## 2024-06-25 NOTE — TELEPHONE ENCOUNTER
Please Approve or Refuse.  Send to Pharmacy per Pt's Request:      Next Visit Date:  Visit date not found   Last Visit Date: 1/19/2024    Hemoglobin A1C (%)   Date Value   05/07/2024 5.4   04/12/2023 5.6   04/08/2022 5.5             ( goal A1C is < 7)   BP Readings from Last 3 Encounters:   06/21/24 130/74   05/10/24 124/78   04/12/24 135/87          (goal 120/80)  BUN   Date Value Ref Range Status   05/07/2024 14 6 - 20 mg/dL Final     Creatinine   Date Value Ref Range Status   05/07/2024 0.9 0.7 - 1.2 mg/dL Final     Potassium   Date Value Ref Range Status   05/07/2024 4.2 3.7 - 5.3 mmol/L Final

## 2024-06-28 NOTE — TELEPHONE ENCOUNTER
Noted    Current Outpatient Medications on File Prior to Visit   Medication Sig Dispense Refill    magnesium (MAGNESIUM-OXIDE) 250 MG TABS tablet Take 1 tablet by mouth daily      omeprazole (PRILOSEC) 40 MG delayed release capsule Take 1 capsule by mouth every morning (before breakfast) 90 capsule 0    budesonide (PULMICORT) 0.5 MG/2ML nebulizer suspension Take 2 mLs by nebulization 2 times daily      famotidine (PEPCID) 20 MG tablet Take 1 tablet by mouth nightly      albuterol (PROVENTIL) (2.5 MG/3ML) 0.083% nebulizer solution Take 3 mLs by nebulization every 6 hours as needed for Wheezing or Shortness of Breath 120 each 3    amLODIPine (NORVASC) 10 MG tablet Take 1 tablet by mouth daily 90 tablet 3    buPROPion (WELLBUTRIN XL) 150 MG extended release tablet Take 1 tablet by mouth every morning 90 tablet 3    busPIRone (BUSPAR) 10 MG tablet Take 1 tablet by mouth 3 times daily as needed (anxiety) 270 tablet 3    fluticasone (FLONASE) 50 MCG/ACT nasal spray 2 sprays by Each Nostril route daily 16 g 5    loratadine (CLARITIN) 10 MG tablet Take 1 tablet by mouth daily 90 tablet 3    mometasone-formoterol (DULERA) 100-5 MCG/ACT inhaler Inhale 2 puffs into the lungs 2 times daily Rinse mouth after use 13 g 11    montelukast (SINGULAIR) 10 MG tablet Take 1 tablet by mouth nightly 90 tablet 3    EPINEPHrine (EPIPEN 2-LEXUS) 0.3 MG/0.3ML SOAJ injection Inject 0.3 mLs into the muscle once as needed (Anaphylaxis) And call 911 1 each 3    nirmatrelvir/ritonavir 300/100 (PAXLOVID) 20 x 150 MG & 10 x 100MG TBPK Take 3 tablets (two 150 mg nirmatrelvir and one 100 mg ritonavir tablets) by mouth every 12 hours for 5 days. 30 tablet 0    albuterol sulfate HFA (PROVENTIL;VENTOLIN;PROAIR) 108 (90 Base) MCG/ACT inhaler inhale 2 puffs by mouth and INTO THE LUNGS four times a day if needed for QUIK RELIEF ONLY MAY TAKE 1-2 PUFFS BEFORE EXERCISE 8.5 g 3    atorvastatin (LIPITOR) 40 MG tablet take 1 tablet by mouth once daily with dinner

## 2024-07-24 ENCOUNTER — OFFICE VISIT (OUTPATIENT)
Dept: BEHAVIORAL/MENTAL HEALTH CLINIC | Age: 44
End: 2024-07-24
Payer: COMMERCIAL

## 2024-07-24 ENCOUNTER — HOSPITAL ENCOUNTER (OUTPATIENT)
Age: 44
Discharge: HOME | End: 2024-07-24
Payer: COMMERCIAL

## 2024-07-24 DIAGNOSIS — E66.01 SEVERE OBESITY (BMI 35.0-39.9) WITH COMORBIDITY (HCC): ICD-10-CM

## 2024-07-24 DIAGNOSIS — F33.0 MILD EPISODE OF RECURRENT MAJOR DEPRESSIVE DISORDER (HCC): Primary | ICD-10-CM

## 2024-07-24 LAB
BASOPHILS # BLD: 0 K/UL (ref 0–0.2)
BASOPHILS NFR BLD: 1 % (ref 0–2)
EOSINOPHIL # BLD: 0.3 K/UL (ref 0–0.4)
EOSINOPHILS RELATIVE PERCENT: 4 % (ref 0–4)
ERYTHROCYTE [DISTWIDTH] IN BLOOD BY AUTOMATED COUNT: 13.8 % (ref 11.5–14.9)
HCT VFR BLD AUTO: 50.8 % (ref 41–53)
HGB BLD-MCNC: 16.6 G/DL (ref 13.5–17.5)
IGE SERPL-ACNC: 601 IU/ML (ref 0–100)
LYMPHOCYTES NFR BLD: 2.5 K/UL (ref 1–4.8)
LYMPHOCYTES RELATIVE PERCENT: 30 % (ref 24–44)
MCH RBC QN AUTO: 29.9 PG (ref 26–34)
MCHC RBC AUTO-ENTMCNC: 32.7 G/DL (ref 31–37)
MCV RBC AUTO: 91.5 FL (ref 80–100)
MONOCYTES NFR BLD: 0.8 K/UL (ref 0.1–1.3)
MONOCYTES NFR BLD: 9 % (ref 1–7)
NEUTROPHILS NFR BLD: 56 % (ref 36–66)
NEUTS SEG NFR BLD: 4.6 K/UL (ref 1.3–9.1)
PLATELET # BLD AUTO: 294 K/UL (ref 150–450)
PMV BLD AUTO: 8.7 FL (ref 6–12)
RBC # BLD AUTO: 5.56 M/UL (ref 4.5–5.9)
WBC OTHER # BLD: 8.3 K/UL (ref 3.5–11)

## 2024-07-24 PROCEDURE — 85025 COMPLETE CBC W/AUTO DIFF WBC: CPT

## 2024-07-24 PROCEDURE — 1036F TOBACCO NON-USER: CPT | Performed by: PSYCHOLOGIST

## 2024-07-24 PROCEDURE — 90834 PSYTX W PT 45 MINUTES: CPT | Performed by: PSYCHOLOGIST

## 2024-07-24 PROCEDURE — 36415 COLL VENOUS BLD VENIPUNCTURE: CPT

## 2024-07-24 PROCEDURE — 86003 ALLG SPEC IGE CRUDE XTRC EA: CPT

## 2024-07-24 PROCEDURE — 82785 ASSAY OF IGE: CPT

## 2024-07-25 NOTE — PROGRESS NOTES
ADULT BEHAVIORAL HEALTH FOLLOW UP  Polina Tenorio M.A.   Psychology Doctoral Trainee  [facility] [employer]   Erika Tapia, Ph.D. ([place] [address], [place] [phone])  Carole Velez, Ph.D. ([place] [address])        Visit Date: 7/24/2024   Time of appointment:  4:00pm-4:50pm  Time spent with Patient: 50 minutes.   This is patient's third appointment.    Reason for Consult:  Depression and Stress     Referring Provider/PCP:    No ref. provider found  Vandana oNvak MD      Pt provided informed consent for the behavioral health program. Discussed with patient model of service to include the limits of confidentiality (i.e. abuse reporting, suicide intervention, etc.) and short-term intervention focused approach. Pt indicated understanding. Also discussed with patient that the service provider is a supervised clinician and in particular is being supervised by Dr. Tapia and/or Dr. Velez. Patient indicated understanding and signed consent form agreeing to be seen by a supervised clinician.     RADHA Winn is a 43 y.o. male who presents for follow up of mild episode of recurrent major depressive disorder. Patient is attending [facility] sessions to obtain psychiatric clearance for bariatric surgery. Patient reported that he had recently returned from traveling abroad which impacted change in diet. He stated that since he returned from the trip he has continued following the eating recommendations. Clinician reviewed stress levels with patient. Patient indicated his stress has been very high due to finances and other family matters. Clinician explored triggers of stress and what is feasible to work on. Clinician also discussed radical acceptance towards certain stressors that are unable to change at the moment. Patient indicated he manages stress via mindfulness/meditation, exercising, creating a list of stressful things, and speaking with his sponsor. Clinician provided positive feedback and used motivational

## 2024-07-26 LAB
COW MILK IGE QN: 0.23 KU/L (ref 0–0.34)
EGG WHITE IGE QN: 0.27 KU/L (ref 0–0.34)
ENGL PLANTAIN IGE QN: 1.55 KU/L (ref 0–0.34)

## 2024-07-28 LAB
A ALTERNATA IGE QN: ABNORMAL KU/L (ref 0–0.34)
A FUMIGATUS IGE QN: ABNORMAL KU/L (ref 0–0.34)
ALLERGEN BIRCH IGE: ABNORMAL KU/L (ref 0–0.34)
BERMUDA GRASS IGE QN: ABNORMAL KU/L (ref 0–0.34)
BOXELDER IGE QN: ABNORMAL KU/L (ref 0–0.34)
C HERBARUM IGE QN: ABNORMAL KUL/L (ref 0–0.34)
CALIF WALNUT POLN IGE QN: ABNORMAL KU/L (ref 0–0.34)
CAT DANDER IGE QN: ABNORMAL KU/L (ref 0–0.34)
CMN PIGWEED IGE QN: ABNORMAL KU/L (ref 0–0.34)
COMMON RAGWEED IGE QN: ABNORMAL KU/L (ref 0–0.34)
COTTONWOOD IGE QN: ABNORMAL KU/L (ref 0–0.34)
D FARINAE IGE QN: ABNORMAL KU/L (ref 0–0.34)
D PTERONYSS IGE QN: ABNORMAL KU/L (ref 0–0.34)
DOG DANDER IGE QN: ABNORMAL KU/L (ref 0–0.34)
IGE SERPL-ACNC: 601 IU/ML (ref 0–100)
LONDON PLANE IGE QN: ABNORMAL KU/L (ref 0–0.34)
M RACEMOSUS IGE QN: ABNORMAL KU/L (ref 0–0.34)
MOUSE EPITH IGE QN: ABNORMAL KU/L (ref 0–0.34)
MT JUNIPER IGE QN: ABNORMAL KU/L (ref 0–0.34)
P NOTATUM IGE QN: ABNORMAL KU/L (ref 0–0.34)
PECAN/HICK TREE IGE QN: ABNORMAL KU/L (ref 0–0.34)
ROACH IGE QN: ABNORMAL KU/L (ref 0–0.34)
SALTWORT IGE QN: ABNORMAL KU/L (ref 0–0.34)
SHEEP SORREL IGE QN: ABNORMAL KU/L (ref 0–0.34)
TIMOTHY IGE QN: ABNORMAL KU/L (ref 0–0.34)
WHITE ASH IGE QN: ABNORMAL KU/L (ref 0–0.34)
WHITE ELM IGE QN: ABNORMAL KU/L (ref 0–0.34)
WHITE MULBERRY IGE QN: ABNORMAL KU/L (ref 0–0.34)
WHITE OAK IGE QN: ABNORMAL KU/L (ref 0–0.34)

## 2024-07-29 LAB
A ALTERNATA IGE QN: 2.22 KU/L (ref 0–0.34)
A FUMIGATUS IGE QN: 0.28 KU/L (ref 0–0.34)
ALLERGEN BIRCH IGE: <0.1 KU/L (ref 0–0.34)
BERMUDA GRASS IGE QN: <0.1 KU/L (ref 0–0.34)
BOXELDER IGE QN: 0.14 KU/L (ref 0–0.34)
C HERBARUM IGE QN: <0.1 KUL/L (ref 0–0.34)
CALIF WALNUT POLN IGE QN: <0.1 KU/L (ref 0–0.34)
CAT DANDER IGE QN: 1.06 KU/L (ref 0–0.34)
CMN PIGWEED IGE QN: <0.1 KU/L (ref 0–0.34)
COMMON RAGWEED IGE QN: 1.01 KU/L (ref 0–0.34)
COTTONWOOD IGE QN: <0.1 KU/L (ref 0–0.34)
D FARINAE IGE QN: 0.69 KU/L (ref 0–0.34)
D PTERONYSS IGE QN: 0.6 KU/L (ref 0–0.34)
DOG DANDER IGE QN: 3.11 KU/L (ref 0–0.34)
IGE SERPL-ACNC: 601 IU/ML (ref 0–100)
LONDON PLANE IGE QN: <0.1 KU/L (ref 0–0.34)
M RACEMOSUS IGE QN: <0.1 KU/L (ref 0–0.34)
MOUSE EPITH IGE QN: <0.1 KU/L (ref 0–0.34)
MT JUNIPER IGE QN: 0.13 KU/L (ref 0–0.34)
P NOTATUM IGE QN: <0.1 KU/L (ref 0–0.34)
PECAN/HICK TREE IGE QN: <0.1 KU/L (ref 0–0.34)
ROACH IGE QN: 0.75 KU/L (ref 0–0.34)
SALTWORT IGE QN: <0.1 KU/L (ref 0–0.34)
SHEEP SORREL IGE QN: <0.1 KU/L (ref 0–0.34)
TIMOTHY IGE QN: 7.13 KU/L (ref 0–0.34)
WHITE ASH IGE QN: <0.1 KU/L (ref 0–0.34)
WHITE ELM IGE QN: <0.1 KU/L (ref 0–0.34)
WHITE MULBERRY IGE QN: <0.1 KU/L (ref 0–0.34)
WHITE OAK IGE QN: <0.1 KU/L (ref 0–0.34)

## 2024-07-30 ENCOUNTER — OFFICE VISIT (OUTPATIENT)
Dept: BARIATRICS/WEIGHT MGMT | Age: 44
End: 2024-07-30
Payer: COMMERCIAL

## 2024-07-30 VITALS
DIASTOLIC BLOOD PRESSURE: 82 MMHG | WEIGHT: 274 LBS | HEIGHT: 72 IN | HEART RATE: 84 BPM | OXYGEN SATURATION: 98 % | BODY MASS INDEX: 37.11 KG/M2 | SYSTOLIC BLOOD PRESSURE: 120 MMHG

## 2024-07-30 DIAGNOSIS — K21.9 GASTROESOPHAGEAL REFLUX DISEASE WITHOUT ESOPHAGITIS: ICD-10-CM

## 2024-07-30 DIAGNOSIS — I10 ESSENTIAL HYPERTENSION: Primary | ICD-10-CM

## 2024-07-30 DIAGNOSIS — E78.1 HYPERTRIGLYCERIDEMIA: ICD-10-CM

## 2024-07-30 DIAGNOSIS — E66.9 OBESITY (BMI 30-39.9): ICD-10-CM

## 2024-07-30 DIAGNOSIS — G47.33 OSA ON CPAP: ICD-10-CM

## 2024-07-30 DIAGNOSIS — E78.2 MIXED HYPERLIPIDEMIA: ICD-10-CM

## 2024-07-30 PROCEDURE — 1036F TOBACCO NON-USER: CPT | Performed by: NURSE PRACTITIONER

## 2024-07-30 PROCEDURE — 3074F SYST BP LT 130 MM HG: CPT | Performed by: NURSE PRACTITIONER

## 2024-07-30 PROCEDURE — G8427 DOCREV CUR MEDS BY ELIG CLIN: HCPCS | Performed by: NURSE PRACTITIONER

## 2024-07-30 PROCEDURE — G8417 CALC BMI ABV UP PARAM F/U: HCPCS | Performed by: NURSE PRACTITIONER

## 2024-07-30 PROCEDURE — 3079F DIAST BP 80-89 MM HG: CPT | Performed by: NURSE PRACTITIONER

## 2024-07-30 PROCEDURE — 99213 OFFICE O/P EST LOW 20 MIN: CPT | Performed by: NURSE PRACTITIONER

## 2024-07-30 NOTE — PROGRESS NOTES
Medical Nutrition Therapy   Metabolic and Bariatric Surgery  Supervised Diet and Exercise Preparation  Nutrition Goals Complete - Visit 6 out of 3    Patient's start weight is 278 lbs, patient's current body weight is 274 lbs.  What additional goals would you like to plan today? Continue bariatric behaviors.    Vitals:   Wt Readings from Last 3 Encounters:   07/30/24 124.3 kg (274 lb)   06/21/24 123.8 kg (273 lb)   05/10/24 122.5 kg (270 lb)     Nutrition Assessment:   PES: Knowledge deficit related to healthy behaviors that support weight management post weight loss surgery as evidenced by Body mass index is 37.16 kg/m².    Nutrition Assessment of Goal Attainment:  TREATMENT GOALS:  Continue bariatric behaviors.    [x] Continue to follow monthly and review goals.  [x] Nutrition goals complete    Aniyah Bourgeois MS, RD, LD  Clinical Dietitian  [facility]  [phone]  
constipation, blood in stool and abdominal distention.   Endocrine: Negative for polydipsia, polyphagia and polyuria.   Genitourinary: Negative for dysuria, frequency, hematuria and difficulty urinating.   Musculoskeletal: Negative for joint swelling.  Positive for myalgias.   Skin: Negative for pallor and rash.   Neurological: Negative for dizziness, tremors, light-headedness and headaches.   Psychiatric/Behavioral: Negative for sleep disturbance and dysphoric mood.     Objective:      Physical Exam   Vital signs reviewed.  General: Well-developed and well-nourished. No acute distress.   Skin: Warm, dry and intact.   HEENT: Normocephalic. EOMs intact. Conjunctivae normal. Neck supple.  Cardiovascular: Normal rate, regular rhythm.    Pulmonary/Chest: Normal effort. Lungs clear to auscultation. No rales, rhonchi or wheezing.   Abdominal: Positive bowel sounds. Soft, nontender. Nondistended.   Musculoskeletal: Movement x4. No edema.  Neurological: Gait normal. Alert and oriented to person, place, and time.   Psychiatric: Normal mood and affect. Speech and behavior normal. Judgment and thought content normal. Cognition and memory intact.     Assessment:       Diagnosis Orders   1. Essential hypertension        2. Mixed hyperlipidemia        3. Obesity (BMI 30-39.9)        4. Hypertriglyceridemia        5. NOEMÍ on CPAP        6. Gastroesophageal reflux disease without esophagitis            Plan:    Dietitian visit today.  Patient was encouraged to journal all food intake. Keep calorie level at approximately 5861-3307. Protein intake is to be a minimum of 60-80 grams per day. Water drinking was encouraged with a goal of 64oz-128oz daily. Beverages to be calorie free except for milk. Every other beverage should be water. Avoid soda.   Continue to increase level of physical activity.  Encouraged use of exercise log.    Patients will undergo thorough nutritional evaluation and counseling with our registered dietician.  Our

## 2024-08-23 ENCOUNTER — HOSPITAL ENCOUNTER (OUTPATIENT)
Dept: GENERAL RADIOLOGY | Age: 44
End: 2024-08-23
Payer: COMMERCIAL

## 2024-08-23 ENCOUNTER — OFFICE VISIT (OUTPATIENT)
Dept: NEUROSURGERY | Age: 44
End: 2024-08-23
Payer: COMMERCIAL

## 2024-08-23 ENCOUNTER — OFFICE VISIT (OUTPATIENT)
Dept: BARIATRICS/WEIGHT MGMT | Age: 44
End: 2024-08-23
Payer: COMMERCIAL

## 2024-08-23 ENCOUNTER — HOSPITAL ENCOUNTER (OUTPATIENT)
Age: 44
Discharge: HOME | End: 2024-08-23
Payer: COMMERCIAL

## 2024-08-23 VITALS
HEART RATE: 80 BPM | SYSTOLIC BLOOD PRESSURE: 130 MMHG | BODY MASS INDEX: 37.93 KG/M2 | DIASTOLIC BLOOD PRESSURE: 64 MMHG | WEIGHT: 280 LBS | HEIGHT: 72 IN

## 2024-08-23 VITALS
SYSTOLIC BLOOD PRESSURE: 127 MMHG | WEIGHT: 278.8 LBS | HEART RATE: 85 BPM | DIASTOLIC BLOOD PRESSURE: 79 MMHG | HEIGHT: 72 IN | BODY MASS INDEX: 37.76 KG/M2

## 2024-08-23 DIAGNOSIS — K21.9 GASTROESOPHAGEAL REFLUX DISEASE WITHOUT ESOPHAGITIS: ICD-10-CM

## 2024-08-23 DIAGNOSIS — G47.33 OSA ON CPAP: ICD-10-CM

## 2024-08-23 DIAGNOSIS — M47.26 OTHER SPONDYLOSIS WITH RADICULOPATHY, LUMBAR REGION: ICD-10-CM

## 2024-08-23 DIAGNOSIS — M25.552 LEFT HIP PAIN: Primary | ICD-10-CM

## 2024-08-23 DIAGNOSIS — M25.552 LEFT HIP PAIN: ICD-10-CM

## 2024-08-23 DIAGNOSIS — I10 ESSENTIAL HYPERTENSION: Primary | ICD-10-CM

## 2024-08-23 DIAGNOSIS — E66.01 SEVERE OBESITY (BMI 35.0-39.9) WITH COMORBIDITY (HCC): ICD-10-CM

## 2024-08-23 DIAGNOSIS — E78.2 MIXED HYPERLIPIDEMIA: ICD-10-CM

## 2024-08-23 DIAGNOSIS — K20.0 EOSINOPHILIC ESOPHAGITIS: ICD-10-CM

## 2024-08-23 DIAGNOSIS — J45.40 MODERATE PERSISTENT ASTHMA WITHOUT COMPLICATION: ICD-10-CM

## 2024-08-23 DIAGNOSIS — M54.16 LUMBAR RADICULOPATHY: ICD-10-CM

## 2024-08-23 DIAGNOSIS — E78.1 HYPERTRIGLYCERIDEMIA: ICD-10-CM

## 2024-08-23 PROCEDURE — 99214 OFFICE O/P EST MOD 30 MIN: CPT

## 2024-08-23 PROCEDURE — 72110 X-RAY EXAM L-2 SPINE 4/>VWS: CPT

## 2024-08-23 PROCEDURE — 3078F DIAST BP <80 MM HG: CPT

## 2024-08-23 PROCEDURE — 1036F TOBACCO NON-USER: CPT | Performed by: NURSE PRACTITIONER

## 2024-08-23 PROCEDURE — 3075F SYST BP GE 130 - 139MM HG: CPT | Performed by: NURSE PRACTITIONER

## 2024-08-23 PROCEDURE — 3078F DIAST BP <80 MM HG: CPT | Performed by: NURSE PRACTITIONER

## 2024-08-23 PROCEDURE — 3074F SYST BP LT 130 MM HG: CPT

## 2024-08-23 PROCEDURE — G8427 DOCREV CUR MEDS BY ELIG CLIN: HCPCS | Performed by: NURSE PRACTITIONER

## 2024-08-23 PROCEDURE — 99213 OFFICE O/P EST LOW 20 MIN: CPT | Performed by: NURSE PRACTITIONER

## 2024-08-23 PROCEDURE — G8417 CALC BMI ABV UP PARAM F/U: HCPCS | Performed by: NURSE PRACTITIONER

## 2024-08-23 PROCEDURE — 73502 X-RAY EXAM HIP UNI 2-3 VIEWS: CPT

## 2024-08-23 NOTE — PROGRESS NOTES
Medical Nutrition Therapy   Metabolic and Bariatric Surgery  Supervised Diet and Exercise Preparation  Nutrition Goals Complete - Visit 7 out of 3    Patient's start weight is 278 lbs, patient's current body weight is 280 lbs.  What additional goals would you like to plan today? continue bariatric behaviors.    Vitals:   Wt Readings from Last 3 Encounters:   08/23/24 127 kg (280 lb)   08/23/24 126.5 kg (278 lb 12.8 oz)   07/30/24 124.3 kg (274 lb)     Nutrition Assessment:   PES: Knowledge deficit related to healthy behaviors that support weight management post weight loss surgery as evidenced by Body mass index is 37.97 kg/m².    Nutrition Assessment of Goal Attainment:  TREATMENT GOALS:  Continue bariatric behaviors.    [x] Continue to follow monthly and review goals.  [x] Nutrition goals complete    Brayden Arias RD, LD  Clinical Bariatric Dietitian  [facility]  [phone]   Event Note

## 2024-08-23 NOTE — PROGRESS NOTES
Therapy Supplies (NEBULIZER) MADHAV 1 Units by Does not apply route 2 times daily Dx: Asthma exacerbation J45.901 (Patient not taking: Reported on 8/23/2024) 1 Device 0     No current facility-administered medications for this visit.       Vital Signs:  /64 (Site: Right Upper Arm, Position: Sitting, Cuff Size: Large Adult)   Pulse 80   Ht 1.829 m (6')   Wt 127 kg (280 lb)   BMI 37.97 kg/m²     BMI/Height/Weight:  Body mass index is 37.97 kg/m².      Review of Systems - A review of systems was performed.  All was negative unless otherwise documented in HPI.    Constitutional: Negative for fever, chills.   HENT: Negative for trouble swallowing.   Eyes: Negative for visual disturbance.   Respiratory: Negative for cough, shortness of breath.   Cardiovascular: Negative for chest pain and palpitations.   Gastrointestinal: Negative for nausea, vomiting, abdominal pain, diarrhea, constipation, blood in stool and abdominal distention.   Endocrine: Negative for polydipsia, polyphagia and polyuria.   Genitourinary: Negative for dysuria, frequency, hematuria and difficulty urinating.   Musculoskeletal: Negative for joint swelling.  Positive for myalgias.   Skin: Negative for pallor and rash.   Neurological: Negative for dizziness, tremors, light-headedness and headaches.   Psychiatric/Behavioral: Negative for sleep disturbance and dysphoric mood.     Objective:      Physical Exam   Vital signs reviewed.  General: Well-developed and well-nourished. No acute distress.   Skin: Warm, dry and intact.   HEENT: Normocephalic. EOMs intact. Conjunctivae normal. Neck supple.  Cardiovascular: Normal rate, regular rhythm.    Pulmonary/Chest: Normal effort. Lungs clear to auscultation. No rales, rhonchi or wheezing.   Abdominal: Positive bowel sounds. Soft, nontender. Nondistended.   Musculoskeletal: Movement x4. No edema.  Neurological: Gait normal. Alert and oriented to person, place, and time.   Psychiatric: Normal mood and  affect. Speech and behavior normal. Judgment and thought content normal. Cognition and memory intact.     Assessment:       Diagnosis Orders   1. Essential hypertension        2. Gastroesophageal reflux disease without esophagitis        3. Severe obesity (BMI 35.0-39.9) with comorbidity (HCC)        4. Lumbar radiculopathy        5. NOEMÍ on CPAP        6. Eosinophilic esophagitis        7. Hypertriglyceridemia        8. Asthma, Moderate persistent asthma without complication        9. Mixed hyperlipidemia            Plan:    Dietitian visit today.  Patient was encouraged to journal all food intake. Keep calorie level at approximately 7807-6858. Protein intake is to be a minimum of 60-80 grams per day. Water drinking was encouraged with a goal of 64oz-128oz daily. Beverages to be calorie free except for milk. Every other beverage should be water. Avoid soda.   Continue to increase level of physical activity.  Encouraged use of exercise log.    Patients will undergo thorough nutritional evaluation and counseling with our registered dietician.  Our program provides long term nutritional counseling with unlimited consults with the dietician. All patients are nicotine tested and require a negative nicotine level prior to surgery.  Patient has been educated about the risks associated with substance use, as well as the risks of using nicotine and alcohol after surgery.  Patient has been educated that theses substances need to be avoided lifelong after surgery to reduce the risk of complications and sub-optimal weight loss.    Follow-up  Return in about 1 month (around 9/23/2024).    Orders this encounter:  No orders of the defined types were placed in this encounter.      Prescriptions this encounter:  No orders of the defined types were placed in this encounter.      Electronically signed by:  Angélica Floers CNP

## 2024-08-23 NOTE — PROGRESS NOTES
[facility] NEUROSURGERY [facility]  [address]  [id number]  [address]  [facility] [address]  Dept: [phone]    Patient:  Pa Cordova  YOB: 1980  Date: 8/23/24    The patient is a 43 y.o. male who presents today for consult of the following problems:     Chief Complaint   Patient presents with    Back Pain     Pt reports his back pain started 1 year ago, denies any injury. Numbness and tingling in the L leg and L hip has a lot of pain. Did PT  and saw PM for the original back pain back in 2022         HPI:     Pa Cordova is a 43 y.o. male who presents for follow up of radiating lower back pain.    Patient had similar pain about 2 years ago, he had a right L4 - 5 and L5 - S1 facet joint RFA which controlled the pain for about 1 year, then the pain returned, no known injury. Located in the lower back and radiates to the left hip along with numbness and tingling. Aggravated by standing and alleviated with sitting. Severity of 10/10. No weakness. No bladder or bowel incontinence. No saddle anesthesia. Patient has been doing the exercises and stretches he learned from physical therapy without any relief. Patient has tried OTC Tylenol and Ibuprofen, massages, HEP, and lumbar support brace without relief. Patient is working with bariatric surgery for weight loss, he is planning gastric sleeve.     Patient was last seen in the office on 6/1/2022 with Dr. Zaragoza, at that time, they discussed  focal decompression and fixation from L4-S1. An anterior posterior approach involving an L4-S1 oblique lumbar interbody fusion followed by posterior fixation and possible foraminotomy bilaterally at L5 was proposed to him.     He is working towards bariatric surgery.  Patient follows with rheumatology for seronegative polyarthritis  Was on plaquenil, now on methotrexate and folic acid, tolerates them well, did not have any labs since  Instructions:      Standing AP; and lateral: neutral/flex/ex     Order Specific Question:   Reason for exam:     Answer:   assess alignment and for instability        Electronically signed by LAMAR Garcia CNP on 8/26/2024 at 7:47 PM    Please note that this chart was generated using voice recognition Dragon dictation software.  Although every effort was made to ensure the accuracy of this automated transcription, some errors in transcription may have occurred.

## 2024-08-26 ASSESSMENT — ENCOUNTER SYMPTOMS: BACK PAIN: 1

## 2024-09-03 ENCOUNTER — TELEPHONE (OUTPATIENT)
Dept: FAMILY MEDICINE CLINIC | Age: 44
End: 2024-09-03

## 2024-09-03 NOTE — TELEPHONE ENCOUNTER
Will send message to patient    FYI    Non-adherent to asthma medication    Action requested: Evaluate drug refill patterns   The Adherence asthma program identifies patients whose proportion of days covered (PDC) is less than 80%. This patient's PDC is 45% over the previous 365 days. Common reasons for non-adherence may include: side effects, affordability, or changes in the drug regimen. Please provide a new prescription if the regimen has changed, and/or discuss the importance of adherence and ways to address barriers with your patient.      The chart below details the patient's pharmacy claims used to identify the above drug therapy opportunity. The medication list is current as of [id number].    Non-adherent to asthma medication  Date Filled Drug Name / Strength Dispensed Quantity Days Supply Pharmacy Name Pharmacy Phone Prescriber of Record Prescriber Phone Number   [phone] EUGENIA PUENTE [phone] [address] [facility] [id number] [phone] JESS FONTANEZ [phone]

## 2024-09-13 ENCOUNTER — HOSPITAL ENCOUNTER (OUTPATIENT)
Dept: MRI IMAGING | Facility: CLINIC | Age: 44
Discharge: HOME | End: 2024-09-15
Payer: COMMERCIAL

## 2024-09-13 DIAGNOSIS — M47.26 OTHER SPONDYLOSIS WITH RADICULOPATHY, LUMBAR REGION: ICD-10-CM

## 2024-09-13 DIAGNOSIS — M25.552 LEFT HIP PAIN: ICD-10-CM

## 2024-09-13 PROCEDURE — 72148 MRI LUMBAR SPINE W/O DYE: CPT

## 2024-10-02 ENCOUNTER — OFFICE VISIT (OUTPATIENT)
Dept: NEUROSURGERY | Age: 44
End: 2024-10-02
Payer: COMMERCIAL

## 2024-10-02 VITALS
HEART RATE: 75 BPM | BODY MASS INDEX: 36.08 KG/M2 | WEIGHT: 272.2 LBS | HEIGHT: 73 IN | SYSTOLIC BLOOD PRESSURE: 127 MMHG | DIASTOLIC BLOOD PRESSURE: 84 MMHG

## 2024-10-02 DIAGNOSIS — M43.16 SPONDYLOLISTHESIS, LUMBAR REGION: Primary | ICD-10-CM

## 2024-10-02 DIAGNOSIS — M47.26 OTHER SPONDYLOSIS WITH RADICULOPATHY, LUMBAR REGION: ICD-10-CM

## 2024-10-02 DIAGNOSIS — M48.062 LUMBAR STENOSIS WITH NEUROGENIC CLAUDICATION: ICD-10-CM

## 2024-10-02 PROCEDURE — 3079F DIAST BP 80-89 MM HG: CPT

## 2024-10-02 PROCEDURE — 3074F SYST BP LT 130 MM HG: CPT

## 2024-10-02 PROCEDURE — 99214 OFFICE O/P EST MOD 30 MIN: CPT

## 2024-10-02 RX ORDER — PREDNISONE 5 MG/1
5 TABLET ORAL DAILY
COMMUNITY
Start: 2024-09-11

## 2024-10-02 ASSESSMENT — ENCOUNTER SYMPTOMS: BACK PAIN: 1

## 2024-10-02 NOTE — PATIENT INSTRUCTIONS
[facility]  Address: [address] [place] [id number]  Phone: [phone]

## 2024-10-02 NOTE — PROGRESS NOTES
compression of the  exiting bilateral L5 nerve roots.     L4/L5 mild spondylosis without significant central canal compromise.     L4 on L5 grade 1 retrolisthesis.     L5/S1 mild bilateral facet degenerative arthrosis.    8/23/24 Xray Lumbar Spine  FINDINGS:  Bilateral L5 pars defects again noted.  Grade 2 anterolisthesis L5 on S1.  Flexion extension demonstrates stable alignment.  No fracture.  No  destructive bony abnormality.     IMPRESSION:  Bilateral pars defects L5     Stable alignment in flexion extension     Grade 2 anterolisthesis L5 on S1    Bariatric Surgery, Rheumatology, and PCP Notes Reviewed    Assessment and Plan:     1. Spondylolisthesis, lumbar region    2. Other spondylosis with radiculopathy, lumbar region    3. Lumbar stenosis with neurogenic claudication         Plan: Lumbar MRI and Xray continue to show Grade 2 anterolisthesis of L5 on S1 with bilateral L5 pars defects. The MRI shows severe bilateral neural foraminal narrowing at the L5-S1 level due to disc extrusion, causing compression of the L5 nerve roots. There is also retrolisthesis of L4 on L5 and mild spondylosis at multiple levels. Patient has completed physical therapy in the past without lasting relief, he continues to work on core strengthening and stabilization exercises to reduce stress on the spine at home. Plan to follow up with pain management to discuss injections again, he responded in the past. Entire spine Xray ordered to assess the spine. Follow up with Dr. Zaragoza after pain management.       Followup: Return in about 12 weeks (around 12/25/2024), or if symptoms worsen or fail to improve.    Prescriptions Ordered:  No orders of the defined types were placed in this encounter.     Orders Placed:  Orders Placed This Encounter   Procedures    XR SPINE ENTIRE (2-3 VIEWS)     Standing Status:   Future     Standing Expiration Date:   10/2/2025     Order Specific Question:   Reason for exam:     Answer:   assess curvature of

## 2024-10-06 ENCOUNTER — PATIENT MESSAGE (OUTPATIENT)
Dept: FAMILY MEDICINE CLINIC | Age: 44
End: 2024-10-06

## 2024-10-06 DIAGNOSIS — I10 ESSENTIAL HYPERTENSION: ICD-10-CM

## 2024-10-06 DIAGNOSIS — K21.00 GASTROESOPHAGEAL REFLUX DISEASE WITH ESOPHAGITIS WITHOUT HEMORRHAGE: ICD-10-CM

## 2024-10-06 DIAGNOSIS — J30.89 SEASONAL ALLERGIC RHINITIS DUE TO OTHER ALLERGIC TRIGGER: ICD-10-CM

## 2024-10-06 DIAGNOSIS — F33.42 RECURRENT MAJOR DEPRESSIVE DISORDER, IN FULL REMISSION (HCC): ICD-10-CM

## 2024-10-06 DIAGNOSIS — J45.41 MODERATE PERSISTENT ASTHMA WITH ACUTE EXACERBATION: ICD-10-CM

## 2024-10-06 DIAGNOSIS — F40.10 SOCIAL ANXIETY DISORDER: ICD-10-CM

## 2024-10-07 RX ORDER — OMEPRAZOLE 40 MG/1
40 CAPSULE, DELAYED RELEASE ORAL
Qty: 90 CAPSULE | Refills: 0 | Status: SHIPPED | OUTPATIENT
Start: 2024-10-07

## 2024-10-07 RX ORDER — BUDESONIDE 0.5 MG/2ML
2 INHALANT ORAL 2 TIMES DAILY
Qty: 60 EACH | Refills: 0 | Status: SHIPPED | OUTPATIENT
Start: 2024-10-07

## 2024-10-07 RX ORDER — BUPROPION HYDROCHLORIDE 150 MG/1
150 TABLET ORAL EVERY MORNING
Qty: 90 TABLET | Refills: 0 | Status: SHIPPED | OUTPATIENT
Start: 2024-10-07

## 2024-10-07 RX ORDER — AMLODIPINE BESYLATE 10 MG/1
10 TABLET ORAL DAILY
Qty: 90 TABLET | Refills: 0 | Status: SHIPPED | OUTPATIENT
Start: 2024-10-07

## 2024-10-07 RX ORDER — MONTELUKAST SODIUM 10 MG/1
10 TABLET ORAL NIGHTLY
Qty: 90 TABLET | Refills: 0 | Status: SHIPPED | OUTPATIENT
Start: 2024-10-07

## 2024-10-07 RX ORDER — BUSPIRONE HYDROCHLORIDE 10 MG/1
10 TABLET ORAL 3 TIMES DAILY PRN
Qty: 270 TABLET | Refills: 0 | Status: SHIPPED | OUTPATIENT
Start: 2024-10-07

## 2024-10-07 RX ORDER — FLUTICASONE PROPIONATE 50 MCG
2 SPRAY, SUSPENSION (ML) NASAL DAILY
Qty: 16 G | Refills: 5 | Status: SHIPPED | OUTPATIENT
Start: 2024-10-07

## 2024-10-07 RX ORDER — FAMOTIDINE 20 MG/1
20 TABLET, FILM COATED ORAL NIGHTLY
Qty: 180 TABLET | Refills: 0 | OUTPATIENT
Start: 2024-10-07

## 2024-10-07 NOTE — TELEPHONE ENCOUNTER
Noted. Thank you!  Stop Pepcid, he is already on omeprazole    Future Appointments   Date Time Provider Department Center   12/30/2024  3:30 PM Megan Zaragoza DO [facility]

## 2024-10-07 NOTE — TELEPHONE ENCOUNTER
Please Approve or Refuse.  Send to Pharmacy per Pt's Request: pj      Next Visit Date:  Visit date not found   Last Visit Date: 1/19/2024    Hemoglobin A1C (%)   Date Value   05/07/2024 5.4   04/12/2023 5.6   04/08/2022 5.5             ( goal A1C is < 7)   BP Readings from Last 3 Encounters:   10/02/24 127/84   08/23/24 130/64   08/23/24 127/79          (goal 120/80)  BUN   Date Value Ref Range Status   05/07/2024 14 6 - 20 mg/dL Final     Creatinine   Date Value Ref Range Status   05/07/2024 0.9 0.7 - 1.2 mg/dL Final     Potassium   Date Value Ref Range Status   05/07/2024 4.2 3.7 - 5.3 mmol/L Final

## 2024-10-07 NOTE — TELEPHONE ENCOUNTER
Noted. Thank you!    Needs nonurgent appointment     [facility] Ast[facility] Protocol Bjbujj61/07/2024 10:06 AM    Visit with authorizing provider in past 6 months or upcoming 90 days       Future Appointments   Date Time Provider Department Center   12/30/2024  3:30 PM [facility]

## 2024-10-31 RX ORDER — ATORVASTATIN CALCIUM 40 MG/1
TABLET, FILM COATED ORAL
Qty: 90 TABLET | Refills: 3 | Status: SHIPPED | OUTPATIENT
Start: 2024-10-31

## 2024-10-31 NOTE — TELEPHONE ENCOUNTER
Please Approve or Refuse.  Send to Pharmacy per Pt's Request:      Next Visit Date:  1/30/2025   Last Visit Date: 1/19/2024    Hemoglobin A1C (%)   Date Value   05/07/2024 5.4   04/12/2023 5.6   04/08/2022 5.5             ( goal A1C is < 7)   BP Readings from Last 3 Encounters:   10/02/24 127/84   08/23/24 130/64   08/23/24 127/79          (goal 120/80)  BUN   Date Value Ref Range Status   05/07/2024 14 6 - 20 mg/dL Final     Creatinine   Date Value Ref Range Status   05/07/2024 0.9 0.7 - 1.2 mg/dL Final     Potassium   Date Value Ref Range Status   05/07/2024 4.2 3.7 - 5.3 mmol/L Final

## 2024-12-23 RX ORDER — BUDESONIDE 0.5 MG/2ML
INHALANT ORAL
Qty: 60 ML | Refills: 3 | Status: SHIPPED | OUTPATIENT
Start: 2024-12-23

## 2024-12-24 ENCOUNTER — TELEPHONE (OUTPATIENT)
Dept: NEUROSURGERY | Age: 44
End: 2024-12-24

## 2024-12-24 DIAGNOSIS — M43.16 SPONDYLOLISTHESIS, LUMBAR REGION: ICD-10-CM

## 2024-12-24 DIAGNOSIS — M47.26 OTHER SPONDYLOSIS WITH RADICULOPATHY, LUMBAR REGION: Primary | ICD-10-CM

## 2024-12-24 DIAGNOSIS — M25.552 LEFT HIP PAIN: ICD-10-CM

## 2024-12-24 DIAGNOSIS — M48.062 LUMBAR STENOSIS WITH NEUROGENIC CLAUDICATION: ICD-10-CM

## 2025-01-20 ENCOUNTER — HOSPITAL ENCOUNTER (OUTPATIENT)
Dept: PAIN MANAGEMENT | Age: 45
Discharge: HOME | End: 2025-01-20
Payer: COMMERCIAL

## 2025-01-20 VITALS — HEIGHT: 73 IN | WEIGHT: 272 LBS | BODY MASS INDEX: 36.05 KG/M2

## 2025-01-20 DIAGNOSIS — M51.369 DEGENERATION OF INTERVERTEBRAL DISC OF LUMBAR REGION, UNSPECIFIED WHETHER PAIN PRESENT: ICD-10-CM

## 2025-01-20 DIAGNOSIS — M47.817 LUMBOSACRAL SPONDYLOSIS WITHOUT MYELOPATHY: Primary | ICD-10-CM

## 2025-01-20 DIAGNOSIS — M79.18 MYOFASCIAL PAIN SYNDROME: ICD-10-CM

## 2025-01-20 PROCEDURE — 99215 OFFICE O/P EST HI 40 MIN: CPT | Performed by: STUDENT IN AN ORGANIZED HEALTH CARE EDUCATION/TRAINING PROGRAM

## 2025-01-20 PROCEDURE — 99215 OFFICE O/P EST HI 40 MIN: CPT

## 2025-01-20 NOTE — PROGRESS NOTES
Chronic Pain Clinic Note     Encounter Date: 1/20/2025     SUBJECTIVE:  Chief Complaint   Patient presents with    Back Pain    Hip Pain     2nd opinion       History of Present Illness:   Pa Cordova is a 44 y.o. male who presents with back pain    Medication Refill: n/a    Current Complaints of Pain:   Location: low back  Radiation: None  Severity: severe  Pain Numerical Score - 8   Average: 7     Highest: 10  Lowest: 5  Character/Quality: Complains of pain that is burning, sharp, and tight  Timing: Constant  Associated symptoms: none  Numbness: No  Weakness: no  Exacerbating factors: standing, walking  Alleviating factors: rest  Length of time pain has been present: Started about 12 years ago   Inciting event/injury: no   Bowel/Bladder incontinence: no  Falls: no   Physical Therapy: Yes    History of Interventions:   Surgery: No previous lumbar/cervical surgeries  Injections: yes    Imaging:    MRI Lumbar spine 9/16/24    Past Medical History:   Diagnosis Date    Abnormal EKG     Acute arthritis     Allergies     Anxiety     Asthma     Chest pain     COPD (chronic obstructive pulmonary disease) (HCC) 11/19/2018    COVID-19 10/03/2021    Admitted to hospital, COVID PNA    Depression     Erectile dysfunction 09/29/2017    Essential hypertension 01/02/2017    Family history of premature CAD 11/19/2018    GERD (gastroesophageal reflux disease) 11/19/2018    Heart attack (HCC)     Heart palpitations 09/29/2017    High blood pressure     Hyperlipidemia with target LDL less than 100 03/23/2017    Lateral epicondylitis of left elbow     Left lower lobe pneumonia 11/09/2012    Liver disease     Lumbar radiculopathy     LVH (left ventricular hypertrophy) due to hypertensive disease 03/23/2017    Mitral valve prolapse     Nonspecific reactive hepatitis 03/22/2017    Obesity (BMI 30-39.9) 03/23/2017    NOEMÍ (obstructive sleep apnea) 10/16/2019    CPAP nightly    NOEMÍ on CPAP 10/16/2019    NOEMÍ on CPAP 10/16/2019

## 2025-01-24 PROBLEM — H57.89 EYE REDNESS: Status: ACTIVE | Noted: 2024-02-03

## 2025-01-24 PROBLEM — J06.9 VIRAL UPPER RESPIRATORY TRACT INFECTION: Status: ACTIVE | Noted: 2024-01-17

## 2025-01-24 PROBLEM — M75.102 UNSPECIFIED ROTATOR CUFF TEAR OR RUPTURE OF LEFT SHOULDER, NOT SPECIFIED AS TRAUMATIC: Status: ACTIVE | Noted: 2024-09-11

## 2025-01-24 PROBLEM — H10.9 CONJUNCTIVITIS: Status: ACTIVE | Noted: 2024-02-03

## 2025-01-24 PROBLEM — M06.09 RHEUMATOID ARTHRITIS WITHOUT RHEUMATOID FACTOR, MULTIPLE SITES (HCC): Status: ACTIVE | Noted: 2023-04-05

## 2025-01-29 DIAGNOSIS — K21.00 GASTROESOPHAGEAL REFLUX DISEASE WITH ESOPHAGITIS WITHOUT HEMORRHAGE: ICD-10-CM

## 2025-01-29 RX ORDER — OMEPRAZOLE 40 MG/1
40 CAPSULE, DELAYED RELEASE ORAL
Qty: 90 CAPSULE | Refills: 0 | Status: SHIPPED | OUTPATIENT
Start: 2025-01-29

## 2025-01-29 NOTE — TELEPHONE ENCOUNTER
Please Approve or Refuse.  Send to Pharmacy per Pt's Request: KYLE     Next Visit Date:  1/30/2025   Last Visit Date: 1/19/2024    Hemoglobin A1C (%)   Date Value   05/07/2024 5.4   04/12/2023 5.6   04/08/2022 5.5             ( goal A1C is < 7)   BP Readings from Last 3 Encounters:   10/02/24 127/84   08/23/24 130/64   08/23/24 127/79          (goal 120/80)  BUN   Date Value Ref Range Status   05/07/2024 14 6 - 20 mg/dL Final     Creatinine   Date Value Ref Range Status   05/07/2024 0.9 0.7 - 1.2 mg/dL Final     Potassium   Date Value Ref Range Status   05/07/2024 4.2 3.7 - 5.3 mmol/L Final

## 2025-02-08 ASSESSMENT — PATIENT HEALTH QUESTIONNAIRE - PHQ9
7. TROUBLE CONCENTRATING ON THINGS, SUCH AS READING THE NEWSPAPER OR WATCHING TELEVISION: NOT AT ALL
10. IF YOU CHECKED OFF ANY PROBLEMS, HOW DIFFICULT HAVE THESE PROBLEMS MADE IT FOR YOU TO DO YOUR WORK, TAKE CARE OF THINGS AT HOME, OR GET ALONG WITH OTHER PEOPLE: SOMEWHAT DIFFICULT
SUM OF ALL RESPONSES TO PHQ QUESTIONS 1-9: 2
8. MOVING OR SPEAKING SO SLOWLY THAT OTHER PEOPLE COULD HAVE NOTICED. OR THE OPPOSITE, BEING SO FIGETY OR RESTLESS THAT YOU HAVE BEEN MOVING AROUND A LOT MORE THAN USUAL: NOT AT ALL
5. POOR APPETITE OR OVEREATING: NOT AT ALL
2. FEELING DOWN, DEPRESSED OR HOPELESS: NOT AT ALL
4. FEELING TIRED OR HAVING LITTLE ENERGY: SEVERAL DAYS
SUM OF ALL RESPONSES TO PHQ QUESTIONS 1-9: 2
9. THOUGHTS THAT YOU WOULD BE BETTER OFF DEAD, OR OF HURTING YOURSELF: NOT AT ALL
2. FEELING DOWN, DEPRESSED OR HOPELESS: NOT AT ALL
1. LITTLE INTEREST OR PLEASURE IN DOING THINGS: NOT AT ALL
4. FEELING TIRED OR HAVING LITTLE ENERGY: SEVERAL DAYS
8. MOVING OR SPEAKING SO SLOWLY THAT OTHER PEOPLE COULD HAVE NOTICED. OR THE OPPOSITE - BEING SO FIDGETY OR RESTLESS THAT YOU HAVE BEEN MOVING AROUND A LOT MORE THAN USUAL: NOT AT ALL
1. LITTLE INTEREST OR PLEASURE IN DOING THINGS: NOT AT ALL
SUM OF ALL RESPONSES TO PHQ9 QUESTIONS 1 & 2: 0
10. IF YOU CHECKED OFF ANY PROBLEMS, HOW DIFFICULT HAVE THESE PROBLEMS MADE IT FOR YOU TO DO YOUR WORK, TAKE CARE OF THINGS AT HOME, OR GET ALONG WITH OTHER PEOPLE: SOMEWHAT DIFFICULT
3. TROUBLE FALLING OR STAYING ASLEEP: SEVERAL DAYS
SUM OF ALL RESPONSES TO PHQ QUESTIONS 1-9: 2
SUM OF ALL RESPONSES TO PHQ QUESTIONS 1-9: 2
6. FEELING BAD ABOUT YOURSELF - OR THAT YOU ARE A FAILURE OR HAVE LET YOURSELF OR YOUR FAMILY DOWN: NOT AT ALL
7. TROUBLE CONCENTRATING ON THINGS, SUCH AS READING THE NEWSPAPER OR WATCHING TELEVISION: NOT AT ALL
9. THOUGHTS THAT YOU WOULD BE BETTER OFF DEAD, OR OF HURTING YOURSELF: NOT AT ALL
6. FEELING BAD ABOUT YOURSELF - OR THAT YOU ARE A FAILURE OR HAVE LET YOURSELF OR YOUR FAMILY DOWN: NOT AT ALL
3. TROUBLE FALLING OR STAYING ASLEEP: SEVERAL DAYS
5. POOR APPETITE OR OVEREATING: NOT AT ALL
SUM OF ALL RESPONSES TO PHQ QUESTIONS 1-9: 2

## 2025-02-10 ENCOUNTER — TELEPHONE (OUTPATIENT)
Dept: PAIN MANAGEMENT | Age: 45
End: 2025-02-10

## 2025-02-10 ENCOUNTER — OFFICE VISIT (OUTPATIENT)
Dept: FAMILY MEDICINE CLINIC | Age: 45
End: 2025-02-10
Payer: COMMERCIAL

## 2025-02-10 VITALS
TEMPERATURE: 98.6 F | BODY MASS INDEX: 36.6 KG/M2 | DIASTOLIC BLOOD PRESSURE: 86 MMHG | HEART RATE: 91 BPM | WEIGHT: 285.2 LBS | SYSTOLIC BLOOD PRESSURE: 136 MMHG | OXYGEN SATURATION: 94 % | HEIGHT: 74 IN

## 2025-02-10 DIAGNOSIS — I10 ESSENTIAL HYPERTENSION: ICD-10-CM

## 2025-02-10 DIAGNOSIS — Z12.11 SCREENING FOR COLON CANCER: ICD-10-CM

## 2025-02-10 DIAGNOSIS — J45.40 MODERATE PERSISTENT ASTHMA WITHOUT COMPLICATION: ICD-10-CM

## 2025-02-10 DIAGNOSIS — R53.83 OTHER FATIGUE: ICD-10-CM

## 2025-02-10 DIAGNOSIS — M54.42 CHRONIC MIDLINE LOW BACK PAIN WITH BILATERAL SCIATICA: ICD-10-CM

## 2025-02-10 DIAGNOSIS — G89.29 CHRONIC MIDLINE LOW BACK PAIN WITH BILATERAL SCIATICA: ICD-10-CM

## 2025-02-10 DIAGNOSIS — F33.42 RECURRENT MAJOR DEPRESSIVE DISORDER, IN FULL REMISSION (HCC): ICD-10-CM

## 2025-02-10 DIAGNOSIS — E78.2 MIXED HYPERLIPIDEMIA: ICD-10-CM

## 2025-02-10 DIAGNOSIS — Z12.5 PROSTATE CANCER SCREENING: ICD-10-CM

## 2025-02-10 DIAGNOSIS — M54.41 CHRONIC MIDLINE LOW BACK PAIN WITH BILATERAL SCIATICA: ICD-10-CM

## 2025-02-10 DIAGNOSIS — Z00.00 ENCOUNTER FOR WELL ADULT EXAM WITHOUT ABNORMAL FINDINGS: Primary | ICD-10-CM

## 2025-02-10 DIAGNOSIS — R73.9 HYPERGLYCEMIA: ICD-10-CM

## 2025-02-10 PROBLEM — Z28.21 REFUSED PNEUMOCOCCAL VACCINATION: Status: RESOLVED | Noted: 2019-05-24 | Resolved: 2025-02-10

## 2025-02-10 PROBLEM — Z28.21 COVID-19 VACCINATION DECLINED: Status: RESOLVED | Noted: 2022-04-08 | Resolved: 2025-02-10

## 2025-02-10 PROBLEM — Z28.21 PNEUMOCOCCAL VACCINATION DECLINED: Status: RESOLVED | Noted: 2022-04-08 | Resolved: 2025-02-10

## 2025-02-10 PROCEDURE — 3075F SYST BP GE 130 - 139MM HG: CPT | Performed by: NURSE PRACTITIONER

## 2025-02-10 PROCEDURE — 3079F DIAST BP 80-89 MM HG: CPT | Performed by: NURSE PRACTITIONER

## 2025-02-10 PROCEDURE — 99396 PREV VISIT EST AGE 40-64: CPT | Performed by: NURSE PRACTITIONER

## 2025-02-10 PROCEDURE — 99214 OFFICE O/P EST MOD 30 MIN: CPT | Performed by: NURSE PRACTITIONER

## 2025-02-10 SDOH — ECONOMIC STABILITY: FOOD INSECURITY: WITHIN THE PAST 12 MONTHS, THE FOOD YOU BOUGHT JUST DIDN'T LAST AND YOU DIDN'T HAVE MONEY TO GET MORE.: NEVER TRUE

## 2025-02-10 SDOH — ECONOMIC STABILITY: FOOD INSECURITY: WITHIN THE PAST 12 MONTHS, YOU WORRIED THAT YOUR FOOD WOULD RUN OUT BEFORE YOU GOT MONEY TO BUY MORE.: NEVER TRUE

## 2025-02-10 ASSESSMENT — ENCOUNTER SYMPTOMS
SHORTNESS OF BREATH: 0
DIARRHEA: 0
NAUSEA: 0
ABDOMINAL PAIN: 0
APNEA: 1
COUGH: 0
VOMITING: 0
BACK PAIN: 0
CONSTIPATION: 0
ABDOMINAL DISTENTION: 0
CHEST TIGHTNESS: 0
WHEEZING: 1

## 2025-02-10 NOTE — PROGRESS NOTES
Well Adult Note  Name: Pa Cordova Today’s Date: 2/10/2025   MRN: [id number] Sex: Male   Age: 44 y.o. Ethnicity:  /    : 1980 Race:  /       Pa Cordova is here for a well adult exam.       Assessment & Plan   Encounter for well adult exam without abnormal findings  Essential hypertension  -Controlled  -No change in plan of care  -     CBC; Future  -     Comprehensive Metabolic Panel; Future  -     Uric Acid; Future  Prostate cancer screening  -     PSA Screening; Future  Mixed hyperlipidemia  -Improved according recent blood work  -States he has been missing doses here and there for his Lipitor but has been working hard on lifestyle modifications  -His goal is to eventually come off of the medication  -Continue lifestyle modifications, weight loss advised  -Patient would like to cut back to 20 mg and see how he does with this, see what his next lipid panel shows  -     Lipid Panel; Future  Chronic midline low back pain with bilateral sciatica  -Chronic condition  -No red flag symptoms  -No change in characteristics  -Continue to follow with neurosurgery as well as pain management  Other fatigue  -Unchanged  -Repeat blood work ordered  -     CBC; Future  -     Comprehensive Metabolic Panel; Future  -     Vitamin D 25 Hydroxy; Future  -     Vitamin B12 & Folate; Future  -     Magnesium; Future  -     TSH; Future  Hyperglycemia  -Unsure if improved or not  -Lab work ordered  -     Hemoglobin A1C; Future  Screening for colon cancer  -     POCT FECAL IMMUNOCHEMICAL TEST (FIT); Future  Asthma, Moderate persistent asthma without complication  -Patient reports not well-controlled  -States the Dulera is not working well for him  -Was given a sample from his pulmonologist but cannot remember the name, believes it may have been Advair  -States he is going to reach out to the pulmonology office for refill  Recurrent major depressive disorder, in full remission (HCC)  -Chronic

## 2025-02-10 NOTE — PROGRESS NOTES
Visit Information    Have you changed or started any medications since your last visit including any over-the-counter medicines, vitamins, or herbal medicines? no   Have you stopped taking any of your medications? Is so, why? -  no  Are you having any side effects from any of your medications? - no    Have you seen any other physician or provider since your last visit?  no   Have you had any other diagnostic tests since your last visit?  yes -    Have you been seen in the emergency room and/or had an admission in a hospital since we last saw you?  no   Have you had your routine dental cleaning in the past 6 months?  no     Do you have an active MyChart account? If no, what is the barrier?  Yes    Patient Care Team:  Vandana Novak MD as PCP - General (Family Medicine)  Vandana Novak MD as PCP - Empaneled Provider  Deep Worrell MD as Surgeon (Cardiology)  Douglas Herrera MD as Consulting Physician (Gastroenterology)  Etta Simpson MD as Consulting Physician (Rheumatology)  Megan Zaragoza DO as Surgeon (Neurosurgery)  Baldomero Barajas MD as Anesthesiologist (Pain Management)  Deep Worrell MD as Consulting Physician (Cardiology)  Molina Orellana MD as Consulting Physician (Pulmonology)  Nikita Nunez DO as Consulting Physician (Bariatric Surgery)    Medical History Review  Past Medical, Family, and Social History reviewed and does contribute to the patient presenting condition    Health Maintenance   Topic Date Due    Hepatitis B vaccine (1 of 3 - 19+ 3-dose series) Never done    Flu vaccine (1) Never done    COVID-19 Vaccine (1 - 2024-25 season) Never done    Colorectal Cancer Screen  Never done    Lipids  05/07/2025    Depression Monitoring  02/08/2026    Diabetes screen  05/07/2027    DTaP/Tdap/Td vaccine (2 - Td or Tdap) 08/09/2032    Pneumococcal 0-49 years Vaccine  Completed    Hepatitis C screen  Completed    HIV screen  Completed    Hepatitis A vaccine  Aged Out    Hib vaccine

## 2025-02-17 DIAGNOSIS — F33.42 RECURRENT MAJOR DEPRESSIVE DISORDER, IN FULL REMISSION: ICD-10-CM

## 2025-02-17 DIAGNOSIS — F40.10 SOCIAL ANXIETY DISORDER: ICD-10-CM

## 2025-02-17 RX ORDER — BUSPIRONE HYDROCHLORIDE 10 MG/1
10 TABLET ORAL 3 TIMES DAILY PRN
Qty: 270 TABLET | Refills: 0 | Status: SHIPPED | OUTPATIENT
Start: 2025-02-17

## 2025-02-17 NOTE — TELEPHONE ENCOUNTER
Please Approve or Refuse.  Send to Pharmacy per Pt's Request: pj     Next Visit Date:  8/12/2025   Last Visit Date: 2/10/2025    Hemoglobin A1C (%)   Date Value   05/07/2024 5.4   04/12/2023 5.6   04/08/2022 5.5             ( goal A1C is < 7)   BP Readings from Last 3 Encounters:   02/10/25 136/86   10/02/24 127/84   08/23/24 130/64          (goal 120/80)  BUN   Date Value Ref Range Status   05/07/2024 14 6 - 20 mg/dL Final     Creatinine   Date Value Ref Range Status   05/07/2024 0.9 0.7 - 1.2 mg/dL Final     Potassium   Date Value Ref Range Status   05/07/2024 4.2 3.7 - 5.3 mmol/L Final

## 2025-02-19 DIAGNOSIS — I10 ESSENTIAL HYPERTENSION: ICD-10-CM

## 2025-02-19 DIAGNOSIS — F33.42 RECURRENT MAJOR DEPRESSIVE DISORDER, IN FULL REMISSION: ICD-10-CM

## 2025-02-19 DIAGNOSIS — F40.10 SOCIAL ANXIETY DISORDER: ICD-10-CM

## 2025-02-19 DIAGNOSIS — J45.41 MODERATE PERSISTENT ASTHMA WITH ACUTE EXACERBATION: ICD-10-CM

## 2025-02-19 RX ORDER — MONTELUKAST SODIUM 10 MG/1
10 TABLET ORAL NIGHTLY
Qty: 90 TABLET | Refills: 3 | Status: SHIPPED | OUTPATIENT
Start: 2025-02-19

## 2025-02-19 RX ORDER — BUPROPION HYDROCHLORIDE 150 MG/1
150 TABLET ORAL EVERY MORNING
Qty: 90 TABLET | Refills: 3 | Status: SHIPPED | OUTPATIENT
Start: 2025-02-19

## 2025-02-19 RX ORDER — AMLODIPINE BESYLATE 10 MG/1
10 TABLET ORAL DAILY
Qty: 90 TABLET | Refills: 3 | Status: SHIPPED | OUTPATIENT
Start: 2025-02-19

## 2025-03-10 ENCOUNTER — HOSPITAL ENCOUNTER (EMERGENCY)
Age: 45
Discharge: HOME | End: 2025-03-10
Attending: EMERGENCY MEDICINE
Payer: COMMERCIAL

## 2025-03-10 ENCOUNTER — APPOINTMENT (OUTPATIENT)
Dept: GENERAL RADIOLOGY | Age: 45
End: 2025-03-10
Payer: COMMERCIAL

## 2025-03-10 ENCOUNTER — TELEPHONE (OUTPATIENT)
Dept: FAMILY MEDICINE CLINIC | Age: 45
End: 2025-03-10

## 2025-03-10 VITALS
TEMPERATURE: 97.6 F | RESPIRATION RATE: 17 BRPM | DIASTOLIC BLOOD PRESSURE: 75 MMHG | BODY MASS INDEX: 34.65 KG/M2 | OXYGEN SATURATION: 99 % | WEIGHT: 270 LBS | HEART RATE: 89 BPM | SYSTOLIC BLOOD PRESSURE: 134 MMHG | HEIGHT: 74 IN

## 2025-03-10 DIAGNOSIS — J45.901 EXACERBATION OF ASTHMA, UNSPECIFIED ASTHMA SEVERITY, UNSPECIFIED WHETHER PERSISTENT: Primary | ICD-10-CM

## 2025-03-10 LAB
EKG ATRIAL RATE: 91 BPM
EKG P AXIS: 38 DEGREES
EKG P-R INTERVAL: 170 MS
EKG Q-T INTERVAL: 362 MS
EKG QRS DURATION: 84 MS
EKG QTC CALCULATION (BAZETT): 445 MS
EKG R AXIS: 47 DEGREES
EKG T AXIS: 106 DEGREES
EKG VENTRICULAR RATE: 91 BPM

## 2025-03-10 PROCEDURE — 6370000000 HC RX 637 (ALT 250 FOR IP): Performed by: EMERGENCY MEDICINE

## 2025-03-10 PROCEDURE — 93005 ELECTROCARDIOGRAM TRACING: CPT | Performed by: EMERGENCY MEDICINE

## 2025-03-10 PROCEDURE — 71045 X-RAY EXAM CHEST 1 VIEW: CPT

## 2025-03-10 PROCEDURE — 99284 EMERGENCY DEPT VISIT MOD MDM: CPT

## 2025-03-10 PROCEDURE — 93010 ELECTROCARDIOGRAM REPORT: CPT | Performed by: INTERNAL MEDICINE

## 2025-03-10 PROCEDURE — 94640 AIRWAY INHALATION TREATMENT: CPT

## 2025-03-10 PROCEDURE — 94761 N-INVAS EAR/PLS OXIMETRY MLT: CPT

## 2025-03-10 RX ORDER — PREDNISONE 50 MG/1
50 TABLET ORAL DAILY
Qty: 5 TABLET | Refills: 0 | Status: SHIPPED | OUTPATIENT
Start: 2025-03-10 | End: 2025-03-15

## 2025-03-10 RX ORDER — IPRATROPIUM BROMIDE AND ALBUTEROL SULFATE 2.5; .5 MG/3ML; MG/3ML
1 SOLUTION RESPIRATORY (INHALATION)
Status: DISCONTINUED | OUTPATIENT
Start: 2025-03-10 | End: 2025-03-10 | Stop reason: HOSPADM

## 2025-03-10 RX ORDER — PREDNISONE 20 MG/1
40 TABLET ORAL ONCE
Status: COMPLETED | OUTPATIENT
Start: 2025-03-10 | End: 2025-03-10

## 2025-03-10 RX ADMIN — IPRATROPIUM BROMIDE AND ALBUTEROL SULFATE 1 DOSE: .5; 2.5 SOLUTION RESPIRATORY (INHALATION) at 08:00

## 2025-03-10 RX ADMIN — IPRATROPIUM BROMIDE AND ALBUTEROL SULFATE 1 DOSE: .5; 2.5 SOLUTION RESPIRATORY (INHALATION) at 08:10

## 2025-03-10 RX ADMIN — PREDNISONE 40 MG: 20 TABLET ORAL at 07:38

## 2025-03-10 ASSESSMENT — ENCOUNTER SYMPTOMS
COUGH: 1
EYE DISCHARGE: 0
SHORTNESS OF BREATH: 1
VOMITING: 0
FACIAL SWELLING: 0
WHEEZING: 1
NAUSEA: 0
CHEST TIGHTNESS: 1
SORE THROAT: 0
EYE REDNESS: 0
EYE PAIN: 0
BLOOD IN STOOL: 0
COLOR CHANGE: 0
TROUBLE SWALLOWING: 0
BACK PAIN: 0
SINUS PRESSURE: 0
ABDOMINAL PAIN: 0
CONSTIPATION: 0
RHINORRHEA: 0
DIARRHEA: 0

## 2025-03-10 ASSESSMENT — LIFESTYLE VARIABLES
HOW MANY STANDARD DRINKS CONTAINING ALCOHOL DO YOU HAVE ON A TYPICAL DAY: PATIENT DOES NOT DRINK
HOW OFTEN DO YOU HAVE A DRINK CONTAINING ALCOHOL: NEVER

## 2025-03-10 ASSESSMENT — PAIN - FUNCTIONAL ASSESSMENT: PAIN_FUNCTIONAL_ASSESSMENT: NONE - DENIES PAIN

## 2025-03-10 NOTE — TELEPHONE ENCOUNTER
[facility] ED Follow up Call    Reason for ED visit:  Exacerbation of asthma, unspecified asthma severity, unspecified whether persistent            FU appts/Provider:    Future Appointments   Date Time Provider Department Center   8/12/2025  2:00 PM Vandana Novak MD [facility] DEP       VOICEMAIL DOCUMENTATION - ERASE IF NOT USED  Hi, this message is for  TORY  This is AYAKA from Vandana Bourne office. Just calling to see how you are doing after your recent visit to the Emergency Room. Vandana Bourne wants to make sure you were able to fill any prescriptions and that you understand your discharge instructions. Please return our call if you need to make a follow up appointment with your provider or have any further needs.   Our phone number is [phone]*. Have a great day.

## 2025-03-10 NOTE — ED PROVIDER NOTES
eMERGENCY dEPARTMENT eNCOUnter      Pt Name: Pa Cordova  MRN: [id number]  Birthdate 1980  Date of evaluation: 3/10/25      CHIEF COMPLAINT       Chief Complaint   Patient presents with    Shortness of Breath         HISTORY OF PRESENT ILLNESS    Pa Cordova is a 44 y.o. male who presents complaining of shortness of breath.  Patient states has been struggling with shortness of breath for the past month.  Patient does have a history of asthma and states for a while he felt like he was keeping a pretty well under control with his breathing treatments but then today just really stopped helping.  Patient states he has had an increase in cough.  Patient denies any chest pain pain or swelling in the legs or fevers.  Patient does have a prior history of atrial fibrillation also.  Patient reports having a heart attack in the past.      REVIEW OF SYSTEMS       Review of Systems   Constitutional:  Negative for activity change, appetite change, chills, diaphoresis and fever.   HENT:  Negative for congestion, ear pain, facial swelling, nosebleeds, rhinorrhea, sinus pressure, sore throat and trouble swallowing.    Eyes:  Negative for pain, discharge and redness.   Respiratory:  Positive for cough, chest tightness, shortness of breath and wheezing.    Cardiovascular:  Negative for chest pain, palpitations and leg swelling.   Gastrointestinal:  Negative for abdominal pain, blood in stool, constipation, diarrhea, nausea and vomiting.   Genitourinary:  Negative for difficulty urinating, dysuria, flank pain, frequency, genital sores and hematuria.   Musculoskeletal:  Negative for arthralgias, back pain, gait problem, joint swelling, myalgias and neck pain.   Skin:  Negative for color change, pallor, rash and wound.   Neurological:  Negative for dizziness, tremors, seizures, syncope, speech difficulty, weakness, numbness and headaches.   Psychiatric/Behavioral:  Negative for confusion, decreased concentration, hallucinations,

## 2025-03-11 ENCOUNTER — TELEPHONE (OUTPATIENT)
Dept: FAMILY MEDICINE CLINIC | Age: 45
End: 2025-03-11

## 2025-03-11 NOTE — TELEPHONE ENCOUNTER
[facility] ED Follow up Call    Reason for ED visit:  Exacerbation of asthma, unspecified asthma severity, unspecified whether persistent                FU appts/Provider:           Future Appointments   Date Time Provider Department Center   8/12/2025  2:00 PM Vandana Novak MD [facility] DEP         VOICEMAIL DOCUMENTATION - ERASE IF NOT USED  Hi, this message is for  TORY  This is AYAKA from Vandana Bourne office. Just calling to see how you are doing after your recent visit to the Emergency Room. Vandana Bourne wants to make sure you were able to fill any prescriptions and that you understand your discharge instructions. Please return our call if you need to make a follow up appointment with your provider or have any further needs.   Our phone number is [phone]*. Have a great day.

## 2025-03-12 ENCOUNTER — TELEPHONE (OUTPATIENT)
Dept: FAMILY MEDICINE CLINIC | Age: 45
End: 2025-03-12

## 2025-03-12 NOTE — TELEPHONE ENCOUNTER
[facility] ED Follow up Call    Reason for ED visit:    3/10/2025 (1 hours)  [facility]     Kevin Rai MD  Last attending  Treatment team Exacerbation of asthma, unspecified asthma severity, unspecified whether persistent  Clinical impression Shortness of Breath  Chief complaint       Isidoro Winn ,     This is Petra Brand from Vandana Mak's office, just calling to see how you are doing after your recent ED visit.       FU appts/Provider:      Future Appointments   Date Time Provider Department Center   8/12/2025  2:00 PM Vandana Novak MD [facility] DEP       VOICEMAIL DOCUMENTATION - ERASE IF NOT USED  Hi, this message is for  Pa    This is Petra Brand from Vandana Bourne office. Just calling to see how you are doing after your recent visit to the Emergency Room. Vandana Bourne wants to make sure you were able to fill any prescriptions and that you understand your discharge instructions. Please return our call if you need to make a follow up appointment with your provider or have any further needs.   Our phone number is [phone].     Have a great day!

## 2025-03-17 DIAGNOSIS — J45.41 MODERATE PERSISTENT ASTHMA WITH ACUTE EXACERBATION: ICD-10-CM

## 2025-03-17 DIAGNOSIS — J45.40 MODERATE PERSISTENT ASTHMA WITHOUT COMPLICATION: Primary | ICD-10-CM

## 2025-03-17 RX ORDER — ALBUTEROL SULFATE 0.83 MG/ML
2.5 SOLUTION RESPIRATORY (INHALATION) EVERY 6 HOURS PRN
Qty: 120 EACH | Refills: 3 | Status: SHIPPED | OUTPATIENT
Start: 2025-03-17

## 2025-03-17 NOTE — TELEPHONE ENCOUNTER
Please Approve or Refuse.  Send to Pharmacy per Pt's Request:      Next Visit Date:  8/12/2025   Last Visit Date: 2/10/2025    Hemoglobin A1C (%)   Date Value   05/07/2024 5.4   04/12/2023 5.6   04/08/2022 5.5             ( goal A1C is < 7)   BP Readings from Last 3 Encounters:   03/10/25 134/75   02/10/25 136/86   10/02/24 127/84          (goal 120/80)  BUN   Date Value Ref Range Status   05/07/2024 14 6 - 20 mg/dL Final     Creatinine   Date Value Ref Range Status   05/07/2024 0.9 0.7 - 1.2 mg/dL Final     Potassium   Date Value Ref Range Status   05/07/2024 4.2 3.7 - 5.3 mmol/L Final

## 2025-04-07 DIAGNOSIS — K21.00 GASTROESOPHAGEAL REFLUX DISEASE WITH ESOPHAGITIS WITHOUT HEMORRHAGE: ICD-10-CM

## 2025-04-07 RX ORDER — OMEPRAZOLE 40 MG/1
40 CAPSULE, DELAYED RELEASE ORAL
Qty: 90 CAPSULE | Refills: 0 | Status: SHIPPED | OUTPATIENT
Start: 2025-04-07

## 2025-04-11 ENCOUNTER — TELEPHONE (OUTPATIENT)
Dept: BARIATRICS/WEIGHT MGMT | Age: 45
End: 2025-04-11

## 2025-04-11 NOTE — TELEPHONE ENCOUNTER
Patients insurance changed. He still has [employer] but through a different company    ID # [id number]  Group  [id number]

## 2025-04-16 ENCOUNTER — TELEPHONE (OUTPATIENT)
Dept: BARIATRICS/WEIGHT MGMT | Age: 45
End: 2025-04-16

## 2025-04-16 NOTE — TELEPHONE ENCOUNTER
Patient will be a re-start! He is PAID IN FULL! Needs \"6\" monthly consecutive visits per insurance company    Online Info Session Completed:  on 4/5/25 okay to schedule with Dr. Nunez    Verified Insurance Benefit   with bc/bs    Patient informed the following:    This is NOT a guarantee of payment  When stating that you have a “Benefit” or “Coverage” for Bariatric Surgery - that means that you may qualify for the surgery  Bariatric Surgery is considered an elective procedure, patient is responsible to know their benefits . Any information we obtain when calling your insurance  is not  a guarantee of  coverage  and/or  benefit.      Appointment Note :   New Patient , bc/joselo,   6   month visits,  PG Fee $200,  Mailed Packet or advised to arrive  early      Remind Patient of $200 Program fee with $ 100 required at Second visit with office on initial dietician visit.     Remind Patient they must be nicotine free. They will be tested at the beginning of the program and prior to surgery.      Advise Patient Responsible for out of pocket, copay at medical visits, Deductible and coinsurance applied to medical visits and procedure.    You will be responsible for any of the following:  Copays   Deductibles 400.0  Co insurances 2500.00    The items mentioned above are indicated or required by your insurance plan. Your deductible and coinsurance are applied to medical visits and procedures.       Verified with patient if he or she has had any previous bariatric surgery? no  ( If yes ,advise patient of transfer of care process and program fee)       .

## 2025-05-23 ENCOUNTER — OFFICE VISIT (OUTPATIENT)
Dept: BARIATRICS/WEIGHT MGMT | Age: 45
End: 2025-05-23
Payer: COMMERCIAL

## 2025-05-23 VITALS
WEIGHT: 285 LBS | HEIGHT: 72 IN | SYSTOLIC BLOOD PRESSURE: 136 MMHG | HEART RATE: 84 BPM | OXYGEN SATURATION: 98 % | DIASTOLIC BLOOD PRESSURE: 80 MMHG | BODY MASS INDEX: 38.6 KG/M2

## 2025-05-23 DIAGNOSIS — I10 ESSENTIAL HYPERTENSION: ICD-10-CM

## 2025-05-23 DIAGNOSIS — G47.33 OSA ON CPAP: ICD-10-CM

## 2025-05-23 DIAGNOSIS — K21.9 GASTROESOPHAGEAL REFLUX DISEASE WITHOUT ESOPHAGITIS: Primary | ICD-10-CM

## 2025-05-23 DIAGNOSIS — E78.2 MIXED HYPERLIPIDEMIA: ICD-10-CM

## 2025-05-23 PROCEDURE — 99213 OFFICE O/P EST LOW 20 MIN: CPT | Performed by: SURGERY

## 2025-05-23 PROCEDURE — 3075F SYST BP GE 130 - 139MM HG: CPT | Performed by: SURGERY

## 2025-05-23 PROCEDURE — 3079F DIAST BP 80-89 MM HG: CPT | Performed by: SURGERY

## 2025-05-23 NOTE — PROGRESS NOTES
[facility] INVASIVE BARIATRIC SURG  [address]  [address]  [place] [id number]  Dept: [phone]    SURGICAL WEIGHT MANAGEMENT PROGRAM  PROGRESS NOTE      Patient: Pa Cordova        Service Date: 5/23/2025      HPI:     Chief Complaint   Patient presents with    Weight Loss     Visit 1 of 6 for Sleeve       The patient is a pleasant 44 y.o. year old male  with morbid obesity, who stands Height: 182.9 cm (6') tall with a weight of Weight - Scale: 129.3 kg (285 lb) , resulting in a BMI of Body mass index is 38.65 kg/m².. The patient suffers from multiple co-morbidities as a result of morbid obesity, including: HTN, HLD, NOEMÍ, GERD, Asthma, MVP, Seronegative Polyarthritis. He has suffered from obesity for many years. He was previously on the bariatric track, but his insurance changed so he wasn't able to make it to surgery.    He has a history of RA. This is well controlled. He is not on methotrexate or prednisone currently.    The patient denies  a history of myocardial infarction, deep vein thrombosis, pulmonary embolism, renal failure, hepatic failure, and stroke.    The patient has failed multiple attempts at non-surgical weight loss, and is now seeking surgical intervention to promote permanent and consistent weight loss. He  has chosen Sleeve Gastrectomy.  He is well educated regarding it, as he has recently viewed our weight loss surgery informational seminar .     Medical History:  Past Medical History:   Diagnosis Date    Abnormal EKG     Acute arthritis     Allergies     Anxiety     Asthma     Atrial fibrillation (HCC)     Chest pain     COPD (chronic obstructive pulmonary disease) (HCC) 11/19/2018    COVID-19 10/03/2021    Admitted to hospital, COVID PNA    Depression     Erectile dysfunction 09/29/2017    Essential hypertension 01/02/2017    Family history of premature CAD 11/19/2018    GERD (gastroesophageal reflux disease) 11/19/2018    Heart 
less.   [] []    [] 7 I will eliminate sugary beverages.   [] []    [] 8 I will eliminate carbonated beverages.   [] []    [] 9 I will eliminate drinking with a straw.   [] []    [] 10 I will limit caffeinated beverages to 16oz daily.   [] []    [x] 11 I will log my exercise daily.   [] []    [] 12 I will determine my calcium and mvi plan.   [] []    [x] 13 I will have 1-2 servings of lean protein present at each meal and snack. [] []    [x] 14 I will eat every 3-5 hours.   [] [] Patient reports currently doing intermittent fasting   [] 15 I will drink 64oz of fluid daily.   [] [x] >120 oz   [] 16 I will eat slowly during meals and snacks.   [] []    [] 17 I will limit fluids to 4oz before, after, and during meals. [] []    [] 18 I will eat protein first at all meals followed by vegetables, fruit, and lastly whole grains. [] []      Monitoring and Evaluation:    [x] Continue to follow monthly and review goals set above.  [] Nutrition visits complete     Brayden Arias, RD, LD  Clinical Bariatric Dietitian  [facility]  [phone]

## 2025-06-27 ENCOUNTER — OFFICE VISIT (OUTPATIENT)
Dept: BARIATRICS/WEIGHT MGMT | Age: 45
End: 2025-06-27
Payer: COMMERCIAL

## 2025-06-27 VITALS
DIASTOLIC BLOOD PRESSURE: 74 MMHG | HEIGHT: 72 IN | OXYGEN SATURATION: 98 % | SYSTOLIC BLOOD PRESSURE: 122 MMHG | HEART RATE: 99 BPM | WEIGHT: 281 LBS | BODY MASS INDEX: 38.06 KG/M2

## 2025-06-27 DIAGNOSIS — E78.2 MIXED HYPERLIPIDEMIA: ICD-10-CM

## 2025-06-27 DIAGNOSIS — K21.9 GERD WITHOUT ESOPHAGITIS: ICD-10-CM

## 2025-06-27 DIAGNOSIS — G47.33 OSA ON CPAP: ICD-10-CM

## 2025-06-27 DIAGNOSIS — E66.01 SEVERE OBESITY (BMI 35.0-39.9) WITH COMORBIDITY (HCC): ICD-10-CM

## 2025-06-27 DIAGNOSIS — I10 ESSENTIAL HYPERTENSION: Primary | ICD-10-CM

## 2025-06-27 PROCEDURE — 99213 OFFICE O/P EST LOW 20 MIN: CPT | Performed by: SURGERY

## 2025-06-27 PROCEDURE — 3074F SYST BP LT 130 MM HG: CPT | Performed by: SURGERY

## 2025-06-27 PROCEDURE — 3078F DIAST BP <80 MM HG: CPT | Performed by: SURGERY

## 2025-06-27 RX ORDER — FLUTICASONE FUROATE, UMECLIDINIUM BROMIDE AND VILANTEROL TRIFENATATE 200; 62.5; 25 UG/1; UG/1; UG/1
1 POWDER RESPIRATORY (INHALATION) DAILY
COMMUNITY
Start: 2025-05-01

## 2025-06-27 RX ORDER — TRAZODONE HYDROCHLORIDE 100 MG/1
100 TABLET ORAL NIGHTLY
COMMUNITY
Start: 2025-05-23

## 2025-06-27 NOTE — PROGRESS NOTES
[facility] INVASIVE BARIATRIC SURG  [address]  [address]  [place] [id number]  Dept: [phone]    SURGICAL WEIGHT MANAGEMENT PROGRAM  PROGRESS NOTE      Patient: Pa Cordova        Service Date: 6/27/2025      HPI:     Chief Complaint   Patient presents with    Weight Loss     Restart visit 2 of 6 for sleeve       The patient is a pleasant 44 y.o. year old male  with morbid obesity, who stands Height: 182.9 cm (6') tall with a weight of Weight - Scale: 127.5 kg (281 lb) , resulting in a BMI of Body mass index is 38.11 kg/m².. The patient suffers from multiple co-morbidities as a result of morbid obesity, including: hypertension, hyperlipidemia, NOEMÍ, GERD, Asthma, MVP, and Seronegative Polyarthritis. He has suffered from obesity for many years. He was previously on the bariatric track, but his insurance changed so he wasn't able to make it to surgery. Today, he reports for his 2nd of 6 monthly visits. His start weight was 285 lbs and he has lost 4 lbs since then by sticking to a good diet and exercise routine. 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 He states that he has abdominal bruising and pain which he received a CT scan for. Aside from this, he is doing well and has no new questions regarding the surgery.     He has a history of RA. This is well controlled. He is not on methotrexate or prednisone currently. The patient denies  a history of myocardial infarction, deep vein thrombosis, pulmonary embolism, renal failure, hepatic failure, and stroke.    The patient has failed multiple attempts at non-surgical weight loss, and is now seeking surgical intervention to promote permanent and consistent weight loss. He  has chosen Sleeve Gastrectomy.  He is well educated regarding it, as he has recently viewed our weight loss surgery informational seminar .     Medical 
per week or less.   [] [x]    [] 7 I will eliminate sugary beverages.   [] [x]    [] 8 I will eliminate carbonated beverages.   [] [x]    [] 9 I will eliminate drinking with a straw.   [] [x]    [] 10 I will limit caffeinated beverages to 16oz daily.   [] [x]    [x] 11 I will log my exercise daily.   [] [] Walking and lifting weights   [] 12 I will determine my calcium and mvi plan.   [] []    [] 13 I will have 1-2 servings of lean protein present at each meal and snack. [x] []    [] 14 I will eat every 3-5 hours.   [x] [] Patient reports currently doing intermittent fasting   [] 15 I will drink 64oz of fluid daily.   [] [x] >120 oz   [x] 16 I will eat slowly during meals and snacks.   [] []    [] 17 I will limit fluids to 4oz before, after, and during meals. [] []    [] 18 I will eat protein first at all meals followed by vegetables, fruit, and lastly whole grains. [] []      [x] Consistent goal achievement in the program thus far and further success with goals is expected.     [] Limited but forward progression at this time, still needs to focus on a number of goals to be successful after surgery.   [] Unable to consistently make progress in goal achievement. At this time patient is not moving forward in developing the skills needed for success after surgery.     Monitoring and Evaluation:    [x] Continue to follow monthly and review goals set above.  [] Nutrition visits complete     Brayden Arias, RD, LD  Clinical Bariatric Dietitian  [facility]  [phone]

## 2025-07-21 ENCOUNTER — OFFICE VISIT (OUTPATIENT)
Dept: BARIATRICS/WEIGHT MGMT | Age: 45
End: 2025-07-21
Payer: COMMERCIAL

## 2025-07-21 ENCOUNTER — HOSPITAL ENCOUNTER (OUTPATIENT)
Age: 45
Discharge: HOME | End: 2025-07-21
Payer: COMMERCIAL

## 2025-07-21 VITALS
HEIGHT: 72 IN | BODY MASS INDEX: 37.25 KG/M2 | WEIGHT: 275 LBS | OXYGEN SATURATION: 93 % | HEART RATE: 70 BPM | DIASTOLIC BLOOD PRESSURE: 70 MMHG | SYSTOLIC BLOOD PRESSURE: 102 MMHG

## 2025-07-21 DIAGNOSIS — F32.A ANXIETY AND DEPRESSION: ICD-10-CM

## 2025-07-21 DIAGNOSIS — I10 ESSENTIAL HYPERTENSION: ICD-10-CM

## 2025-07-21 DIAGNOSIS — M51.369 DEGENERATION OF INTERVERTEBRAL DISC OF LUMBAR REGION, UNSPECIFIED WHETHER PAIN PRESENT: Chronic | ICD-10-CM

## 2025-07-21 DIAGNOSIS — G47.33 OSA ON CPAP: ICD-10-CM

## 2025-07-21 DIAGNOSIS — J45.40 MODERATE PERSISTENT ASTHMA WITHOUT COMPLICATION: ICD-10-CM

## 2025-07-21 DIAGNOSIS — F41.9 ANXIETY AND DEPRESSION: ICD-10-CM

## 2025-07-21 DIAGNOSIS — I10 ESSENTIAL HYPERTENSION: Primary | ICD-10-CM

## 2025-07-21 DIAGNOSIS — I34.1 MITRAL VALVE PROLAPSE: ICD-10-CM

## 2025-07-21 DIAGNOSIS — K21.9 GERD WITHOUT ESOPHAGITIS: ICD-10-CM

## 2025-07-21 DIAGNOSIS — E78.2 MIXED HYPERLIPIDEMIA: ICD-10-CM

## 2025-07-21 DIAGNOSIS — M13.0 SERONEGATIVE POLYARTHRITIS: ICD-10-CM

## 2025-07-21 LAB
25(OH)D3 SERPL-MCNC: 58.5 NG/ML (ref 30–100)
ALBUMIN SERPL-MCNC: 4.4 G/DL (ref 3.5–5.2)
ALP SERPL-CCNC: 131 U/L (ref 40–129)
ALT SERPL-CCNC: 60 U/L (ref 10–50)
AMPHET UR QL SCN: NEGATIVE
ANION GAP SERPL CALCULATED.3IONS-SCNC: 12 MMOL/L (ref 9–16)
AST SERPL-CCNC: 35 U/L (ref 10–50)
BARBITURATES UR QL SCN: NEGATIVE
BASOPHILS # BLD: 0.08 K/UL (ref 0–0.2)
BASOPHILS NFR BLD: 1 % (ref 0–2)
BENZODIAZ UR QL: NEGATIVE
BILIRUB SERPL-MCNC: 0.7 MG/DL (ref 0–1.2)
BUN SERPL-MCNC: 7 MG/DL (ref 6–20)
CALCIUM SERPL-MCNC: 9.6 MG/DL (ref 8.6–10.4)
CANNABINOIDS UR QL SCN: NEGATIVE
CHLORIDE SERPL-SCNC: 106 MMOL/L (ref 98–107)
CHOLEST SERPL-MCNC: 167 MG/DL (ref 0–199)
CHOLESTEROL/HDL RATIO: 4.2
CO2 SERPL-SCNC: 24 MMOL/L (ref 20–31)
COCAINE UR QL SCN: NEGATIVE
CREAT SERPL-MCNC: 0.9 MG/DL (ref 0.7–1.2)
EOSINOPHIL # BLD: 0.47 K/UL (ref 0–0.44)
EOSINOPHILS RELATIVE PERCENT: 6 % (ref 0–4)
ERYTHROCYTE [DISTWIDTH] IN BLOOD BY AUTOMATED COUNT: 13.4 % (ref 11.5–14.9)
EST. AVERAGE GLUCOSE BLD GHB EST-MCNC: 111 MG/DL
FENTANYL UR QL: NEGATIVE
FERRITIN SERPL-MCNC: 123 NG/ML (ref 30–400)
FOLATE SERPL-MCNC: 10.9 NG/ML (ref 4.8–24.2)
GFR, ESTIMATED: >90 ML/MIN/1.73M2
GLUCOSE SERPL-MCNC: 87 MG/DL (ref 74–99)
HBA1C MFR BLD: 5.5 % (ref 4–6)
HCT VFR BLD AUTO: 51.4 % (ref 41–53)
HDLC SERPL-MCNC: 40 MG/DL
HGB BLD-MCNC: 17 G/DL (ref 13.5–17.5)
IMM GRANULOCYTES # BLD AUTO: 0.03 K/UL (ref 0–0.3)
IMM GRANULOCYTES NFR BLD: 0 %
IRON SATN MFR SERPL: 30 % (ref 20–55)
IRON SERPL-MCNC: 111 UG/DL (ref 61–157)
LDLC SERPL CALC-MCNC: 105 MG/DL (ref 0–100)
LYMPHOCYTES NFR BLD: 2.39 K/UL (ref 1.1–3.7)
LYMPHOCYTES RELATIVE PERCENT: 30 % (ref 24–44)
MAGNESIUM SERPL-MCNC: 2.2 MG/DL (ref 1.6–2.6)
MCH RBC QN AUTO: 29.5 PG (ref 26–34)
MCHC RBC AUTO-ENTMCNC: 33.1 G/DL (ref 31–37)
MCV RBC AUTO: 89.2 FL (ref 80–100)
METHADONE UR QL: NEGATIVE
MONOCYTES NFR BLD: 0.47 K/UL (ref 0.1–1.2)
MONOCYTES NFR BLD: 6 % (ref 3–12)
NEUTROPHILS NFR BLD: 57 % (ref 36–66)
NEUTS SEG NFR BLD: 4.47 K/UL (ref 1.5–8.1)
NRBC BLD-RTO: 0 PER 100 WBC
OPIATES UR QL SCN: NEGATIVE
OXYCODONE UR QL SCN: NEGATIVE
PCP UR QL SCN: NEGATIVE
PLATELET # BLD AUTO: ABNORMAL K/UL (ref 150–450)
PLATELET, FLUORESCENCE: ABNORMAL K/UL (ref 150–450)
PLATELETS.RETICULATED NFR BLD AUTO: 7.5 % (ref 1.1–10.3)
PMV BLD AUTO: 11.4 FL (ref 8–13.5)
POTASSIUM SERPL-SCNC: 4 MMOL/L (ref 3.7–5.3)
PROT SERPL-MCNC: 7.6 G/DL (ref 6.6–8.7)
PTH-INTACT SERPL-MCNC: 49 PG/ML (ref 17.9–58.6)
RBC # BLD AUTO: 5.76 M/UL (ref 4.21–5.77)
SODIUM SERPL-SCNC: 142 MMOL/L (ref 136–145)
T4 FREE SERPL-MCNC: 1.1 NG/DL (ref 0.9–1.7)
TEST INFORMATION: NORMAL
TIBC SERPL-MCNC: 367 UG/DL (ref 250–450)
TRIGL SERPL-MCNC: 110 MG/DL (ref 0–149)
TSH SERPL DL<=0.05 MIU/L-ACNC: 2.19 UIU/ML (ref 0.27–4.2)
UNSATURATED IRON BINDING CAPACITY: 256 UG/DL (ref 112–347)
VIT B12 SERPL-MCNC: 1281 PG/ML (ref 232–1245)
WBC OTHER # BLD: 7.9 K/UL (ref 3.5–11)

## 2025-07-21 PROCEDURE — 80307 DRUG TEST PRSMV CHEM ANLYZR: CPT

## 2025-07-21 PROCEDURE — 83735 ASSAY OF MAGNESIUM: CPT

## 2025-07-21 PROCEDURE — 80061 LIPID PANEL: CPT

## 2025-07-21 PROCEDURE — 82746 ASSAY OF FOLIC ACID SERUM: CPT

## 2025-07-21 PROCEDURE — 82607 VITAMIN B-12: CPT

## 2025-07-21 PROCEDURE — 3078F DIAST BP <80 MM HG: CPT | Performed by: NURSE PRACTITIONER

## 2025-07-21 PROCEDURE — 36415 COLL VENOUS BLD VENIPUNCTURE: CPT

## 2025-07-21 PROCEDURE — 85025 COMPLETE CBC W/AUTO DIFF WBC: CPT

## 2025-07-21 PROCEDURE — 84443 ASSAY THYROID STIM HORMONE: CPT

## 2025-07-21 PROCEDURE — 84439 ASSAY OF FREE THYROXINE: CPT

## 2025-07-21 PROCEDURE — 83540 ASSAY OF IRON: CPT

## 2025-07-21 PROCEDURE — 99214 OFFICE O/P EST MOD 30 MIN: CPT | Performed by: NURSE PRACTITIONER

## 2025-07-21 PROCEDURE — 84590 ASSAY OF VITAMIN A: CPT

## 2025-07-21 PROCEDURE — 83550 IRON BINDING TEST: CPT

## 2025-07-21 PROCEDURE — 84425 ASSAY OF VITAMIN B-1: CPT

## 2025-07-21 PROCEDURE — 82306 VITAMIN D 25 HYDROXY: CPT

## 2025-07-21 PROCEDURE — 82728 ASSAY OF FERRITIN: CPT

## 2025-07-21 PROCEDURE — 83036 HEMOGLOBIN GLYCOSYLATED A1C: CPT

## 2025-07-21 PROCEDURE — 83970 ASSAY OF PARATHORMONE: CPT

## 2025-07-21 PROCEDURE — 80053 COMPREHEN METABOLIC PANEL: CPT

## 2025-07-21 PROCEDURE — 84630 ASSAY OF ZINC: CPT

## 2025-07-21 PROCEDURE — G0480 DRUG TEST DEF 1-7 CLASSES: HCPCS

## 2025-07-21 PROCEDURE — 3074F SYST BP LT 130 MM HG: CPT | Performed by: NURSE PRACTITIONER

## 2025-07-21 NOTE — PROGRESS NOTES
Medical Weight Management Progress Note    Subjective     Patient being seen for medically supervised weight loss for the chronic conditions of hypertension, hyperlipidemia, NOEMÍ, GERD, Asthma, MVP, and Seronegative Polyarthritis. He is working on the behavior changes discussed at the initial appointment. Patient continues on diet plan. Physical activity includes walking and weight lifting.  Weight loss since last visit is 6 lbs.  No current issues.      Letter of medical necessity -  still need. PCP sent medical clearance instead of letter of medical necessity.     EGD - completed on 4/12/2024 - H pylori negative    Labs - ordered on 5/23/25 - not completed     Psych eval - appt with Sandoval on 8/29/2025    Pulmonary clearance - sees Dr. Orellana for NOEMÍ and asthma, he is complaint with CPAP - saw him this morning.     Cardiology clearance - sees [facility], Dr. Worrell. Hx of MVP. Reviewed chart - appears clearance was given last year but he hasn't see cardiology over a year, possibly 2 years. Patient cannot recall when he was seen last..    Rheumatology clearance - cleared in 2024 - needs to be off methotrexate 6 weeks prior to surgery. He states he has been off the methotrexate for 2 years. He states his rheumatology office only has appts on Wednesdays and [place] so it is hard for him to get an appt.    Intro class - not completed    Support group - 5/8/2024    Working toward bariatric surgery:    [x] Sleeve Gastrectomy                                                           [] Derian-en-Y Gastric Bypass    Allergies:  Allergies   Allergen Reactions    Fish-Derived Products Anaphylaxis    Other Anaphylaxis     Any type of seafood    Shellfish Allergy Anaphylaxis    Shrimp (Diagnostic) Anaphylaxis    Ace Inhibitors Swelling     UVULITIS ON 8/15/19    Betadine [Povidone-Iodine] Rash     Allergic reaction topically to betadine from a shot    Iodinated Contrast Media Hives, Itching and Rash     Benadryl resolved

## 2025-07-21 NOTE — PROGRESS NOTES
Medical Nutrition Therapy  Supervised Diet & Exercise Pre-Op   Metabolic and Bariatric Surgery  Visit 3 out of 6    Nutrition Assessment:  Patient's start weight is 285 lbs and current body weight is 275 lbs. Patient is working toward bariatric surgery for Gastric Sleeve    Vitals:   Wt Readings from Last 3 Encounters:   07/21/25 124.7 kg (275 lb)   06/27/25 127.5 kg (281 lb)   05/23/25 129.3 kg (285 lb)     Nutrition Diagnosis:  Knowledge deficit related to healthy behaviors that support weight management after weight loss surgery as evidenced by Body mass index is 37.3 kg/m².    Nutrition Intervention:  Nutrition Prescription:  Bariatric Pre-op Diet    Nutrition Counseling:  Reviewed patients current behaviors and habits and discussed barriers for change. Utilized motivational interviewing to set patient specific goals that promote bariatric behaviors. Patient displays understanding of the goals discussed.     Nutrition Education:   Reviewed and available in the Bariatric Education Binder.                                                Goals: The patient has a list and explanation of every goal in their \"Bariatric Education Binder\". Changes in eating patterns to promote health are noted below. See below for goals that patient is continuing to work on and completed. All goals were planned with and agreed on by the patient.    Patient completed  3 out of 3 goals.  Treatment goals for upcoming month: Continue all previous goals and add #17 and 18        Goal C N/A Notes:   [] 1 I will read the education binder provided to me.    [x] []    [] 2 I will make my psychological evaluation appoinment. [x] []    [x] 3 I will bring my binder to every appointment.   [] []    [] 4 I will eliminate all tobacco/nicotine.   [] [x]    [] 5 I will limit alcoholic beverages to 4-6oz per week.   [] [x]    [] 6 I will limit dining out to 3 times per week or less.   [] [x]    [] 7 I will eliminate sugary beverages.   [] [x]    [] 8 I

## 2025-07-22 ENCOUNTER — TELEPHONE (OUTPATIENT)
Dept: FAMILY MEDICINE CLINIC | Age: 45
End: 2025-07-22

## 2025-07-23 LAB — ZINC SERPL-MCNC: 93.6 UG/DL (ref 60–120)

## 2025-07-24 LAB
RETINYL PALMITATE: <0.02 MG/L (ref 0–0.1)
VIT B1 PYROPHOSHATE BLD-SCNC: 133 NMOL/L (ref 70–180)
VITAMIN A LEVEL: 0.41 MG/L (ref 0.3–1.2)
VITAMIN A, INTERP: NORMAL

## 2025-07-25 LAB
COTININE: <5 NG/ML
NICOTINE: <5 NG/ML

## 2025-08-25 DIAGNOSIS — K21.00 GASTROESOPHAGEAL REFLUX DISEASE WITH ESOPHAGITIS WITHOUT HEMORRHAGE: ICD-10-CM

## 2025-08-25 RX ORDER — OMEPRAZOLE 40 MG/1
40 CAPSULE, DELAYED RELEASE ORAL
Qty: 90 CAPSULE | Refills: 0 | Status: SHIPPED | OUTPATIENT
Start: 2025-08-25

## 2025-08-29 ENCOUNTER — OFFICE VISIT (OUTPATIENT)
Dept: BARIATRICS/WEIGHT MGMT | Age: 45
End: 2025-08-29
Payer: COMMERCIAL

## 2025-08-29 VITALS
WEIGHT: 276 LBS | HEART RATE: 90 BPM | HEIGHT: 72 IN | BODY MASS INDEX: 37.38 KG/M2 | SYSTOLIC BLOOD PRESSURE: 120 MMHG | OXYGEN SATURATION: 94 % | DIASTOLIC BLOOD PRESSURE: 78 MMHG

## 2025-08-29 DIAGNOSIS — K21.9 GERD WITHOUT ESOPHAGITIS: ICD-10-CM

## 2025-08-29 DIAGNOSIS — I10 ESSENTIAL HYPERTENSION: ICD-10-CM

## 2025-08-29 DIAGNOSIS — G47.33 OSA ON CPAP: Primary | ICD-10-CM

## 2025-08-29 DIAGNOSIS — E66.01 SEVERE OBESITY (BMI 35.0-39.9) WITH COMORBIDITY (HCC): ICD-10-CM

## 2025-08-29 DIAGNOSIS — E78.2 MIXED HYPERLIPIDEMIA: ICD-10-CM

## 2025-08-29 DIAGNOSIS — M13.0 SERONEGATIVE POLYARTHRITIS: ICD-10-CM

## 2025-08-29 PROCEDURE — 3074F SYST BP LT 130 MM HG: CPT | Performed by: SURGERY

## 2025-08-29 PROCEDURE — 99213 OFFICE O/P EST LOW 20 MIN: CPT | Performed by: SURGERY

## 2025-08-29 PROCEDURE — 3078F DIAST BP <80 MM HG: CPT | Performed by: SURGERY

## (undated) DEVICE — SOLUTION IV IRRIG LACTATED RINGERS 3000ML 2B7487

## (undated) DEVICE — NEEDLE, QUINCKE, 18GX3.5": Brand: MEDLINE

## (undated) DEVICE — GLOVE ORANGE PI 7   MSG9070

## (undated) DEVICE — GAUZE,SPONGE,4"X4",16PLY,XRAY,STRL,LF: Brand: MEDLINE

## (undated) DEVICE — PROBE ABLAT XL 90DEG ASPIR BPLR RF 1 PC ELECTRD ERGO HNDL

## (undated) DEVICE — 1010 S-DRAPE TOWEL DRAPE 10/BX: Brand: STERI-DRAPE™

## (undated) DEVICE — SINGLE PORT MANIFOLD: Brand: NEPTUNE 2

## (undated) DEVICE — FORCEPS BX L240CM JAW DIA2.8MM L CAP W/ NDL MIC MESH TOOTH

## (undated) DEVICE — FORCEPS BX L240CM WRK CHN 2.8MM STD CAP W/ NDL MIC MESH

## (undated) DEVICE — TUBING PMP L16FT MAIN DISP FOR AR-6400 AR-6475

## (undated) DEVICE — INTENDED FOR TISSUE SEPARATION, AND OTHER PROCEDURES THAT REQUIRE A SHARP SURGICAL BLADE TO PUNCTURE OR CUT.: Brand: BARD-PARKER ® CARBON RIB-BACK BLADES

## (undated) DEVICE — SUTURE PROL SZ 3-0 L18IN NONABSORBABLE BLU L24MM FS-1 3/8 8684G

## (undated) DEVICE — BITEBLOCK 54FR W/ DENT RIM BLOX

## (undated) DEVICE — GOWN,SIRUS,POLYRNF,BRTHSLV,XL,30/CS: Brand: MEDLINE

## (undated) DEVICE — GOWN,POLY REINFORCED,LG: Brand: MEDLINE

## (undated) DEVICE — BANDAGE COMPR W4INXL5YD WHT BGE POLY COT M E WRP WV HK AND

## (undated) DEVICE — DRESSING TRNSPAR W5XL4.5IN FLM SHT SEMIPERMEABLE WIND

## (undated) DEVICE — COVER,TABLE,60X90,STERILE: Brand: MEDLINE

## (undated) DEVICE — SPONGE GZ W4XL4IN RAYON POLY FILL CVR W/ NONWOVEN FAB

## (undated) DEVICE — DRAPE,U/ SHT,SPLIT,PLAS,STERIL: Brand: MEDLINE

## (undated) DEVICE — PERRYSBURG ENDO PACK: Brand: MEDLINE INDUSTRIES, INC.

## (undated) DEVICE — TUBING, SUCTION, 3/16" X 10', STRAIGHT: Brand: MEDLINE

## (undated) DEVICE — JELLY,LUBE,STERILE,FLIP TOP,TUBE,2-OZ: Brand: MEDLINE

## (undated) DEVICE — Device

## (undated) DEVICE — MERCY HEALTH ST CHARLES: Brand: MEDLINE INDUSTRIES, INC.

## (undated) DEVICE — GOWN,SURGICAL,AURORA,SLEEVE: Brand: MEDLINE

## (undated) DEVICE — COVER,MAYO STAND,XL,STERILE: Brand: MEDLINE

## (undated) DEVICE — KIT ELBW FOAM W/ DRP ARM HLDR DISP TRIMANO

## (undated) DEVICE — GLOVE SURG SZ 75 L12IN FNGR THK79MIL GRN LTX FREE

## (undated) DEVICE — COVER,MAYO STAND,STERILE: Brand: MEDLINE

## (undated) DEVICE — STRIP,CLOSURE,WOUND,MEDI-STRIP,1/2X4: Brand: MEDLINE

## (undated) DEVICE — POUCH INSTR W6.75XL11.5IN FRST 2 PKT ADH FOR ORTH AND

## (undated) DEVICE — Device: Brand: DEFENDO VALVE AND CONNECTOR KIT

## (undated) DEVICE — BANDAGE,ELASTIC,ESMARK,STERILE,6"X9',LF: Brand: MEDLINE

## (undated) DEVICE — DEFENDO AIR WATER SUCTION AND BIOPSY VALVE KIT FOR  OLYMPUS: Brand: DEFENDO AIR/WATER/SUCTION AND BIOPSY VALVE

## (undated) DEVICE — STRAP,POSITIONING,KNEE/BODY,FOAM,4X60": Brand: MEDLINE

## (undated) DEVICE — BANDAGE COBAN 4 IN COMPR W4INXL5YD FOAM COHESIVE QUIK STK SELF ADH SFT

## (undated) DEVICE — SYRINGE MED 50ML LUERSLIP TIP

## (undated) DEVICE — BLADE SHV L13CM DIA4MM EXCALIBUR AGG COOLCUT

## (undated) DEVICE — BLADE SAW RESECTOR CUT 4MM

## (undated) DEVICE — GLOVE SURG SZ 65 STD WHT LTX SYN POLYMER BEAD REINF ANTI RL

## (undated) DEVICE — POUCH FLD 36X29IN SHLDR HVY LO GLARE MATTE FINISH FLM 2 SUCT

## (undated) DEVICE — 4-PORT MANIFOLD: Brand: NEPTUNE 2

## (undated) DEVICE — ZIMMER® STERILE DISPOSABLE TOURNIQUET CUFF WITH PLC, DUAL PORT, SINGLE BLADDER, 18 IN. (46 CM)

## (undated) DEVICE — DISC ABSRB YEL FLR POLYETH MGMT SUCT QUICKSUITE

## (undated) DEVICE — SPONGE LAP W18XL18IN WHT COT 4 PLY FLD STRUNG RADPQ DISP ST 2 PER PACK

## (undated) DEVICE — ADAPTER CLEANING PORPOISE CLEANING

## (undated) DEVICE — GOWN,AURORA,NONREINFORCED,LARGE: Brand: MEDLINE

## (undated) DEVICE — SOLUTION IV IRRIG POUR BRL 0.9% SODIUM CHL 2F7124

## (undated) DEVICE — ST CHARLES MINOR ABDOMINAL PK: Brand: MEDLINE INDUSTRIES, INC.

## (undated) DEVICE — SOLUTION IRRIG 1000ML STRL H2O USP PLAS POUR BTL

## (undated) DEVICE — SUTURE VCRL + SZ 4-0 L27IN ABSRB WHT FS-2 3/8 CIR REV CUT VCP422H

## (undated) DEVICE — POLYP TRAP: Brand: TRAPEASE®

## (undated) DEVICE — ERBE NESSY® OMEGA PLATE USA (85+23)CM² , WITH CABLE 3 M: Brand: ERBE

## (undated) DEVICE — DRAPE,T,LAPARO,TRANS,STERILE: Brand: MEDLINE

## (undated) DEVICE — SNARE ENDOSCP L240CM LOOP W13MM DIA2.4MM SHT THROW SM OVL

## (undated) DEVICE — STANDARD HYPODERMIC NEEDLE,POLYPROPYLENE HUB: Brand: MONOJECT

## (undated) DEVICE — SYRINGE MED 50ML LUERLOCK TIP

## (undated) DEVICE — MARKER SURG SKIN GENTIAN VLT REG TIP W/ 6IN RUL